# Patient Record
Sex: FEMALE | Race: WHITE | Employment: UNEMPLOYED | URBAN - METROPOLITAN AREA
[De-identification: names, ages, dates, MRNs, and addresses within clinical notes are randomized per-mention and may not be internally consistent; named-entity substitution may affect disease eponyms.]

---

## 2017-02-14 ENCOUNTER — TRANSFERRED RECORDS (OUTPATIENT)
Dept: HEALTH INFORMATION MANAGEMENT | Facility: CLINIC | Age: 52
End: 2017-02-14

## 2017-02-14 LAB — PAP SMEAR - HIM PATIENT REPORTED: NEGATIVE

## 2017-09-23 ENCOUNTER — RADIANT APPOINTMENT (OUTPATIENT)
Dept: GENERAL RADIOLOGY | Facility: CLINIC | Age: 52
End: 2017-09-23
Attending: PHYSICIAN ASSISTANT
Payer: COMMERCIAL

## 2017-09-23 ENCOUNTER — OFFICE VISIT (OUTPATIENT)
Dept: URGENT CARE | Facility: URGENT CARE | Age: 52
End: 2017-09-23
Payer: COMMERCIAL

## 2017-09-23 VITALS
DIASTOLIC BLOOD PRESSURE: 76 MMHG | HEIGHT: 67 IN | BODY MASS INDEX: 25.11 KG/M2 | HEART RATE: 74 BPM | WEIGHT: 160 LBS | TEMPERATURE: 97.2 F | SYSTOLIC BLOOD PRESSURE: 126 MMHG | RESPIRATION RATE: 16 BRPM

## 2017-09-23 DIAGNOSIS — S99.911A ANKLE INJURY, RIGHT, INITIAL ENCOUNTER: Primary | ICD-10-CM

## 2017-09-23 DIAGNOSIS — S99.911A ANKLE INJURY, RIGHT, INITIAL ENCOUNTER: ICD-10-CM

## 2017-09-23 PROCEDURE — 73610 X-RAY EXAM OF ANKLE: CPT | Mod: RT

## 2017-09-23 PROCEDURE — 99203 OFFICE O/P NEW LOW 30 MIN: CPT | Performed by: PHYSICIAN ASSISTANT

## 2017-09-23 RX ORDER — IBUPROFEN 800 MG/1
800 TABLET, FILM COATED ORAL EVERY 8 HOURS PRN
Qty: 30 TABLET | Refills: 1 | Status: SHIPPED | OUTPATIENT
Start: 2017-09-23 | End: 2018-01-01

## 2017-09-23 NOTE — NURSING NOTE
"Chief Complaint   Patient presents with     Urgent Care     Musculoskeletal Problem     was coming down stairs last night and missed a step. injury to right ankle.        Initial /76  Pulse 74  Temp 97.2  F (36.2  C) (Oral)  Resp 16  Ht 5' 7\" (1.702 m)  Wt 160 lb (72.6 kg)  LMP 08/25/2017  Breastfeeding? No  BMI 25.06 kg/m2 Estimated body mass index is 25.06 kg/(m^2) as calculated from the following:    Height as of this encounter: 5' 7\" (1.702 m).    Weight as of this encounter: 160 lb (72.6 kg).  Medication Reconciliation: complete  "

## 2017-09-23 NOTE — MR AVS SNAPSHOT
After Visit Summary   9/23/2017    Megha Campbell    MRN: [id number]           Patient Information     Date Of Birth          1965        Visit Information        Provider Department      9/23/2017 6:35 PM Abilio Purdy PA-C [facility]        Today's Diagnoses     Ankle injury, right, initial encounter    -  1      Care Instructions    600 mg ibuprofen 3 times a day for 3-5 days.  If not improving in a week, follow up with pcp        Self-Care for Strains and Sprains  Most minor strains and sprains can be treated with self-care. Recovering from a strain or sprain may take 6 to 8 weeks. Your self-care goal is to reduce pain and immobilize the injury to speed healing.     A sprain injures ligaments (tissue that connects bones to bones).        A strain injures muscles or tendons (tissue that connects muscles to bones).   Support the injured area  Wrapping the injured area provides support for short, necessary activities. Be careful not to wrap the area too tightly. This could cut off the blood supply.    Support a wrist, elbow, or shoulder with a sling.    Wrap an ankle or knee with an elastic bandage.    Tape a finger or toe to the one next to it.  Use cold and heat  Cold reduces swelling. Both cold and heat reduce pain. Heat should not be used in the initial treatment of the injury. When using cold or heat, always place a towel between the pack and your skin.    Apply ice or a cold pack 10 to 15 minutes every hour you re awake for the first 2 days.    After the swelling goes down, use cold or heat to control pain. Don t use heat late in the day, since it can cause swelling when you re not active.  Rest and elevate  Rest and elevation help your injury heal faster.    Raise the injured area above your heart level.    Keep the injured area from moving.    Limit the use of the joint or limb.  Use medicine    Aspirin reduces pain and swelling. (Note: Don t give  aspirin to a child 18 or younger unless prescribed by the doctor.)    Aspirin substitutes, such as ibuprofen, can reduce pain. Some substitutes reduce swelling, too. Ask your pharmacist which substitutes you can use.  Call your doctor if:    The injured joint won t move, or bones make a grating sound when they move.    You can t put weight on the injured area, even after 24 hours.    The injured body part is cold, blue, or numb.    The joint or limb appears bent or crooked.    Pain increases or doesn t improve in 4 days.    When pressing along the injured area, you notice a spot that is especially painful.   Date Last Reviewed: 9/29/2015 2000-2017 [employer] [address] [place] [address], [place] [address]. All rights reserved. This information is not intended as a substitute for professional medical care. Always follow your healthcare professional's instructions.        R.I.C.E.    R.I.C.E. stands for Rest, Ice, Compression, and Elevation. Doing these things helps limit pain and swelling after an injury. R.I.C.E. also helps injuries heal faster. Use R.I.C.E. for sprains, strains, and severe bruises or bumps. Follow the tips on this handout and begin R.I.C.E. as soon as possible after an injury.  ? Rest  Pain is your body s way of telling you to rest an injured area. Whether you have hurt an elbow, hand, foot, or knee, limiting its use will prevent further injury and help you heal.  ? Ice  Applying ice right after an injury helps prevent swelling and reduce pain. Don t place ice directly on your skin.    Wrap a cold pack or bag of ice in a thin cloth. Place it over the injured area.    Ice for 10 minutes every 3 hours. Don t ice for more than 20 minutes at a time.  ? Compression  Putting pressure (compression) on an injury helps prevent swelling and provides support.    Wrap the injured area firmly with an elastic bandage. If your hand or foot tingles, becomes discolored, or feels cold to the touch, the  "bandage may be too tight. Rewrap it more loosely.    If your bandage becomes too loose, rewrap it.    Do not wear an elastic bandage overnight.  ? Elevation  Keeping an injury elevated helps reduce swelling, pain, and throbbing. Elevation is most effective when the injury is kept elevated higher than the heart.     Call your healthcare provider if you notice any of the following:    Fingers or toes feel numb, are cold to the touch, or change color    Skin looks shiny or tight    Pain, swelling, or bruising worsens and is not improved with elevation   Date Last Reviewed: 9/3/2015    9296-4813 [employer]. [address] [place] [address]. All rights reserved. This information is not intended as a substitute for professional medical care. Always follow your healthcare professional's instructions.                Follow-ups after your visit        Who to contact     If you have questions or need follow up information about today's clinic visit or your schedule please contact [facility] directly at [phone].  Normal or non-critical lab and imaging results will be communicated to you by [employer], letter or phone within 4 business days after the clinic has received the results. If you do not hear from us within 7 days, please contact the clinic through [employer] or phone. If you have a critical or abnormal lab result, we will notify you by phone as soon as possible.  Submit refill requests through [employer] or call your pharmacy and they will forward the refill request to us. Please allow 3 business days for your refill to be completed.          Additional Information About Your Visit        [employer] Information     [employer] lets you send messages to your doctor, view your test results, renew your prescriptions, schedule appointments and more. To sign up, go to [email or web address] . Click on \"Log in\" on the left side of the screen, which will take you to the Welcome page. " "Then click on \"Sign up Now\" on the right side of the page.     You will be asked to enter the access code listed below, as well as some personal information. Please follow the directions to create your username and password.     Your access code is: [email or web address]  Expires: 2017  7:16 PM     Your access code will  in 90 days. If you need help or a new code, please call your [facility] clinic or [phone].        Care EveryWhere ID     This is your Care EveryWhere ID. This could be used by other organizations to access your [facility] medical records  [id number]        Your Vitals Were     Pulse Temperature Respirations Height Last Period Breastfeeding?    74 97.2  F (36.2  C) (Oral) 16 5' 7\" (1.702 m) 2017 No    BMI (Body Mass Index)                   25.06 kg/m2            Blood Pressure from Last 3 Encounters:   17 126/76    Weight from Last 3 Encounters:   17 160 lb (72.6 kg)                 Today's Medication Changes          These changes are accurate as of: 17  7:16 PM.  If you have any questions, ask your nurse or doctor.               Start taking these medicines.        Dose/Directions    * order for DME   Used for:  Ankle injury, right, initial encounter   Started by:  Abilio Purdy PA-C        Air splint   Quantity:  1 Device   Refills:  0       * order for DME   Used for:  Ankle injury, right, initial encounter   Started by:  Abilio Purdy PA-C        2 crutches   Quantity:  2 Device   Refills:  0       * Notice:  This list has 2 medication(s) that are the same as other medications prescribed for you. Read the directions carefully, and ask your doctor or other care provider to review them with you.         Where to get your medicines      Some of these will need a paper prescription and others can be bought over the counter.  Ask your nurse if you have questions.     Bring a paper prescription for each of these medications     order for DME    " order for DME                Primary Care Provider    None Specified       No primary provider on file.        Equal Access to Services     TORIAROLDO ROSEANN : Hadii aad ku hadmariettagómez Matildemichelet, jo annashley grimm, johnnaangela piersonumbertoashley dejesus, jose abelrobbiegini ash. So [facility] [phone].    ATENCIÓN: Si habla español, tiene a anaya disposición servicios gratuitos de asistencia lingüística. Llame al [phone].    We comply with applicable federal civil rights laws and [place] laws. We do not discriminate on the basis of race, color, national origin, age, disability sex, sexual orientation or gender identity.            Thank you!     Thank you for choosing [facility]  for your care. Our goal is always to provide you with excellent care. Hearing back from our patients is one way we can continue to improve our services. Please take a few minutes to complete the written survey that you may receive in the mail after your visit with us. Thank you!             Your Updated Medication List - Protect others around you: Learn how to safely use, store and throw away your medicines at www.disposemymeds.org.          This list is accurate as of: 9/23/17  7:16 PM.  Always use your most recent med list.                   Brand Name Dispense Instructions for use Diagnosis    * order for DME     1 Device    Air splint    Ankle injury, right, initial encounter       * order for DME     2 Device    2 crutches    Ankle injury, right, initial encounter       SERTRALINE HCL PO      Take by mouth daily        WELLBUTRIN PO           * Notice:  This list has 2 medication(s) that are the same as other medications prescribed for you. Read the directions carefully, and ask your doctor or other care provider to review them with you.

## 2017-09-24 NOTE — PROGRESS NOTES
"SUBJECTIVE:  Chief Complaint   Patient presents with     Urgent Care     Musculoskeletal Problem     was coming down stairs last night and missed a step. injury to right ankle.      Megha Campbell is a 52 year old female presents with a chief complaint of right ankle pain.  The injury occurred 1 day(s) ago.   The injury happened while at home. How: twistedimmediate pain.  The patient complained of severe pain  and has not had decreased ROM.  Pain exacerbated by weight-bearing.  Relieved by rest.  She treated it initially with Ibuprofen. This is not the first time this type of injury has occurred to this patient.     No past medical history on file.  Current Outpatient Prescriptions   Medication Sig Dispense Refill     BuPROPion HCl (WELLBUTRIN PO)        SERTRALINE HCL PO Take by mouth daily       order for DME Air splint 1 Device 0     order for DME 2 crutches 2 Device 0     Social History   Substance Use Topics     Smoking status: Never Smoker     Smokeless tobacco: Not on file     Alcohol use Not on file       ROS:  Review of systems negative except as stated above.    EXAM:   /76  Pulse 74  Temp 97.2  F (36.2  C) (Oral)  Resp 16  Ht 5' 7\" (1.702 m)  Wt 160 lb (72.6 kg)  LMP 08/25/2017  Breastfeeding? No  BMI 25.06 kg/m2  Gen: healthy,alert,no distress  Extremity: foot has point tenderness on medial dursum. Pain with eversion and inversion. No swelling, erythema, warmth  There is not compromise to the distal circulation.  Pulses are +2 and CRT is brisk  GENERAL APPEARANCE: healthy, alert and no distress  EXTREMITIES: peripheral pulses normal  SKIN: no suspicious lesions or rashes  NEURO: Normal strength and tone, sensory exam grossly normal, mentation intact and speech normal    X-RAY was done. Appears WNL in initial read      ASSESSMENT:   (V77.894W) Ankle injury, right, initial encounter  (primary encounter diagnosis)  Comment: high level of reported pain, minimal swelling.    Plan: XR Ankle Right " G/E 3 Views, order for DME,         order for DME, ibuprofen (ADVIL/MOTRIN) 800 MG         tablet  800mg ibuprofen in clinic  Follow up with PCP if symptoms worsen or fail to improve  In 7 days  Patient Instructions     600 mg ibuprofen 3 times a day for 3-5 days.  If not improving in a week, follow up with pcp        Self-Care for Strains and Sprains  Most minor strains and sprains can be treated with self-care. Recovering from a strain or sprain may take 6 to 8 weeks. Your self-care goal is to reduce pain and immobilize the injury to speed healing.     A sprain injures ligaments (tissue that connects bones to bones).        A strain injures muscles or tendons (tissue that connects muscles to bones).   Support the injured area  Wrapping the injured area provides support for short, necessary activities. Be careful not to wrap the area too tightly. This could cut off the blood supply.    Support a wrist, elbow, or shoulder with a sling.    Wrap an ankle or knee with an elastic bandage.    Tape a finger or toe to the one next to it.  Use cold and heat  Cold reduces swelling. Both cold and heat reduce pain. Heat should not be used in the initial treatment of the injury. When using cold or heat, always place a towel between the pack and your skin.    Apply ice or a cold pack 10 to 15 minutes every hour you re awake for the first 2 days.    After the swelling goes down, use cold or heat to control pain. Don t use heat late in the day, since it can cause swelling when you re not active.  Rest and elevate  Rest and elevation help your injury heal faster.    Raise the injured area above your heart level.    Keep the injured area from moving.    Limit the use of the joint or limb.  Use medicine    Aspirin reduces pain and swelling. (Note: Don t give aspirin to a child 18 or younger unless prescribed by the doctor.)    Aspirin substitutes, such as ibuprofen, can reduce pain. Some substitutes reduce swelling, too. Ask your  pharmacist which substitutes you can use.  Call your doctor if:    The injured joint won t move, or bones make a grating sound when they move.    You can t put weight on the injured area, even after 24 hours.    The injured body part is cold, blue, or numb.    The joint or limb appears bent or crooked.    Pain increases or doesn t improve in 4 days.    When pressing along the injured area, you notice a spot that is especially painful.   Date Last Reviewed: 9/29/2015 2000-2017 [employer]. [address] [place] [address]. All rights reserved. This information is not intended as a substitute for professional medical care. Always follow your healthcare professional's instructions.        R.I.C.E.    R.I.C.E. stands for Rest, Ice, Compression, and Elevation. Doing these things helps limit pain and swelling after an injury. R.I.C.E. also helps injuries heal faster. Use R.I.C.E. for sprains, strains, and severe bruises or bumps. Follow the tips on this handout and begin R.I.C.E. as soon as possible after an injury.  ? Rest  Pain is your body s way of telling you to rest an injured area. Whether you have hurt an elbow, hand, foot, or knee, limiting its use will prevent further injury and help you heal.  ? Ice  Applying ice right after an injury helps prevent swelling and reduce pain. Don t place ice directly on your skin.    Wrap a cold pack or bag of ice in a thin cloth. Place it over the injured area.    Ice for 10 minutes every 3 hours. Don t ice for more than 20 minutes at a time.  ? Compression  Putting pressure (compression) on an injury helps prevent swelling and provides support.    Wrap the injured area firmly with an elastic bandage. If your hand or foot tingles, becomes discolored, or feels cold to the touch, the bandage may be too tight. Rewrap it more loosely.    If your bandage becomes too loose, rewrap it.    Do not wear an elastic bandage overnight.  ? Elevation  Keeping an injury  elevated helps reduce swelling, pain, and throbbing. Elevation is most effective when the injury is kept elevated higher than the heart.     Call your healthcare provider if you notice any of the following:    Fingers or toes feel numb, are cold to the touch, or change color    Skin looks shiny or tight    Pain, swelling, or bruising worsens and is not improved with elevation   Date Last Reviewed: 9/3/2015    6629-3787 [employer]. [address] [place] [address], [place] [address]. All rights reserved. This information is not intended as a substitute for professional medical care. Always follow your healthcare professional's instructions.

## 2017-09-24 NOTE — PATIENT INSTRUCTIONS
600 mg ibuprofen 3 times a day for 3-5 days.  If not improving in a week, follow up with pcp        Self-Care for Strains and Sprains  Most minor strains and sprains can be treated with self-care. Recovering from a strain or sprain may take 6 to 8 weeks. Your self-care goal is to reduce pain and immobilize the injury to speed healing.     A sprain injures ligaments (tissue that connects bones to bones).        A strain injures muscles or tendons (tissue that connects muscles to bones).   Support the injured area  Wrapping the injured area provides support for short, necessary activities. Be careful not to wrap the area too tightly. This could cut off the blood supply.    Support a wrist, elbow, or shoulder with a sling.    Wrap an ankle or knee with an elastic bandage.    Tape a finger or toe to the one next to it.  Use cold and heat  Cold reduces swelling. Both cold and heat reduce pain. Heat should not be used in the initial treatment of the injury. When using cold or heat, always place a towel between the pack and your skin.    Apply ice or a cold pack 10 to 15 minutes every hour you re awake for the first 2 days.    After the swelling goes down, use cold or heat to control pain. Don t use heat late in the day, since it can cause swelling when you re not active.  Rest and elevate  Rest and elevation help your injury heal faster.    Raise the injured area above your heart level.    Keep the injured area from moving.    Limit the use of the joint or limb.  Use medicine    Aspirin reduces pain and swelling. (Note: Don t give aspirin to a child 18 or younger unless prescribed by the doctor.)    Aspirin substitutes, such as ibuprofen, can reduce pain. Some substitutes reduce swelling, too. Ask your pharmacist which substitutes you can use.  Call your doctor if:    The injured joint won t move, or bones make a grating sound when they move.    You can t put weight on the injured area, even after 24 hours.    The injured  body part is cold, blue, or numb.    The joint or limb appears bent or crooked.    Pain increases or doesn t improve in 4 days.    When pressing along the injured area, you notice a spot that is especially painful.   Date Last Reviewed: 9/29/2015 2000-2017 [employer]. [address] [place] [address]. All rights reserved. This information is not intended as a substitute for professional medical care. Always follow your healthcare professional's instructions.        R.I.C.E.    R.I.C.E. stands for Rest, Ice, Compression, and Elevation. Doing these things helps limit pain and swelling after an injury. R.I.C.E. also helps injuries heal faster. Use R.I.C.E. for sprains, strains, and severe bruises or bumps. Follow the tips on this handout and begin R.I.C.E. as soon as possible after an injury.  ? Rest  Pain is your body s way of telling you to rest an injured area. Whether you have hurt an elbow, hand, foot, or knee, limiting its use will prevent further injury and help you heal.  ? Ice  Applying ice right after an injury helps prevent swelling and reduce pain. Don t place ice directly on your skin.    Wrap a cold pack or bag of ice in a thin cloth. Place it over the injured area.    Ice for 10 minutes every 3 hours. Don t ice for more than 20 minutes at a time.  ? Compression  Putting pressure (compression) on an injury helps prevent swelling and provides support.    Wrap the injured area firmly with an elastic bandage. If your hand or foot tingles, becomes discolored, or feels cold to the touch, the bandage may be too tight. Rewrap it more loosely.    If your bandage becomes too loose, rewrap it.    Do not wear an elastic bandage overnight.  ? Elevation  Keeping an injury elevated helps reduce swelling, pain, and throbbing. Elevation is most effective when the injury is kept elevated higher than the heart.     Call your healthcare provider if you notice any of the following:    Fingers or  toes feel numb, are cold to the touch, or change color    Skin looks shiny or tight    Pain, swelling, or bruising worsens and is not improved with elevation   Date Last Reviewed: 9/3/2015    1562-8380 [employer]. [address] [place] [address], [place] [address]. All rights reserved. This information is not intended as a substitute for professional medical care. Always follow your healthcare professional's instructions.

## 2017-10-26 ENCOUNTER — HOSPITAL ENCOUNTER (EMERGENCY)
Facility: CLINIC | Age: 52
Discharge: HOME | End: 2017-10-26
Attending: EMERGENCY MEDICINE | Admitting: EMERGENCY MEDICINE
Payer: COMMERCIAL

## 2017-10-26 VITALS
HEIGHT: 68 IN | OXYGEN SATURATION: 99 % | TEMPERATURE: 98.5 F | BODY MASS INDEX: 25.76 KG/M2 | DIASTOLIC BLOOD PRESSURE: 71 MMHG | WEIGHT: 170 LBS | SYSTOLIC BLOOD PRESSURE: 124 MMHG

## 2017-10-26 DIAGNOSIS — R11.2 NAUSEA AND VOMITING, INTRACTABILITY OF VOMITING NOT SPECIFIED, UNSPECIFIED VOMITING TYPE: ICD-10-CM

## 2017-10-26 LAB
ANION GAP SERPL CALCULATED.3IONS-SCNC: 11 MMOL/L (ref 3–14)
ANION GAP SERPL CALCULATED.3IONS-SCNC: NORMAL MMOL/L (ref 6–17)
BUN SERPL-MCNC: 10 MG/DL (ref 7–30)
BUN SERPL-MCNC: NORMAL MG/DL (ref 7–30)
CALCIUM SERPL-MCNC: 8.1 MG/DL (ref 8.5–10.1)
CALCIUM SERPL-MCNC: NORMAL MG/DL (ref 8.5–10.1)
CHLORIDE SERPL-SCNC: 103 MMOL/L (ref 94–109)
CHLORIDE SERPL-SCNC: NORMAL MMOL/L (ref 94–109)
CO2 SERPL-SCNC: 23 MMOL/L (ref 20–32)
CO2 SERPL-SCNC: NORMAL MMOL/L (ref 20–32)
CREAT SERPL-MCNC: 0.84 MG/DL (ref 0.52–1.04)
CREAT SERPL-MCNC: NORMAL MG/DL (ref 0.52–1.04)
ERYTHROCYTE [DISTWIDTH] IN BLOOD BY AUTOMATED COUNT: 12.7 % (ref 10–15)
GFR SERPL CREATININE-BSD FRML MDRD: 72 ML/MIN/1.7M2
GFR SERPL CREATININE-BSD FRML MDRD: NORMAL ML/MIN/1.7M2
GLUCOSE SERPL-MCNC: 68 MG/DL (ref 70–99)
GLUCOSE SERPL-MCNC: NORMAL MG/DL (ref 70–99)
HCT VFR BLD AUTO: 41.6 % (ref 35–47)
HGB BLD-MCNC: 14.3 G/DL (ref 11.7–15.7)
INTERPRETATION ECG - MUSE: NORMAL
MCH RBC QN AUTO: 30.6 PG (ref 26.5–33)
MCHC RBC AUTO-ENTMCNC: 34.4 G/DL (ref 31.5–36.5)
MCV RBC AUTO: 89 FL (ref 78–100)
PLATELET # BLD AUTO: 217 10E9/L (ref 150–450)
POTASSIUM SERPL-SCNC: 3.5 MMOL/L (ref 3.4–5.3)
POTASSIUM SERPL-SCNC: NORMAL MMOL/L (ref 3.4–5.3)
RBC # BLD AUTO: 4.68 10E12/L (ref 3.8–5.2)
SODIUM SERPL-SCNC: 137 MMOL/L (ref 133–144)
SODIUM SERPL-SCNC: NORMAL MMOL/L (ref 133–144)
TROPONIN I SERPL-MCNC: <0.015 UG/L (ref 0–0.04)
TROPONIN I SERPL-MCNC: NORMAL UG/L (ref 0–0.04)
WBC # BLD AUTO: 7.6 10E9/L (ref 4–11)

## 2017-10-26 PROCEDURE — 96361 HYDRATE IV INFUSION ADD-ON: CPT

## 2017-10-26 PROCEDURE — 96374 THER/PROPH/DIAG INJ IV PUSH: CPT

## 2017-10-26 PROCEDURE — 99284 EMERGENCY DEPT VISIT MOD MDM: CPT | Mod: 25

## 2017-10-26 PROCEDURE — 80048 BASIC METABOLIC PNL TOTAL CA: CPT | Performed by: EMERGENCY MEDICINE

## 2017-10-26 PROCEDURE — 85027 COMPLETE CBC AUTOMATED: CPT | Performed by: EMERGENCY MEDICINE

## 2017-10-26 PROCEDURE — 84484 ASSAY OF TROPONIN QUANT: CPT | Performed by: EMERGENCY MEDICINE

## 2017-10-26 PROCEDURE — 25000128 H RX IP 250 OP 636: Performed by: EMERGENCY MEDICINE

## 2017-10-26 RX ORDER — ONDANSETRON 2 MG/ML
4 INJECTION INTRAMUSCULAR; INTRAVENOUS ONCE
Status: COMPLETED | OUTPATIENT
Start: 2017-10-26 | End: 2017-10-26

## 2017-10-26 RX ORDER — ONDANSETRON 4 MG/1
4 TABLET, ORALLY DISINTEGRATING ORAL EVERY 8 HOURS PRN
Qty: 10 TABLET | Refills: 0 | Status: SHIPPED | OUTPATIENT
Start: 2017-10-26 | End: 2017-10-29

## 2017-10-26 RX ADMIN — SODIUM CHLORIDE 1000 ML: 9 INJECTION, SOLUTION INTRAVENOUS at 03:09

## 2017-10-26 RX ADMIN — ONDANSETRON 4 MG: 2 SOLUTION INTRAMUSCULAR; INTRAVENOUS at 03:09

## 2017-10-26 ASSESSMENT — ENCOUNTER SYMPTOMS
COUGH: 0
NAUSEA: 1
DIARRHEA: 0
SORE THROAT: 1
CHEST TIGHTNESS: 0
WEAKNESS: 1
ABDOMINAL PAIN: 0
VOMITING: 1

## 2017-10-26 NOTE — ED AVS SNAPSHOT
Emergency Department    [address] [place] [id number]    Phone:  [phone]    Fax:  [phone]                                       Megha Campbell   MRN: [id number]    Department:   Emergency Department   Date of Visit:  10/26/2017           After Visit Summary Signature Page     I have received my discharge instructions, and my questions have been answered. I have discussed any challenges I see with this plan with the nurse or doctor.    ..........................................................................................................................................  Patient/Patient Representative Signature      ..........................................................................................................................................  Patient Representative Print Name and Relationship to Patient    ..................................................               ................................................  Date                                            Time    ..........................................................................................................................................  Reviewed by Signature/Title    ...................................................              ..............................................  Date                                                            Time

## 2017-10-26 NOTE — DISCHARGE INSTRUCTIONS
Please stop your amoxicillin.  Please use zofran as needed for nausea.  [place] diet.  Follow-up with primary care provider in 2-3 days.  Return to ER with new or troubling symptoms.    Discharge Instructions  Vomiting    You have been seen today for vomiting. This is usually caused by a virus, but some bacteria, parasites, medicines or other medical conditions can cause similar symptoms. At this time your doctor does not find that your vomiting is a sign of anything dangerous or life-threatening. However, sometimes the signs of serious illness do not show up right away. If you have new or worse symptoms, you may need to be seen again in the emergency department or by your primary doctor. Remember that serious problems like appendicitis can start as vomiting.     Return to the Emergency Department if:    You keep throwing up and you are not able to keep liquids down.     You feel you are getting dehydrated, such as being very thirsty, not urinating at least every 8-12 hours, or feeling faint or lightheaded.     You develop a new fever, or your fever continues for more than 2 days.     You have belly pain that seems worse than cramps, is in one spot, or is getting worse over time.     You have blood in your vomit or stools.     You feel very weak.    You are not starting to improve within 24 hours of your visit here.     What can I do to help myself?    The most important thing to do is to drink clear liquids. If you have been vomiting a lot, it is best to have only small, frequent sips of liquids. Drinking too much at once may cause more vomiting. If you are vomiting often, you must replace minerals, sodium and potassium lost with your illness. Pedialyte  and sports drinks can help you replace these minerals. You can also drink clear liquids such as water, weak tea, apple juice, and 7-Up . Avoid acid liquids (orange), caffeine (coffee) or alcohol. Do not drink milk until you no longer have diarrhea.     After liquids  are staying down, you may start eating mild foods. Soda crackers, toast, plain noodles, gelatin, applesauce and bananas are good first choices. Avoid foods that have acid, are spicy, fatty or have a lot of fiber (such as meats, coarse grains, vegetables). You may start eating these foods again in about 3 days when you are better.     Sometimes treatment includes prescription medicine to prevent nausea and vomiting. If your doctor prescribes these for you, take them as directed.     Don t take ibuprofen, or other nonsteroidal anti-inflammatory medicines without checking with your healthcare provider.   If you were given a prescription for medicine here today, be sure to read all of the information (including the package insert) that comes with your prescription.  This will include important information about the medicine, its side effects, and any warnings that you need to know about.  The pharmacist who fills the prescription can provide more information and answer questions you may have about the medicine.  If you have questions or concerns that the pharmacist cannot address, please call or return to the Emergency Department.       Opioid Medication Information    Pain medications are among the most commonly prescribed medicines, so we are including this information for all our patients. If you did not receive pain medication or get a prescription for pain medicine, you can ignore it.     You may have been given a prescription for an opioid (narcotic) pain medicine and/or have received a pain medicine while here in the Emergency Department. These medicines can make you drowsy or impaired. You must not drive, operate dangerous equipment, or engage in any other dangerous activities while taking these medications. If you drive while taking these medications, you could be arrested for DUI, or driving under the influence. Do not drink any alcohol while you are taking these medications.     Opioid pain medications can  cause addiction. If you have a history of chemical dependency of any type, you are at a higher risk of becoming addicted to pain medications.  Only take these prescribed medications to treat your pain when all other options have been tried. Take it for as short a time and as few doses as possible. Store your pain pills in a secure place, as they are frequently stolen and provide a dangerous opportunity for children or visitors in your house to start abusing these powerful medications. We will not replace any lost or stolen medicine.  As soon as your pain is better, you should flush all your remaining medication.     Many prescription pain medications contain Tylenol  (acetaminophen), including Vicodin , Tylenol #3 , Norco , Lortab , and Percocet .  You should not take any extra pills of Tylenol  if you are using these prescription medications or you can get very sick.  Do not ever take more than 3000 mg of acetaminophen in any 24 hour period.    All opioids tend to cause constipation. Drink plenty of water and eat foods that have a lot of fiber, such as fruits, vegetables, prune juice, apple juice and high fiber cereal.  Take a laxative if you don t move your bowels at least every other day. Miralax , Milk of Magnesia, Colace , or Senna  can be used to keep you regular.      Remember that you can always come back to the Emergency Department if you are not able to see your regular doctor in the amount of time listed above, if you get any new symptoms, or if there is anything that worries you.

## 2017-10-26 NOTE — ED NOTES
MD at bedside.  Silva Jennings RN,.......................................... 10/26/2017   2:42 AM

## 2017-10-26 NOTE — ED PROVIDER NOTES
History     Chief Complaint:  Nausea & Vomiting     HPI   Megha Campbell is a 52 year old female who presents to the emergency department today for evaluation of nausea and vomiting. The patient reports that last Thursday, 10/19/2017, she presented to her clinic, [facility], for evaluation of a sore throat. The patient had negative strep testing and was then discharged on 800 mg Amoxicillin. Since that time, the patient reports that she has been having 3-4 episodes of vomiting and has been constantly nauseous and weak. The patient last took a dose of her Amoxicillin on Wednesday, 10/25/2017. This morning the patient was feeling particularly weak and nauseous so she decided to come here to the emergency department. Here she denies any chest pain, chest pressure, sore throat, diarrhea, ear pain, coughing, abdominal pain, rash, recent sick contacts, or recent travel. The patient has already received her flu shot this year.     Allergies:  No Known Drug Allergies     Medications:    Wellbutrin   Ibuprofen   Amoxicillin    Past Medical History:    Past medical history reviewed. No pertinent past medical history.      Past Surgical History:    Surgical history reviewed. No pertinent surgical history.     Family History:    History reviewed. No pertinent family history.     Social History:  Smoking Status: Never Smoker  Marital Status:        Review of Systems   HENT: Positive for sore throat (Resolved). Negative for ear pain.    Respiratory: Negative for cough and chest tightness.    Cardiovascular: Negative for chest pain.   Gastrointestinal: Positive for nausea and vomiting. Negative for abdominal pain and diarrhea.   Skin: Negative for rash.   Neurological: Positive for weakness.   All other systems reviewed and are negative.    Physical Exam     Patient Vitals for the past 24 hrs:   BP Temp Temp src Heart Rate SpO2 Height Weight   10/26/17 0415 - - - - 99 % - -   10/26/17 0400 128/76 - - - - - -  "  10/26/17 0225 130/72 98.5  F (36.9  C) Oral 103 98 % 1.727 m (5' 8\") 77.1 kg (170 lb)     Physical Exam  General:                        Well-nourished                        Speaking in full sentences  Eyes:                        Conjunctiva without injection or scleral icterus                        PERRL  ENT:                        Moist mucous membranes                        Posterior oropharynx with mild erythema, no exudate                        TM translucent and gray bilaterally without fluid or overlying erythema                        Nares patent                        Pinnae normal  Neck:                        Full ROM                        No stiffness appreciated  Resp:                        Lungs CTAB                        No crackles, wheezing or audible rubs                        Good air movement  CV:                                        Normal rate, regular rhythm                        S1 and S2 present                        No murmur, gallop or rub  GI:                        BS present                        Abdomen soft without distention                        Non-tender to light and deep palpation                        No guarding or rebound tenderness  Skin:                        Warm, dry, well perfused                        No rashes or open wounds on exposed skin  MSK:                        Moves all extremities                        No focal deformities or swelling  Neuro:                        Alert                        Answers questions appropriately                        Moves all extremities equally                        Gait stable  Psych:                        Normal affect, normal mood    Emergency Department Course     ECG:  ECG taken at 0325, ECG read at 0331  Normal sinus rhythm  Left axis deviation  Low voltage QRS  Cannot rule out Anterior infarct, age undetermined   Abnormal ECG   Rate 92 bpm. NY interval 154 ms. QRS duration 72 ms. QT/QTc 346/427 " ms. P-R-T axes 55 -30 29.    Laboratory:  Laboratory findings were communicated with the patient who voiced understanding of the findings.    CBC: WBC 7.6, HGB 14.3,   BMP: Glucose 68 (L), Calcium 8.1 (L) o/w WNL (Creatinine 0.84)  Troponin I (Collected 0345): <0.015      Interventions:  0309 Zofran 4 mg IV   0309 NS 1000 ml IV     Emergency Department Course:    0235 Nursing notes and vitals reviewed.    0240 I performed an exam of the patient as documented above.     0311 IV was inserted and blood was drawn for laboratory testing, results above.     0325 EKG obtained     I personally reviewed the laboratory and EKG results with the patient and answered all related questions prior to discharge.    Impression & Plan      Medical Decision Making:  Megha Campbell is a 52 year old female who presents to the emergency department today for evaluation of nausea and vomiting.  VS on presentation reveal HR of 103, though are otherwise unremarkable.  Examination reveals a well appearing female resting comfortably on the gurney, with a reassuring abdominal exam. A broad differential was considered, including though is not limited to gastritis, medication side effect, URI, colitis, cholecystitis, referred cardiac ischemia, CNS process, among others.  Prior records reviewed, including recent clinic visit for sore throat, with negative RST and culture.  No current evidence of deep space neck infection, AOM, otitis externa, nor mastoiditis.  Pulmonary exam is clear without crackles nor consolidation.  Patient has been taking amoxicillin, though in the absence of clear infection at this time, I have recommended she stop the Amoxicillin. This is likely contributing to her symptoms.  Abdominal exam is soft without localizing tenderness.  Surgical intra-abdominal process such as colitis, cholecystitis, appendicitis, perforation are unlikely.  Cardiac ischemia unlikely without chest pain, chest pressure, nor SOB.  Troponin  undetectable.  EKG demonstrates sinus rhythm, with non-specific ST-T wave changes, though again do not feel this is representative of cardiac ischemia.  No headache, AMS, nor neurologic deficits suggestive of CNS process. Patient provided the above symptomatic cares noting improvement in symptoms.  Labs reveal BS of 68, though are otherwise unremarkable.  She is able to tolerate PO.  Will plan DC home, Zofran for nausea, bland diet, follow-up with PCP in 2 days, or return to ER with new or worsening symptoms. Patient felt comfortable with this plan of care and questions were answered prior to DC.    Diagnosis:    ICD-10-CM    1. Nausea and vomiting, intractability of vomiting not specified, unspecified vomiting type R11.2      Disposition:   The patient is discharged to home.    Discharge Medications:  New Prescriptions    ONDANSETRON (ZOFRAN ODT) 4 MG ODT TAB    Take 1 tablet (4 mg) by mouth every 8 hours as needed for nausea     Scribe Disclosure:  Nathan CARDOSO, am serving as a scribe at 2:39 AM on 10/26/2017 to document services personally performed by Lei Youssef MD, based on my observations and the provider's statements to me.     EMERGENCY DEPARTMENT       Lei Youssef MD  10/26/17 0453

## 2017-10-26 NOTE — ED AVS SNAPSHOT
Emergency Department    [address] [facility] [address]    Phone:  [phone]    Fax:  [phone]                                       Megha Campbell   MRN: [id number]    Department:   Emergency Department   Date of Visit:  10/26/2017           Patient Information     Date Of Birth          1965        Your diagnoses for this visit were:     Nausea and vomiting, intractability of vomiting not specified, unspecified vomiting type        You were seen by Lei Youssef MD.      Follow-up Information     Follow up with [facility]. Schedule an appointment as soon as possible for a visit in 2 days.    Contact information:    [address] [facility] [address] [phone]          Follow up with  Emergency Department.    Specialty:  EMERGENCY MEDICINE    Why:  If symptoms worsen    Contact information:    [address] [facility] [id number] [phone]        Discharge Instructions       Please stop your amoxicillin.  Please use zofran as needed for nausea.  Mineral diet.  Follow-up with primary care provider in 2-3 days.  Return to ER with new or troubling symptoms.    Discharge Instructions  Vomiting    You have been seen today for vomiting. This is usually caused by a virus, but some bacteria, parasites, medicines or other medical conditions can cause similar symptoms. At this time your doctor does not find that your vomiting is a sign of anything dangerous or life-threatening. However, sometimes the signs of serious illness do not show up right away. If you have new or worse symptoms, you may need to be seen again in the emergency department or by your primary doctor. Remember that serious problems like appendicitis can start as vomiting.     Return to the Emergency Department if:    You keep throwing up and you are not able to keep liquids down.     You feel you are getting dehydrated, such as being very thirsty, not urinating at  least every 8-12 hours, or feeling faint or lightheaded.     You develop a new fever, or your fever continues for more than 2 days.     You have belly pain that seems worse than cramps, is in one spot, or is getting worse over time.     You have blood in your vomit or stools.     You feel very weak.    You are not starting to improve within 24 hours of your visit here.     What can I do to help myself?    The most important thing to do is to drink clear liquids. If you have been vomiting a lot, it is best to have only small, frequent sips of liquids. Drinking too much at once may cause more vomiting. If you are vomiting often, you must replace minerals, sodium and potassium lost with your illness. Pedialyte  and sports drinks can help you replace these minerals. You can also drink clear liquids such as water, weak tea, apple juice, and 7-Up . Avoid acid liquids (orange), caffeine (coffee) or alcohol. Do not drink milk until you no longer have diarrhea.     After liquids are staying down, you may start eating mild foods. Soda crackers, toast, plain noodles, gelatin, applesauce and bananas are good first choices. Avoid foods that have acid, are spicy, fatty or have a lot of fiber (such as meats, coarse grains, vegetables). You may start eating these foods again in about 3 days when you are better.     Sometimes treatment includes prescription medicine to prevent nausea and vomiting. If your doctor prescribes these for you, take them as directed.     Don t take ibuprofen, or other nonsteroidal anti-inflammatory medicines without checking with your healthcare provider.   If you were given a prescription for medicine here today, be sure to read all of the information (including the package insert) that comes with your prescription.  This will include important information about the medicine, its side effects, and any warnings that you need to know about.  The pharmacist who fills the prescription can provide more  information and answer questions you may have about the medicine.  If you have questions or concerns that the pharmacist cannot address, please call or return to the Emergency Department.       Opioid Medication Information    Pain medications are among the most commonly prescribed medicines, so we are including this information for all our patients. If you did not receive pain medication or get a prescription for pain medicine, you can ignore it.     You may have been given a prescription for an opioid (narcotic) pain medicine and/or have received a pain medicine while here in the Emergency Department. These medicines can make you drowsy or impaired. You must not drive, operate dangerous equipment, or engage in any other dangerous activities while taking these medications. If you drive while taking these medications, you could be arrested for DUI, or driving under the influence. Do not drink any alcohol while you are taking these medications.     Opioid pain medications can cause addiction. If you have a history of chemical dependency of any type, you are at a higher risk of becoming addicted to pain medications.  Only take these prescribed medications to treat your pain when all other options have been tried. Take it for as short a time and as few doses as possible. Store your pain pills in a secure place, as they are frequently stolen and provide a dangerous opportunity for children or visitors in your house to start abusing these powerful medications. We will not replace any lost or stolen medicine.  As soon as your pain is better, you should flush all your remaining medication.     Many prescription pain medications contain Tylenol  (acetaminophen), including Vicodin , Tylenol #3 , Norco , Lortab , and Percocet .  You should not take any extra pills of Tylenol  if you are using these prescription medications or you can get very sick.  Do not ever take more than 3000 mg of acetaminophen in any 24 hour  period.    All opioids tend to cause constipation. Drink plenty of water and eat foods that have a lot of fiber, such as fruits, vegetables, prune juice, apple juice and high fiber cereal.  Take a laxative if you don t move your bowels at least every other day. Miralax , Milk of Magnesia, Colace , or Senna  can be used to keep you regular.      Remember that you can always come back to the Emergency Department if you are not able to see your regular doctor in the amount of time listed above, if you get any new symptoms, or if there is anything that worries you.          24 Hour Appointment Hotline       To make an appointment at any [facility], call [phone] ([phone]). If you don't have a family doctor or clinic, we will help you find one. [facility] are conveniently located to serve the needs of you and your family.             Review of your medicines      START taking        Dose / Directions Last dose taken    ondansetron 4 MG ODT tab   Commonly known as:  ZOFRAN ODT   Dose:  4 mg   Quantity:  10 tablet        Take 1 tablet (4 mg) by mouth every 8 hours as needed for nausea   Refills:  0          Our records show that you are taking the medicines listed below. If these are incorrect, please call your family doctor or clinic.        Dose / Directions Last dose taken    ibuprofen 800 MG tablet   Commonly known as:  ADVIL/MOTRIN   Dose:  800 mg   Quantity:  30 tablet        Take 1 tablet (800 mg) by mouth every 8 hours as needed for moderate pain   Refills:  1        * order for DME   Quantity:  1 Device        Air splint   Refills:  0        * order for DME   Quantity:  2 Device        2 crutches   Refills:  0        SERTRALINE HCL PO        Take by mouth daily   Refills:  0        WELLBUTRIN PO        Refills:  0        * Notice:  This list has 2 medication(s) that are the same as other medications prescribed for you. Read the directions carefully, and ask your doctor or other care  provider to review them with you.            Prescriptions were sent or printed at these locations (1 Prescription)                   Other Prescriptions                Printed at Department/Unit printer (1 of 1)         ondansetron (ZOFRAN ODT) 4 MG ODT tab                Procedures and tests performed during your visit     Procedure/Test Number of Times Performed    Basic metabolic panel 2    CBC (+ platelets, no diff) 1    EKG 12 lead 1    Troponin I 1    Troponin I (now) 1      Orders Needing Specimen Collection     None      Pending Results     Date and Time Order Name Status Description    10/26/2017 0246 EKG 12 lead Preliminary             Pending Culture Results     No orders found from 10/24/2017 to 10/27/2017.            Pending Results Instructions     If you had any lab results that were not finalized at the time of your Discharge, you can call the ED Lab Result RN at [phone]. You will be contacted by this team for any positive Lab results or changes in treatment. The nurses are available 7 days a week from 10A to 6:30P.  You can leave a message 24 hours per day and they will return your call.        Test Results From Your Hospital Stay        10/26/2017  3:23 AM      Component Results     Component Value Ref Range & Units Status    WBC 7.6 4.0 - 11.0 10e9/L Final    RBC Count 4.68 3.8 - 5.2 10e12/L Final    Hemoglobin 14.3 11.7 - 15.7 g/dL Final    Hematocrit 41.6 35.0 - 47.0 % Final    MCV 89 78 - 100 fl Final    MCH 30.6 26.5 - 33.0 pg Final    MCHC 34.4 31.5 - 36.5 g/dL Final    RDW 12.7 10.0 - 15.0 % Final    Platelet Count 217 150 - 450 10e9/L Final         10/26/2017  3:34 AM      Component Results     Component Value Ref Range & Units Status    Sodium Canceled, Test credited 133 - 144 mmol/L Final    Unsatisfactory specimen - hemolyzed    Potassium Canceled, Test credited 3.4 - 5.3 mmol/L Final    Unsatisfactory specimen - hemolyzed    Chloride Canceled, Test credited 94 - 109 mmol/L Final     Unsatisfactory specimen - hemolyzed    Carbon Dioxide Canceled, Test credited 20 - 32 mmol/L Final    Unsatisfactory specimen - hemolyzed    Anion Gap Canceled, Test credited 6 - 17 mmol/L Final    Unsatisfactory specimen - hemolyzed    Glucose Canceled, Test credited 70 - 99 mg/dL Final    Unsatisfactory specimen - hemolyzed    Urea Nitrogen Canceled, Test credited 7 - 30 mg/dL Final    Unsatisfactory specimen - hemolyzed    Creatinine Canceled, Test credited 0.52 - 1.04 mg/dL Final    Unsatisfactory specimen - hemolyzed    GFR Estimate Canceled, Test credited >60 mL/min/1.7m2 Final    Unsatisfactory specimen - hemolyzed    GFR Estimate If Black Canceled, Test credited >60 mL/min/1.7m2 Final    Unsatisfactory specimen - hemolyzed    Calcium Canceled, Test credited 8.5 - 10.1 mg/dL Final    Unsatisfactory specimen - hemolyzed         10/26/2017  3:34 AM      Component Results     Component Value Ref Range & Units Status    Troponin I ES Canceled, Test credited 0.000 - 0.045 ug/L Final    Unsatisfactory specimen - hemolyzed         10/26/2017  4:11 AM      Component Results     Component Value Ref Range & Units Status    Sodium 137 133 - 144 mmol/L Final    Potassium 3.5 3.4 - 5.3 mmol/L Final    Chloride 103 94 - 109 mmol/L Final    Carbon Dioxide 23 20 - 32 mmol/L Final    Anion Gap 11 3 - 14 mmol/L Final    Glucose 68 (L) 70 - 99 mg/dL Final    Urea Nitrogen 10 7 - 30 mg/dL Final    Creatinine 0.84 0.52 - 1.04 mg/dL Final    GFR Estimate 72 >60 mL/min/1.7m2 Final    Non  GFR Calc    GFR Estimate If Black 87 >60 mL/min/1.7m2 Final    African American GFR Calc    Calcium 8.1 (L) 8.5 - 10.1 mg/dL Final         10/26/2017  4:11 AM      Component Results     Component Value Ref Range & Units Status    Troponin I ES <0.015 0.000 - 0.045 ug/L Final    The 99th percentile for upper reference range is 0.045 ug/L.  Troponin values   in the range of 0.045 - 0.120 ug/L may be associated with risks of  adverse   clinical events.                  Clinical Quality Measure: Blood Pressure Screening     Your blood pressure was checked while you were in the emergency department today. The last reading we obtained was  BP: 128/76 . Please read the guidelines below about what these numbers mean and what you should do about them.  If your systolic blood pressure (the top number) is less than 120 and your diastolic blood pressure (the bottom number) is less than 80, then your blood pressure is normal. There is nothing more that you need to do about it.  If your systolic blood pressure (the top number) is 120-139 or your diastolic blood pressure (the bottom number) is 80-89, your blood pressure may be higher than it should be. You should have your blood pressure rechecked within a year by a primary care provider.  If your systolic blood pressure (the top number) is 140 or greater or your diastolic blood pressure (the bottom number) is 90 or greater, you may have high blood pressure. High blood pressure is treatable, but if left untreated over time it can put you at risk for heart attack, stroke, or kidney failure. You should have your blood pressure rechecked by a primary care provider within the next 4 weeks.  If your provider in the emergency department today gave you specific instructions to follow-up with your doctor or provider even sooner than that, you should follow that instruction and not wait for up to 4 weeks for your follow-up visit.        Thank you for choosing [facility]       Thank you for choosing [facility] for your care. Our goal is always to provide you with excellent care. Hearing back from our patients is one way we can continue to improve our services. Please take a few minutes to complete the written survey that you may receive in the mail after you visit with us. Thank you!        [facility] Information     [employer] lets you send messages to your doctor, view your test results, renew your prescriptions,  "schedule appointments and more. To sign up, go to [email or web address] . Click on \"Log in\" on the left side of the screen, which will take you to the Welcome page. Then click on \"Sign up Now\" on the right side of the page.     You will be asked to enter the access code listed below, as well as some personal information. Please follow the directions to create your username and password.     Your access code is: [email or web address]  Expires: 2017  7:16 PM     Your access code will  in 90 days. If you need help or a new code, please call your [facility] clinic or [phone].        Care EveryWhere ID     This is your Care EveryWhere ID. This could be used by other organizations to access your [facility] medical records  [id number]        Equal Access to Services     JOHN WHITFIELD : Edel Tay, patsy grimm, clemencia dejesus, jose diaz . So [facility] [phone].    ATENCIÓN: Si habla español, tiene a anaya disposición servicios gratuitos de asistencia lingüística. Llame al [phone].    We comply with applicable federal civil rights laws and [place] laws. We do not discriminate on the basis of race, color, national origin, age, disability, sex, sexual orientation, or gender identity.            After Visit Summary       This is your record. Keep this with you and show to your community pharmacist(s) and doctor(s) at your next visit.                  "

## 2018-01-01 ENCOUNTER — TELEPHONE (OUTPATIENT)
Dept: SURGERY | Facility: CLINIC | Age: 53
End: 2018-01-01

## 2018-01-01 ENCOUNTER — HOSPITAL ENCOUNTER (OUTPATIENT)
Dept: MAMMOGRAPHY | Facility: CLINIC | Age: 53
End: 2018-10-30
Attending: SURGERY
Payer: COMMERCIAL

## 2018-01-01 ENCOUNTER — HOSPITAL ENCOUNTER (OUTPATIENT)
Facility: CLINIC | Age: 53
Discharge: HOME | End: 2018-12-07
Attending: SURGERY | Admitting: SURGERY
Payer: COMMERCIAL

## 2018-01-01 ENCOUNTER — HOSPITAL ENCOUNTER (OUTPATIENT)
Dept: CT IMAGING | Facility: CLINIC | Age: 53
Discharge: HOME | End: 2018-11-18
Attending: SURGERY | Admitting: SURGERY
Payer: COMMERCIAL

## 2018-01-01 ENCOUNTER — HOSPITAL ENCOUNTER (OUTPATIENT)
Facility: CLINIC | Age: 53
Discharge: HOME | End: 2018-11-21
Attending: SURGERY | Admitting: SURGERY
Payer: COMMERCIAL

## 2018-01-01 ENCOUNTER — OFFICE VISIT (OUTPATIENT)
Dept: SURGERY | Facility: CLINIC | Age: 53
End: 2018-01-01
Payer: COMMERCIAL

## 2018-01-01 ENCOUNTER — SURGERY (OUTPATIENT)
Age: 53
End: 2018-01-01

## 2018-01-01 ENCOUNTER — OFFICE VISIT (OUTPATIENT)
Dept: SURGERY | Facility: PHYSICIAN GROUP | Age: 53
End: 2018-01-01
Payer: COMMERCIAL

## 2018-01-01 ENCOUNTER — HOSPITAL ENCOUNTER (OUTPATIENT)
Facility: CLINIC | Age: 53
End: 2018-01-01
Attending: SURGERY | Admitting: SURGERY
Payer: COMMERCIAL

## 2018-01-01 ENCOUNTER — OFFICE VISIT (OUTPATIENT)
Dept: FAMILY MEDICINE | Facility: CLINIC | Age: 53
End: 2018-01-01
Payer: COMMERCIAL

## 2018-01-01 ENCOUNTER — ANESTHESIA (OUTPATIENT)
Dept: SURGERY | Facility: CLINIC | Age: 53
End: 2018-01-01
Payer: COMMERCIAL

## 2018-01-01 ENCOUNTER — HOSPITAL ENCOUNTER (OUTPATIENT)
Dept: MAMMOGRAPHY | Facility: CLINIC | Age: 53
End: 2018-11-21
Attending: SURGERY
Payer: COMMERCIAL

## 2018-01-01 ENCOUNTER — HOSPITAL ENCOUNTER (OUTPATIENT)
Dept: NUCLEAR MEDICINE | Facility: CLINIC | Age: 53
Setting detail: NUCLEAR MEDICINE
End: 2018-11-21
Attending: SURGERY
Payer: COMMERCIAL

## 2018-01-01 ENCOUNTER — MYC MEDICAL ADVICE (OUTPATIENT)
Dept: SURGERY | Facility: CLINIC | Age: 53
End: 2018-01-01

## 2018-01-01 ENCOUNTER — CARE COORDINATION (OUTPATIENT)
Dept: SURGERY | Facility: CLINIC | Age: 53
End: 2018-01-01

## 2018-01-01 ENCOUNTER — PRE VISIT (OUTPATIENT)
Dept: ONCOLOGY | Facility: CLINIC | Age: 53
End: 2018-01-01

## 2018-01-01 ENCOUNTER — ANESTHESIA EVENT (OUTPATIENT)
Dept: SURGERY | Facility: CLINIC | Age: 53
End: 2018-01-01
Payer: COMMERCIAL

## 2018-01-01 ENCOUNTER — TRANSFERRED RECORDS (OUTPATIENT)
Dept: HEALTH INFORMATION MANAGEMENT | Facility: CLINIC | Age: 53
End: 2018-01-01

## 2018-01-01 ENCOUNTER — HOSPITAL ENCOUNTER (OUTPATIENT)
Dept: MAMMOGRAPHY | Facility: CLINIC | Age: 53
Discharge: HOME | End: 2018-10-30
Attending: SURGERY | Admitting: SURGERY
Payer: COMMERCIAL

## 2018-01-01 ENCOUNTER — DOCUMENTATION ONLY (OUTPATIENT)
Dept: MAMMOGRAPHY | Facility: CLINIC | Age: 53
End: 2018-01-01

## 2018-01-01 VITALS
HEART RATE: 95 BPM | WEIGHT: 250 LBS | SYSTOLIC BLOOD PRESSURE: 122 MMHG | BODY MASS INDEX: 37.03 KG/M2 | HEIGHT: 69 IN | OXYGEN SATURATION: 98 % | DIASTOLIC BLOOD PRESSURE: 78 MMHG

## 2018-01-01 VITALS
DIASTOLIC BLOOD PRESSURE: 82 MMHG | SYSTOLIC BLOOD PRESSURE: 139 MMHG | BODY MASS INDEX: 39.62 KG/M2 | RESPIRATION RATE: 14 BRPM | OXYGEN SATURATION: 99 % | HEIGHT: 67 IN | WEIGHT: 252.44 LBS | TEMPERATURE: 97.7 F

## 2018-01-01 VITALS
SYSTOLIC BLOOD PRESSURE: 120 MMHG | HEART RATE: 85 BPM | BODY MASS INDEX: 33.97 KG/M2 | OXYGEN SATURATION: 97 % | WEIGHT: 230 LBS | DIASTOLIC BLOOD PRESSURE: 90 MMHG

## 2018-01-01 VITALS
BODY MASS INDEX: 36.14 KG/M2 | OXYGEN SATURATION: 98 % | SYSTOLIC BLOOD PRESSURE: 128 MMHG | HEART RATE: 83 BPM | TEMPERATURE: 98 F | DIASTOLIC BLOOD PRESSURE: 70 MMHG | HEIGHT: 69 IN | WEIGHT: 244 LBS

## 2018-01-01 VITALS
RESPIRATION RATE: 12 BRPM | OXYGEN SATURATION: 95 % | WEIGHT: 230 LBS | DIASTOLIC BLOOD PRESSURE: 83 MMHG | TEMPERATURE: 97.9 F | SYSTOLIC BLOOD PRESSURE: 144 MMHG | HEIGHT: 69 IN | BODY MASS INDEX: 34.07 KG/M2

## 2018-01-01 DIAGNOSIS — C50.912 MALIGNANT NEOPLASM OF LEFT BREAST (H): ICD-10-CM

## 2018-01-01 DIAGNOSIS — Z17.1 MALIGNANT NEOPLASM OF CENTRAL PORTION OF LEFT BREAST IN FEMALE, ESTROGEN RECEPTOR NEGATIVE (H): Primary | ICD-10-CM

## 2018-01-01 DIAGNOSIS — F40.240 CLAUSTROPHOBIA: Primary | ICD-10-CM

## 2018-01-01 DIAGNOSIS — C50.919 BREAST CANCER (H): Primary | ICD-10-CM

## 2018-01-01 DIAGNOSIS — Z17.1 MALIGNANT NEOPLASM OF LOWER-OUTER QUADRANT OF LEFT BREAST OF FEMALE, ESTROGEN RECEPTOR NEGATIVE (H): ICD-10-CM

## 2018-01-01 DIAGNOSIS — C50.512 MALIGNANT NEOPLASM OF LOWER-OUTER QUADRANT OF LEFT BREAST OF FEMALE, ESTROGEN RECEPTOR NEGATIVE (H): ICD-10-CM

## 2018-01-01 DIAGNOSIS — Z17.1 MALIGNANT NEOPLASM OF LOWER-OUTER QUADRANT OF LEFT BREAST OF FEMALE, ESTROGEN RECEPTOR NEGATIVE (H): Primary | ICD-10-CM

## 2018-01-01 DIAGNOSIS — Z01.818 PREOP GENERAL PHYSICAL EXAM: Primary | ICD-10-CM

## 2018-01-01 DIAGNOSIS — C50.512 MALIGNANT NEOPLASM OF LOWER-OUTER QUADRANT OF LEFT BREAST OF FEMALE, ESTROGEN RECEPTOR NEGATIVE (H): Primary | ICD-10-CM

## 2018-01-01 DIAGNOSIS — C50.112 MALIGNANT NEOPLASM OF CENTRAL PORTION OF LEFT BREAST IN FEMALE, ESTROGEN RECEPTOR NEGATIVE (H): ICD-10-CM

## 2018-01-01 DIAGNOSIS — Z01.818 EXAMINATION PRIOR TO CHEMOTHERAPY: ICD-10-CM

## 2018-01-01 DIAGNOSIS — Z09 SURGERY FOLLOW-UP EXAMINATION: Primary | ICD-10-CM

## 2018-01-01 DIAGNOSIS — Z17.1 MALIGNANT NEOPLASM OF CENTRAL PORTION OF LEFT BREAST IN FEMALE, ESTROGEN RECEPTOR NEGATIVE (H): ICD-10-CM

## 2018-01-01 DIAGNOSIS — Z01.818 PRE-OPERATIVE CLEARANCE: Primary | ICD-10-CM

## 2018-01-01 DIAGNOSIS — Z98.890 S/P LUMPECTOMY OF BREAST: Primary | ICD-10-CM

## 2018-01-01 DIAGNOSIS — C50.112 MALIGNANT NEOPLASM OF CENTRAL PORTION OF LEFT BREAST IN FEMALE, ESTROGEN RECEPTOR NEGATIVE (H): Primary | ICD-10-CM

## 2018-01-01 DIAGNOSIS — C50.912 MALIGNANT NEOPLASM OF LEFT BREAST (H): Primary | ICD-10-CM

## 2018-01-01 DIAGNOSIS — C50.912 INVASIVE DUCTAL CARCINOMA OF BREAST, FEMALE, LEFT (H): Primary | ICD-10-CM

## 2018-01-01 DIAGNOSIS — N61.0 CELLULITIS OF LEFT BREAST: ICD-10-CM

## 2018-01-01 LAB
BACTERIA SPEC CULT: ABNORMAL
BACTERIA SPEC CULT: NO GROWTH
BACTERIA SPEC CULT: NORMAL
COPATH REPORT: NORMAL
GRAM STN SPEC: NORMAL
HCG UR QL: NEGATIVE
HGB BLD-MCNC: 13.2 G/DL (ref 11.7–15.7)
Lab: ABNORMAL
Lab: NORMAL
SPECIMEN SOURCE: ABNORMAL
SPECIMEN SOURCE: NORMAL

## 2018-01-01 PROCEDURE — 88377 M/PHMTRC ALYS ISHQUANT/SEMIQ: CPT | Performed by: PATHOLOGY

## 2018-01-01 PROCEDURE — 88360 TUMOR IMMUNOHISTOCHEM/MANUAL: CPT | Mod: 26 | Performed by: RADIOLOGY

## 2018-01-01 PROCEDURE — 81025 URINE PREGNANCY TEST: CPT | Performed by: ANESTHESIOLOGY

## 2018-01-01 PROCEDURE — 25000128 H RX IP 250 OP 636: Performed by: NURSE ANESTHETIST, CERTIFIED REGISTERED

## 2018-01-01 PROCEDURE — 25000125 ZZHC RX 250: Performed by: NURSE ANESTHETIST, CERTIFIED REGISTERED

## 2018-01-01 PROCEDURE — 88307 TISSUE EXAM BY PATHOLOGIST: CPT | Performed by: SURGERY

## 2018-01-01 PROCEDURE — 88360 TUMOR IMMUNOHISTOCHEM/MANUAL: CPT | Performed by: RADIOLOGY

## 2018-01-01 PROCEDURE — 25000132 ZZH RX MED GY IP 250 OP 250 PS 637: Performed by: SURGERY

## 2018-01-01 PROCEDURE — 85018 HEMOGLOBIN: CPT | Performed by: ANESTHESIOLOGY

## 2018-01-01 PROCEDURE — 87070 CULTURE OTHR SPECIMN AEROBIC: CPT | Performed by: SURGERY

## 2018-01-01 PROCEDURE — 27210794 ZZH OR GENERAL SUPPLY STERILE: Performed by: SURGERY

## 2018-01-01 PROCEDURE — 88360 TUMOR IMMUNOHISTOCHEM/MANUAL: CPT | Performed by: SURGERY

## 2018-01-01 PROCEDURE — 38792 RA TRACER ID OF SENTINL NODE: CPT

## 2018-01-01 PROCEDURE — 87076 CULTURE ANAEROBE IDENT EACH: CPT | Performed by: SURGERY

## 2018-01-01 PROCEDURE — 88342 IMHCHEM/IMCYTCHM 1ST ANTB: CPT | Performed by: RADIOLOGY

## 2018-01-01 PROCEDURE — 88307 TISSUE EXAM BY PATHOLOGIST: CPT | Mod: 26 | Performed by: SURGERY

## 2018-01-01 PROCEDURE — 25000125 ZZHC RX 250: Performed by: SURGERY

## 2018-01-01 PROCEDURE — 71000012 ZZH RECOVERY PHASE 1 LEVEL 1 FIRST HR: Performed by: SURGERY

## 2018-01-01 PROCEDURE — 88342 IMHCHEM/IMCYTCHM 1ST ANTB: CPT | Mod: 26 | Performed by: RADIOLOGY

## 2018-01-01 PROCEDURE — 37000009 ZZH ANESTHESIA TECHNICAL FEE, EACH ADDTL 15 MIN: Performed by: SURGERY

## 2018-01-01 PROCEDURE — 19301 PARTIAL MASTECTOMY: CPT | Mod: 58 | Performed by: SURGERY

## 2018-01-01 PROCEDURE — 00000159 ZZHCL STATISTIC H-SEND OUTS PREP: Performed by: SURGERY

## 2018-01-01 PROCEDURE — 25000128 H RX IP 250 OP 636: Performed by: ANESTHESIOLOGY

## 2018-01-01 PROCEDURE — 88360 TUMOR IMMUNOHISTOCHEM/MANUAL: CPT | Mod: 26 | Performed by: SURGERY

## 2018-01-01 PROCEDURE — 40000268 MA BREAST SPECIMEN LEFT OR

## 2018-01-01 PROCEDURE — 25000125 ZZHC RX 250: Performed by: RADIOLOGY

## 2018-01-01 PROCEDURE — 38525 BIOPSY/REMOVAL LYMPH NODES: CPT | Mod: 51 | Performed by: SURGERY

## 2018-01-01 PROCEDURE — 40000170 ZZH STATISTIC PRE-PROCEDURE ASSESSMENT II: Performed by: SURGERY

## 2018-01-01 PROCEDURE — 36000054 ZZH SURGERY LEVEL 2 W FLUORO 1ST 30 MIN: Performed by: SURGERY

## 2018-01-01 PROCEDURE — 40000986 MA POST PROCEDURE LEFT

## 2018-01-01 PROCEDURE — 00000158 ZZHCL STATISTIC H-FISH PROCESS B/S: Performed by: RADIOLOGY

## 2018-01-01 PROCEDURE — 88305 TISSUE EXAM BY PATHOLOGIST: CPT | Mod: 26 | Performed by: SURGERY

## 2018-01-01 PROCEDURE — 76882 US LMTD JT/FCL EVL NVASC XTR: CPT | Mod: LT

## 2018-01-01 PROCEDURE — 00000159 ZZHCL STATISTIC H-SEND OUTS PREP: Performed by: RADIOLOGY

## 2018-01-01 PROCEDURE — 25000132 ZZH RX MED GY IP 250 OP 250 PS 637: Performed by: ANESTHESIOLOGY

## 2018-01-01 PROCEDURE — 25000132 ZZH RX MED GY IP 250 OP 250 PS 637: Performed by: STUDENT IN AN ORGANIZED HEALTH CARE EDUCATION/TRAINING PROGRAM

## 2018-01-01 PROCEDURE — 99024 POSTOP FOLLOW-UP VISIT: CPT | Performed by: SURGERY

## 2018-01-01 PROCEDURE — 99205 OFFICE O/P NEW HI 60 MIN: CPT | Performed by: SURGERY

## 2018-01-01 PROCEDURE — 25000128 H RX IP 250 OP 636: Performed by: SURGERY

## 2018-01-01 PROCEDURE — 36000050 ZZH SURGERY LEVEL 2 1ST 30 MIN: Performed by: SURGERY

## 2018-01-01 PROCEDURE — 25000566 ZZH SEVOFLURANE, EA 15 MIN: Performed by: SURGERY

## 2018-01-01 PROCEDURE — 87205 SMEAR GRAM STAIN: CPT | Performed by: SURGERY

## 2018-01-01 PROCEDURE — 19301 PARTIAL MASTECTOMY: CPT | Performed by: SURGERY

## 2018-01-01 PROCEDURE — 87075 CULTR BACTERIA EXCEPT BLOOD: CPT | Performed by: SURGERY

## 2018-01-01 PROCEDURE — 40000169 ZZH STATISTIC PRE-PROCEDURE ASSESSMENT I: Performed by: SURGERY

## 2018-01-01 PROCEDURE — 37000008 ZZH ANESTHESIA TECHNICAL FEE, 1ST 30 MIN: Performed by: SURGERY

## 2018-01-01 PROCEDURE — 34300033 ZZH RX 343: Performed by: SURGERY

## 2018-01-01 PROCEDURE — 88305 TISSUE EXAM BY PATHOLOGIST: CPT | Performed by: RADIOLOGY

## 2018-01-01 PROCEDURE — 71000027 ZZH RECOVERY PHASE 2 EACH 15 MINS: Performed by: SURGERY

## 2018-01-01 PROCEDURE — 36000052 ZZH SURGERY LEVEL 2 EA 15 ADDTL MIN: Performed by: SURGERY

## 2018-01-01 PROCEDURE — 00000158 ZZHCL STATISTIC H-FISH PROCESS B/S: Performed by: SURGERY

## 2018-01-01 PROCEDURE — 99214 OFFICE O/P EST MOD 30 MIN: CPT | Performed by: INTERNAL MEDICINE

## 2018-01-01 PROCEDURE — 71000013 ZZH RECOVERY PHASE 1 LEVEL 1 EA ADDTL HR: Performed by: SURGERY

## 2018-01-01 PROCEDURE — 87015 SPECIMEN INFECT AGNT CONCNTJ: CPT | Performed by: SURGERY

## 2018-01-01 PROCEDURE — 88305 TISSUE EXAM BY PATHOLOGIST: CPT | Performed by: SURGERY

## 2018-01-01 PROCEDURE — 38900 IO MAP OF SENT LYMPH NODE: CPT | Performed by: SURGERY

## 2018-01-01 PROCEDURE — 88305 TISSUE EXAM BY PATHOLOGIST: CPT | Mod: 26 | Performed by: RADIOLOGY

## 2018-01-01 PROCEDURE — A9520 TC99 TILMANOCEPT DIAG 0.5MCI: HCPCS | Performed by: SURGERY

## 2018-01-01 PROCEDURE — 36415 COLL VENOUS BLD VENIPUNCTURE: CPT | Performed by: ANESTHESIOLOGY

## 2018-01-01 PROCEDURE — 71260 CT THORAX DX C+: CPT

## 2018-01-01 PROCEDURE — 76942 ECHO GUIDE FOR BIOPSY: CPT

## 2018-01-01 PROCEDURE — A4641 RADIOPHARM DX AGENT NOC: HCPCS

## 2018-01-01 RX ORDER — AMOXICILLIN 250 MG
1-2 CAPSULE ORAL 2 TIMES DAILY
Qty: 30 TABLET | Refills: 0 | Status: SHIPPED | OUTPATIENT
Start: 2018-01-01 | End: 2018-01-01

## 2018-01-01 RX ORDER — SODIUM CHLORIDE, SODIUM LACTATE, POTASSIUM CHLORIDE, CALCIUM CHLORIDE 600; 310; 30; 20 MG/100ML; MG/100ML; MG/100ML; MG/100ML
INJECTION, SOLUTION INTRAVENOUS CONTINUOUS
Status: DISCONTINUED | OUTPATIENT
Start: 2018-01-01 | End: 2018-01-01 | Stop reason: HOSPADM

## 2018-01-01 RX ORDER — CEFAZOLIN SODIUM 1 G/3ML
1 INJECTION, POWDER, FOR SOLUTION INTRAMUSCULAR; INTRAVENOUS SEE ADMIN INSTRUCTIONS
Status: DISCONTINUED | OUTPATIENT
Start: 2018-01-01 | End: 2018-01-01 | Stop reason: HOSPADM

## 2018-01-01 RX ORDER — DIAZEPAM 10 MG
10 TABLET ORAL ONCE
Qty: 1 TABLET | Refills: 0 | Status: SHIPPED | OUTPATIENT
Start: 2018-01-01 | End: 2018-01-01

## 2018-01-01 RX ORDER — MEPERIDINE HYDROCHLORIDE 25 MG/ML
12.5 INJECTION INTRAMUSCULAR; INTRAVENOUS; SUBCUTANEOUS
Status: DISCONTINUED | OUTPATIENT
Start: 2018-01-01 | End: 2018-01-01 | Stop reason: HOSPADM

## 2018-01-01 RX ORDER — FENTANYL CITRATE 50 UG/ML
INJECTION, SOLUTION INTRAMUSCULAR; INTRAVENOUS PRN
Status: DISCONTINUED | OUTPATIENT
Start: 2018-01-01 | End: 2018-01-01

## 2018-01-01 RX ORDER — NALOXONE HYDROCHLORIDE 0.4 MG/ML
.1-.4 INJECTION, SOLUTION INTRAMUSCULAR; INTRAVENOUS; SUBCUTANEOUS
Status: DISCONTINUED | OUTPATIENT
Start: 2018-01-01 | End: 2018-01-01 | Stop reason: HOSPADM

## 2018-01-01 RX ORDER — FENTANYL CITRATE 50 UG/ML
25-50 INJECTION, SOLUTION INTRAMUSCULAR; INTRAVENOUS EVERY 5 MIN PRN
Status: DISCONTINUED | OUTPATIENT
Start: 2018-01-01 | End: 2018-01-01 | Stop reason: HOSPADM

## 2018-01-01 RX ORDER — ACETAMINOPHEN 325 MG/1
975 TABLET ORAL ONCE
Status: COMPLETED | OUTPATIENT
Start: 2018-01-01 | End: 2018-01-01

## 2018-01-01 RX ORDER — ONDANSETRON 2 MG/ML
INJECTION INTRAMUSCULAR; INTRAVENOUS PRN
Status: DISCONTINUED | OUTPATIENT
Start: 2018-01-01 | End: 2018-01-01

## 2018-01-01 RX ORDER — CEPHALEXIN 500 MG/1
500 CAPSULE ORAL 3 TIMES DAILY
Qty: 21 CAPSULE | Refills: 0 | Status: SHIPPED | OUTPATIENT
Start: 2018-01-01 | End: 2019-01-01

## 2018-01-01 RX ORDER — CEFAZOLIN SODIUM 2 G/100ML
2 INJECTION, SOLUTION INTRAVENOUS
Status: COMPLETED | OUTPATIENT
Start: 2018-01-01 | End: 2018-01-01

## 2018-01-01 RX ORDER — FENTANYL CITRATE 50 UG/ML
25-50 INJECTION, SOLUTION INTRAMUSCULAR; INTRAVENOUS
Status: DISCONTINUED | OUTPATIENT
Start: 2018-01-01 | End: 2018-01-01 | Stop reason: HOSPADM

## 2018-01-01 RX ORDER — ONDANSETRON 4 MG/1
4 TABLET, ORALLY DISINTEGRATING ORAL EVERY 30 MIN PRN
Status: DISCONTINUED | OUTPATIENT
Start: 2018-01-01 | End: 2018-01-01 | Stop reason: HOSPADM

## 2018-01-01 RX ORDER — HYDROCODONE BITARTRATE AND ACETAMINOPHEN 5; 325 MG/1; MG/1
1 TABLET ORAL
Status: COMPLETED | OUTPATIENT
Start: 2018-01-01 | End: 2018-01-01

## 2018-01-01 RX ORDER — DIPHENHYDRAMINE HCL 25 MG
25 TABLET ORAL ONCE
Status: COMPLETED | OUTPATIENT
Start: 2018-01-01 | End: 2018-01-01

## 2018-01-01 RX ORDER — IOPAMIDOL 755 MG/ML
80 INJECTION, SOLUTION INTRAVASCULAR ONCE
Status: COMPLETED | OUTPATIENT
Start: 2018-01-01 | End: 2018-01-01

## 2018-01-01 RX ORDER — CEFAZOLIN SODIUM 1 G/3ML
1 INJECTION, POWDER, FOR SOLUTION INTRAMUSCULAR; INTRAVENOUS SEE ADMIN INSTRUCTIONS
Status: CANCELLED | OUTPATIENT
Start: 2018-01-01

## 2018-01-01 RX ORDER — MAGNESIUM HYDROXIDE 1200 MG/15ML
LIQUID ORAL PRN
Status: DISCONTINUED | OUTPATIENT
Start: 2018-01-01 | End: 2018-01-01 | Stop reason: HOSPADM

## 2018-01-01 RX ORDER — DIPHENHYDRAMINE HCL 25 MG
25-50 TABLET ORAL EVERY 6 HOURS PRN
Qty: 60 TABLET | Refills: 1 | Status: ON HOLD | OUTPATIENT
Start: 2018-01-01 | End: 2018-01-01

## 2018-01-01 RX ORDER — SULFAMETHOXAZOLE/TRIMETHOPRIM 800-160 MG
1 TABLET ORAL 2 TIMES DAILY
Qty: 20 TABLET | Refills: 0 | Status: SHIPPED | OUTPATIENT
Start: 2018-01-01 | End: 2018-01-01

## 2018-01-01 RX ORDER — CEFAZOLIN SODIUM 2 G/100ML
2 INJECTION, SOLUTION INTRAVENOUS
Status: DISCONTINUED | OUTPATIENT
Start: 2018-01-01 | End: 2018-01-01 | Stop reason: HOSPADM

## 2018-01-01 RX ORDER — OXYCODONE HYDROCHLORIDE 5 MG/1
5 TABLET ORAL
Status: COMPLETED | OUTPATIENT
Start: 2018-01-01 | End: 2018-01-01

## 2018-01-01 RX ORDER — SODIUM CHLORIDE, SODIUM LACTATE, POTASSIUM CHLORIDE, CALCIUM CHLORIDE 600; 310; 30; 20 MG/100ML; MG/100ML; MG/100ML; MG/100ML
INJECTION, SOLUTION INTRAVENOUS CONTINUOUS PRN
Status: DISCONTINUED | OUTPATIENT
Start: 2018-01-01 | End: 2018-01-01

## 2018-01-01 RX ORDER — CEFAZOLIN SODIUM 2 G/100ML
2 INJECTION, SOLUTION INTRAVENOUS
Status: CANCELLED | OUTPATIENT
Start: 2018-01-01

## 2018-01-01 RX ORDER — PROPOFOL 10 MG/ML
INJECTION, EMULSION INTRAVENOUS CONTINUOUS PRN
Status: DISCONTINUED | OUTPATIENT
Start: 2018-01-01 | End: 2018-01-01

## 2018-01-01 RX ORDER — DIPHENHYDRAMINE HCL 25 MG
25-50 TABLET ORAL EVERY 6 HOURS PRN
Qty: 60 TABLET | Refills: 0 | Status: ON HOLD | OUTPATIENT
Start: 2018-01-01 | End: 2019-01-01

## 2018-01-01 RX ORDER — PROPOFOL 10 MG/ML
INJECTION, EMULSION INTRAVENOUS PRN
Status: DISCONTINUED | OUTPATIENT
Start: 2018-01-01 | End: 2018-01-01

## 2018-01-01 RX ORDER — ONDANSETRON 2 MG/ML
4 INJECTION INTRAMUSCULAR; INTRAVENOUS EVERY 30 MIN PRN
Status: DISCONTINUED | OUTPATIENT
Start: 2018-01-01 | End: 2018-01-01 | Stop reason: HOSPADM

## 2018-01-01 RX ORDER — ONDANSETRON 4 MG/1
4 TABLET, ORALLY DISINTEGRATING ORAL
Status: DISCONTINUED | OUTPATIENT
Start: 2018-01-01 | End: 2018-01-01 | Stop reason: HOSPADM

## 2018-01-01 RX ORDER — LIDOCAINE HYDROCHLORIDE 20 MG/ML
INJECTION, SOLUTION INFILTRATION; PERINEURAL PRN
Status: DISCONTINUED | OUTPATIENT
Start: 2018-01-01 | End: 2018-01-01

## 2018-01-01 RX ORDER — AMOXICILLIN 250 MG
1-2 CAPSULE ORAL 2 TIMES DAILY
Qty: 60 TABLET | Refills: 0 | Status: ON HOLD | OUTPATIENT
Start: 2018-01-01 | End: 2018-01-01

## 2018-01-01 RX ORDER — HYDROMORPHONE HYDROCHLORIDE 1 MG/ML
.3-.5 INJECTION, SOLUTION INTRAMUSCULAR; INTRAVENOUS; SUBCUTANEOUS EVERY 10 MIN PRN
Status: DISCONTINUED | OUTPATIENT
Start: 2018-01-01 | End: 2018-01-01 | Stop reason: HOSPADM

## 2018-01-01 RX ORDER — OXYCODONE AND ACETAMINOPHEN 5; 325 MG/1; MG/1
1 TABLET ORAL EVERY 6 HOURS PRN
Qty: 20 TABLET | Refills: 0 | Status: SHIPPED | OUTPATIENT
Start: 2018-01-01 | End: 2018-01-01

## 2018-01-01 RX ORDER — ACETAMINOPHEN 325 MG/1
650 TABLET ORAL
Status: DISCONTINUED | OUTPATIENT
Start: 2018-01-01 | End: 2018-01-01 | Stop reason: HOSPADM

## 2018-01-01 RX ORDER — HYDROCODONE BITARTRATE AND ACETAMINOPHEN 5; 325 MG/1; MG/1
1-2 TABLET ORAL EVERY 4 HOURS PRN
Qty: 40 TABLET | Refills: 0 | Status: ON HOLD | OUTPATIENT
Start: 2018-01-01 | End: 2018-01-01

## 2018-01-01 RX ORDER — DEXAMETHASONE SODIUM PHOSPHATE 4 MG/ML
INJECTION, SOLUTION INTRA-ARTICULAR; INTRALESIONAL; INTRAMUSCULAR; INTRAVENOUS; SOFT TISSUE PRN
Status: DISCONTINUED | OUTPATIENT
Start: 2018-01-01 | End: 2018-01-01

## 2018-01-01 RX ORDER — IBUPROFEN 800 MG/1
800 TABLET, FILM COATED ORAL EVERY 8 HOURS PRN
Qty: 60 TABLET | Refills: 1 | Status: ON HOLD | OUTPATIENT
Start: 2018-01-01 | End: 2018-01-01

## 2018-01-01 RX ADMIN — DIPHENHYDRAMINE HYDROCHLORIDE 25 MG: 25 CAPSULE ORAL at 09:31

## 2018-01-01 RX ADMIN — Medication 0.5 MG: at 18:03

## 2018-01-01 RX ADMIN — PROPOFOL 200 MG: 10 INJECTION, EMULSION INTRAVENOUS at 15:15

## 2018-01-01 RX ADMIN — PROPOFOL 10 MG: 10 INJECTION, EMULSION INTRAVENOUS at 07:47

## 2018-01-01 RX ADMIN — FENTANYL CITRATE 50 MCG: 50 INJECTION, SOLUTION INTRAMUSCULAR; INTRAVENOUS at 15:15

## 2018-01-01 RX ADMIN — FENTANYL CITRATE 50 MCG: 50 INJECTION, SOLUTION INTRAMUSCULAR; INTRAVENOUS at 16:35

## 2018-01-01 RX ADMIN — FENTANYL CITRATE 50 MCG: 50 INJECTION, SOLUTION INTRAMUSCULAR; INTRAVENOUS at 07:36

## 2018-01-01 RX ADMIN — FENTANYL CITRATE 50 MCG: 50 INJECTION, SOLUTION INTRAMUSCULAR; INTRAVENOUS at 09:13

## 2018-01-01 RX ADMIN — IOPAMIDOL 80 ML: 755 INJECTION, SOLUTION INTRAVENOUS at 11:03

## 2018-01-01 RX ADMIN — LIDOCAINE HYDROCHLORIDE 5 ML: 10 INJECTION, SOLUTION INFILTRATION; PERINEURAL at 13:02

## 2018-01-01 RX ADMIN — SODIUM CHLORIDE 1000 ML: 900 IRRIGANT IRRIGATION at 15:38

## 2018-01-01 RX ADMIN — PROPOFOL 30 MG: 10 INJECTION, EMULSION INTRAVENOUS at 07:38

## 2018-01-01 RX ADMIN — PROPOFOL 10 MG: 10 INJECTION, EMULSION INTRAVENOUS at 07:50

## 2018-01-01 RX ADMIN — FENTANYL CITRATE 50 MCG: 50 INJECTION, SOLUTION INTRAMUSCULAR; INTRAVENOUS at 09:04

## 2018-01-01 RX ADMIN — SODIUM CHLORIDE, POTASSIUM CHLORIDE, SODIUM LACTATE AND CALCIUM CHLORIDE: 600; 310; 30; 20 INJECTION, SOLUTION INTRAVENOUS at 07:36

## 2018-01-01 RX ADMIN — SODIUM CHLORIDE, PRESERVATIVE FREE 70 ML: 5 INJECTION INTRAVENOUS at 11:04

## 2018-01-01 RX ADMIN — ONDANSETRON 4 MG: 2 INJECTION INTRAMUSCULAR; INTRAVENOUS at 07:53

## 2018-01-01 RX ADMIN — FENTANYL CITRATE 25 MCG: 50 INJECTION, SOLUTION INTRAMUSCULAR; INTRAVENOUS at 07:50

## 2018-01-01 RX ADMIN — SODIUM CHLORIDE, POTASSIUM CHLORIDE, SODIUM LACTATE AND CALCIUM CHLORIDE: 600; 310; 30; 20 INJECTION, SOLUTION INTRAVENOUS at 08:44

## 2018-01-01 RX ADMIN — LIDOCAINE HYDROCHLORIDE 40 MG: 20 INJECTION, SOLUTION INFILTRATION; PERINEURAL at 07:38

## 2018-01-01 RX ADMIN — HYDROCODONE BITARTRATE AND ACETAMINOPHEN 1 TABLET: 5; 325 TABLET ORAL at 18:18

## 2018-01-01 RX ADMIN — SODIUM CHLORIDE, POTASSIUM CHLORIDE, SODIUM LACTATE AND CALCIUM CHLORIDE: 600; 310; 30; 20 INJECTION, SOLUTION INTRAVENOUS at 15:14

## 2018-01-01 RX ADMIN — ACETAMINOPHEN 975 MG: 325 TABLET, FILM COATED ORAL at 14:33

## 2018-01-01 RX ADMIN — FENTANYL CITRATE 50 MCG: 50 INJECTION, SOLUTION INTRAMUSCULAR; INTRAVENOUS at 15:36

## 2018-01-01 RX ADMIN — FENTANYL CITRATE 25 MCG: 50 INJECTION, SOLUTION INTRAMUSCULAR; INTRAVENOUS at 07:51

## 2018-01-01 RX ADMIN — LIDOCAINE HYDROCHLORIDE 5 ML: 10 INJECTION, SOLUTION INFILTRATION; PERINEURAL at 08:10

## 2018-01-01 RX ADMIN — PROPOFOL 125 MCG/KG/MIN: 10 INJECTION, EMULSION INTRAVENOUS at 07:38

## 2018-01-01 RX ADMIN — ONDANSETRON 4 MG: 2 INJECTION INTRAMUSCULAR; INTRAVENOUS at 17:00

## 2018-01-01 RX ADMIN — FENTANYL CITRATE 50 MCG: 50 INJECTION, SOLUTION INTRAMUSCULAR; INTRAVENOUS at 17:30

## 2018-01-01 RX ADMIN — MIDAZOLAM 2 MG: 1 INJECTION INTRAMUSCULAR; INTRAVENOUS at 07:36

## 2018-01-01 RX ADMIN — CEFAZOLIN SODIUM 2 G: 2 INJECTION, SOLUTION INTRAVENOUS at 07:39

## 2018-01-01 RX ADMIN — TILMANOCEPT 0.5 MILLICURIE: KIT at 13:40

## 2018-01-01 RX ADMIN — FENTANYL CITRATE 50 MCG: 50 INJECTION, SOLUTION INTRAMUSCULAR; INTRAVENOUS at 17:39

## 2018-01-01 RX ADMIN — PROPOFOL 75 MCG/KG/MIN: 10 INJECTION, EMULSION INTRAVENOUS at 08:23

## 2018-01-01 RX ADMIN — PROPOFOL 50 MCG/KG/MIN: 10 INJECTION, EMULSION INTRAVENOUS at 15:22

## 2018-01-01 RX ADMIN — FENTANYL CITRATE 50 MCG: 50 INJECTION, SOLUTION INTRAMUSCULAR; INTRAVENOUS at 16:18

## 2018-01-01 RX ADMIN — OXYCODONE HYDROCHLORIDE 5 MG: 5 TABLET ORAL at 09:30

## 2018-01-01 RX ADMIN — PHENYLEPHRINE HYDROCHLORIDE 100 MCG: 10 INJECTION, SOLUTION INTRAMUSCULAR; INTRAVENOUS; SUBCUTANEOUS at 16:15

## 2018-01-01 RX ADMIN — MIDAZOLAM 2 MG: 1 INJECTION INTRAMUSCULAR; INTRAVENOUS at 14:52

## 2018-01-01 RX ADMIN — DEXAMETHASONE SODIUM PHOSPHATE 4 MG: 4 INJECTION, SOLUTION INTRA-ARTICULAR; INTRALESIONAL; INTRAMUSCULAR; INTRAVENOUS; SOFT TISSUE at 07:43

## 2018-01-01 RX ADMIN — Medication 0.5 MG: at 17:44

## 2018-01-01 ASSESSMENT — ENCOUNTER SYMPTOMS: SEIZURES: 0

## 2018-01-01 ASSESSMENT — LIFESTYLE VARIABLES: TOBACCO_USE: 0

## 2018-08-17 ENCOUNTER — HOSPITAL ENCOUNTER (OUTPATIENT)
Facility: CLINIC | Age: 53
End: 2018-08-17
Attending: OBSTETRICS & GYNECOLOGY | Admitting: OBSTETRICS & GYNECOLOGY
Payer: COMMERCIAL

## 2018-08-17 RX ORDER — CEFAZOLIN SODIUM 2 G/100ML
2 INJECTION, SOLUTION INTRAVENOUS
Status: CANCELLED | OUTPATIENT
Start: 2018-08-17

## 2018-08-17 RX ORDER — CEFAZOLIN SODIUM 1 G/3ML
1 INJECTION, POWDER, FOR SOLUTION INTRAMUSCULAR; INTRAVENOUS SEE ADMIN INSTRUCTIONS
Status: CANCELLED | OUTPATIENT
Start: 2018-08-17

## 2018-10-25 NOTE — PROGRESS NOTES
[facility] Breast Surgery Consultation    HPI:   Megha Campbell is a 53 year old female who is seen in consultation at the request of Dr. Zapata for evaluation of newly diagnosed left breast cancer. She felt a lump in her left breast approximately two weeks ago. Prior to this she had no breast concerns. No prior breast surgeries or breast biopsies. She denies nipple discharge. No breast pain.     She had diagnostic breast imaging which revealed a ill defined increased parenchymal density spanning 3cm. Targeted US revealed a 2.1cm hypoechoic mass at 5-6:00 and 4cm FN. Her exam by radiology mentions the mass feels 6cm x4cm clinically.  Megha had a left breast biopsy which revealed a 2.1cm invasive ductal carcinoma, grade 2, ER/[place] negative, Her 2 negative.      Hormonal history:  menarche 16yo, 2 children, 1st at age 20yo,  Post-menopausal, <1 year OCP use, no HRT, no fertility treatment.     Family history of breast cancer: Yes - mother with breast cancer, diet at age 59yo.   Family history of ovarian cancer:  No  Family history of colon cancer: No  Family history of prostate cancer: Yes - brother with prostate cancer at 56yo    Imaging:   Obtained at [facility]. Images and reports reviewed.   10/19/2018  Diagnostic mammogram  Near the 6:00 position on the left breast, approscimately 7cm posterior to the nipple, is  Subtle, illdefined increased parenchyma density spanning 3cm. No underlying mass or architectural distortion.     US: irregular, hypoechoic mass measuring 2.1cm in the left breast. The finding is much more conspicuous by US than mammogram.     Percutaneous core needle biopsy, left: Left breast 2.1cm mass at 5:00 by ultrasound, 4cm FN,   invasive ductal carcinoma, grade 2, no ALI. DCIS present. ER/[place]/HER 2 negative.       Past Medical History:  depression      Current Outpatient Prescriptions:      BuPROPion HCl (WELLBUTRIN PO), , Disp: , Rfl:      ibuprofen (ADVIL/MOTRIN) 800 MG tablet, Take 1 tablet (800 mg) by  mouth every 8 hours as needed for moderate pain, Disp: 30 tablet, Rfl: 1     order for DME, Air splint, Disp: 1 Device, Rfl: 0     order for DME, 2 crutches, Disp: 2 Device, Rfl: 0     SERTRALINE HCL PO, Take by mouth daily, Disp: , Rfl:     Past Surgical History:  Gastric bypass - boaz en Y.         Allergies   Allergen Reactions     Oxycodone Itching and Rash        Social History:  Social History     Social History     Marital status:      Spouse name: N/A     Number of children: N/A     Years of education: N/A     Occupational History     Not on file.     Social History Main Topics     Smoking status: Never Smoker     Smokeless tobacco: Not on file     Alcohol use Not on file     Drug use: Not on file     Sexual activity: Not on file     Other Topics Concern     Not on file     Social History Narrative     No narrative on file        ROS:  The 10 point review of systems is negative other than noted in the HPI and above.    PE:  Vitals: There were no vitals taken for this visit.  General appearance: well-nourished, sitting comfortably, no apparent distress  Psych: normal affect, pleasant  HEENT:  Head normocephalic and atraumatic, pupils equal and round, conjunctivae clear, mucous membranes moist, external ears and nose normal  Neck: Supple without thyromegaly or masses  Lungs: Respirations unlabored  Lymphatic: No cervical, or supraclavicular lymphadenopathy  Extremities: Without edema  Musculoskeletal:  Normal station and gait  Neurologic: nonfocal, grossly intact times four extremities, alert and oriented times three  Psychiatric: Mood and affect are appropriate  Skin: Without lesions or rashes    Breast:  A bilateral breast exam was performed in the supine position.. Bilateral breasts were palpated in a circumferential clockwise fashion including the supraclavicular and axillary areas.   Breasts are large and symmetrical with no skin or nipple changes.  Contour is normal. Parenchyma is moderately  dense. There is a 6cm mass in the left breast at 6:00, deep to the nipple/areolar complex.       Lymph:       No supraclavicular/infraclavicular adenopathy.   Axillary adenopathy: none    Assessment:  Left breast invasive ductal carcinoma, grade 2, ER -, MI -, [id number] - measuring 6 cm at 6:00 and 7 cm from the nipple.    Plan:  Megha is a 53yof who unfortunately has newly diagnosed left breast cancer.  I reviewed the imaging and pathology reports with her and explained the findings.  We talked about the fact that this is invasive ductal carcinoma  that is large in size on her clinical exam compared to imaging and she has not had axillary imaging yet. We discussed the triple negative nature of her breast cancer and that it unfortunately tends to be more aggressive disease. We discussed a breast MRI to get a better sense of the size of the lesion given the discrepency between her mammogram/US and clinical exam size. She reports she will not tolerate a breast MRI due to extreme claustrophobia. She is open to axillary ultrasound, this was ordered today. We discussed with triple negative breast cancer, neoadjuvant chemotherapy is often considered. She is not interested in this but is open to meeting with oncology to discuss it further. We also discussed genetic testing given age <60 and triple negative disease. She is open to meeting with genetics. Should a deleterious mutation be identified, she would no longer be a good candidate for breast conservation.  We also reviewed the risk reduction benefits of a prophylactic mastectomy in this situation.     We next discussed the surgical options for treatment.  I described the procedures for lumpectomy with sentinel lymph node biopsy and mastectomy with sentinel lymph node biopsy, possible axillary node dissection including the details of the procedures, the risks, anesthesia and expected recovery.  Given the size of the lesion clinically, a lumpectomy would not be a great  option and would leave her with a much smaller breast on the left. She tells me she does not care about this and would like a lumpectomy. We discussed risk of positive margin is higher with a large lesion and she understands she may need a mastectomy if there are many positive margins.     I reviewed the data regarding lumpectomy and radiation vs mastectomy that shows that the local recurrence risk is slightly higher for lumpectomy and radiation vs mastectomy (3-5% vs. 1-2%), but the survival at 20 years is the same.  I advised that lymph node biopsy is recommended whenever we are dealing with invasive breast cancer and described the procedure for sentinel lymph node biopsy.  We talked about the risk for lymphedema which is small with removal of only a few nodes, but certainly not zero.  We discussed that if her ultrasound of the axilla is concerning, biopsy would be recommended and in that setting I would strongly encourage neoadjuvant therapy to allow for downsizing of the tumor and possibly making her a candidate for a SLNB if she has a good response.     We talked about post-lumpectomy radiation, the course and usual side effects. We discussed that with lumpectomy, radiation is typically recommended to decrease risk of recurrence. It may be necessary following mastectomy depending on final pathology and if kinza involvement is present.     We also talked about post-mastectomy reconstruction and the stages involved. We also discussed the various types of mastectomy, including total, skin-sparing, and nipple-sparing mastectomy.  We reviewed that the nipple-sparing technique is cosmetic; sensation and contractility will likely be lost.       She would like to proceed with scheduling axillary ultrasound, consultation with oncology, and tentative plan for left breast seed localized lumpectomy with SLNB.     Time spent with the patient with greater that 50% of the time in discussion was 60 minutes.    Lesly Aponte,  MD     Addendum: Pt had axillary ultrasound on 10/30 and there were a couple nodes concerning for metastatic disease. Biopsy is recommended and was ordered. Pending these results - I would strongly recommend consideration of neoadjuvant chemotherapy. Megha has an appt with Dr. Winston on 11/2/2018.       Please route or send letter to:  Primary Care Provider (PCP) and Referring Provider

## 2018-10-26 NOTE — LETTER
2018    Re: Megha Campbell - 1965    Megha Campbell is a 53 year old female who is seen in consultation at the request of Dr. Zapata for evaluation of newly diagnosed left breast cancer. She felt a lump in her left breast approximately two weeks ago. Prior to this she had no breast concerns. No prior breast surgeries or breast biopsies. She denies nipple discharge. No breast pain.      She had diagnostic breast imaging which revealed a ill defined increased parenchymal density spanning 3cm. Targeted US revealed a 2.1cm hypoechoic mass at 5-6:00 and 4cm FN. Her exam by radiology mentions the mass feels 6cm x4cm clinically.  Megha had a left breast biopsy which revealed a 2.1cm invasive ductal carcinoma, grade 2, ER/[place] negative, Her 2 negative.       Hormonal history:  menarche 14yo, 2 children, 1st at age 20yo,  Post-menopausal, <1 year OCP use, no HRT, no fertility treatment.      Family history of breast cancer: Yes - mother with breast cancer, diet at age 57yo.   Family history of ovarian cancer:  No  Family history of colon cancer: No  Family history of prostate cancer: Yes - brother with prostate cancer at 56yo     Imaging:   Obtained at [facility]. Images and reports reviewed.   10/19/2018  Diagnostic mammogram  Near the 6:00 position on the left breast, approscimately 7cm posterior to the nipple, is  Subtle, illdefined increased parenchyma density spanning 3cm. No underlying mass or architectural distortion.      US: irregular, hypoechoic mass measuring 2.1cm in the left breast. The finding is much more conspicuous by US than mammogram.      Percutaneous core needle biopsy, left: Left breast 2.1cm mass at 5:00 by ultrasound, 4cm FN,   invasive ductal carcinoma, grade 2, no ALI. DCIS present. ER/NJ/HER 2 negative.      Past Medical History:  depression      Current Outpatient Prescriptions:      BuPROPion HCl (WELLBUTRIN PO), , Disp: , Rfl:      ibuprofen (ADVIL/MOTRIN) 800 MG tablet, Take 1 tablet  (800 mg) by mouth every 8 hours as needed for moderate pain, Disp: 30 tablet, Rfl: 1     order for DME, Air splint, Disp: 1 Device, Rfl: 0     order for DME, 2 crutches, Disp: 2 Device, Rfl: 0     SERTRALINE HCL PO, Take by mouth daily, Disp: , Rfl:      ROS:  The 10 point review of systems is negative other than noted in the HPI and above.     PE:  Vitals: There were no vitals taken for this visit.  General appearance: well-nourished, sitting comfortably, no apparent distress  Psych: normal affect, pleasant  HEENT:  Head normocephalic and atraumatic, pupils equal and round, conjunctivae clear, mucous membranes moist, external ears and nose normal  Neck: Supple without thyromegaly or masses  Lungs: Respirations unlabored  Lymphatic: No cervical, or supraclavicular lymphadenopathy  Extremities: Without edema  Musculoskeletal:  Normal station and gait  Neurologic: nonfocal, grossly intact times four extremities, alert and oriented times three  Psychiatric: Mood and affect are appropriate  Skin: Without lesions or rashes     Breast:  A bilateral breast exam was performed in the supine position.. Bilateral breasts were palpated in a circumferential clockwise fashion including the supraclavicular and axillary areas.   Breasts are large and symmetrical with no skin or nipple changes.  Contour is normal. Parenchyma is moderately dense. There is a 6cm mass in the left breast at 6:00, deep to the nipple/areolar complex.       Lymph:          No supraclavicular/infraclavicular adenopathy.                         Axillary adenopathy: none     Assessment:  Left breast invasive ductal carcinoma, grade 2, ER -, SD -, [id number] - measuring 6 cm at 6:00 and 7 cm from the nipple.     Plan:  Megha is a 53yof who unfortunately has newly diagnosed left breast cancer.  I reviewed the imaging and pathology reports with her and explained the findings.  We talked about the fact that this is invasive ductal carcinoma  that is large in size on  her clinical exam compared to imaging and she has not had axillary imaging yet. We discussed the triple negative nature of her breast cancer and that it unfortunately tends to be more aggressive disease. We discussed a breast MRI to get a better sense of the size of the lesion given the discrepency between her mammogram/US and clinical exam size. She reports she will not tolerate a breast MRI due to extreme claustrophobia. She is open to axillary ultrasound, this was ordered today. We discussed with triple negative breast cancer, neoadjuvant chemotherapy is often considered. She is not interested in this but is open to meeting with oncology to discuss it further. We also discussed genetic testing given age <60 and triple negative disease. She is open to meeting with genetics. Should a deleterious mutation be identified, she would no longer be a good candidate for breast conservation.  We also reviewed the risk reduction benefits of a prophylactic mastectomy in this situation.      We next discussed the surgical options for treatment.  I described the procedures for lumpectomy with sentinel lymph node biopsy and mastectomy with sentinel lymph node biopsy, possible axillary node dissection including the details of the procedures, the risks, anesthesia and expected recovery.  Given the size of the lesion clinically, a lumpectomy would not be a great option and would leave her with a much smaller breast on the left. She tells me she does not care about this and would like a lumpectomy. We discussed risk of positive margin is higher with a large lesion and she understands she may need a mastectomy if there are many positive margins.      I reviewed the data regarding lumpectomy and radiation vs mastectomy that shows that the local recurrence risk is slightly higher for lumpectomy and radiation vs mastectomy (3-5% vs. 1-2%), but the survival at 20 years is the same.  I advised that lymph node biopsy is recommended  whenever we are dealing with invasive breast cancer and described the procedure for sentinel lymph node biopsy.  We talked about the risk for lymphedema which is small with removal of only a few nodes, but certainly not zero.  We discussed that if her ultrasound of the axilla is concerning, biopsy would be recommended and in that setting I would strongly encourage neoadjuvant therapy to allow for downsizing of the tumor and possibly making her a candidate for a SLNB if she has a good response.      We talked about post-lumpectomy radiation, the course and usual side effects. We discussed that with lumpectomy, radiation is typically recommended to decrease risk of recurrence. It may be necessary following mastectomy depending on final pathology and if kinza involvement is present.      We also talked about post-mastectomy reconstruction and the stages involved. We also discussed the various types of mastectomy, including total, skin-sparing, and nipple-sparing mastectomy.  We reviewed that the nipple-sparing technique is cosmetic; sensation and contractility will likely be lost.        She would like to proceed with scheduling axillary ultrasound, consultation with oncology, and tentative plan for left breast seed localized lumpectomy with SLNB.           Lesly Aponte MD      Addendum: Pt had axillary ultrasound on 10/30 and there were a couple nodes concerning for metastatic disease. Biopsy is recommended and was ordered. Pending these results - I would strongly recommend consideration of neoadjuvant chemotherapy. Megha has an appt with Dr. Winston on 11/2/2018.

## 2018-10-26 NOTE — MR AVS SNAPSHOT
After Visit Summary   10/26/2018    Megha Campbell    MRN: [id number]           Patient Information     Date Of Birth          1965        Visit Information        Provider Department      10/26/2018 3:30 PM Lesly Aponte MD [facility] [place] General Surgery      Today's Diagnoses     Malignant neoplasm of lower-outer quadrant of left breast of female, estrogen receptor negative (H)    -  1      Care Instructions    You are scheduled for Ultrasound axilla at [facility] on 10/29/18 at 2:30pm    Your surgery is scheduled on 11/5/18 at [facility]    [place] [facility] will contact you directly for a consultation with Dr. Amber Juarez          Follow-ups after your visit        Additional Services     CANCER RISK MGMT/CANCER GENETIC COUNSELING REFERRAL       Your provider has referred you to the Cancer Risk Management Program - Cancer Genetic Counseling.    Reason for Referral: new left breast cancer    We have a sent a notice to a staff member of the Cancer Risk Management Program to give you a call to assist with scheduling your appointment.  You may also call  [phone] ([phone]) to initiate scheduling.    Please be aware that coverage of these services is subject to the terms and limitations of your health insurance plan.  Call member services at your health plan with any benefit or coverage questions.      Please bring the completed family history sheet to your appointment in addition to any available outside medical records documenting your cancer diagnosis.            ONC/HEME ADULT REFERRAL       Your provider has referred you to: [facility]: [facility] [place] [phone]   [email or web address]    Please be aware that coverage of these services is subject to the terms and limitations of your health insurance plan.  Call member services at  your health plan with any benefit or coverage questions.      Please bring the following with you to your appointment:    (1) Any X-Rays, CTs or MRIs which have been performed.  Contact the facility where they were done to arrange for  prior to your scheduled appointment.   (2) List of current medications  (3) This referral request   (4) Any documents/labs given to you for this referral                  Your next 10 appointments already scheduled     Oct 29, 2018  2:30 PM CDT   US AXILLARY LEFT with [id number]   [facility] ([facility])    [address], [address]  [place] [address] [phone]           How do I prepare for my exam? (Food and drink instructions) No Food and Drink Restrictions.  How do I prepare for my exam? (Other instructions) You do not need to do anything special to prepare for your exam.  What should I wear: Wear comfortable clothes.  How long does the exam take: Most ultrasounds take 30 to 60 minutes.  What should I bring: Bring a list of your medicines, including vitamins, minerals and over-the-counter drugs. It is safest to leave personal items at home.  Do I need a :  No  is needed.  What do I need to tell my doctor: Tell your doctor about any allergies you may have.  What should I do after the exam: No restrictions, You may resume normal activities.  What is this test: An ultrasound uses sound waves to make pictures of the body. Sound waves do not cause pain. The only discomfort may be the pressure of the wand against your skin or full bladder.  Who should I call with questions: If you have any questions, please call the Imaging Department where you will have your exam. Directions, parking instructions, and other information is available on our website, [email or web address].            Nov 05, 2018   Procedure with Lesly Aponte MD   [facility] (--)    [address]  [place] [facility] [place]  "[phone]              Who to contact     If you have questions or need follow up information about today's clinic visit or your schedule please contact [facility] directly at [phone].  Normal or non-critical lab and imaging results will be communicated to you by RADLIVEhart, letter or phone within 4 business days after the clinic has received the results. If you do not hear from us within 7 days, please contact the clinic through RADLIVEhart or phone. If you have a critical or abnormal lab result, we will notify you by phone as soon as possible.  Submit refill requests through [facility] or call your pharmacy and they will forward the refill request to us. Please allow 3 business days for your refill to be completed.          Additional Information About Your Visit        [employer] [employer] Information     [employer] [employer] lets you send messages to your doctor, view your test results, renew your prescriptions, schedule appointments and more. To sign up, go to [email or web address] . Click on \"Log in\" on the left side of the screen, which will take you to the Welcome page. Then click on \"Sign up Now\" on the right side of the page.     You will be asked to enter the access code listed below, as well as some personal information. Please follow the directions to create your username and password.     Your access code is: [email or web address]  Expires: 2019  8:48 AM     Your access code will  in 90 days. If you need help or a new code, please call your [facility] clinic or [phone].        Care EveryWhere ID     This is your Care EveryWhere ID. This could be used by other organizations to access your [facility] medical records  [id number]        Your Vitals Were     Pulse Height Pulse Oximetry BMI (Body Mass Index)          95 5' 9\" (1.753 m) 98% 36.92 kg/m2         Blood Pressure from Last 3 Encounters:   10/26/18 122/78   10/26/17 124/71   17 126/76    Weight from Last 3 Encounters:   10/26/18 250 " lb (113.4 kg)   10/26/17 170 lb (77.1 kg)   09/23/17 160 lb (72.6 kg)              We Performed the Following     CANCER RISK MGMT/CANCER GENETIC COUNSELING REFERRAL     ONC/HEME ADULT REFERRAL        Primary Care Provider Office Phone # Fax #    Laure Zapata [phone]       [facility] [address]  [place] [address]        Equal Access to Services     [facility]: Hadii aad ku hadasho Soomaali, waaxda luqadaha, qaybta kaalmada adeegyada, waxay idiin hayaan adeeg kharagini la'damaris . So [facility] [phone].    ATENCIÓN: Si leonardla andrea, tiene a anaya disposición servicios gratuitos de asistencia lingüística. [facility] [phone].    We comply with applicable federal civil rights laws and [place] laws. We do not discriminate on the basis of race, color, national origin, age, disability, sex, sexual orientation, or gender identity.            Thank you!     Thank you for choosing [facility]  for your care. Our goal is always to provide you with excellent care. Hearing back from our patients is one way we can continue to improve our services. Please take a few minutes to complete the written survey that you may receive in the mail after your visit with us. Thank you!             Your Updated Medication List - Protect others around you: Learn how to safely use, store and throw away your medicines at www.disposemymeds.org.          This list is accurate as of 10/26/18 11:59 PM.  Always use your most recent med list.                   Brand Name Dispense Instructions for use Diagnosis    CLONAZEPAM PO      Take by mouth At Bedtime        ibuprofen 800 MG tablet    ADVIL/MOTRIN    30 tablet    Take 1 tablet (800 mg) by mouth every 8 hours as needed for moderate pain    Ankle injury, right, initial encounter       * order for DME     1 Device    Air splint    Ankle injury, right, initial encounter       * order for DME     2 Device    2 crutches    Ankle injury,  right, initial encounter       SERTRALINE HCL PO      Take by mouth daily        WELLBUTRIN PO           * Notice:  This list has 2 medication(s) that are the same as other medications prescribed for you. Read the directions carefully, and ask your doctor or other care provider to review them with you.

## 2018-10-26 NOTE — NURSING NOTE
Breast Patients    BREAST PATIENTS (ALL)    1-Do you have any of the following symptoms? Lump(s) or Mass(es)  2-In which breast are you having the symptoms? left  3-Do you use hormones?  No  4-Have you had a Mammogram? Yes  Where: Breast screening  Date: 10/19/2018  5-Have you ever had a breast cyst drained? No  6-Have you ever had a breast biopsy? Yes  Side: left  Date: 10/19/18  7-Have you ever had a Breast Cancer? No   8-Is there a history of Breast Cancer in your family? Yes   Relationship to you:    Mother  9-Have you ever had Ovarian Cancer? No  10-Is there a history of Ovarian Cancer in your family? No  11-Summarize your caffeine intake (i.e. coffee, tea, chocolate, soda etc.): Coffee or soda once daily    BREAST PATIENTS (FEMALE)    12-What age did your periods begin? 15  13-Date your last menstrual period began? NA  14-Number of full-term pregnancies: 2  15-Your age when your first child was born? 19   16-Did you nurse your children? No  17-Are you pregnant now? No  18-Have you begun menopause? No  19-Have you had either ovary removed?No  20-Do you have breast implants? No     Carlos Meraz MA on 10/26/2018 at 3:40 PM

## 2018-10-29 NOTE — PROGRESS NOTES
Introduced self and explained my role of oncology care coordinator to patient. Accompanied Megha to her surgical consultation with Dr. Aponte on 10/26/18.    Megha was given the new patient packet which includes educational material and support resources such as [employer], American Cancer Society: For Women Facing Breast Cancer, What You Need to Know about Breast Conservation Surgery, Exercises After Breast Surgery, [employer], Fighting Cancer through Diet and Lifestyle, [facility] Cancer Support Group, and [facility] Breast Cancer Support Group.     At the end of the consultation, we reviewed plan of care and education. Megha has decided to proceed with seed localized, left breast lumpectomy, left sentinel lymph node biopsy. She will have an ultrasound of left axilla performed prior to surgery.    Informed Megha to wear comfortable clothing morning of procedure and supportive bra with clasps in the front and no under wire.     Megha has my contact information and knows to contact me in the future with any questions/concerns.     Celena REBOLLEDON, RN, OCN  Oncology Care Coordinator  [facility]  [phone]

## 2018-10-29 NOTE — TELEPHONE ENCOUNTER
Type of surgery: Seed localized left breast lumpectomy with left sentinel lymph node biopsy  Location of surgery: [facility]  Date and time of surgery: 11/21/18 at 2:30pm  Surgeon: Dr. Lesly Aponte  Pre-Op Appt Date: Patient to schedule  Post-Op Appt Date: Patient to schedule   Packet sent out: Yes  Pre-cert/Authorization completed:  Not Applicable  Date: 10/29/18

## 2018-10-29 NOTE — PATIENT INSTRUCTIONS
You are scheduled for the following appointments:   1)  Ultrasound axilla at [facility] on 10/30/18 at 7:30am   2) Dr. Jamaal Winston at [facility] on 11/2/18 at 4pm    Your surgery is scheduled on 11/5/18 at [facility]

## 2018-10-30 NOTE — DISCHARGE INSTRUCTIONS
After Your Biopsy  Bleeding or Bruising: Slight bruising is normal. If you bleed through the bandage, put direct pressure on the site for 20 minutes. If you are still bleeding after 20 minutes, call the provider who ordered the exam. If it is after hours please go to the emergency room for further evaluation.    Bandages: Keep your bandage in place until tomorrow morning. Do not get it wet. On the second day, you may remove the bandage. Keep steri strips in place, they will fall off on their own.     Activity: You may shower the morning after the exam. No heavy activity (lifting, vacuuming) for 24 hours.     Discomfort: You may take tylenol for pain. You may also apply an ice pack over biopsy site for 15-20 minutes several times per day. Wear supportive bra for 24 hours following biopsy.    Infection: Infection is rare. Symptoms may include fever, redness, increasing pain, and/or fluid draining from biopsy site. If you have any of these symptoms, please call the provider who ordered your exam.    Results: Results may take up to 3 business days.     Call the provider who ordered your exam if: you have bleeding more than 20 minutes, you have uncontrolled pain, or you have signs of an infection.    Please call one of our nurses if you have questions or concerns after your biopsy: Salud [phone] Mhiaela [phone] or Celena DOTSON [phone]    Thank you for choosing [facility].     Celena Ingram BSN, RN, OCN  Oncology Care Coordinator  [facility]  [phone]

## 2018-10-31 NOTE — TELEPHONE ENCOUNTER
Staff Message sent to Dr. Finch to inform patient's left axillary LN biopsy final pathology report is available for her to review and inform patient.  Patient has know left breast cancer and is scheduled for a radioactive seed localized left breast lumpectomy with left sentinel lymph node biopsy on 11/5/18.       Patient Name: JONAHTAN RIGGINS   MR#: [id number]   Specimen #: [id number]   Collected: 10/30/2018   Received: 10/30/2018   Reported: 10/31/2018 16:19   Ordering Phy(s): SCOOTER HOWELL   Additional Phy(s): ANNABELLE FINCH     For improved result formatting, select 'View Enhanced Report Format' under    Linked Documents section.     SPECIMEN(S):   Left ultrasound guided axillary lymph node needle biopsy, 16 cm from   nipple, 3.2 cm size     FINAL DIAGNOSIS:   Lymph node, left axilla, ultrasound-guided biopsy-   - Positive for malignancy, metastatic carcinoma consistent with metastasis    from breast primary (Please see   microscopic description)   -Estrogen receptor is negative (0%), progesterone receptor is negative   (0%)   -Her 2 by FISH is ordered and pending       Salud Frausto, RN, BSN

## 2018-11-01 NOTE — TELEPHONE ENCOUNTER
----- Message from Edna Frausto RN sent at 10/31/2018  4:24 PM CDT -----  Regarding: Breast Biopsy results are available  Contact: [phone]  Hi Steffany Campbell's breast biopsy results just finalized in [facility].    Patient Name: JONATHAN CAMPBELL   MR#: [id number]   Specimen #: [id number]   Collected: 10/30/2018   Received: 10/30/2018   Reported: 10/31/2018 16:19   Ordering Phy(s): SCOOTER HOWELL   Additional Phy(s): ANNABELLE FINCH     For improved result formatting, select 'View Enhanced Report Format' under    Linked Documents section.     SPECIMEN(S):   Left ultrasound guided axillary lymph node needle biopsy, 16 cm from   nipple, 3.2 cm size     FINAL DIAGNOSIS:   Lymph node, left axilla, ultrasound-guided biopsy-   - Positive for malignancy, metastatic carcinoma consistent with metastasis    from breast primary (Please see   microscopic description)   -Estrogen receptor is negative (0%), progesterone receptor is negative   (0%)   -Her 2 by FISH is ordered and pending

## 2018-11-01 NOTE — TELEPHONE ENCOUNTER
I called Megha and discussed her pathology results from her axillary node biopsy on the left. It revealed metastatic breast cancer. As such, I strongly encourage her to consider neoadjuvant chemotherapy with a large triple negative cancer with kinza metastasis. She is open to this. She will meet with Dr. Winston on Friday at 4pm. She does have concerns about losing her hair and does not think she will want a port.     Lesly Aponte MD  [facility]  [phone]

## 2018-11-02 NOTE — OR NURSING
PATIENT RELATES GETTING A CALL FROM DR. FINCH YESTERDAY, SAYING SURGERY IS CANCELLED AND KEEP APT. TODAY WITH ONCOLOGIST.

## 2018-11-05 NOTE — PROGRESS NOTES
I received a message that Megha decided to cancel her port placement today and get a 2nd opinion regarding her breast cancer treatment. I called her and left her a voicemail encouraging her to get a 2nd opinion and to call me if she has questions going forward.     Lesly Aponte MD  [facility]  [phone]

## 2018-11-10 NOTE — TELEPHONE ENCOUNTER
Date of appointment: 11/14/18   Diagnosis/reason for appointment:Malignant neoplasm of lower-outer quadrant of left breast of female, estrogen receptor negative   Referring provider/facility:Dr Aponte-Internal  Who called:    Recent Studies  Imaging:  Pathology:  Labs:  Previous chemo/radiation (if known):    Records requested from:  Records received from:    Additional information:Records in Epic and

## 2018-11-12 NOTE — TELEPHONE ENCOUNTER
I called Megha and again discussed with her options for treatment of her triple negative breast cancer with positive lymph node. She has thought about chemotherapy and does not want it, before or after surgery. She would like to proceed with seed localized lumpectomy with left axillary node dissection as soon as possible. She understands this would be a large lumpectomy and also the risks associated with axillary node dissection. She also understands radiation will be recommended post operatively. I discussed that I could place a port at the time of her surgery but again she is adamant she does not want chemotherapy.     Lesly Aponte MD  [facility]  [phone]

## 2018-11-12 NOTE — TELEPHONE ENCOUNTER
----- Message from Leslie Layton sent at 11/12/2018 10:14 AM CST -----  Regarding: RE: Surgery  The only time available in the OR before Thanksgiving would be next Wednesday at 3pm.  Otherwise, we are looking at December 5th.    Would you be willing to add her at 3pm the day before Thanksgiving? Or should we plan on the 5th?    Let me know  Leslie    ----- Message -----     From: Celena Ingram RN     Sent: 11/12/2018  10:01 AM       To: Leslie Layton, Lesly Aponte MD  Subject: Surgery                                          Hello ladies-    Received a call from Megha this am. She'd like to cancel 2nd opinion with Dr. Barney and proceed with surgery first followed by radiation. She is not interested in chemo, at all.     She'd like this done ASAP, preferably before the Thanksgiving Holiday...     Leslie-she had a pre op scheduled with her ob/gyn; however, cancelled it. Tish did one for the port placement. Not sure if that will work.    Thanks!  Vanda

## 2018-11-15 NOTE — TELEPHONE ENCOUNTER
I called Megha to discuss our recommendations from Breast Cancer Conference yesterday. The consensus of the group was to proceed with neoadjuvant chemotherapy; however, they understood that Megha refuses this and would like surgery first. They would then recommend CT chest prior to surgery to ensure no evidence of distant disease. Megha was initially resistant to this but did agree. I will have Leslie call her to coordinate.     Lesly Aponte MD  [facility]  [phone]

## 2018-11-15 NOTE — TELEPHONE ENCOUNTER
Call returned by patient. All patient's questions pertaining to CT chest answered appropriately and thoroughly. At the end of the discussion, Ms. Campbell agreed to proceed with CT chest to r/o metastatic disease. Surgery as scheduled for November 21st.     Await call from Leslie to confirm CT chest. Both parties in agreement of plan.    Celena REBOLLEDON, RN, OCN  Oncology Care Coordinator  [facility]  [phone]

## 2018-11-15 NOTE — TELEPHONE ENCOUNTER
Voicemail received from Ms. Campbell. Megha would like to cancel her CT chest scheduled for November 17th  as well has her surgery scheduled for November 21st for her newly diagnosed breast cancer. Per the voicemail left by Ms. Campbell, she and her  talked, and they would like to get a second opinion regarding surgical options for her newly diagnosed, triple negative breast cancer.    I called patient back and left a message requesting a call back to confirm cancelling of CT chest as well as her surgery. Await returned call from patient.    Both Leslie and Dr. Aponte have been made aware of above.    Celena REBOLLEDON, RN, OCN  Oncology Care Coordinator  [facility]  [phone]

## 2018-11-16 NOTE — TELEPHONE ENCOUNTER
Patient requesting Valium prior to CT chest. Reviewed with Dr. Aponte. Ok to call in Valium 10 mg one tablet 30 minutes prior to CT chest. Do not take until you get to the imaging center. Instructed patient she would need a  to and from appointment. Patient verbalized understanding.    Rx called to [facility] per patient request.    Celena REBOLLEDON, RN, OCN  Oncology Care Coordinator  [facility]  [phone]

## 2018-11-16 NOTE — MR AVS SNAPSHOT
After Visit Summary   11/16/2018    Megha Campbell    MRN: [id number]           Patient Information     Date Of Birth          1965        Visit Information        Provider Department      11/16/2018 4:40 PM Briseyda Lock MD [facility]        Today's Diagnoses     Preop general physical exam    -  1    Malignant neoplasm of lower-outer quadrant of left breast of female, estrogen receptor negative (H)          Care Instructions      Before Your Surgery      Call your surgeon if there is any change in your health. This includes signs of a cold or flu (such as a sore throat, runny nose, cough, rash or fever).    Do not smoke, drink alcohol or take over the counter medicine (unless your surgeon or primary care doctor tells you to) for the 24 hours before and after surgery.    If you take prescribed drugs: Follow your doctor s orders about which medicines to take and which to stop until after surgery.    Eating and drinking prior to surgery: follow the instructions from your surgeon    Take a shower or bath the night before surgery. Use the soap your surgeon gave you to gently clean your skin. If you do not have soap from your surgeon, use your regular soap. Do not shave or scrub the surgery site.  Wear clean pajamas and have clean sheets on your bed.           Follow-ups after your visit        Follow-up notes from your care team     Return in about 1 month (around 12/16/2018).      Your next 10 appointments already scheduled     Nov 18, 2018 10:30 AM CST   CT CHEST W CONTRAST with SHCT2   [facility] ([facility])    [address] [place] [id number] [phone]           How do I prepare for my exam? (Food and drink instructions) **You will have contrast for this exam.** Do not eat or drink for 2 hours before your exam. If you need to take medicine, you may take it with small sips of water. (We may ask you to take liquid medicine as well.)   The day before your exam, drink extra fluids at least six 8-ounce glasses (unless your doctor tells you to restrict your fluids).  How do I prepare for my exam? (Other instructions) Patients over 70 or patients with diabetes or kidney problems: If you haven t had a blood test (creatinine test) within the last 30 days, the Cardiologist/Radiologist may require you to get this test prior to your exam.  What should I wear: Please wear loose clothing, such as a sweat suit or jogging clothes.  Avoid snaps, zippers and other metal. We may ask you to undress and put on a hospital gown.  How long does the exam take: Most scans take less than 20 minutes.  What should I bring: Please bring any scans or X-rays taken at other hospitals, if similar tests were done. Also bring a list of your medicines, including vitamins, minerals and over-the-counter drugs. It is safest to leave personal items at home.  Do I need a :  No  is needed.  What do I need to tell my doctor? Be sure to tell your doctor: * If you have any allergies. * If there s any chance you are pregnant. * If you are breastfeeding. * If you have diabetes as your medication may need to be adjusted for this exam.  What should I do after the exam: No restrictions, You may resume normal activities.  What is this test: A CT (computed tomography) scan is a series of pictures that allows us to look inside your body. The scanner creates images of the body in cross sections, much like slices of bread. This helps us see any problems more clearly. You may receive contrast (X-ray dye) before or during your scan. Contrast is given through an IV (small needle in your arm).  Who should I call with questions: If you have any questions, please call the Imaging Department where you will have your exam. Directions, parking instructions, and other information is available on our website, [email or web address].            Nov 21, 2018 12:30 PM [facility] BREAST RADIOACTIVE SEED  LOCALIZATION INITIAL LEFT with SHBCUS2, SH BREAST NURSE, SH BREAST RAD   [facility] ([facility])    [address], [address]  [facility] [id number] [phone]           How do I prepare for my exam? (Food and drink instructions) No Food and Drink Restrictions.  How do I prepare for my exam? (Other instructions) You do not need to do anything special to prepare for your exam.  What should I wear: Wear comfortable clothes.  How long does the exam take: Most ultrasounds take 30 to 60 minutes.  What should I bring: Bring a list of your medicines, including vitamins, minerals and over-the-counter drugs. It is safest to leave personal items at home.  Do I need a :  No  is needed.  What do I need to tell my doctor: Tell your doctor about any allergies you may have.  What should I do after the exam: No restrictions, You may resume normal activities.  What is this test: An ultrasound uses sound waves to make pictures of the body. Sound waves do not cause pain. The only discomfort may be the pressure of the wand against your skin or full bladder.  Who should I call with questions: If you have any questions, please call the Imaging Department where you will have your exam. Directions, parking instructions, and other information is available on our website, [email or web address].            Nov 21, 2018  1:30 PM CST   NM SENTINEL NODE INJECTION BILATERAL with SHNM2   [facility] ([facility])    [address] [id number] [phone]           How do I prepare for my exam? (Food and drink instructions) For Adults:  No Food and Drink Restrictions.  For children: Young children may need medicine to help them relax (called sedation). We will tell you in advance if your child needs this medicine. If so, he or she cannot eat or drink before this test. You will need to arrive about 45 minutes early.  *If your  child will not be sedated, he or she can eat and drink as normal.  How do I prepare for my exam? (Other instructions) You may take your normal medicines, unless your doctor tells you not to.  What should I wear: Please wear comfortable clothes.  How long does the exam take: Please allow 90 minutes for this exam.  What should I bring: Please bring a list of your medicines (including vitamins, minerals and over-the-counter drugs). Leave your valuables at home.  Do I need a :  No  is needed.  What do I need to tell my doctor: If you are feeding or may be pregnant, tell us before the exam.  What should I do after the exam: No restrictions, You may resume normal activities. The radioactive fluid will leave your body when you urinate (use the toilet).  *If your child was sedated, we will bring him or her to the recovery room. It may take up to 90 minutes before your child is ready to go home. We will give you clear guidelines about how to care for your child at home. Be sure to ask any questions you may have.  What is this test:  For these tests, we will inject a small amount of radioactive fluid into your arm or hand (about the amount of radiation you would get in an X-ray). If you are having a GFR, we will test your blood to measure the radioactivity. This tells us how well your kidneys are filtering (cleaning) your blood. If you are having a renogram (kidney scan), we take pictures of the kidneys to see how quickly they can remove the radioactive fluid from your body. This tells us how well your kidneys are working. The fluid helps the kidneys show up on our video screen.  Other information about my exam (For children): We may place a catheter (tube) in the bladder. This tube will drain urine from the body. A parent or other adult may stay with the child in the exam room.  Who should I call with questions: Please call your Imaging Department at your exam site with any questions. Directions, parking  "instructions, and other information is available on our website, [email or web address].            Nov 21, 2018  2:00 PM CST   MA POST PROCEDURE LEFT with SHBCMA4   [facility] [place] ([facility])    [address] [place] [address], [address]  [facility] [id number] [phone]           How do I prepare for my exam? (Food and drink instructions) No Food and Drink Restrictions.  How do I prepare for my exam? (Other instructions) Do not use any powder, lotion or deodorant under your arms or on your breast. If you do, we will ask you to remove it before your exam.  What should I wear: Wear comfortable, two-piece clothing.  How long does the exam take: Most scans will take 15 minutes.  What should I bring: Bring any previous mammograms from other facilities or have them mailed to the breast center.  Do I need a :  No  is needed.  What do I need to tell my doctor: If you have any allergies, tell your care team.  What should I do after the exam: No restrictions, You may resume normal activities.  What is this test: This test is an x-ray of the breast to look for breast disease. The breast is pressed between two plates to flatten and spread the tissue. An X-ray is taken of the breast from different angles.  Who should I call with questions: If you have any questions, please call the Imaging Department where you will have your exam. Directions, parking instructions, and other information is available on our website, [email or web address].  Other information about my exam Three-dimensional (3D) mammograms are available at [place] locations in [facility], [place], and [place].  Health locations include [place] and the [facility] in [place].  Benefits of 3D mammograms include: * Improved rate of cancer detection * Decreases your chance of having to go back for more tests, which means fewer: * \"False-positive\" results (This " means that there is an abnormal area but it isn't cancer.) * Invasive testing procedures, such as a biopsy or surgery * Can provide clearer images of the breast if you have dense breast tissue.  *3D mammography is an optional exam that anyone can have with a 2D mammogram. It doesn't replace or take the place of a 2D mammogram. 2D mammograms remain an effective screening test for all women.  Not all insurance companies cover the cost of a 3D mammogram. Check with your insurance.            Nov 21, 2018   Procedure with Lesly Aponte MD   [facility] (--)    [address]  [facility] [address]   [phone]            Nov 21, 2018  2:15 PM CST   [facility] Same Day Surgery with Lesly Aponte MD   [facility] ([facility])    [facility] ([facility])   [phone]            Nov 21, 2018  2:20 PM CST   MA BREAST SPECIMEN LEFT OR with SHMASP2   [facility] ([facility])    [address], [address]  [facility] [address]   [phone]           How do I prepare for my exam? (Food and drink instructions) No Food and Drink Restrictions.  How do I prepare for my exam? (Other instructions) Do not use any powder, lotion or deodorant under your arms or on your breast. If you do, we will ask you to remove it before your exam.  What should I wear: Wear comfortable, two-piece clothing.  How long does the exam take: Most scans will take 15 minutes.  What should I bring: Bring any previous mammograms from other facilities or have them mailed to the breast center.  Do I need a :  No  is needed.  What do I need to tell my doctor: If you have any allergies, tell your care team.  What should I do after the exam: No restrictions, You may resume normal activities.  What is this test: This test is an x-ray of the breast to look for breast disease.  "The breast is pressed between two plates to flatten and spread the tissue. An X-ray is taken of the breast from different angles.  Who should I call with questions: If you have any questions, please call the Imaging Department where you will have your exam. Directions, parking instructions, and other information is available on our website, [email or web address].  Other information about my exam Three-dimensional (3D) mammograms are available at [place] locations in [facility], [place], [place], and [place]. [facility] locations include [place] and the [facility] in [place].  Benefits of 3D mammograms include: * Improved rate of cancer detection * Decreases your chance of having to go back for more tests, which means fewer: * \"False-positive\" results (This means that there is an abnormal area but it isn't cancer.) * Invasive testing procedures, such as a biopsy or surgery * Can provide clearer images of the breast if you have dense breast tissue.  *3D mammography is an optional exam that anyone can have with a 2D mammogram. It doesn't replace or take the place of a 2D mammogram. 2D mammograms remain an effective screening test for all women.  Not all insurance companies cover the cost of a 3D mammogram. Check with your insurance.              Future tests that were ordered for you today     Open Future Orders        Priority Expected Expires Ordered    CT Chest w Contrast Routine 11/15/2018 11/15/2019 11/15/2018            Who to contact     If you have questions or need follow up information about today's clinic visit or your schedule please contact [facility] directly at [phone].  Normal or non-critical lab and imaging results will be communicated to you by MyChart, letter or phone within 4 business days after the clinic has received the results. If you do not hear from us within 7 days, please contact the clinic through MyChart or phone. " "If you have a critical or abnormal lab result, we will notify you by phone as soon as possible.  Submit refill requests through [employer] or call your pharmacy and they will forward the refill request to us. Please allow 3 business days for your refill to be completed.          Additional Information About Your Visit        [employer] Information     [employer] lets you send messages to your doctor, view your test results, renew your prescriptions, schedule appointments and more. To sign up, go to [email or web address] . Click on \"Log in\" on the left side of the screen, which will take you to the Welcome page. Then click on \"Sign up Now\" on the right side of the page.     You will be asked to enter the access code listed below, as well as some personal information. Please follow the directions to create your username and password.     Your access code is: [email or web address]  Expires: 2019  7:48 AM     Your access code will  in 90 days. If you need help or a new code, please call your [facility] clinic or [phone].        Care EveryWhere ID     This is your Care EveryWhere ID. This could be used by other organizations to access your [facility] medical records  [id number]        Your Vitals Were     Pulse Temperature Height Pulse Oximetry BMI (Body Mass Index)       83 98  F (36.7  C) (Oral) 5' 9\" (1.753 m) 98% 36.03 kg/m2        Blood Pressure from Last 3 Encounters:   18 128/70   10/26/18 122/78   10/26/17 124/71    Weight from Last 3 Encounters:   18 244 lb (110.7 kg)   10/26/18 250 lb (113.4 kg)   10/26/17 170 lb (77.1 kg)              Today, you had the following     No orders found for display         Today's Medication Changes          These changes are accurate as of 18  5:03 PM.  If you have any questions, ask your nurse or doctor.               Start taking these medicines.        Dose/Directions    diazepam 10 MG tablet   Commonly known as:  VALIUM   Used for:  Claustrophobia   Started " by:  Celena Ingram, RN        Dose:  10 mg   Take 1 tablet (10 mg) by mouth once for 1 dose Take 30 minutes before procedure.  Do not operate a vehicle after taking this medication.   Quantity:  1 tablet   Refills:  0            Where to get your medicines      Some of these will need a paper prescription and others can be bought over the counter.  Ask your nurse if you have questions.     Bring a paper prescription for each of these medications     diazepam 10 MG tablet                Primary Care Provider Office Phone # Fax #    Laure Zapata [phone]       [facility] [address]  [facility] [address]        Equal Access to Services     [facility]: Hadii mikal zaragoza hadmariettao [place], waaxda luqadaha, qaybta kaalmada bennettyaashley, jose diaz . So [facility] [phone].    ATENCIÓN: Si habla español, tiene a anaya disposición servicios gratuitos de asistencia lingüística. [facility] [phone].    We comply with applicable federal civil rights laws and [place] laws. We do not discriminate on the basis of race, color, national origin, age, disability, sex, sexual orientation, or gender identity.            Thank you!     Thank you for choosing [facility]  for your care. Our goal is always to provide you with excellent care. Hearing back from our patients is one way we can continue to improve our services. Please take a few minutes to complete the written survey that you may receive in the mail after your visit with us. Thank you!             Your Updated Medication List - Protect others around you: Learn how to safely use, store and throw away your medicines at www.disposemymeds.org.          This list is accurate as of 11/16/18  5:03 PM.  Always use your most recent med list.                   Brand Name Dispense Instructions for use Diagnosis    CLONAZEPAM PO      Take by mouth At Bedtime        diazepam 10 MG tablet    VALIUM    1 tablet     Take 1 tablet (10 mg) by mouth once for 1 dose Take 30 minutes before procedure.  Do not operate a vehicle after taking this medication.    Claustrophobia       * order for DME     1 Device    Air splint    Ankle injury, right, initial encounter       * order for DME     2 Device    2 crutches    Ankle injury, right, initial encounter       order for DME     2 Units    Equipment being ordered: front closure bra for post-op and recovery.  *Scheduled for seed localized, left breast lumpectomy, sentinel lymph node biopsy on 11/5/18*    Invasive ductal carcinoma of breast, female, left (H)       SERTRALINE HCL PO      Take by mouth daily        WELLBUTRIN PO           * Notice:  This list has 2 medication(s) that are the same as other medications prescribed for you. Read the directions carefully, and ask your doctor or other care provider to review them with you.

## 2018-11-16 NOTE — PROGRESS NOTES
[address] [address]  [phone]  Dept: [phone]    PRE-OP EVALUATION:  Today's date: 2018    Megha Campbell (: 1965) presents for pre-operative evaluation assessment as requested by Dr. Aponte.  She requires evaluation and anesthesia risk assessment prior to undergoing surgery/procedure for treatment of breast cancer, left.    Proposed Surgery/ Procedure: Seed localized left breast lumpectomy, left sentinel lymph node biopsy  Date of Surgery/ Procedure: 18  Time of Surgery/ Procedure: 230 pm  Hospital/Surgical Facility:  OR  Fax number for surgical facility:   Primary Physician: Laure Zapata  Type of Anesthesia Anticipated: General    Patient has a Health Care Directive or Living Will:  YES     1. NO - Do you have a history of heart attack, stroke, stent, bypass or surgery on an artery in the head, neck, heart or legs?  2. NO - Do you ever have any pain or discomfort in your chest?  3. NO - Do you have a history of  Heart Failure?  4. NO - Are you troubled by shortness of breath when: walking on the level, up a slight hill or at night?  5. NO - Do you currently have a cold, bronchitis or other respiratory infection?  6. NO - Do you have a cough, shortness of breath or wheezing?  7. NO - Do you sometimes get pains in the calves of your legs when you walk?  8. NO - Do you or anyone in your family have previous history of blood clots?  9. NO - Do you or does anyone in your family have a serious bleeding problem such as prolonged bleeding following surgeries or cuts?  10. Yes  - HAVE YOU EVER HAD PROBLEMS WITH ANEMIA OR BEEN TOLD TO TAKE IRON PILLS? Temporarily after previous gastric bypass surgery in 2004. Most recent Hgb within normal limits.  11. NO - Have you had any abnormal blood loss such as black, tarry or bloody stools, or abnormal vaginal bleeding?  12. NO - Have you ever had a blood transfusion?  13. NO - Have you or any of  your relatives ever had problems with anesthesia?  14. NO - Do you have sleep apnea, excessive snoring or daytime drowsiness?  15. NO - Do you have any prosthetic heart valves?  16. NO - Do you have prosthetic joints?  17. NO - Is there any chance that you may be pregnant?      HPI:     HPI related to upcoming procedure: patient Megha Campbell is a very pleasant 53 year old female with left breast cancer who presents to internal medicine clinic for a pre op cardiac evaluation for upcoming general surgery procedure for Seed localized left breast lumpectomy, left sentinel lymph node biopsy for treatment of for treatment of breast cancer, left. Patient denies any chest pain, headaches, fever or chills. Patient denies any known CAD, CVA or Type 2 Diabetes. Patient denies allergies to anesthesia agents. Patient does not take any daily aspirin or any other blood thinner medication.        MEDICAL HISTORY:      Past Medical History:   Diagnosis Date     Breast cancer in female (H)      Past Surgical History:   Procedure Laterality Date     GASTRIC BYPASS  2004     Current Outpatient Prescriptions   Medication Sig Dispense Refill     BuPROPion HCl (WELLBUTRIN PO)        CLONAZEPAM PO Take by mouth At Bedtime       diazepam (VALIUM) 10 MG tablet Take 1 tablet (10 mg) by mouth once for 1 dose Take 30 minutes before procedure.  Do not operate a vehicle after taking this medication. 1 tablet 0     order for DME Equipment being ordered: front closure bra for post-op and recovery.    *Scheduled for seed localized, left breast lumpectomy, sentinel lymph node biopsy on 11/5/18* 2 Units 0     SERTRALINE HCL PO Take by mouth daily       order for DME Air splint (Patient not taking: Reported on 10/26/2018) 1 Device 0     order for DME 2 crutches (Patient not taking: Reported on 10/26/2018) 2 Device 0     OTC products: None, except as noted above    Allergies   Allergen Reactions     Oxycodone Itching and Rash      Latex Allergy:  "NO    Social History   Substance Use Topics     Smoking status: Never Smoker     Smokeless tobacco: Never Used     Alcohol use No     History   Drug Use No       REVIEW OF SYSTEMS:   Constitutional, neuro, ENT, endocrine, pulmonary, cardiac, gastrointestinal, genitourinary, musculoskeletal, integument and psychiatric systems are negative, except as otherwise noted.    EXAM:   /70 (BP Location: Right arm, Patient Position: Sitting, Cuff Size: Adult Large)  Pulse 83  Temp 98  F (36.7  C) (Oral)  Ht 5' 9\" (1.753 m)  Wt 244 lb (110.7 kg)  SpO2 98%  BMI 36.03 kg/m2    GENERAL APPEARANCE: alert and no distress     EYES: EOMI, PERRL     HENT: ear canals and TM's normal and nose and mouth without ulcers or lesions     NECK: no adenopathy, no asymmetry, masses, or scars and thyroid normal to palpation     RESP: lungs clear to auscultation - no rales, rhonchi or wheezes     CV: regular rates and rhythm, normal S1 S2, no S3 or S4 and no murmur, click or rub     ABDOMEN:  soft, nontender, no HSM or masses and bowel sounds normal     MS: extremities normal- no gross deformities noted, no evidence of inflammation in joints, FROM in all extremities.     SKIN: no suspicious lesions or rashes     NEURO: Normal strength and tone, sensory exam grossly normal, mentation intact and speech normal     PSYCH: mentation appears normal. and affect normal/bright     LYMPHATICS: No cervical adenopathy    DIAGNOSTICS:   EKG: Not indicated due to non-vascular surgery and low risk of event (age <65 and without cardiac risk factors)    Recent Labs   Lab Test  10/26/17   0345  10/26/17   0314   HGB   --   14.3   PLT   --   217   NA  137  Canceled, Test credited   POTASSIUM  3.5  Canceled, Test credited   CR  0.84  Canceled, Test credited        IMPRESSION:   Reason for surgery/procedure: breast cancer, left.  Diagnosis/reason for consult: pre op cardiac evaluation for upcoming general surgery procedure for Seed localized left breast " lumpectomy, left sentinel lymph node biopsy for treatment of for treatment of breast cancer, left.    The proposed surgical procedure is considered INTERMEDIATE risk.    REVISED CARDIAC RISK INDEX  The patient has the following serious cardiovascular risks for perioperative complications such as (MI, PE, VFib and 3  AV Block):  No serious cardiac risks  INTERPRETATION: 0 risks: Class I (very low risk - 0.4% complication rate)    The patient has the following additional risks for perioperative complications:  No identified additional risks      ICD-10-CM    1. Preop general physical exam Z01.818    2. Malignant neoplasm of lower-outer quadrant of left breast of female, estrogen receptor negative (H) C50.512     Z17.1        RECOMMENDATIONS:     --Patient is to take all scheduled medications on the day of surgery.    APPROVAL GIVEN to proceed with proposed procedure, without further diagnostic evaluation       Signed Electronically by: Briseyda Lock MD    Copy of this evaluation report is provided to requesting physician.    [place] Preop Guidelines    Revised Cardiac Risk Index

## 2018-11-19 NOTE — H&P (VIEW-ONLY)
[address] [address]  [phone]  Dept: [phone]    PRE-OP EVALUATION:  Today's date: 2018    Megha Campbell (: 1965) presents for pre-operative evaluation assessment as requested by Dr. Aponte.  She requires evaluation and anesthesia risk assessment prior to undergoing surgery/procedure for treatment of breast cancer, left.    Proposed Surgery/ Procedure: Seed localized left breast lumpectomy, left sentinel lymph node biopsy  Date of Surgery/ Procedure: 18  Time of Surgery/ Procedure: 230 pm  Hospital/Surgical Facility:  OR  Fax number for surgical facility:   Primary Physician: Laure Zapata  Type of Anesthesia Anticipated: General    Patient has a Health Care Directive or Living Will:  YES     1. NO - Do you have a history of heart attack, stroke, stent, bypass or surgery on an artery in the head, neck, heart or legs?  2. NO - Do you ever have any pain or discomfort in your chest?  3. NO - Do you have a history of  Heart Failure?  4. NO - Are you troubled by shortness of breath when: walking on the level, up a slight hill or at night?  5. NO - Do you currently have a cold, bronchitis or other respiratory infection?  6. NO - Do you have a cough, shortness of breath or wheezing?  7. NO - Do you sometimes get pains in the calves of your legs when you walk?  8. NO - Do you or anyone in your family have previous history of blood clots?  9. NO - Do you or does anyone in your family have a serious bleeding problem such as prolonged bleeding following surgeries or cuts?  10. Yes  - HAVE YOU EVER HAD PROBLEMS WITH ANEMIA OR BEEN TOLD TO TAKE IRON PILLS? Temporarily after previous gastric bypass surgery in 2004. Most recent Hgb within normal limits.  11. NO - Have you had any abnormal blood loss such as black, tarry or bloody stools, or abnormal vaginal bleeding?  12. NO - Have you ever had a blood transfusion?  13. NO - Have you or any of  your relatives ever had problems with anesthesia?  14. NO - Do you have sleep apnea, excessive snoring or daytime drowsiness?  15. NO - Do you have any prosthetic heart valves?  16. NO - Do you have prosthetic joints?  17. NO - Is there any chance that you may be pregnant?      HPI:     HPI related to upcoming procedure: patient Megha Campbell is a very pleasant 53 year old female with left breast cancer who presents to internal medicine clinic for a pre op cardiac evaluation for upcoming general surgery procedure for Seed localized left breast lumpectomy, left sentinel lymph node biopsy for treatment of for treatment of breast cancer, left. Patient denies any chest pain, headaches, fever or chills. Patient denies any known CAD, CVA or Type 2 Diabetes. Patient denies allergies to anesthesia agents. Patient does not take any daily aspirin or any other blood thinner medication.        MEDICAL HISTORY:      Past Medical History:   Diagnosis Date     Breast cancer in female (H)      Past Surgical History:   Procedure Laterality Date     GASTRIC BYPASS  2004     Current Outpatient Prescriptions   Medication Sig Dispense Refill     BuPROPion HCl (WELLBUTRIN PO)        CLONAZEPAM PO Take by mouth At Bedtime       diazepam (VALIUM) 10 MG tablet Take 1 tablet (10 mg) by mouth once for 1 dose Take 30 minutes before procedure.  Do not operate a vehicle after taking this medication. 1 tablet 0     order for DME Equipment being ordered: front closure bra for post-op and recovery.    *Scheduled for seed localized, left breast lumpectomy, sentinel lymph node biopsy on 11/5/18* 2 Units 0     SERTRALINE HCL PO Take by mouth daily       order for DME Air splint (Patient not taking: Reported on 10/26/2018) 1 Device 0     order for DME 2 crutches (Patient not taking: Reported on 10/26/2018) 2 Device 0     OTC products: None, except as noted above    Allergies   Allergen Reactions     Oxycodone Itching and Rash      Latex Allergy:  "NO    Social History   Substance Use Topics     Smoking status: Never Smoker     Smokeless tobacco: Never Used     Alcohol use No     History   Drug Use No       REVIEW OF SYSTEMS:   Constitutional, neuro, ENT, endocrine, pulmonary, cardiac, gastrointestinal, genitourinary, musculoskeletal, integument and psychiatric systems are negative, except as otherwise noted.    EXAM:   /70 (BP Location: Right arm, Patient Position: Sitting, Cuff Size: Adult Large)  Pulse 83  Temp 98  F (36.7  C) (Oral)  Ht 5' 9\" (1.753 m)  Wt 244 lb (110.7 kg)  SpO2 98%  BMI 36.03 kg/m2    GENERAL APPEARANCE: alert and no distress     EYES: EOMI, PERRL     HENT: ear canals and TM's normal and nose and mouth without ulcers or lesions     NECK: no adenopathy, no asymmetry, masses, or scars and thyroid normal to palpation     RESP: lungs clear to auscultation - no rales, rhonchi or wheezes     CV: regular rates and rhythm, normal S1 S2, no S3 or S4 and no murmur, click or rub     ABDOMEN:  soft, nontender, no HSM or masses and bowel sounds normal     MS: extremities normal- no gross deformities noted, no evidence of inflammation in joints, FROM in all extremities.     SKIN: no suspicious lesions or rashes     NEURO: Normal strength and tone, sensory exam grossly normal, mentation intact and speech normal     PSYCH: mentation appears normal. and affect normal/bright     LYMPHATICS: No cervical adenopathy    DIAGNOSTICS:   EKG: Not indicated due to non-vascular surgery and low risk of event (age <65 and without cardiac risk factors)    Recent Labs   Lab Test  10/26/17   0345  10/26/17   0314   HGB   --   14.3   PLT   --   217   NA  137  Canceled, Test credited   POTASSIUM  3.5  Canceled, Test credited   CR  0.84  Canceled, Test credited        IMPRESSION:   Reason for surgery/procedure: breast cancer, left.  Diagnosis/reason for consult: pre op cardiac evaluation for upcoming general surgery procedure for Seed localized left breast " lumpectomy, left sentinel lymph node biopsy for treatment of for treatment of breast cancer, left.    The proposed surgical procedure is considered INTERMEDIATE risk.    REVISED CARDIAC RISK INDEX  The patient has the following serious cardiovascular risks for perioperative complications such as (MI, PE, VFib and 3  AV Block):  No serious cardiac risks  INTERPRETATION: 0 risks: Class I (very low risk - 0.4% complication rate)    The patient has the following additional risks for perioperative complications:  No identified additional risks      ICD-10-CM    1. Preop general physical exam Z01.818    2. Malignant neoplasm of lower-outer quadrant of left breast of female, estrogen receptor negative (H) C50.512     Z17.1        RECOMMENDATIONS:     --Patient is to take all scheduled medications on the day of surgery.    APPROVAL GIVEN to proceed with proposed procedure, without further diagnostic evaluation       Signed Electronically by: Briseyda Lock MD    Copy of this evaluation report is provided to requesting physician.    [facility] Preop Guidelines    Revised Cardiac Risk Index

## 2018-11-19 NOTE — TELEPHONE ENCOUNTER
I called Megha with the results of her chest CT - it reveals the known left breast cancer and involved axillary nodes. No evidence of metastatic disease. We will proceed with seed localized left breast lumpectomy with left axillary dissection as planned. She understands if margins are positive, mastectomy would be recommended.     Lesly Aponte MD  [facility]  [phone]

## 2018-11-21 NOTE — ANESTHESIA PREPROCEDURE EVALUATION
Anesthesia Evaluation     .             ROS/MED HX    ENT/Pulmonary:     (+)HESHAM risk factors obese, Intermittent asthma Treatment: Inhaler prn,  , . .   (-) tobacco use   Neurologic:      (-) seizures, CVA and migraines   Cardiovascular:  - neg cardiovascular ROS       METS/Exercise Tolerance:     Hematologic: Comments: No PRBC, FFP, Platelets        Musculoskeletal:         GI/Hepatic:  - neg GI/hepatic ROS       Renal/Genitourinary:  - ROS Renal section negative       Endo: Comment: S/p gastric bypass    (+) Obesity, .   (-) Type II DM   Psychiatric:         Infectious Disease:         Malignancy:         Other:                     Physical Exam  Normal systems: cardiovascular, pulmonary and dental    Airway   Mallampati: II  TM distance: >3 FB  Neck ROM: full    Dental     Cardiovascular       Pulmonary                     Anesthesia Plan      History & Physical Review  History and physical reviewed and following examination; no interval change.    ASA Status:  2 .    NPO Status:  > 8 hours    Plan for General and LMA with Intravenous and Propofol induction. Maintenance will be Balanced.    PONV prophylaxis:  Ondansetron (or other 5HT-3)       Postoperative Care  Postoperative pain management:  IV analgesics and Multi-modal analgesia.      Consents  Anesthetic plan, risks, benefits and alternatives discussed with:  Patient..                          .

## 2018-11-21 NOTE — PROGRESS NOTES
SBAR Seed Localization    SITUATION:  Patient to breast imaging center for imaging guided seed localizations before breast lumpectomy or excision biopsy with sentinel node injection.    BACKGROUND:  Breast imaging cancer, breast abnormality  Ordered procedure completed: Yes  Special needs identified: Yes     ASSESSMENT:  SBAR report called to patient care unit because of unexpected event in radiology: No  Allergies and medication list reviewed prior to procedure. Yes  Skin cleansed with ChloraPrep One-Step.  Anesthesia: approximately 5ml of 1% Lidocaine injection subcutaneous before seed insertion administered by the radiologist.   Gauze dressing over insertion site(s).  Post procedure mammogram completed: Yes    Patient tolerance: Patient tolerated exam well.    RECOMMENDATIONS:  Patient transferred to Same Day Surgery in stable condition via wheelchair with Breast Imaging Staff.  Copy of note given to patient and instructions to hand this note to surgery staff.    Please call [facility] [phone] if there are any questions.

## 2018-11-21 NOTE — ANESTHESIA CARE TRANSFER NOTE
Patient: Megha Campbell    Procedure(s):  SEED LOCALIZED LEFT BREAST LUMPECTOMY WITH LEFT AXILLARY LYMPH NODE DISSECTION    Diagnosis: BREAST CANCER  Diagnosis Additional Information: No value filed.    Anesthesia Type:   General, LMA     Note:  Airway :Face Mask  Patient transferred to:PACU  Handoff Report: Identifed the Patient, Identified the Reponsible Provider, Reviewed the pertinent medical history, Discussed the surgical course, Reviewed Intra-OP anesthesia mangement and issues during anesthesia, Set expectations for post-procedure period and Allowed opportunity for questions and acknowledgement of understanding      Vitals: (Last set prior to Anesthesia Care Transfer)    CRNA VITALS  11/21/2018 1645 - 11/21/2018 1718      11/21/2018             EKG: Sinus rhythm                Electronically Signed By: ELLEN Hays CRNA  November 21, 2018  5:18 PM

## 2018-11-21 NOTE — OP NOTE
[facility] Breast Surgery Operative Note      Pre-operative diagnosis: Left breast invasive ductal carcinoma   Post-operative diagnosis: Left breast invasive ductal carcinoma     Procedure: 1.  LEFT SEED LOCALIZED PARTIAL MASTECTOMY  2.  LEFT SENTINEL LYMPH NODE BIOPSY  3.  INTRAOPERATIVE INJECTION OF METHYLENE BLUE TRACER     Surgeon: Lesly Aponte MD   Assistant(s):  Zafar Butler PA-C  The PA s assistance was medically necessary to provide adequate exposure in the operating field, maintain hemostasis, cutting suture, clamping and ligating bleeding vessels, and visualization of anatomic structures throughout the surgical procedure.      Anesthesia: General    Estimated blood loss:   25 cc     Specimens:   ID Type Source Tests Collected by Time Destination   A : LEFT BREAST LUMPECTOMY Tissue Breast, Left SURGICAL PATHOLOGY EXAM Lesly Aponte MD 11/21/2018  4:05 PM    B : LEFT BREAST  Tissue Breast, Left SURGICAL PATHOLOGY EXAM Lesly Aponte MD 11/21/2018  4:05 PM    C : LEFT AXILLARY LYMPH NODE DISSECTION Tissue Axilla, Left SURGICAL PATHOLOGY EXAM Lesly Aponte MD 11/21/2018  4:52 PM         INDICATION:  Megha is a 53yof who presented with a large left breast mass. She had a biopsy and it was found to be triple negative, grade 3 invasive ductal carcinoma. She had an axillary ultrasound which revealed adenopathy. Node biopsy was completed and positive for metastatic disease. Neoadjuvant chemotherapy was recommended by Dr. Winston and she was initially scheduled for a port placement. She cancelled her port placement and elected to proceed with surgery up front and does not at this time want to consider adjuvant chemotherapy. She chose to proceed with lumpectomy and axillary node dissection. We had discussed the risk of positive margins given the size of the lesion.   DESCRIPTION OF PROCEDURE: The patient was placed on the table in supine position. General anesthetic was induced. Perioperative  antibiotics were given.  The left breast and axilla were prepped and draped in standard sterile fashion.  We used the seed placed in the [facility] as well as the post-seed mammograms to localize the area of interest. We made a periareolar incision centered at the 6 o'clock position. We carried the incision down using electrocautery into the breast tissue and excised the palpable mass, including the seed.  The neoprobe was used to guide the dissection. The mass was removed in its entirety with some surrounding benign appearing breast tissue. The mass clinically felt to be 8-9cm. The margins were grossly normal. The posterior margin was separately excised and included the pectoral fascia. After the specimen was removed it had a high signal with the neoprobe. Once the mass was removed, it was oriented with Wright dyes. A specimen mammogram was obtained and revealed the seed and clip and the margins appeared to not be involved. The specimen was then sent to pathology for review.  The wound was then examined for bleeding and hemostasis was achieved using electrocautery.    Hemostasis was maintained throughout with electrocautery. The field was irrigated with sterile saline.     Clips were placed at the 12, 3, 6, and 9 o'clock positions of the lumpectomy cavity as well as posterior and anterior.  The lumpectomy cavity was reapproximated with several interrupted 3-0 vicryl sutures. The skin was closed with a deep dermal 3-0 vicryl and running 4-0 Monocryl subcuticular suture and steri strips.    We than began the axillary node dissection. A separate incision was made just below the hair bearing skin in the left axilla. Cautery was used to divide the subcutaneous fat and the clavipectoral fascia to enter the axillary space. There were several palpable nodes in the axilla which felt clinically positive. The axillary vein was identified. The thoracodorsal bundle was identified and protected. The long thoracic nerve was  identified medially along the chest wall. All of the soft tissue inferior to the axillary vein and anterior to the thoracodorsal bundle was removed and sent to pathology for review.     There were no remaining clinically positive nodes upon thorough evaluation by palpation of the axilla.   Hemostasis was assured.  A 15french round drain was placed through the skin and into the axillary space. This was secured with a 3-0 nylon suture. We then closed the incision with interrupted subcutaneous 3-0 Vicryl sutures, a running 4-0 Monocryl subcuticular suture and Steri strips. The nodes were sent to pathology for routine evaluation. The patient tolerated the procedure well.  Sponge and instrument counts were correct.    Lesly Aponte MD  [facility]  [phone]

## 2018-11-21 NOTE — IP AVS SNAPSHOT
[place] [address]    [place] [address]    Phone:  [phone]                                       After Visit Summary   11/21/2018    Megha Campbell    MRN: [id number]           After Visit Summary Signature Page     I have received my discharge instructions, and my questions have been answered. I have discussed any challenges I see with this plan with the nurse or doctor.    ..........................................................................................................................................  Patient/Patient Representative Signature      ..........................................................................................................................................  Patient Representative Print Name and Relationship to Patient    ..................................................               ................................................  Date                                   Time    ..........................................................................................................................................  Reviewed by Signature/Title    ...................................................              ..............................................  Date                                               Time          22EPIC Rev 08/18

## 2018-11-21 NOTE — IP AVS SNAPSHOT
MRN:[id number]                      After Visit Summary   11/21/2018    Megha Campbell    MRN: [id number]           Thank you!     Thank you for choosing [facility] for your care. Our goal is always to provide you with excellent care. Hearing back from our patients is one way we can continue to improve our services. Please take a few minutes to complete the written survey that you may receive in the mail after you visit with us. Thank you!        Patient Information     Date Of Birth          1965        Designated Caregiver       Most Recent Value    Caregiver    Will someone help with your care after discharge? yes    Name of designated caregiver Esau ( )    Phone number of caregiver see EC      About your hospital stay     You were admitted on:  November 21, 2018 You last received care in the:  [facility] PACU    You were discharged on:  November 21, 2018       Who to Call     For medical emergencies, please call 911.  For non-urgent questions about your medical care, please call your primary care provider or clinic, [phone]  For questions related to your surgery, please call your surgery clinic        Attending Provider     Provider Specialty    Lesly Aponte MD Surgery       Primary Care Provider Office Phone # Fax #    Laure Zapata [phone]      After Care Instructions     Discharge Instructions       [facility]  Discharge Instructions: Post-Operative Breast Surgery    ACTIVITY  Increase your activity gradually.  Avoid strenuous physical activity or heavy lifting greater than 15-20 lbs. for 1-2 weeks with arm on the surgery side.  You may climb stairs.  Gentle rotation and stretching of your arms and shoulders will prevent joint stiffness.  You may drive without restrictions when you are not using any prescription pain medication and comfortable in a car.  You may return to work/school when you are  comfortable without any prescription pain medication.    WOUND CARE  You may remove your bandage and shower 48 hours after the surgery.  Pat your incisions dry and leave open to air.  Re-apply dressing (Band-aid or gauze/tape) as needed for drainage.  You may have steri-strips (looks like tape) or Dermabond (looks like glue) on your incision.  Leave it alone, it will peel up and fall off on its own.   Do not soak your incisions in a tub or pool for 2 weeks.   Wear a supportive bra for 1-2 weeks, day and night.  It is common to have fluid accumulate at the surgery site. Your body will reabsorb this fluid over time. Occasionally if it is painful this can be aspirated in clinic.     DIET  Return to the diet you were on before surgery.  Drink plenty of liquids to stay hydrated.    PAIN  Expect some tenderness and discomfort at the incision site(s).  Use the prescribed pain medication at your discretion.  Expect gradual resolution of your pain over several days.  You may take ibuprofen with food (unless you have been told not to) instead of or in addition to your prescribed pain medication.  If you are taking Norco or Percocet, do not take any additional acetaminophen/APAP/Tylenol.  Do not drink alcohol or drive while you are taking pain medications.  You may apply ice to your incisions in 20 minute intervals as needed for the next week.  After that time, consider switching to heat if you prefer.    RETURN APPOINTMENT  Follow up with your surgeon in 2 weeks.  Please call the office at [phone] to schedule your appointment.    CALL OUR OFFICE IF YOU HAVE:   Chills or fever above 101.5 F.  Increased redness or drainage at your incisions.  Significant bleeding.  Pain not relieved by your pain medication or rest.  Increasing pain after the first 48 hours.  Any other concerns or questions.    HELPFUL HINTS  Pain medications can cause constipation.  Limit use when possible.  Take over the counter stool softener/stimulant,  such as Colace or Senna, with plenty of water.  You may take a mild over the counter laxative, such as Miralax or a suppository, as needed.    DRAIN CARE: please record outputs of drain daily. Empty drain into measuring cup and record milliliters. Drain is removed when output is <30ml/day.     Please call [phone] to schedule your follow up appt for next week. I would like to see you on Tuesday. They will overbook the schedule.            Ice to affected area       Ice to operative site PRN            Shower       No shower until drain removed. Ok to sit in tub and wash lower half but do not get incisions wet.                  Further instructions from your care team             Same Day Surgery Discharge Instructions for  Sedation and General Anesthesia       It's not unusual to feel dizzy, light-headed or faint for up to 24 hours after surgery or while taking pain medication.  If you have these symptoms: sit for a few minutes before standing and have someone assist you when you get up to walk or use the bathroom.      You should rest and relax for the next 24 hours. We recommend you make arrangements to have an adult stay with you for at least 24 hours after your discharge.  Avoid hazardous and strenuous activity.      DO NOT DRIVE any vehicle or operate mechanical equipment for 24 hours following the end of your surgery.  Even though you may feel normal, your reactions may be affected by the medication you have received.      Do not drink alcoholic beverages for 24 hours following surgery.       Slowly progress to your regular diet as you feel able. It's not unusual to feel nauseated and/or vomit after receiving anesthesia.  If you develop these symptoms, drink clear liquids (apple juice, ginger ale, broth, 7-up, etc. ) until you feel better.  If your nausea and vomiting persists for 24 hours, please notify your surgeon.        All narcotic pain medications, along with inactivity and anesthesia, can cause  constipation. Drinking plenty of liquids and increasing fiber intake will help.      For any questions of a medical nature, call your surgeon.      Do not make important decisions for 24 hours.      If you had general anesthesia, you may have a sore throat for a couple of days related to the breathing tube used during surgery.  You may use Cepacol lozenges to help with this discomfort.  If it worsens or if you develop a fever, contact your surgeon.       If you feel your pain is not well managed with the pain medications prescribed by your surgeon, please contact your surgeon's office to let them know so they can address your concerns.       Jude Woods Drain  Home Care Instructions    What is a Jude Woods (MARVEL) Drain?  This is a small tube that connects to a bulb.  Its gentle suction removes extra fluid from a surgical wound.  Your doctor will remove the tube when the amount of fluid decreases.  The color and amount of fluid varies.  Right after surgery the fluid is bright red.  Over time, it changes to light pink and may become clear or the color of straw.    How should I care for my tube site?    Keep the skin around the tube dry.  Check with your doctor about how to shower.  You may need to cover the site with plastic when you shower.  Or, it may be okay to let the site get wet and put on a clean bandage after you shower.      If the bandage gets wet, you will need to change it.  How should I care for the bulb?    Keep the bulb compressed at all times except while you empty it.     Attach the bulb to your clothing with tape and a safety pin.    Try to empty the bulb at the same time every day.  Empty the bulb at least once a day, or when the bulb becomes half full.  If it becomes too full, there will not be enough suction.    To empty the bulb:    Wash your hands.    Open the bulb cap.    Drain the fluid from the bulb into the measuring cup.  If you have two drains, use two cups.      Clean the mouth of the  "bulb with an alcohol wipe if your nurse told you to.    Squeeze the bulb (fold it in half before you close the bulb cap) If it does not stay compressed, call your nurse or clinic.    Write the amount of drainage on the drainage record (see back page).  If you have two drains, write the amount for each bulb.    Flush the drainage down the toilet.  Rinse the measuring cup.    Wash your hands.    When should I call my doctor?   Call your doctor if:    You have a fever over 101 F (38.3 C), taken under the tongue.     The drainage increases or smells bad.    The skin around your tube has increased redness, swelling, warmth or pain.    You have pus or fluid leaking at the tube site.    Your stitches break.    You think the tube is not draining.    The tube falls out.    You have any problems or concerns.    Your drainage record:    Empty your bulb at least once per day or when 1/2 to 1/3 full.  Write down the date, time and amount of drainage in each bulb.   Bring this record to each clinic visit.    Date Time Bulb 1: amount of  Drainage in (ml or cc) Bulb 2: amount of drainage (in ml or cc) Notes                                                          **If you have concerns or questions about your procedure,    please contact Dr Aponte at  [phone]**          Pending Results     No orders found from 11/19/2018 to 11/22/2018.            Admission Information     Date & Time Provider Department Dept. Phone    11/21/2018 Lesly Aponte MD [facility] PACU [phone]      Your Vitals Were     Blood Pressure Temperature Respirations Height Weight Pulse Oximetry    127/65 97.9  F (36.6  C) (Temporal) 13 1.753 m (5' 9\") 104.3 kg (230 lb) 99%    BMI (Body Mass Index)                   33.97 kg/m2           MyChart Information     [facility] lets you send messages to your doctor, view your test results, renew your prescriptions, schedule appointments and more. To sign up, go to [email or web address] . Click " "on \"Log in\" on the left side of the screen, which will take you to the Welcome page. Then click on \"Sign up Now\" on the right side of the page.     You will be asked to enter the access code listed below, as well as some personal information. Please follow the directions to create your username and password.     Your access code is: [email or web address]  Expires: 2019  7:48 AM     Your access code will  in 90 days. If you need help or a new code, please call your [facility] clinic or [phone].        Care EveryWhere ID     This is your Care EveryWhere ID. This could be used by other organizations to access your [facility] medical records  [id number]        Equal Access to Services     JOHN WHITFIELD : Edel Tay, waliz grimm, clemencia piersonalashley dejesus, jose diaz . So [facility] [phone].    ATENCIÓN: Si habla español, tiene a anaya disposición servicios gratuitos de asistencia lingüística. Llame al [phone].    We comply with applicable federal civil rights laws and [place] laws. We do not discriminate on the basis of race, color, national origin, age, disability, sex, sexual orientation, or gender identity.               Review of your medicines      START taking        Dose / Directions    HYDROcodone-acetaminophen 5-325 MG tablet   Commonly known as:  NORCO   Used for:  Malignant neoplasm of lower-outer quadrant of left breast of female, estrogen receptor negative (H)        Dose:  1-2 tablet   Take 1-2 tablets by mouth every 4 hours as needed for moderate to severe pain   Quantity:  40 tablet   Refills:  0       senna-docusate 8.6-50 MG per tablet   Commonly known as:  SENOKOT-S;PERICOLACE   Used for:  Malignant neoplasm of lower-outer quadrant of left breast of female, estrogen receptor negative (H)        Dose:  1-2 tablet   Take 1-2 tablets by mouth 2 times daily   Quantity:  60 tablet   Refills:  0         CONTINUE these medicines which have NOT " CHANGED        Dose / Directions    CLONAZEPAM PO        Take by mouth At Bedtime   Refills:  0       * order for DME   Used for:  Ankle injury, right, initial encounter        Air splint   Quantity:  1 Device   Refills:  0       * order for DME   Used for:  Ankle injury, right, initial encounter        2 crutches   Quantity:  2 Device   Refills:  0       order for DME   Used for:  Invasive ductal carcinoma of breast, female, left (H)        Equipment being ordered: front closure bra for post-op and recovery.  *Scheduled for seed localized, left breast lumpectomy, sentinel lymph node biopsy on 11/5/18*   Quantity:  2 Units   Refills:  0       SERTRALINE HCL PO        Take by mouth daily   Refills:  0       WELLBUTRIN PO        Refills:  0       * Notice:  This list has 2 medication(s) that are the same as other medications prescribed for you. Read the directions carefully, and ask your doctor or other care provider to review them with you.         Where to get your medicines      These medications were sent to [facility] [place] - [address]  [address], [place] [address]     Phone:  [phone]     senna-docusate 8.6-50 MG per tablet         Some of these will need a paper prescription and others can be bought over the counter. Ask your nurse if you have questions.     Bring a paper prescription for each of these medications     HYDROcodone-acetaminophen 5-325 MG tablet                Protect others around you: Learn how to safely use, store and throw away your medicines at www.disposemymeds.org.        Information about OPIOIDS     PRESCRIPTION OPIOIDS: WHAT YOU NEED TO KNOW   We gave you an opioid (narcotic) pain medicine. It is important to manage your pain, but opioids are not always the best choice. You should first try all the other options your care team gave you. Take this medicine for as short a time (and as few doses) as possible.    Some activities can increase  your pain, such as bandage changes or therapy sessions. It may help to take your pain medicine 30 to 60 minutes before these activities. Reduce your stress by getting enough sleep, working on hobbies you enjoy and practicing relaxation or meditation. Talk to your care team about ways to manage your pain beyond prescription opioids.    These medicines have risks:    DO NOT drive when on new or higher doses of pain medicine. These medicines can affect your alertness and reaction times, and you could be arrested for driving under the influence (DUI). If you need to use opioids long-term, talk to your care team about driving.    DO NOT operate heavy machinery    DO NOT do any other dangerous activities while taking these medicines.    DO NOT drink any alcohol while taking these medicines.     If the opioid prescribed includes acetaminophen, DO NOT take with any other medicines that contain acetaminophen. Read all labels carefully. Look for the word  acetaminophen  or  Tylenol.  Ask your pharmacist if you have questions or are unsure.    You can get addicted to pain medicines, especially if you have a history of addiction (chemical, alcohol or substance dependence). Talk to your care team about ways to reduce this risk.    All opioids tend to cause constipation. Drink plenty of water and eat foods that have a lot of fiber, such as fruits, vegetables, prune juice, apple juice and high-fiber cereal. Take a laxative (Miralax, milk of magnesia, Colace, Senna) if you don t move your bowels at least every other day. Other side effects include upset stomach, sleepiness, dizziness, throwing up, tolerance (needing more of the medicine to have the same effect), physical dependence and slowed breathing.    Store your pills in a secure place, locked if possible. We will not replace any lost or stolen medicine. If you don t finish your medicine, please throw away (dispose) as directed by your pharmacist. The [place] Pollution  Control [employer] has more information about safe disposal: https://www.pca.[facility].[place]/living-green/managing-unwanted-medications             Medication List: This is a list of all your medications and when to take them. Check marks below indicate your daily home schedule. Keep this list as a reference.      Medications           Morning Afternoon Evening Bedtime As Needed    CLONAZEPAM PO   Take by mouth At Bedtime                                HYDROcodone-acetaminophen 5-325 MG tablet   Commonly known as:  NORCO   Take 1-2 tablets by mouth every 4 hours as needed for moderate to severe pain                                * order for DME   Air splint                                * order for DME   2 crutches                                order for DME   Equipment being ordered: front closure bra for post-op and recovery.  *Scheduled for seed localized, left breast lumpectomy, sentinel lymph node biopsy on 11/5/18*                                senna-docusate 8.6-50 MG per tablet   Commonly known as:  SENOKOT-S;PERICOLACE   Take 1-2 tablets by mouth 2 times daily                                SERTRALINE HCL PO   Take by mouth daily                                WELLBUTRIN PO                                * Notice:  This list has 2 medication(s) that are the same as other medications prescribed for you. Read the directions carefully, and ask your doctor or other care provider to review them with you.

## 2018-11-22 NOTE — DISCHARGE INSTRUCTIONS
Same Day Surgery Discharge Instructions for  Sedation and General Anesthesia       It's not unusual to feel dizzy, light-headed or faint for up to 24 hours after surgery or while taking pain medication.  If you have these symptoms: sit for a few minutes before standing and have someone assist you when you get up to walk or use the bathroom.      You should rest and relax for the next 24 hours. We recommend you make arrangements to have an adult stay with you for at least 24 hours after your discharge.  Avoid hazardous and strenuous activity.      DO NOT DRIVE any vehicle or operate mechanical equipment for 24 hours following the end of your surgery.  Even though you may feel normal, your reactions may be affected by the medication you have received.      Do not drink alcoholic beverages for 24 hours following surgery.       Slowly progress to your regular diet as you feel able. It's not unusual to feel nauseated and/or vomit after receiving anesthesia.  If you develop these symptoms, drink clear liquids (apple juice, ginger ale, broth, 7-up, etc. ) until you feel better.  If your nausea and vomiting persists for 24 hours, please notify your surgeon.        All narcotic pain medications, along with inactivity and anesthesia, can cause constipation. Drinking plenty of liquids and increasing fiber intake will help.      For any questions of a medical nature, call your surgeon.      Do not make important decisions for 24 hours.      If you had general anesthesia, you may have a sore throat for a couple of days related to the breathing tube used during surgery.  You may use Cepacol lozenges to help with this discomfort.  If it worsens or if you develop a fever, contact your surgeon.       If you feel your pain is not well managed with the pain medications prescribed by your surgeon, please contact your surgeon's office to let them know so they can address your concerns.       Jude Woods [facility]  Instructions    What is a Jude Woods (MARVEL) Drain?  This is a small tube that connects to a bulb.  Its gentle suction removes extra fluid from a surgical wound.  Your doctor will remove the tube when the amount of fluid decreases.  The color and amount of fluid varies.  Right after surgery the fluid is bright red.  Over time, it changes to light pink and may become clear or the color of straw.    How should I care for my tube site?    Keep the skin around the tube dry.  Check with your doctor about how to shower.  You may need to cover the site with plastic when you shower.  Or, it may be okay to let the site get wet and put on a clean bandage after you shower.      If the bandage gets wet, you will need to change it.  How should I care for the bulb?    Keep the bulb compressed at all times except while you empty it.     Attach the bulb to your clothing with tape and a safety pin.    Try to empty the bulb at the same time every day.  Empty the bulb at least once a day, or when the bulb becomes half full.  If it becomes too full, there will not be enough suction.    To empty the bulb:    Wash your hands.    Open the bulb cap.    Drain the fluid from the bulb into the measuring cup.  If you have two drains, use two cups.      Clean the mouth of the bulb with an alcohol wipe if your nurse told you to.    Squeeze the bulb (fold it in half before you close the bulb cap) If it does not stay compressed, call your nurse or clinic.    Write the amount of drainage on the drainage record (see back page).  If you have two drains, write the amount for each bulb.    Flush the drainage down the toilet.  Rinse the measuring cup.    Wash your hands.    When should I call my doctor?   Call your doctor if:    You have a fever over 101 F (38.3 C), taken under the tongue.     The drainage increases or smells bad.    The skin around your tube has increased redness, swelling, warmth or pain.    You have pus or fluid leaking at the tube  site.    Your stitches break.    You think the tube is not draining.    The tube falls out.    You have any problems or concerns.    Your drainage record:    Empty your bulb at least once per day or when 1/2 to 1/3 full.  Write down the date, time and amount of drainage in each bulb.   Bring this record to each clinic visit.    Date Time Bulb 1: amount of  Drainage in (ml or cc) Bulb 2: amount of drainage (in ml or cc) Notes                                                          **If you have concerns or questions about your procedure,    please contact Dr Aponte at  [phone]**

## 2018-11-22 NOTE — OR NURSING
PNDS met, po per I&O sheet. Pt Meghaabhinav Campbell dressed, up in recliner and transported to Phase 2.  MARVEL High teaching done with patient's .   Designated care taker verbalized understanding instruction. Return demonstration achieved and care taker has no further question. Supplies for the care were given to the designated caretaker.

## 2018-11-22 NOTE — OR NURSING
Printed and verbal instructions were given to the patient and assigned care taker.  Patient and caretaker verbalized understanding discharge instructions and has no further question. No knowledge deficit noted. Prescribed medications ( including narcotic medication Norco) was/were given to the care taker. Patient's belongings were returned to the patient and care taker. Patient was transferred to a wheel chair and was accompanied by RN and NA to the hospital entrance door to be discharged to patient's home.

## 2018-11-22 NOTE — ANESTHESIA POSTPROCEDURE EVALUATION
Patient: Megha Campbell    Procedure(s):  SEED LOCALIZED LEFT BREAST LUMPECTOMY WITH LEFT AXILLARY LYMPH NODE DISSECTION    Diagnosis:BREAST CANCER  Diagnosis Additional Information: No value filed.    Anesthesia Type:  General, LMA    Note:  Anesthesia Post Evaluation    Patient location during evaluation: PACU  Patient participation: Able to fully participate in evaluation  Level of consciousness: awake  Pain management: adequate  Airway patency: patent  Cardiovascular status: acceptable  Respiratory status: acceptable  Hydration status: acceptable  PONV: none     Anesthetic complications: None          Last vitals:  Vitals:    11/21/18 1745 11/21/18 1800 11/21/18 1845   BP: 121/54 127/65 144/83   Resp: 10 13 12   Temp:      SpO2: 95% 99% 95%         Electronically Signed By: Christine John MD, MD  November 21, 2018  9:28 PM

## 2018-11-27 NOTE — MR AVS SNAPSHOT
After Visit Summary   11/27/2018    Megha Campbell    MRN: [id number]           Patient Information     Date Of Birth          1965        Visit Information        Provider Department      11/27/2018 4:15 PM Lesly Aponte MD [facility] General Surgery      Today's Diagnoses     S/P lumpectomy of breast    -  1       Follow-ups after your visit        Additional Services     RAD ONCOLOGY REFERRAL       Your provider has referred you to: radiation oncology    Please be aware that coverage of these services is subject to the terms and limitations of your health insurance plan.  Call member services at your health plan with any benefit or coverage questions.      Please bring the following with you to your appointment:    (1) Any X-Rays, CTs or MRIs which have been performed.  Contact the facility where they were done to arrange for  prior to your scheduled appointment.    (2) List of current medications   (3) This referral request   (4) Any documents/labs given to you for this referral                  Who to contact     If you have questions or need follow up information about today's clinic visit or your schedule please contact [facility] directly at [phone].  Normal or non-critical lab and imaging results will be communicated to you by MyChart, letter or phone within 4 business days after the clinic has received the results. If you do not hear from us within 7 days, please contact the clinic through [facility]oxed[facility] or phone. If you have a critical or abnormal lab result, we will notify you by phone as soon as possible.  Submit refill requests through Arch[employer] or call your pharmacy and they will forward the refill request to us. Please allow 3 business days for your refill to be completed.          Additional Information About Your Visit        Shoeb[employer] Information     Archy lets you send messages to  "your doctor, view your test results, renew your prescriptions, schedule appointments and more. To sign up, go to [email or web address] . Click on \"Log in\" on the left side of the screen, which will take you to the Welcome page. Then click on \"Sign up Now\" on the right side of the page.     You will be asked to enter the access code listed below, as well as some personal information. Please follow the directions to create your username and password.     Your access code is: [email or web address]  Expires: 2019  7:48 AM     Your access code will  in 90 days. If you need help or a new code, please call your [facility] clinic or [phone].        Care EveryWhere ID     This is your Care EveryWhere ID. This could be used by other organizations to access your [facility] medical records  [id number]        Your Vitals Were     Pulse Pulse Oximetry BMI (Body Mass Index)             85 97% 33.97 kg/m2          Blood Pressure from Last 3 Encounters:   18 120/90   18 144/83   18 128/70    Weight from Last 3 Encounters:   18 230 lb (104.3 kg)   18 230 lb (104.3 kg)   18 244 lb (110.7 kg)              We Performed the Following     RAD ONCOLOGY REFERRAL          Today's Medication Changes          These changes are accurate as of 18 11:59 PM.  If you have any questions, ask your nurse or doctor.               Start taking these medicines.        Dose/Directions    acetaminophen-codeine 300-30 MG tablet   Commonly known as:  TYLENOL #3   Used for:  S/P lumpectomy of breast   Started by:  Lesly Aponte MD        Dose:  1 tablet   Take 1 tablet by mouth every 6 hours as needed for severe pain   Quantity:  20 tablet   Refills:  0       diphenhydrAMINE 25 MG tablet   Commonly known as:  BENADRYL   Used for:  S/P lumpectomy of breast   Started by:  Lesly Aponte MD        Dose:  25-50 mg   Take 1-2 tablets (25-50 mg) by mouth every 6 hours as needed for itching or allergies "   Quantity:  60 tablet   Refills:  1       ibuprofen 800 MG tablet   Commonly known as:  ADVIL/MOTRIN   Used for:  S/P lumpectomy of breast   Started by:  Lesly Aponte MD        Dose:  800 mg   Take 1 tablet (800 mg) by mouth every 8 hours as needed for moderate pain   Quantity:  60 tablet   Refills:  1            Where to get your medicines      These medications were sent to [facility] - [place], [place] - [address] AT [facility] OF Critical access hospital [address]  [address], [place] [address]     Phone:  [phone]     diphenhydrAMINE 25 MG tablet    ibuprofen 800 MG tablet         Some of these will need a paper prescription and others can be bought over the counter.  Ask your nurse if you have questions.     Bring a paper prescription for each of these medications     acetaminophen-codeine 300-30 MG tablet               Information about OPIOIDS     PRESCRIPTION OPIOIDS: WHAT YOU NEED TO KNOW   We gave you an opioid (narcotic) pain medicine. It is important to manage your pain, but opioids are not always the best choice. You should first try all the other options your care team gave you. Take this medicine for as short a time (and as few doses) as possible.    Some activities can increase your pain, such as bandage changes or therapy sessions. It may help to take your pain medicine 30 to 60 minutes before these activities. Reduce your stress by getting enough sleep, working on hobbies you enjoy and practicing relaxation or meditation. Talk to your care team about ways to manage your pain beyond prescription opioids.    These medicines have risks:    DO NOT drive when on new or higher doses of pain medicine. These medicines can affect your alertness and reaction times, and you could be arrested for driving under the influence (DUI). If you need to use opioids long-term, talk to your care team about driving.    DO NOT operate heavy machinery    DO NOT do any other  dangerous activities while taking these medicines.    DO NOT drink any alcohol while taking these medicines.     If the opioid prescribed includes acetaminophen, DO NOT take with any other medicines that contain acetaminophen. Read all labels carefully. Look for the word  acetaminophen  or  Tylenol.  Ask your pharmacist if you have questions or are unsure.    You can get addicted to pain medicines, especially if you have a history of addiction (chemical, alcohol or substance dependence). Talk to your care team about ways to reduce this risk.    All opioids tend to cause constipation. Drink plenty of water and eat foods that have a lot of fiber, such as fruits, vegetables, prune juice, apple juice and high-fiber cereal. Take a laxative (Miralax, milk of magnesia, Colace, Senna) if you don t move your bowels at least every other day. Other side effects include upset stomach, sleepiness, dizziness, throwing up, tolerance (needing more of the medicine to have the same effect), physical dependence and slowed breathing.    Store your pills in a secure place, locked if possible. We will not replace any lost or stolen medicine. If you don t finish your medicine, please throw away (dispose) as directed by your pharmacist. The [place] Pollution Control Agency has more information about safe disposal: [email or web address]-medications         Primary Care Provider Office Phone # Fax #    Laure Zapata [phone]       [facility] [address] [address]        Equal Access to Services     JOHN WHITFIELD : Hadii aad ku hadasho [place], waaxda luqadaha, qaybta kaalmada adelaurynyaashley, jose ash. So [facility] [phone].    ATENCIÓN: Si habla español, tiene a anaya disposición servicios gratuitos de asistencia lingüística. Llame al [phone].    We comply with applicable federal civil rights laws and [place] laws. We do  not discriminate on the basis of race, color, national origin, age, disability, sex, sexual orientation, or gender identity.            Thank you!     Thank you for choosing [facility]  for your care. Our goal is always to provide you with excellent care. Hearing back from our patients is one way we can continue to improve our services. Please take a few minutes to complete the written survey that you may receive in the mail after your visit with us. Thank you!             Your Updated Medication List - Protect others around you: Learn how to safely use, store and throw away your medicines at www.disposemymeds.org.          This list is accurate as of 11/27/18 11:59 PM.  Always use your most recent med list.                   Brand Name Dispense Instructions for use Diagnosis    acetaminophen-codeine 300-30 MG tablet    TYLENOL #3    20 tablet    Take 1 tablet by mouth every 6 hours as needed for severe pain    S/P lumpectomy of breast       CLONAZEPAM PO      Take by mouth At Bedtime        diphenhydrAMINE 25 MG tablet    BENADRYL    60 tablet    Take 1-2 tablets (25-50 mg) by mouth every 6 hours as needed for itching or allergies    S/P lumpectomy of breast       HYDROcodone-acetaminophen 5-325 MG tablet    NORCO    40 tablet    Take 1-2 tablets by mouth every 4 hours as needed for moderate to severe pain    Malignant neoplasm of lower-outer quadrant of left breast of female, estrogen receptor negative (H)       ibuprofen 800 MG tablet    ADVIL/MOTRIN    60 tablet    Take 1 tablet (800 mg) by mouth every 8 hours as needed for moderate pain    S/P lumpectomy of breast       * order for DME     1 Device    Air splint    Ankle injury, right, initial encounter       * order for DME     2 Device    2 crutches    Ankle injury, right, initial encounter       order for DME     2 Units    Equipment being ordered: front closure bra for post-op and recovery.  *Scheduled for seed localized, left  breast lumpectomy, sentinel lymph node biopsy on 11/5/18*    Invasive ductal carcinoma of breast, female, left (H)       senna-docusate 8.6-50 MG tablet    SENOKOT-S/PERICOLACE    60 tablet    Take 1-2 tablets by mouth 2 times daily    Malignant neoplasm of lower-outer quadrant of left breast of female, estrogen receptor negative (H)       SERTRALINE HCL PO      Take by mouth daily        WELLBUTRIN PO           * Notice:  This list has 2 medication(s) that are the same as other medications prescribed for you. Read the directions carefully, and ask your doctor or other care provider to review them with you.

## 2018-11-27 NOTE — LETTER
2018    Re: Megha Campbell, : 1965    [facility] Breast Surgery Postoperative Note     S: Megha returns for one week follow up after left breast mastectomy and left axillary node dissection for a triple negative 8.5cm invasive ductal carcinoma with 5/11 positive lymph nodes. The inferior margin was focally positive for invasive ductal carcinoma. She has an axillary drain in place. She reports she is having pain in her axilla. Her drain accidentally fell out on  when she was itching her chest. She reports it was <10ml when it came out. She stopped using norco because of itching. Her breast feels fine.      Breasts: left breast inferior periareolar incision healing well. No evidence of infection. Axillary incision healing well. She has a tennis ball sized seroma in the axilla which is fairly soft and not limiting.      Pathology:   FINAL DIAGNOSIS:   <<<<<  A: Breast, left, lumpectomy   - Invasive ductal carcinoma, [place] grade 3, invasive carcinoma   extending to inferior margin of excision     B: Breast, left, new posterior margin of excision   - Microscopic foci of invasive carcinoma closely approximating anterior   portion of excision (see description)     C: Lymph nodes, left axillary, excision   - Metastatic carcinoma to 5 of 11 axillary lymph nodes, extracapsular   extension identified      A/P  Megha Campbell is recovering from a partial mastectomy with left axillary node dissection for a T3N2a invasive ductal carcinoma. We discussed her pathology report at length. She would benefit from re-excision of the inferior margin. She would also benefit from radiation and chemotherapy. As she has previously stated, she does not want chemotherapy. She is open to radiation and will meet with Radiation Oncology. We will schedule her for re-excision of the inferior margin in the next week or two. If her seroma increases in size, she may need aspiration. I gave her Rx for tylenol 3 for pain,  benadryl and ibuprofen today.      Thank you for the opportunity to help in her care.     Lesly Aponte  [facility]  [phone]

## 2018-11-27 NOTE — PROGRESS NOTES
[facility] Breast Surgery Postoperative Note    S: Megha returns for one week follow up after left breast mastectomy and left axillary node dissection for a triple negative 8.5cm invasive ductal carcinoma with 5/11 positive lymph nodes. The inferior margin was focally positive for invasive ductal carcinoma. She has an axillary drain in place. She reports she is having pain in her axilla. Her drain accidentally fell out on Sunday when she was itching her chest. She reports it was <10ml when it came out. She stopped using norco because of itching. Her breast feels fine. .     Breasts: left breast inferior periareolar incision healing well. No evidence of infection. Axillary incision healing well. She has a tennis ball sized seroma in the axilla which is fairly soft and not limiting.     Pathology:   FINAL DIAGNOSIS:   <<<<<  A: Breast, left, lumpectomy   - Invasive ductal carcinoma, [place] grade 3, invasive carcinoma   extending to inferior margin of excision     B: Breast, left, new posterior margin of excision   - Microscopic foci of invasive carcinoma closely approximating anterior   portion of excision (see description)     C: Lymph nodes, left axillary, excision   - Metastatic carcinoma to 5 of 11 axillary lymph nodes, extracapsular   extension identified       A/P  Megha Campbell is recovering from a partial mastectomy with left axillary node dissection for a T3N2a invasive ductal carcinoma. We discussed her pathology report at length. She would benefit from re-excision of the inferior margin. She would also benefit from radiation and chemotherapy. As she has previously stated, she does not want chemotherapy. She is open to radiation and will meet with Radiation Oncology. We will schedule her for re-excision of the inferior margin in the next week or two. If her seroma increases in size, she may need aspiration. I gave her Rx for tylenol 3 for pain, benadryl and ibuprofen today.     Thank you for the  opportunity to help in her care.    Lesly Aponte  [facility]  [phone]    Please route or send letter to:  Primary Care Provider (PCP) and Referring Provider

## 2018-11-28 NOTE — TELEPHONE ENCOUNTER
Type of surgery: Re-excision left breast inferior margin  Location of surgery: [facility]  Date and time of surgery: 12/7/18 at 7:30am  Surgeon: Dr. Lesly Aponte  Pre-Op Appt Date: Patient to schedule  Post-Op Appt Date: Patient to schedule   Packet sent out: Yes  Pre-cert/Authorization completed:  Not Applicable  Date: 11/26/18

## 2018-11-28 NOTE — TELEPHONE ENCOUNTER
Your surgery is scheduled on 12/7/18 at [facility].    You are scheduled with Dr. Babatunde Saab at [facility] on 12/19/18 at 1pm.    Dr. Winston's office will contact you directly to schedule your follow-up appointment in his office.

## 2018-12-05 NOTE — H&P (VIEW-ONLY)
[address] [address]  [phone]  Dept: [phone]    PRE-OP EVALUATION:  Today's date: 2018    Megha Campbell (: 1965) presents for pre-operative evaluation assessment as requested by Dr. Aponte.  She requires evaluation and anesthesia risk assessment prior to undergoing surgery/procedure for treatment of breast cancer, left.    Proposed Surgery/ Procedure: Seed localized left breast lumpectomy, left sentinel lymph node biopsy  Date of Surgery/ Procedure: 18  Time of Surgery/ Procedure: 230 pm  Hospital/Surgical Facility:  OR  Fax number for surgical facility:   Primary Physician: Laure Zapata  Type of Anesthesia Anticipated: General    Patient has a Health Care Directive or Living Will:  YES     1. NO - Do you have a history of heart attack, stroke, stent, bypass or surgery on an artery in the head, neck, heart or legs?  2. NO - Do you ever have any pain or discomfort in your chest?  3. NO - Do you have a history of  Heart Failure?  4. NO - Are you troubled by shortness of breath when: walking on the level, up a slight hill or at night?  5. NO - Do you currently have a cold, bronchitis or other respiratory infection?  6. NO - Do you have a cough, shortness of breath or wheezing?  7. NO - Do you sometimes get pains in the calves of your legs when you walk?  8. NO - Do you or anyone in your family have previous history of blood clots?  9. NO - Do you or does anyone in your family have a serious bleeding problem such as prolonged bleeding following surgeries or cuts?  10. Yes  - HAVE YOU EVER HAD PROBLEMS WITH ANEMIA OR BEEN TOLD TO TAKE IRON PILLS? Temporarily after previous gastric bypass surgery in 2004. Most recent Hgb within normal limits.  11. NO - Have you had any abnormal blood loss such as black, tarry or bloody stools, or abnormal vaginal bleeding?  12. NO - Have you ever had a blood transfusion?  13. NO - Have you or any of  your relatives ever had problems with anesthesia?  14. NO - Do you have sleep apnea, excessive snoring or daytime drowsiness?  15. NO - Do you have any prosthetic heart valves?  16. NO - Do you have prosthetic joints?  17. NO - Is there any chance that you may be pregnant?      HPI:     HPI related to upcoming procedure: patient Megha Campbell is a very pleasant 53 year old female with left breast cancer who presents to internal medicine clinic for a pre op cardiac evaluation for upcoming general surgery procedure for Seed localized left breast lumpectomy, left sentinel lymph node biopsy for treatment of for treatment of breast cancer, left. Patient denies any chest pain, headaches, fever or chills. Patient denies any known CAD, CVA or Type 2 Diabetes. Patient denies allergies to anesthesia agents. Patient does not take any daily aspirin or any other blood thinner medication.        MEDICAL HISTORY:      Past Medical History:   Diagnosis Date     Breast cancer in female (H)      Past Surgical History:   Procedure Laterality Date     GASTRIC BYPASS  2004     Current Outpatient Prescriptions   Medication Sig Dispense Refill     BuPROPion HCl (WELLBUTRIN PO)        CLONAZEPAM PO Take by mouth At Bedtime       diazepam (VALIUM) 10 MG tablet Take 1 tablet (10 mg) by mouth once for 1 dose Take 30 minutes before procedure.  Do not operate a vehicle after taking this medication. 1 tablet 0     order for DME Equipment being ordered: front closure bra for post-op and recovery.    *Scheduled for seed localized, left breast lumpectomy, sentinel lymph node biopsy on 11/5/18* 2 Units 0     SERTRALINE HCL PO Take by mouth daily       order for DME Air splint (Patient not taking: Reported on 10/26/2018) 1 Device 0     order for DME 2 crutches (Patient not taking: Reported on 10/26/2018) 2 Device 0     OTC products: None, except as noted above    Allergies   Allergen Reactions     Oxycodone Itching and Rash      Latex Allergy:  "NO    Social History   Substance Use Topics     Smoking status: Never Smoker     Smokeless tobacco: Never Used     Alcohol use No     History   Drug Use No       REVIEW OF SYSTEMS:   Constitutional, neuro, ENT, endocrine, pulmonary, cardiac, gastrointestinal, genitourinary, musculoskeletal, integument and psychiatric systems are negative, except as otherwise noted.    EXAM:   /70 (BP Location: Right arm, Patient Position: Sitting, Cuff Size: Adult Large)  Pulse 83  Temp 98  F (36.7  C) (Oral)  Ht 5' 9\" (1.753 m)  Wt 244 lb (110.7 kg)  SpO2 98%  BMI 36.03 kg/m2    GENERAL APPEARANCE: alert and no distress     EYES: EOMI, PERRL     HENT: ear canals and TM's normal and nose and mouth without ulcers or lesions     NECK: no adenopathy, no asymmetry, masses, or scars and thyroid normal to palpation     RESP: lungs clear to auscultation - no rales, rhonchi or wheezes     CV: regular rates and rhythm, normal S1 S2, no S3 or S4 and no murmur, click or rub     ABDOMEN:  soft, nontender, no HSM or masses and bowel sounds normal     MS: extremities normal- no gross deformities noted, no evidence of inflammation in joints, FROM in all extremities.     SKIN: no suspicious lesions or rashes     NEURO: Normal strength and tone, sensory exam grossly normal, mentation intact and speech normal     PSYCH: mentation appears normal. and affect normal/bright     LYMPHATICS: No cervical adenopathy    DIAGNOSTICS:   EKG: Not indicated due to non-vascular surgery and low risk of event (age <65 and without cardiac risk factors)    Recent Labs   Lab Test  10/26/17   0345  10/26/17   0314   HGB   --   14.3   PLT   --   217   NA  137  Canceled, Test credited   POTASSIUM  3.5  Canceled, Test credited   CR  0.84  Canceled, Test credited        IMPRESSION:   Reason for surgery/procedure: breast cancer, left.  Diagnosis/reason for consult: pre op cardiac evaluation for upcoming general surgery procedure for Seed localized left breast " lumpectomy, left sentinel lymph node biopsy for treatment of for treatment of breast cancer, left.    The proposed surgical procedure is considered INTERMEDIATE risk.    REVISED CARDIAC RISK INDEX  The patient has the following serious cardiovascular risks for perioperative complications such as (MI, PE, VFib and 3  AV Block):  No serious cardiac risks  INTERPRETATION: 0 risks: Class I (very low risk - 0.4% complication rate)    The patient has the following additional risks for perioperative complications:  No identified additional risks      ICD-10-CM    1. Preop general physical exam Z01.818    2. Malignant neoplasm of lower-outer quadrant of left breast of female, estrogen receptor negative (H) C50.512     Z17.1        RECOMMENDATIONS:     --Patient is to take all scheduled medications on the day of surgery.    APPROVAL GIVEN to proceed with proposed procedure, without further diagnostic evaluation       Signed Electronically by: Briseyda Lock MD    Copy of this evaluation report is provided to requesting physician.    [place] Preop Guidelines    Revised Cardiac Risk Index

## 2018-12-07 NOTE — ANESTHESIA PREPROCEDURE EVALUATION
Anesthesia Evaluation     .             ROS/MED HX    ENT/Pulmonary:     (+)asthma , . .   (-) sleep apnea   Neurologic:       Cardiovascular:         METS/Exercise Tolerance:     Hematologic:         Musculoskeletal:         GI/Hepatic:     (+) Other GI/Hepatic s/p GIB;     (-) GERD   Renal/Genitourinary:         Endo:     (+) Obesity, .      Psychiatric:     (+) psychiatric history depression      Infectious Disease:         Malignancy:   (+) Malignancy History of Breast  Breast CA status post Surgery.         Other:                     Physical Exam  Normal systems: cardiovascular and pulmonary    Airway   Mallampati: II  TM distance: >3 FB  Neck ROM: full    Dental   (+) missing    Cardiovascular       Pulmonary                     Anesthesia Plan      History & Physical Review  History and physical reviewed and following examination; no interval change.    ASA Status:  2 .    NPO Status:  > 8 hours    Plan for MAC   PONV prophylaxis:  Ondansetron (or other 5HT-3)       Postoperative Care  Postoperative pain management:  IV analgesics.      Consents  Anesthetic plan, risks, benefits and alternatives discussed with:  Patient..                          .

## 2018-12-07 NOTE — DISCHARGE INSTRUCTIONS
Same Day Surgery Discharge Instructions for  Sedation and General Anesthesia       It's not unusual to feel dizzy, light-headed or faint for up to 24 hours after surgery or while taking pain medication.  If you have these symptoms: sit for a few minutes before standing and have someone assist you when you get up to walk or use the bathroom.      You should rest and relax for the next 24 hours. We recommend you make arrangements to have an adult stay with you for at least 24 hours after your discharge.  Avoid hazardous and strenuous activity.      DO NOT DRIVE any vehicle or operate mechanical equipment for 24 hours following the end of your surgery.  Even though you may feel normal, your reactions may be affected by the medication you have received.      Do not drink alcoholic beverages for 24 hours following surgery.       Slowly progress to your regular diet as you feel able. It's not unusual to feel nauseated and/or vomit after receiving anesthesia.  If you develop these symptoms, drink clear liquids (apple juice, ginger ale, broth, 7-up, etc. ) until you feel better.  If your nausea and vomiting persists for 24 hours, please notify your surgeon.        All narcotic pain medications, along with inactivity and anesthesia, can cause constipation. Drinking plenty of liquids and increasing fiber intake will help.      For any questions of a medical nature, call your surgeon.      Do not make important decisions for 24 hours.      If you had general anesthesia, you may have a sore throat for a couple of days related to the breathing tube used during surgery.  You may use Cepacol lozenges to help with this discomfort.  If it worsens or if you develop a fever, contact your surgeon.       If you feel your pain is not well managed with the pain medications prescribed by your surgeon, please contact your surgeon's office to let them know so they can address your concerns.       [facility]  Discharge Instructions: Post-Operative Breast Surgery    ACTIVITY    Take frequent short walks and increase your activity gradually.      Avoid strenuous physical activity or heavy lifting greater than 15-20 lbs. for 1-2 weeks with arm on the surgery side.  You may climb stairs.    Gentle rotation and stretching of your arms and shoulders will prevent joint stiffness.    You may drive without restrictions when you are not using any prescription pain medication and feel comfortable in a car.    You may return to work/school when you are comfortable without any prescription pain medication.    WOUND CARE    You may remove your outer dressing and shower 48 hours after the surgery.  Pat your incisions dry and leave them open to air.  Re-apply dressing (Band-Aids or gauze/tape) as needed for drainage.    You may have steri-strips (looks like white tape) or Dermabond (looks like glue) on your incisions.  You may peel off the steri-strips 2 weeks after your surgery if they have not peeled off on their own.  If you have Dermabond, it will peel up and fall off on its own.    Do not soak your incisions in a tub or pool for 2 weeks.     Do not apply any lotions, creams, or ointments to your incisions.    A ridge under your incisions is normal and will gradually resolve.    Wear a supportive bra for 1-2 weeks, day and night.    DIET    Start with liquids, then gradually resume your regular diet as tolerated.     Drink plenty of liquids to stay hydrated.    PAIN    Expect some tenderness and discomfort at the incision site(s).  Use the prescribed pain medication at your discretion.  Expect gradual resolution of your pain over several days.    You may take ibuprofen with food (unless you have been told not to) instead of or in addition to your prescribed pain medication.  If you are taking Norco or Percocet, do not take any additional acetaminophen/APAP/Tylenol.    Do not drink alcohol or drive while you are taking  pain medications.    You may apply ice to your incisions in 20 minute intervals as needed for the next 48 hours.      EXPECTATIONS    Pain medications can cause constipation.  Limit use when possible.  Take over the counter stool softener/stimulant, such as Colace or Senna, 1-2 times a day with plenty of water.  You may take a mild over the counter laxative, such as Miralax or a suppository, as needed.      You may discontinue these medications once you are having regular bowel movements and/or are no longer taking your narcotic pain medication.    Blue dye may have been used during your surgery to locate lymph nodes and can cause your urine to be blue/green for several days after surgery.  This is not a cause for concern and will resolve on its own.     RETURN APPOINTMENT    Follow up with your surgeon in 2 weeks.  Please call the office at [phone] to schedule your appointment.      CALL OUR OFFICE -[phone] IF YOU HAVE:     Chills or fever above 101 F.    Increased redness, warmth, or drainage at your incisions.    Significant bleeding.    Pain not relieved by your pain medication or rest.    Increasing pain after the first 48 hours.    Any other concerns or questions.    Revised October 2018  **If you have concerns or questions about your procedure,    please contact Dr Aponte at  [phone]**

## 2018-12-07 NOTE — ANESTHESIA POSTPROCEDURE EVALUATION
Patient: Megha Campbell    Procedure(s):  RE-EXCISION LEFT BREAST INFERIOR MARGIN, ASPIRATION OF AXILLARY SEROMA    Diagnosis:BREAST CANCER  Diagnosis Additional Information: No value filed.    Anesthesia Type:  MAC    Note:  Anesthesia Post Evaluation    Patient location during evaluation: PACU  Patient participation: Able to fully participate in evaluation  Level of consciousness: awake  Pain management: adequate  Airway patency: patent  Cardiovascular status: acceptable  Respiratory status: acceptable  Hydration status: acceptable  PONV: none     Anesthetic complications: None          Last vitals:  Vitals:    12/07/18 0900 12/07/18 0915 12/07/18 0930   BP: 136/82 (!) 135/91 117/80   Resp: 12 12 12   Temp:      SpO2: 99% 92% 95%         Electronically Signed By: PAULETTE FUENTES MD  December 7, 2018  9:35 AM

## 2018-12-07 NOTE — OP NOTE
[facility] Breast Surgery Operative Note      Pre-operative diagnosis: left breast invasive ductal carcinoma, positive inferior margin, axillary seroma   Post-operative diagnosis: left breast invasive ductal carcinoma, positive inferior margin, axillary seroma     Procedure: 1. Aspiration of axillary seroma  2. Re-excision of left breast inferior margin     Surgeon: Lesly Aponte MD   Assistant(s):  Umesh Alonzo MD - [facility] Resident  The PA s assistance was medically necessary to provide adequate exposure in the operating field, maintain hemostasis, cutting suture, clamping and ligating bleeding vessels, and visualization of anatomic structures throughout the surgical procedure.      Anesthesia: Local with MAC    Estimated blood loss:   15 cc     Specimens:   ID Type Source Tests Collected by Time Destination   1 : AXILLARY SEROMA Other (specify in comments) Axilla, Left ANAEROBIC BACTERIAL CULTURE, GRAM STAIN, MISCELLANEOUS CULTURE AEROBIC BACTERIAL Lesly Aponte MD 12/7/2018  7:57 AM    2 : LEFT BREAST SEROMA Fluid Breast, Left ANAEROBIC BACTERIAL CULTURE, FLUID CULTURE AEROBIC BACTERIAL, GRAM STAIN Lesly Aponte MD 12/7/2018  8:00 AM    A : LEFT BREAST INFERIOR MARGIN Tissue Breast, Left SURGICAL PATHOLOGY EXAM Lesly Aponte MD 12/7/2018  8:11 AM         INDICATION:  Megha is a 53yof who underwent a left lumpectomy and SLNB on 11/21 for a triple negative 8.5cm invasive ductal carcinoma with 5/11 positive lymph nodes. The inferior margin was focally positive for invasive ductal carcinoma. She also has a moderate sized seroma in the left axilla. She presents today for re-excision of the inferior margin and aspiration of the axillary seroma.   DESCRIPTION OF PROCEDURE: The patient was placed on the table in supine position. Conscious sedation was induced. Perioperative antibiotics were given. The left breast and axilla were prepped and draped in standard fashion.   We began by aspirating the axillary  seroma with a 18 gauge needle and syringe. 180ml of serous fluid was aspirated. This was sent to the lab for culture. The lumpectomy incision was then reopened with a 15 blade. There was immediate return of dark bloody fluid. This was also cultured as her overlying skin was mildly indurated, warm and erythematous. The prior lumpectomy cavity was completely opened by cutting several deep sutures. There was a large hematoma within the lumpectomy cavity which was completely evacuated. There was no identifiable bleeding vessel. The inferior margin was grasped with bobby clamps and excised. This was oriented with sutures and sent to pathology for review. There was some bleeding which was controlled with cautery. The entire lumpectomy cavity had mild oozing from the raw surfaces. I placed 2cc of surgicell powder within the lumpectomy cavity.  The lumpectomy cavity was reapproximated with several interrupted 3-0 vicryl sutures. The skin was closed with a deep dermal 3-0 vicryl and running 4-0 Monocryl subcuticular suture and steri strips.    The patient tolerated the procedure well.  Sponge and instrument counts were correct.    Lesly Aponte MD  [facility]  [phone]

## 2018-12-07 NOTE — IP AVS SNAPSHOT
MRN:[id number]                      After Visit Summary   12/7/2018    Megha Campbell    MRN: [id number]           Thank you!     Thank you for choosing [facility] for your care. Our goal is always to provide you with excellent care. Hearing back from our patients is one way we can continue to improve our services. Please take a few minutes to complete the written survey that you may receive in the mail after you visit with us. Thank you!        Patient Information     Date Of Birth          1965        About your hospital stay     You were admitted on:  December 7, 2018 You last received care in the:  [facility] PACU    You were discharged on:  December 7, 2018       Who to Call     For medical emergencies, please call 911.  For non-urgent questions about your medical care, please call your primary care provider or clinic, [phone]  For questions related to your surgery, please call your surgery clinic        Attending Provider     Provider Specialty    Lesly Aponte MD Surgery       Primary Care Provider Office Phone # Fax #    Laure Zapata [phone]      After Care Instructions     Diet Instructions       Resume pre-procedure diet            Discharge Instructions       Patient to follow up with appointment in 2 weeks            Discharge Instructions       [facility]  Discharge Instructions: Post-Operative Breast Surgery    ACTIVITY  Take frequent short walks and increase your activity gradually.    Avoid strenuous physical activity or heavy lifting greater than 15-20 lbs. for 1-2 weeks with arm on the surgery side.  You may climb stairs.  Gentle rotation and stretching of your arms and shoulders will prevent joint stiffness.  You may drive without restrictions when you are not using any prescription pain medication and feel comfortable in a car.  You may return to work/school when you are comfortable without any  prescription pain medication.    WOUND CARE  You may remove your outer dressing and shower 48 hours after the surgery.  Pat your incisions dry and leave them open to air.  Re-apply dressing (Band-Aids or gauze/tape) as needed for drainage.  You may have steri-strips (looks like white tape) or Dermabond (looks like glue) on your incisions.  You may peel off the steri-strips 2 weeks after your surgery if they have not peeled off on their own.  If you have Dermabond, it will peel up and fall off on its own.  Do not soak your incisions in a tub or pool for 2 weeks.   Do not apply any lotions, creams, or ointments to your incisions.  A ridge under your incisions is normal and will gradually resolve.  Wear a supportive bra for 1-2 weeks, day and night.    DIET  Start with liquids, then gradually resume your regular diet as tolerated.   Drink plenty of liquids to stay hydrated.    PAIN  Expect some tenderness and discomfort at the incision site(s).  Use the prescribed pain medication at your discretion.  Expect gradual resolution of your pain over several days.  You may take ibuprofen with food (unless you have been told not to) instead of or in addition to your prescribed pain medication.  If you are taking Norco or Percocet, do not take any additional acetaminophen/APAP/Tylenol.  Do not drink alcohol or drive while you are taking pain medications.  You may apply ice to your incisions in 20 minute intervals as needed for the next 48 hours.      EXPECTATIONS  Pain medications can cause constipation.  Limit use when possible.  Take over the counter stool softener/stimulant, such as Colace or Senna, 1-2 times a day with plenty of water.  You may take a mild over the counter laxative, such as Miralax or a suppository, as needed.    You may discontinue these medications once you are having regular bowel movements and/or are no longer taking your narcotic pain medication.  Blue dye may have been used during your surgery to  locate lymph nodes and can cause your urine to be blue/green for several days after surgery.  This is not a cause for concern and will resolve on its own.     RETURN APPOINTMENT  Follow up with your surgeon in 2 weeks.  Please call the office at [phone] to schedule your appointment.      CALL OUR OFFICE -[phone] IF YOU HAVE:   Chills or fever above 101 F.  Increased redness, warmth, or drainage at your incisions.  Significant bleeding.  Pain not relieved by your pain medication or rest.  Increasing pain after the first 48 hours.  Any other concerns or questions.            Do not - immerse incision in water until sutures removed       Do not immerse incision in water until you have followed up with your surgeon            No Alcohol       For 24 hours post procedure            No driving or operating machinery        until the day after procedure            No lifting        No lifting over 10 lbs and no strenuous physical activity for 2 weeks on your left arm            Shower       No shower for 48 hours post procedure. May shower Postoperative Day (POD)  2                  Further instructions from your care team       Same Day Surgery Discharge Instructions for  Sedation and General Anesthesia       It's not unusual to feel dizzy, light-headed or faint for up to 24 hours after surgery or while taking pain medication.  If you have these symptoms: sit for a few minutes before standing and have someone assist you when you get up to walk or use the bathroom.      You should rest and relax for the next 24 hours. We recommend you make arrangements to have an adult stay with you for at least 24 hours after your discharge.  Avoid hazardous and strenuous activity.      DO NOT DRIVE any vehicle or operate mechanical equipment for 24 hours following the end of your surgery.  Even though you may feel normal, your reactions may be affected by the medication you have received.      Do not drink alcoholic beverages  for 24 hours following surgery.       Slowly progress to your regular diet as you feel able. It's not unusual to feel nauseated and/or vomit after receiving anesthesia.  If you develop these symptoms, drink clear liquids (apple juice, ginger ale, broth, 7-up, etc. ) until you feel better.  If your nausea and vomiting persists for 24 hours, please notify your surgeon.        All narcotic pain medications, along with inactivity and anesthesia, can cause constipation. Drinking plenty of liquids and increasing fiber intake will help.      For any questions of a medical nature, call your surgeon.      Do not make important decisions for 24 hours.      If you had general anesthesia, you may have a sore throat for a couple of days related to the breathing tube used during surgery.  You may use Cepacol lozenges to help with this discomfort.  If it worsens or if you develop a fever, contact your surgeon.       If you feel your pain is not well managed with the pain medications prescribed by your surgeon, please contact your surgeon's office to let them know so they can address your concerns.       [facility]  Discharge Instructions: Post-Operative Breast Surgery    ACTIVITY    Take frequent short walks and increase your activity gradually.      Avoid strenuous physical activity or heavy lifting greater than 15-20 lbs. for 1-2 weeks with arm on the surgery side.  You may climb stairs.    Gentle rotation and stretching of your arms and shoulders will prevent joint stiffness.    You may drive without restrictions when you are not using any prescription pain medication and feel comfortable in a car.    You may return to work/school when you are comfortable without any prescription pain medication.    WOUND CARE    You may remove your outer dressing and shower 48 hours after the surgery.  Pat your incisions dry and leave them open to air.  Re-apply dressing (Band-Aids or gauze/tape) as  needed for drainage.    You may have steri-strips (looks like white tape) or Dermabond (looks like glue) on your incisions.  You may peel off the steri-strips 2 weeks after your surgery if they have not peeled off on their own.  If you have Dermabond, it will peel up and fall off on its own.    Do not soak your incisions in a tub or pool for 2 weeks.     Do not apply any lotions, creams, or ointments to your incisions.    A ridge under your incisions is normal and will gradually resolve.    Wear a supportive bra for 1-2 weeks, day and night.    DIET    Start with liquids, then gradually resume your regular diet as tolerated.     Drink plenty of liquids to stay hydrated.    PAIN    Expect some tenderness and discomfort at the incision site(s).  Use the prescribed pain medication at your discretion.  Expect gradual resolution of your pain over several days.    You may take ibuprofen with food (unless you have been told not to) instead of or in addition to your prescribed pain medication.  If you are taking Norco or Percocet, do not take any additional acetaminophen/APAP/Tylenol.    Do not drink alcohol or drive while you are taking pain medications.    You may apply ice to your incisions in 20 minute intervals as needed for the next 48 hours.      EXPECTATIONS    Pain medications can cause constipation.  Limit use when possible.  Take over the counter stool softener/stimulant, such as Colace or Senna, 1-2 times a day with plenty of water.  You may take a mild over the counter laxative, such as Miralax or a suppository, as needed.      You may discontinue these medications once you are having regular bowel movements and/or are no longer taking your narcotic pain medication.    Blue dye may have been used during your surgery to locate lymph nodes and can cause your urine to be blue/green for several days after surgery.  This is not a cause for concern and will resolve on its own.     RETURN APPOINTMENT    Follow up with  "your surgeon in 2 weeks.  Please call the office at [phone] to schedule your appointment.      CALL OUR OFFICE -[phone] IF YOU HAVE:     Chills or fever above 101 F.    Increased redness, warmth, or drainage at your incisions.    Significant bleeding.    Pain not relieved by your pain medication or rest.    Increasing pain after the first 48 hours.    Any other concerns or questions.    Revised October 2018  **If you have concerns or questions about your procedure,    please contact Dr Aponte at  [phone]**      Pending Results     Date and Time Order Name Status Description    12/7/2018 0800 Gram stain In process     12/7/2018 0800 Fluid Culture Aerobic Bacterial In process     12/7/2018 0800 Anaerobic bacterial culture In process     12/7/2018 0758 Gram stain In process     12/7/2018 0758 Anaerobic bacterial culture In process     12/7/2018 0757 Fluid Culture Aerobic Bacterial In process             Admission Information     Date & Time Provider Department Dept. Phone    12/7/2018 Lesly Aponte MD [facility] PACU [phone]      Your Vitals Were     Blood Pressure Temperature Respirations Height Weight Last Period    131/72 97.7  F (36.5  C) (Temporal) 15 1.702 m (5' 7\") 114.5 kg (252 lb 7 oz) 12/07/2017    Pulse Oximetry BMI (Body Mass Index)                98% 39.54 kg/m2          MyChart Information     [employer] lets you send messages to your doctor, view your test results, renew your prescriptions, schedule appointments and more. To sign up, go to [email or web address] . Click on \"Log in\" on the left side of the screen, which will take you to the Welcome page. Then click on \"Sign up Now\" on the right side of the page.     You will be asked to enter the access code listed below, as well as some personal information. Please follow the directions to create your username and password.     Your access code is: [email or web address]  Expires: 1/27/2019  7:48 AM     Your access code will "  in 90 days. If you need help or a new code, please call your [facility] clinic or [phone].        Care EveryWhere ID     This is your Care EveryWhere ID. This could be used by other organizations to access your [facility] medical records  [id number]        Equal Access to Services     JOHN WHITFIELD : Hadii aad ku hadmairettao Soomaali, waaxda luqadaha, qaybta kaalmada adeegyada, jose aeblrobbiegini ash. So [facility] [phone].    ATENCIÓN: Si habla español, tiene a anaya disposición servicios gratuitos de asistencia lingüística. Llame al [phone].    We comply with applicable federal civil rights laws and [place] laws. We do not discriminate on the basis of race, color, national origin, age, disability, sex, sexual orientation, or gender identity.               Review of your medicines      START taking        Dose / Directions    oxyCODONE-acetaminophen 5-325 MG tablet   Commonly known as:  PERCOCET   Used for:  Malignant neoplasm of lower-outer quadrant of left breast of female, estrogen receptor negative (H)   Notes to Patient:  You received 5 mg Oxycodone at 9:30 am.        Dose:  1 tablet   Take 1 tablet by mouth every 6 hours as needed for pain   Quantity:  20 tablet   Refills:  0       senna-docusate 8.6-50 MG tablet   Commonly known as:  SENOKOT-S/PERICOLACE   Used for:  Malignant neoplasm of lower-outer quadrant of left breast of female, estrogen receptor negative (H)        Dose:  1-2 tablet   Take 1-2 tablets by mouth 2 times daily   Quantity:  30 tablet   Refills:  0       sulfamethoxazole-trimethoprim 800-160 MG tablet   Commonly known as:  BACTRIM DS/SEPTRA DS   Used for:  Malignant neoplasm of lower-outer quadrant of left breast of female, estrogen receptor negative (H)        Dose:  1 tablet   Take 1 tablet by mouth 2 times daily   Quantity:  20 tablet   Refills:  0         CONTINUE these medicines which have NOT CHANGED        Dose / Directions    CLONAZEPAM PO        Dose:  1  mg   Take 1 mg by mouth At Bedtime   Refills:  0       diphenhydrAMINE 25 MG tablet   Commonly known as:  BENADRYL   Used for:  Malignant neoplasm of lower-outer quadrant of left breast of female, estrogen receptor negative (H)   Notes to Patient:  You received 25 mg at 9:30 am.        Dose:  25-50 mg   Take 1-2 tablets (25-50 mg) by mouth every 6 hours as needed for itching or allergies   Quantity:  60 tablet   Refills:  0       order for DME   Used for:  Invasive ductal carcinoma of breast, female, left (H)        Equipment being ordered: front closure bra for post-op and recovery.  *Scheduled for seed localized, left breast lumpectomy, sentinel lymph node biopsy on 11/5/18*   Quantity:  2 Units   Refills:  0       SERTRALINE HCL PO        Take by mouth daily   Refills:  0       WELLBUTRIN PO        Dose:  100 mg   Take 100 mg by mouth daily   Refills:  0         STOP taking     acetaminophen-codeine 300-30 MG tablet   Commonly known as:  TYLENOL #3           ibuprofen 800 MG tablet   Commonly known as:  ADVIL/MOTRIN                Where to get your medicines      These medications were sent to [facility] - [address]  [address], [place] [address]     Phone:  [phone]     diphenhydrAMINE 25 MG tablet    senna-docusate 8.6-50 MG tablet    sulfamethoxazole-trimethoprim 800-160 MG tablet         Some of these will need a paper prescription and others can be bought over the counter. Ask your nurse if you have questions.     Bring a paper prescription for each of these medications     oxyCODONE-acetaminophen 5-325 MG tablet                Protect others around you: Learn how to safely use, store and throw away your medicines at www.disposemymeds.org.        ANTIBIOTIC INSTRUCTION     You've Been Prescribed an Antibiotic - Now What?  Your healthcare team thinks that you or your loved one might have an infection. Some infections can be treated with antibiotics, which  are powerful, life-saving drugs. Like all medications, antibiotics have side effects and should only be used when necessary. There are some important things you should know about your antibiotic treatment.      Your healthcare team may run tests before you start taking an antibiotic.    Your team may take samples (e.g., from your blood, urine or other areas) to run tests to look for bacteria. These test can be important to determine if you need an antibiotic at all and, if you do, which antibiotic will work best.      Within a few days, your healthcare team might change or even stop your antibiotic.    Your team may start you on an antibiotic while they are working to find out what is making you sick.    Your team might change your antibiotic because test results show that a different antibiotic would be better to treat your infection.    In some cases, once your team has more information, they learn that you do not need an antibiotic at all. They may find out that you don't have an infection, or that the antibiotic you're taking won't work against your infection. For example, an infection caused by a virus can't be treated with antibiotics. Staying on an antibiotic when you don't need it is more likely to be harmful than helpful.      You may experience side effects from your antibiotic.    Like all medications, antibiotics have side effects. Some of these can be serious.    Let you healthcare team know if you have any known allergies when you are admitted to the hospital.    One significant side effect of nearly all antibiotics is the risk of severe and sometimes deadly diarrhea caused by Clostridium difficile (C. Difficile). This occurs when a person takes antibiotics because some good germs are destroyed. Antibiotic use allows C. diificile to take over, putting patients at high risk for this serious infection.    As a patient or caregiver, it is important to understand your or your loved one's antibiotic  treatment. It is especially important for caregivers to speak up when patients can't speak for themselves. Here are some important questions to ask your healthcare team.    What infection is this antibiotic treating and how do you know I have that infection?    What side effects might occur from this antibiotic?    How long will I need to take this antibiotic?    Is it safe to take this antibiotic with other medications or supplements (e.g., vitamins) that I am taking?     Are there any special directions I need to know about taking this antibiotic? For example, should I take it with food?    How will I be monitored to know whether my infection is responding to the antibiotic?    What tests may help to make sure the right antibiotic is prescribed for me?      Information provided by:  www.cdc.gov/getsmart  U.S. Department of Health and Human Services  Centers for disease Control and Prevention  National Center for Emerging and Zoonotic Infectious Diseases  Division of Healthcare Quality Promotion        Information about OPIOIDS     PRESCRIPTION OPIOIDS: WHAT YOU NEED TO KNOW   We gave you an opioid (narcotic) pain medicine. It is important to manage your pain, but opioids are not always the best choice. You should first try all the other options your care team gave you. Take this medicine for as short a time (and as few doses) as possible.    Some activities can increase your pain, such as bandage changes or therapy sessions. It may help to take your pain medicine 30 to 60 minutes before these activities. Reduce your stress by getting enough sleep, working on hobbies you enjoy and practicing relaxation or meditation. Talk to your care team about ways to manage your pain beyond prescription opioids.    These medicines have risks:    DO NOT drive when on new or higher doses of pain medicine. These medicines can affect your alertness and reaction times, and you could be arrested for driving under the influence (DUI).  If you need to use opioids long-term, talk to your care team about driving.    DO NOT operate heavy machinery    DO NOT do any other dangerous activities while taking these medicines.    DO NOT drink any alcohol while taking these medicines.     If the opioid prescribed includes acetaminophen, DO NOT take with any other medicines that contain acetaminophen. Read all labels carefully. Look for the word  acetaminophen  or  Tylenol.  Ask your pharmacist if you have questions or are unsure.    You can get addicted to pain medicines, especially if you have a history of addiction (chemical, alcohol or substance dependence). Talk to your care team about ways to reduce this risk.    All opioids tend to cause constipation. Drink plenty of water and eat foods that have a lot of fiber, such as fruits, vegetables, prune juice, apple juice and high-fiber cereal. Take a laxative (Miralax, milk of magnesia, Colace, Senna) if you don t move your bowels at least every other day. Other side effects include upset stomach, sleepiness, dizziness, throwing up, tolerance (needing more of the medicine to have the same effect), physical dependence and slowed breathing.    Store your pills in a secure place, locked if possible. We will not replace any lost or stolen medicine. If you don t finish your medicine, please throw away (dispose) as directed by your pharmacist. The [place] Pollution Control Agency has more information about safe disposal: https://www.pca.[facility].mn.us/living-green/managing-unwanted-medications             Medication List: This is a list of all your medications and when to take them. Check marks below indicate your daily home schedule. Keep this list as a reference.      Medications           Morning Afternoon Evening Bedtime As Needed    CLONAZEPAM PO   Take 1 mg by mouth At Bedtime                                diphenhydrAMINE 25 MG tablet   Commonly known as:  BENADRYL   Take 1-2 tablets (25-50 mg) by mouth every  6 hours as needed for itching or allergies   Last time this was given:  25 mg on 12/7/2018  9:31 AM   Notes to Patient:  You received 25 mg at 9:30 am.                                order for DME   Equipment being ordered: front closure bra for post-op and recovery.  *Scheduled for seed localized, left breast lumpectomy, sentinel lymph node biopsy on 11/5/18*                                oxyCODONE-acetaminophen 5-325 MG tablet   Commonly known as:  PERCOCET   Take 1 tablet by mouth every 6 hours as needed for pain   Notes to Patient:  You received 5 mg Oxycodone at 9:30 am.                                senna-docusate 8.6-50 MG tablet   Commonly known as:  SENOKOT-S/PERICOLACE   Take 1-2 tablets by mouth 2 times daily                                SERTRALINE HCL PO   Take by mouth daily                                sulfamethoxazole-trimethoprim 800-160 MG tablet   Commonly known as:  BACTRIM DS/SEPTRA DS   Take 1 tablet by mouth 2 times daily                                WELLBUTRIN PO   Take 100 mg by mouth daily

## 2018-12-07 NOTE — ANESTHESIA CARE TRANSFER NOTE
Patient: Megha Campbell    Procedure(s):  RE-EXCISION LEFT BREAST INFERIOR MARGIN, ASPIRATION OF AXILLARY SEROMA    Diagnosis: BREAST CANCER  Diagnosis Additional Information: No value filed.    Anesthesia Type:   MAC     Note:  Airway :Face Mask  Patient transferred to:PACU  Comments: Pt to PACU with O2 via mask, airway patent, VSS.  Report to RN.Handoff Report: Identifed the Patient, Identified the Reponsible Provider, Reviewed the pertinent medical history, Discussed the surgical course, Reviewed Intra-OP anesthesia mangement and issues during anesthesia, Set expectations for post-procedure period and Allowed opportunity for questions and acknowledgement of understanding      Vitals: (Last set prior to Anesthesia Care Transfer)    CRNA VITALS  12/7/2018 0820 - 12/7/2018 0856      12/7/2018             Resp Rate (set): 10                Electronically Signed By: ELLEN Flanagan CRNA  December 7, 2018  8:56 AM

## 2018-12-07 NOTE — IP AVS SNAPSHOT
[place] [address]    [place] [address]    Phone:  [phone]                                       After Visit Summary   12/7/2018    Megha Campbell    MRN: [id number]           After Visit Summary Signature Page     I have received my discharge instructions, and my questions have been answered. I have discussed any challenges I see with this plan with the nurse or doctor.    ..........................................................................................................................................  Patient/Patient Representative Signature      ..........................................................................................................................................  Patient Representative Print Name and Relationship to Patient    ..................................................               ................................................  Date                                   Time    ..........................................................................................................................................  Reviewed by Signature/Title    ...................................................              ..............................................  Date                                               Time          22EPIC Rev 08/18

## 2018-12-10 NOTE — TELEPHONE ENCOUNTER
From: Megha Campbell  To: Lesly Aponet MD  Sent: 12/8/2018 10:46 PM CST  Subject: Updates about my health    Good evening Lesly,   I hope you re well , I m concerned about swelling underneath   My arm was is quite swollen where my incision is under my arm it was down   Now it seems to be swollen up again. Would this be due to the infection

## 2018-12-10 NOTE — TELEPHONE ENCOUNTER
I called patient and left her a message to call me back.    Megha is scheduled to see Dr. Aponte this Friday, December 14th at 2:00p.m. At [facility] for her first post op check.  I am reaching out to patient to inform of the appointment date and to check in at 1:45p.m.  Awaiting a return call.  Salud Frausto RN, BSN

## 2018-12-10 NOTE — TELEPHONE ENCOUNTER
"I spoke with patient this morning who informed me that she had an aspiration of axillary seroma, and a re-excision of her left breast inferior margin on 12/7/2018 with Dr. Lesly Aponte.     Patient reports having left axilla swelling on and off since her surgery.  Patient states at this time the swelling is improved and it \"doesn't look too bad\".  Patient denies having fevers, increased redness, or any incisional drainage.  Patient states the incision is pink, dry and intact with steri strips across.       She reports taking 1 hydrocodone tablet in the morning and 1 in the evening before bedtime. Patient states she is getting good pain relief with the hydrocodone tablets.    I informed patient that we will need for her to come in for a 2 week post op visit, and patient is asking if she could come in sooner.  I will send a message to [facility] schedulers to ask if they could call patient to get her scheduled for a post op visit later this week, or early next week.  I also informed patient to continue to monitor her surgical sites for worsening symptoms, such as increased swelling, increased pain, increased redness, or fevers.  Informed patient to call us back if she were to develop any of these symptoms.  Patient verbalized understanding of the plan of care.  Salud Frausto RN, BSN      This is a FYI for Dr. Aponte.   "

## 2018-12-10 NOTE — TELEPHONE ENCOUNTER
Patient called back and I informed her of the post op appointment with Dr. Aponte 12/14/18 @ 2:00p.m.  Patient verbalized understanding.  Salud Frausto RN, BSN

## 2018-12-11 NOTE — TELEPHONE ENCOUNTER
I called Megha with her pathology report from re-excision of the inferior margin - there was residual cancer within the margin but the final margin is negative. She will see me on Friday for follow up. She has an appt with Radiation Oncology soon. She will also follow up with Dr. Winston.     Lesly Aponte MD  [facility]  [phone]

## 2018-12-14 NOTE — PROGRESS NOTES
[facility] Breast Surgery Postoperative Note    S: Megha returns for follow up after re-excision of the inferior left breast margin and drainage of her axillary seroma. She reports ongoing pain in the axilla and breast with breast swelling. It is not as severe as after her initial surgery.     Breasts: left breast is swollen, no erythema. Incision healing well. Left axillary seroma smaller in size and soft.     Pathology: reviewed and copy given, new margin negative.     A/P  Megha Campbell is recovering from a large left breast partial mastectomy with axillary node dissection and re-excision of the inferior margin for a triple negative 8.5cm invasive ductal carcinoma with 5/11 positive lymph nodes. She is doing well. She is not interested in chemotherapy despite her triple negative large breast cancer. She is open to radiation.   Referrals were placed to radiation oncology. I spent a great deal of time discussing with her that I thought she would benefit from chemotherapy. She was very nigel with me that she is not going to do chemotherapy. I will see her back in 1-2 weeks to reassess her breast and axilla.     Thank you for the opportunity to help in her care.    Lesly Aponte  [facility]  [phone]    Please route or send letter to:  Primary Care Provider (PCP) and Referring Provider

## 2018-12-30 NOTE — PROGRESS NOTES
[facility] Breast Surgery Postoperative Note    S: Megha returns for two week follow up after re-excision of inferior breast margin and evacuation of breast hematoma. She is doing fine. She has occasional sharp pains in the left breast, 1-2 times per day which last a few seconds. She reports the swelling under her left arm is much improved.     Breasts:   Left breast is swollen, larger than right. There is mild induration across the inferior breast with mild erythema. nontender to palpation throughout. Small axillary seroma.     Pathology: reviewed, final margin is negative for malignancy.     A/P  Megha Campbell is recovering from a large left breast partial mastectomy with axillary node dissection and re-excision of the inferior margin. She is not interested in chemotherapy despite ongoing discussion regarding the triple negative nature of her breast cancer. She has an appt with Dr. Saab with Radiation Oncology next week. Given the mild erythema and induration - I would like her to take keflex for 1 week.  If she has ongoing erythema on her breast I would like to see her back in 2-3 weeks. She should continue to wear a very supportive bra to promote drainage of the breast seroma.  She will follow up with me in 6 months as well, after completion of radiation.       Thank you for the opportunity to help in her care.    Lesly Aponte  [facility]  [phone]    Please route or send letter to:  Primary Care Provider (PCP) and Referring Provider

## 2019-01-01 ENCOUNTER — TELEPHONE (OUTPATIENT)
Dept: FAMILY MEDICINE | Facility: CLINIC | Age: 54
End: 2019-01-01

## 2019-01-01 ENCOUNTER — HOSPITAL ENCOUNTER (EMERGENCY)
Facility: CLINIC | Age: 54
Discharge: HOME | End: 2019-09-06
Attending: EMERGENCY MEDICINE | Admitting: EMERGENCY MEDICINE
Payer: COMMERCIAL

## 2019-01-01 ENCOUNTER — APPOINTMENT (OUTPATIENT)
Dept: OCCUPATIONAL THERAPY | Facility: CLINIC | Age: 54
End: 2019-01-01
Attending: INTERNAL MEDICINE
Payer: COMMERCIAL

## 2019-01-01 ENCOUNTER — APPOINTMENT (OUTPATIENT)
Dept: CARDIOLOGY | Facility: CLINIC | Age: 54
End: 2019-01-01
Attending: INTERNAL MEDICINE
Payer: COMMERCIAL

## 2019-01-01 ENCOUNTER — APPOINTMENT (OUTPATIENT)
Dept: OCCUPATIONAL THERAPY | Facility: CLINIC | Age: 54
End: 2019-01-01
Payer: COMMERCIAL

## 2019-01-01 ENCOUNTER — APPOINTMENT (OUTPATIENT)
Dept: CT IMAGING | Facility: CLINIC | Age: 54
End: 2019-01-01
Attending: EMERGENCY MEDICINE
Payer: COMMERCIAL

## 2019-01-01 ENCOUNTER — HOSPITAL ENCOUNTER (EMERGENCY)
Dept: LAB | Facility: CLINIC | Age: 54
End: 2019-08-16
Attending: INTERNAL MEDICINE
Payer: COMMERCIAL

## 2019-01-01 ENCOUNTER — APPOINTMENT (OUTPATIENT)
Dept: CT IMAGING | Facility: CLINIC | Age: 54
End: 2019-01-01
Attending: INTERNAL MEDICINE
Payer: COMMERCIAL

## 2019-01-01 ENCOUNTER — HOSPITAL ENCOUNTER (EMERGENCY)
Facility: CLINIC | Age: 54
Discharge: HOME | End: 2019-08-08
Attending: EMERGENCY MEDICINE | Admitting: EMERGENCY MEDICINE
Payer: COMMERCIAL

## 2019-01-01 ENCOUNTER — APPOINTMENT (OUTPATIENT)
Dept: INTERVENTIONAL RADIOLOGY/VASCULAR | Facility: CLINIC | Age: 54
End: 2019-01-01
Attending: INTERNAL MEDICINE
Payer: COMMERCIAL

## 2019-01-01 ENCOUNTER — APPOINTMENT (OUTPATIENT)
Dept: ULTRASOUND IMAGING | Facility: CLINIC | Age: 54
End: 2019-01-01
Attending: EMERGENCY MEDICINE
Payer: COMMERCIAL

## 2019-01-01 ENCOUNTER — APPOINTMENT (OUTPATIENT)
Dept: CT IMAGING | Facility: CLINIC | Age: 54
End: 2019-01-01
Attending: NURSE PRACTITIONER
Payer: COMMERCIAL

## 2019-01-01 ENCOUNTER — HOSPITAL ENCOUNTER (OUTPATIENT)
Facility: CLINIC | Age: 54
Discharge: HOME | End: 2019-08-14
Attending: RADIOLOGY | Admitting: RADIOLOGY
Payer: COMMERCIAL

## 2019-01-01 ENCOUNTER — TRANSFERRED RECORDS (OUTPATIENT)
Dept: HEALTH INFORMATION MANAGEMENT | Facility: CLINIC | Age: 54
End: 2019-01-01

## 2019-01-01 ENCOUNTER — HOSPITAL ENCOUNTER (OUTPATIENT)
Dept: MAMMOGRAPHY | Facility: CLINIC | Age: 54
Discharge: HOME | End: 2019-08-05
Attending: SURGERY | Admitting: SURGERY
Payer: COMMERCIAL

## 2019-01-01 ENCOUNTER — HOSPITAL ENCOUNTER (OUTPATIENT)
Dept: MAMMOGRAPHY | Facility: CLINIC | Age: 54
End: 2019-08-05
Attending: SURGERY
Payer: COMMERCIAL

## 2019-01-01 ENCOUNTER — APPOINTMENT (OUTPATIENT)
Dept: ULTRASOUND IMAGING | Facility: CLINIC | Age: 54
End: 2019-01-01
Attending: NURSE PRACTITIONER
Payer: COMMERCIAL

## 2019-01-01 ENCOUNTER — APPOINTMENT (OUTPATIENT)
Dept: CARDIOLOGY | Facility: CLINIC | Age: 54
End: 2019-01-01
Attending: PHYSICIAN ASSISTANT
Payer: COMMERCIAL

## 2019-01-01 ENCOUNTER — TELEPHONE (OUTPATIENT)
Dept: SURGERY | Facility: CLINIC | Age: 54
End: 2019-01-01

## 2019-01-01 ENCOUNTER — HOSPITAL ENCOUNTER (INPATIENT)
Facility: CLINIC | Age: 54
LOS: 3 days | End: 2019-09-17
Attending: EMERGENCY MEDICINE | Admitting: INTERNAL MEDICINE
Payer: COMMERCIAL

## 2019-01-01 ENCOUNTER — APPOINTMENT (OUTPATIENT)
Dept: GENERAL RADIOLOGY | Facility: CLINIC | Age: 54
End: 2019-01-01
Attending: NURSE PRACTITIONER
Payer: COMMERCIAL

## 2019-01-01 ENCOUNTER — ANESTHESIA (OUTPATIENT)
Dept: INTENSIVE CARE | Facility: CLINIC | Age: 54
End: 2019-01-01
Payer: COMMERCIAL

## 2019-01-01 ENCOUNTER — APPOINTMENT (OUTPATIENT)
Dept: PHYSICAL THERAPY | Facility: CLINIC | Age: 54
End: 2019-01-01
Attending: HOSPITALIST
Payer: COMMERCIAL

## 2019-01-01 ENCOUNTER — APPOINTMENT (OUTPATIENT)
Dept: GENERAL RADIOLOGY | Facility: CLINIC | Age: 54
End: 2019-01-01
Attending: PHYSICIAN ASSISTANT
Payer: COMMERCIAL

## 2019-01-01 ENCOUNTER — APPOINTMENT (OUTPATIENT)
Dept: GENERAL RADIOLOGY | Facility: CLINIC | Age: 54
End: 2019-01-01
Attending: EMERGENCY MEDICINE
Payer: COMMERCIAL

## 2019-01-01 ENCOUNTER — HOSPITAL ENCOUNTER (INPATIENT)
Facility: CLINIC | Age: 54
LOS: 3 days | Discharge: HOME | End: 2019-09-03
Attending: EMERGENCY MEDICINE | Admitting: INTERNAL MEDICINE
Payer: COMMERCIAL

## 2019-01-01 ENCOUNTER — HOSPITAL ENCOUNTER (INPATIENT)
Facility: CLINIC | Age: 54
LOS: 1 days | Discharge: HOME | End: 2019-09-04
Attending: EMERGENCY MEDICINE | Admitting: INTERNAL MEDICINE
Payer: COMMERCIAL

## 2019-01-01 ENCOUNTER — ANESTHESIA (OUTPATIENT)
Dept: SURGERY | Facility: CLINIC | Age: 54
End: 2019-01-01
Payer: COMMERCIAL

## 2019-01-01 ENCOUNTER — DOCUMENTATION ONLY (OUTPATIENT)
Dept: ONCOLOGY | Facility: CLINIC | Age: 54
End: 2019-01-01

## 2019-01-01 ENCOUNTER — TELEPHONE (OUTPATIENT)
Dept: SURGERY | Facility: PHYSICIAN GROUP | Age: 54
End: 2019-01-01

## 2019-01-01 ENCOUNTER — HOSPITAL ENCOUNTER (EMERGENCY)
Facility: CLINIC | Age: 54
Discharge: HOME | End: 2019-08-29
Attending: EMERGENCY MEDICINE | Admitting: EMERGENCY MEDICINE
Payer: COMMERCIAL

## 2019-01-01 ENCOUNTER — APPOINTMENT (OUTPATIENT)
Dept: CARDIOLOGY | Facility: CLINIC | Age: 54
End: 2019-01-01
Attending: NURSE PRACTITIONER
Payer: COMMERCIAL

## 2019-01-01 ENCOUNTER — HOSPITAL ENCOUNTER (EMERGENCY)
Facility: CLINIC | Age: 54
Discharge: HOME | End: 2019-08-14
Attending: EMERGENCY MEDICINE | Admitting: INTERNAL MEDICINE
Payer: COMMERCIAL

## 2019-01-01 ENCOUNTER — HOSPITAL ENCOUNTER (OUTPATIENT)
Dept: MAMMOGRAPHY | Facility: CLINIC | Age: 54
Discharge: HOME | End: 2019-05-21
Attending: SURGERY | Admitting: SURGERY
Payer: COMMERCIAL

## 2019-01-01 ENCOUNTER — HOSPITAL ENCOUNTER (OUTPATIENT)
Facility: CLINIC | Age: 54
Discharge: HOME | End: 2019-08-09
Attending: OBSTETRICS & GYNECOLOGY | Admitting: OBSTETRICS & GYNECOLOGY
Payer: COMMERCIAL

## 2019-01-01 ENCOUNTER — DOCUMENTATION ONLY (OUTPATIENT)
Dept: OTHER | Facility: CLINIC | Age: 54
End: 2019-01-01

## 2019-01-01 ENCOUNTER — APPOINTMENT (OUTPATIENT)
Dept: ULTRASOUND IMAGING | Facility: CLINIC | Age: 54
End: 2019-01-01
Attending: INTERNAL MEDICINE
Payer: COMMERCIAL

## 2019-01-01 ENCOUNTER — APPOINTMENT (OUTPATIENT)
Dept: GENERAL RADIOLOGY | Facility: CLINIC | Age: 54
End: 2019-01-01
Attending: ANESTHESIOLOGY
Payer: COMMERCIAL

## 2019-01-01 ENCOUNTER — APPOINTMENT (OUTPATIENT)
Dept: GENERAL RADIOLOGY | Facility: CLINIC | Age: 54
End: 2019-01-01
Attending: INTERNAL MEDICINE
Payer: COMMERCIAL

## 2019-01-01 ENCOUNTER — HOSPITAL ENCOUNTER (OUTPATIENT)
Dept: ULTRASOUND IMAGING | Facility: CLINIC | Age: 54
End: 2019-08-09
Attending: OBSTETRICS & GYNECOLOGY | Admitting: OBSTETRICS & GYNECOLOGY
Payer: COMMERCIAL

## 2019-01-01 ENCOUNTER — ANESTHESIA EVENT (OUTPATIENT)
Dept: SURGERY | Facility: CLINIC | Age: 54
End: 2019-01-01
Payer: COMMERCIAL

## 2019-01-01 ENCOUNTER — APPOINTMENT (OUTPATIENT)
Dept: PHYSICAL THERAPY | Facility: CLINIC | Age: 54
End: 2019-01-01
Attending: INTERNAL MEDICINE
Payer: COMMERCIAL

## 2019-01-01 ENCOUNTER — HOSPITAL ENCOUNTER (OUTPATIENT)
Dept: MAMMOGRAPHY | Facility: CLINIC | Age: 54
Discharge: HOME | End: 2019-06-21
Attending: SURGERY | Admitting: SURGERY
Payer: COMMERCIAL

## 2019-01-01 ENCOUNTER — HOSPITAL ENCOUNTER (INPATIENT)
Facility: CLINIC | Age: 54
LOS: 7 days | Discharge: HOME | End: 2019-08-28
Attending: EMERGENCY MEDICINE | Admitting: INTERNAL MEDICINE
Payer: COMMERCIAL

## 2019-01-01 ENCOUNTER — HOSPITAL ENCOUNTER (INPATIENT)
Facility: CLINIC | Age: 54
LOS: 5 days | Discharge: HOME HEALTH | End: 2019-09-11
Attending: EMERGENCY MEDICINE | Admitting: INTERNAL MEDICINE
Payer: COMMERCIAL

## 2019-01-01 ENCOUNTER — HOSPITAL ENCOUNTER (INPATIENT)
Facility: CLINIC | Age: 54
LOS: 3 days | Discharge: HOME | End: 2019-08-19
Attending: EMERGENCY MEDICINE | Admitting: INTERNAL MEDICINE
Payer: COMMERCIAL

## 2019-01-01 ENCOUNTER — HOSPITAL ENCOUNTER (EMERGENCY)
Facility: CLINIC | Age: 54
Discharge: HOME | End: 2019-09-12
Attending: EMERGENCY MEDICINE | Admitting: EMERGENCY MEDICINE
Payer: COMMERCIAL

## 2019-01-01 ENCOUNTER — HOSPITAL ENCOUNTER (OUTPATIENT)
Dept: MAMMOGRAPHY | Facility: CLINIC | Age: 54
End: 2019-05-21
Attending: SURGERY
Payer: COMMERCIAL

## 2019-01-01 ENCOUNTER — ANESTHESIA EVENT (OUTPATIENT)
Dept: INTENSIVE CARE | Facility: CLINIC | Age: 54
End: 2019-01-01
Payer: COMMERCIAL

## 2019-01-01 ENCOUNTER — HOSPITAL ENCOUNTER (EMERGENCY)
Facility: CLINIC | Age: 54
Discharge: HOME | End: 2019-09-03
Admitting: EMERGENCY MEDICINE
Payer: COMMERCIAL

## 2019-01-01 ENCOUNTER — CARE COORDINATION (OUTPATIENT)
Dept: FAMILY MEDICINE | Facility: CLINIC | Age: 54
End: 2019-01-01

## 2019-01-01 ENCOUNTER — OFFICE VISIT (OUTPATIENT)
Dept: SURGERY | Facility: CLINIC | Age: 54
End: 2019-01-01
Payer: COMMERCIAL

## 2019-01-01 VITALS
WEIGHT: 266.2 LBS | BODY MASS INDEX: 40.35 KG/M2 | SYSTOLIC BLOOD PRESSURE: 143 MMHG | DIASTOLIC BLOOD PRESSURE: 67 MMHG | OXYGEN SATURATION: 95 % | RESPIRATION RATE: 20 BRPM | TEMPERATURE: 99.5 F | HEART RATE: 108 BPM | HEIGHT: 68 IN

## 2019-01-01 VITALS
OXYGEN SATURATION: 100 % | RESPIRATION RATE: 22 BRPM | HEART RATE: 99 BPM | TEMPERATURE: 97.6 F | DIASTOLIC BLOOD PRESSURE: 75 MMHG | SYSTOLIC BLOOD PRESSURE: 127 MMHG

## 2019-01-01 VITALS
RESPIRATION RATE: 18 BRPM | TEMPERATURE: 96.5 F | HEART RATE: 74 BPM | OXYGEN SATURATION: 98 % | DIASTOLIC BLOOD PRESSURE: 69 MMHG | SYSTOLIC BLOOD PRESSURE: 130 MMHG | WEIGHT: 245.1 LBS | BODY MASS INDEX: 37.15 KG/M2 | HEIGHT: 68 IN

## 2019-01-01 VITALS
BODY MASS INDEX: 31.39 KG/M2 | DIASTOLIC BLOOD PRESSURE: 4 MMHG | WEIGHT: 200 LBS | OXYGEN SATURATION: 97 % | HEIGHT: 67 IN | TEMPERATURE: 98.3 F | SYSTOLIC BLOOD PRESSURE: 124 MMHG | RESPIRATION RATE: 20 BRPM

## 2019-01-01 VITALS
HEART RATE: 121 BPM | TEMPERATURE: 97.2 F | SYSTOLIC BLOOD PRESSURE: 140 MMHG | WEIGHT: 243.8 LBS | RESPIRATION RATE: 18 BRPM | HEIGHT: 68 IN | DIASTOLIC BLOOD PRESSURE: 81 MMHG | OXYGEN SATURATION: 98 % | BODY MASS INDEX: 36.95 KG/M2

## 2019-01-01 VITALS
WEIGHT: 264.77 LBS | RESPIRATION RATE: 26 BRPM | BODY MASS INDEX: 40.26 KG/M2 | TEMPERATURE: 101.8 F | OXYGEN SATURATION: 98 % | SYSTOLIC BLOOD PRESSURE: 114 MMHG | DIASTOLIC BLOOD PRESSURE: 66 MMHG | HEART RATE: 129 BPM

## 2019-01-01 VITALS
TEMPERATURE: 98.7 F | HEART RATE: 96 BPM | HEIGHT: 63 IN | WEIGHT: 245.7 LBS | RESPIRATION RATE: 18 BRPM | DIASTOLIC BLOOD PRESSURE: 70 MMHG | BODY MASS INDEX: 43.54 KG/M2 | OXYGEN SATURATION: 98 % | SYSTOLIC BLOOD PRESSURE: 130 MMHG

## 2019-01-01 VITALS
OXYGEN SATURATION: 95 % | BODY MASS INDEX: 36.49 KG/M2 | RESPIRATION RATE: 26 BRPM | SYSTOLIC BLOOD PRESSURE: 127 MMHG | HEART RATE: 98 BPM | WEIGHT: 240 LBS | TEMPERATURE: 97.7 F | DIASTOLIC BLOOD PRESSURE: 78 MMHG

## 2019-01-01 VITALS
RESPIRATION RATE: 20 BRPM | DIASTOLIC BLOOD PRESSURE: 98 MMHG | TEMPERATURE: 98.9 F | BODY MASS INDEX: 34.72 KG/M2 | OXYGEN SATURATION: 99 % | WEIGHT: 221.2 LBS | HEIGHT: 67 IN | HEART RATE: 97 BPM | SYSTOLIC BLOOD PRESSURE: 149 MMHG

## 2019-01-01 VITALS
TEMPERATURE: 96.6 F | DIASTOLIC BLOOD PRESSURE: 82 MMHG | RESPIRATION RATE: 21 BRPM | HEART RATE: 121 BPM | SYSTOLIC BLOOD PRESSURE: 139 MMHG | OXYGEN SATURATION: 98 %

## 2019-01-01 VITALS
RESPIRATION RATE: 20 BRPM | BODY MASS INDEX: 36.83 KG/M2 | SYSTOLIC BLOOD PRESSURE: 135 MMHG | HEIGHT: 68 IN | HEART RATE: 102 BPM | DIASTOLIC BLOOD PRESSURE: 93 MMHG | WEIGHT: 243 LBS | OXYGEN SATURATION: 96 % | TEMPERATURE: 97.5 F

## 2019-01-01 VITALS
OXYGEN SATURATION: 99 % | HEART RATE: 107 BPM | DIASTOLIC BLOOD PRESSURE: 84 MMHG | RESPIRATION RATE: 22 BRPM | WEIGHT: 200 LBS | TEMPERATURE: 98.5 F | BODY MASS INDEX: 31.32 KG/M2 | SYSTOLIC BLOOD PRESSURE: 118 MMHG

## 2019-01-01 VITALS
BODY MASS INDEX: 34.97 KG/M2 | HEART RATE: 121 BPM | OXYGEN SATURATION: 92 % | SYSTOLIC BLOOD PRESSURE: 155 MMHG | TEMPERATURE: 98.3 F | DIASTOLIC BLOOD PRESSURE: 93 MMHG | RESPIRATION RATE: 21 BRPM | WEIGHT: 230 LBS

## 2019-01-01 VITALS — OXYGEN SATURATION: 97 % | WEIGHT: 210 LBS | BODY MASS INDEX: 32.89 KG/M2 | HEART RATE: 90 BPM

## 2019-01-01 VITALS
TEMPERATURE: 97.7 F | OXYGEN SATURATION: 100 % | DIASTOLIC BLOOD PRESSURE: 103 MMHG | RESPIRATION RATE: 33 BRPM | HEART RATE: 123 BPM | SYSTOLIC BLOOD PRESSURE: 141 MMHG

## 2019-01-01 DIAGNOSIS — R06.02 SHORTNESS OF BREATH: ICD-10-CM

## 2019-01-01 DIAGNOSIS — Z17.1 MALIGNANT NEOPLASM OF LOWER-OUTER QUADRANT OF LEFT BREAST OF FEMALE, ESTROGEN RECEPTOR NEGATIVE (H): ICD-10-CM

## 2019-01-01 DIAGNOSIS — C50.512 MALIGNANT NEOPLASM OF LOWER-OUTER QUADRANT OF LEFT BREAST OF FEMALE, ESTROGEN RECEPTOR NEGATIVE (H): ICD-10-CM

## 2019-01-01 DIAGNOSIS — N64.4 BREAST PAIN: ICD-10-CM

## 2019-01-01 DIAGNOSIS — J91.0 MALIGNANT PLEURAL EFFUSION (H): ICD-10-CM

## 2019-01-01 DIAGNOSIS — F41.1 GENERALIZED ANXIETY DISORDER: ICD-10-CM

## 2019-01-01 DIAGNOSIS — J18.9 PNEUMONIA OF RIGHT UPPER LOBE DUE TO INFECTIOUS ORGANISM: Primary | ICD-10-CM

## 2019-01-01 DIAGNOSIS — C50.919 BREAST CANCER (H): ICD-10-CM

## 2019-01-01 DIAGNOSIS — F41.9 ANXIETY: Primary | ICD-10-CM

## 2019-01-01 DIAGNOSIS — K21.9 GASTROESOPHAGEAL REFLUX DISEASE WITHOUT ESOPHAGITIS: ICD-10-CM

## 2019-01-01 DIAGNOSIS — J45.901 ASTHMA EXACERBATION: ICD-10-CM

## 2019-01-01 DIAGNOSIS — F41.9 ANXIETY: ICD-10-CM

## 2019-01-01 DIAGNOSIS — I47.10 SVT (SUPRAVENTRICULAR TACHYCARDIA) (H): ICD-10-CM

## 2019-01-01 DIAGNOSIS — M62.81 GENERALIZED MUSCLE WEAKNESS: ICD-10-CM

## 2019-01-01 DIAGNOSIS — J18.9 ACUTE PNEUMONIA: ICD-10-CM

## 2019-01-01 DIAGNOSIS — J45.21 EXACERBATION OF INTERMITTENT ASTHMA, UNSPECIFIED ASTHMA SEVERITY: ICD-10-CM

## 2019-01-01 DIAGNOSIS — R09.02 HYPOXIA: ICD-10-CM

## 2019-01-01 DIAGNOSIS — N64.4 BREAST PAIN: Primary | ICD-10-CM

## 2019-01-01 DIAGNOSIS — N61.0 CELLULITIS OF BREAST: Primary | ICD-10-CM

## 2019-01-01 DIAGNOSIS — J81.0 ACUTE PULMONARY EDEMA (H): ICD-10-CM

## 2019-01-01 DIAGNOSIS — E87.6 HYPOKALEMIA: ICD-10-CM

## 2019-01-01 DIAGNOSIS — F32.A DEPRESSIVE DISORDER: ICD-10-CM

## 2019-01-01 DIAGNOSIS — F32.A DEPRESSIVE DISORDER: Primary | ICD-10-CM

## 2019-01-01 DIAGNOSIS — N64.4 BREAST PAIN, LEFT: ICD-10-CM

## 2019-01-01 DIAGNOSIS — I89.0 LYMPHEDEMA: Primary | ICD-10-CM

## 2019-01-01 DIAGNOSIS — J96.01 ACUTE RESPIRATORY FAILURE WITH HYPOXIA (H): ICD-10-CM

## 2019-01-01 DIAGNOSIS — J18.9 PNEUMONIA OF RIGHT LUNG DUE TO INFECTIOUS ORGANISM, UNSPECIFIED PART OF LUNG: ICD-10-CM

## 2019-01-01 DIAGNOSIS — R60.0 LOWER EXTREMITY EDEMA: ICD-10-CM

## 2019-01-01 DIAGNOSIS — J90 PLEURAL EFFUSION ON LEFT: ICD-10-CM

## 2019-01-01 DIAGNOSIS — Z17.1 MALIGNANT NEOPLASM OF LOWER-OUTER QUADRANT OF LEFT BREAST OF FEMALE, ESTROGEN RECEPTOR NEGATIVE (H): Primary | ICD-10-CM

## 2019-01-01 DIAGNOSIS — C50.512 MALIGNANT NEOPLASM OF LOWER-OUTER QUADRANT OF LEFT BREAST OF FEMALE, ESTROGEN RECEPTOR NEGATIVE (H): Primary | ICD-10-CM

## 2019-01-01 DIAGNOSIS — C79.9 METASTATIC CANCER (H): ICD-10-CM

## 2019-01-01 DIAGNOSIS — I82.4Y9 DEEP VEIN THROMBOSIS (DVT) OF PROXIMAL LOWER EXTREMITY, UNSPECIFIED CHRONICITY, UNSPECIFIED LATERALITY (H): ICD-10-CM

## 2019-01-01 DIAGNOSIS — C50.012 BILATERAL MALIGNANT NEOPLASM INVOLVING BOTH NIPPLE AND AREOLA IN FEMALE, UNSPECIFIED ESTROGEN RECEPTOR STATUS (H): ICD-10-CM

## 2019-01-01 DIAGNOSIS — K59.09 OTHER CONSTIPATION: ICD-10-CM

## 2019-01-01 DIAGNOSIS — J90 PLEURAL EFFUSION: ICD-10-CM

## 2019-01-01 DIAGNOSIS — G89.18 ACUTE POST-OPERATIVE PAIN: Primary | ICD-10-CM

## 2019-01-01 DIAGNOSIS — C50.919 METASTATIC BREAST CANCER: ICD-10-CM

## 2019-01-01 DIAGNOSIS — J45.20 MILD INTERMITTENT ASTHMA WITHOUT COMPLICATION: ICD-10-CM

## 2019-01-01 DIAGNOSIS — J18.9 PNEUMONIA OF BOTH LOWER LOBES DUE TO INFECTIOUS ORGANISM: ICD-10-CM

## 2019-01-01 DIAGNOSIS — N64.4 BREAST PAIN, LEFT: Primary | ICD-10-CM

## 2019-01-01 DIAGNOSIS — I82.401 ACUTE DEEP VEIN THROMBOSIS (DVT) OF RIGHT LOWER EXTREMITY, UNSPECIFIED VEIN (H): ICD-10-CM

## 2019-01-01 DIAGNOSIS — J45.901 MODERATE ASTHMA WITH EXACERBATION, UNSPECIFIED WHETHER PERSISTENT: ICD-10-CM

## 2019-01-01 DIAGNOSIS — Z01.818 EXAMINATION PRIOR TO CHEMOTHERAPY: ICD-10-CM

## 2019-01-01 DIAGNOSIS — Z09 FOLLOW-UP EXAMINATION FOLLOWING SURGERY: Primary | ICD-10-CM

## 2019-01-01 DIAGNOSIS — R06.02 SOB (SHORTNESS OF BREATH): ICD-10-CM

## 2019-01-01 DIAGNOSIS — R07.89 CHEST WALL PAIN: ICD-10-CM

## 2019-01-01 DIAGNOSIS — J18.9 PNEUMONIA OF BOTH UPPER LOBES DUE TO INFECTIOUS ORGANISM: ICD-10-CM

## 2019-01-01 DIAGNOSIS — R00.0 TACHYCARDIA: ICD-10-CM

## 2019-01-01 DIAGNOSIS — C50.011 BILATERAL MALIGNANT NEOPLASM INVOLVING BOTH NIPPLE AND AREOLA IN FEMALE, UNSPECIFIED ESTROGEN RECEPTOR STATUS (H): ICD-10-CM

## 2019-01-01 DIAGNOSIS — E87.70 HYPERVOLEMIA, UNSPECIFIED HYPERVOLEMIA TYPE: ICD-10-CM

## 2019-01-01 LAB
ABO + RH BLD: NORMAL
ABO + RH BLD: NORMAL
ALBUMIN SERPL-MCNC: 2 G/DL (ref 3.4–5)
ALBUMIN SERPL-MCNC: 2 G/DL (ref 3.4–5)
ALBUMIN SERPL-MCNC: 2.1 G/DL (ref 3.4–5)
ALBUMIN SERPL-MCNC: 2.1 G/DL (ref 3.4–5)
ALBUMIN SERPL-MCNC: 2.2 G/DL (ref 3.4–5)
ALBUMIN SERPL-MCNC: 2.2 G/DL (ref 3.4–5)
ALBUMIN SERPL-MCNC: 2.6 G/DL (ref 3.4–5)
ALBUMIN SERPL-MCNC: 2.7 G/DL (ref 3.4–5)
ALBUMIN SERPL-MCNC: 3 G/DL (ref 3.4–5)
ALBUMIN UR-MCNC: 30 MG/DL
ALBUMIN UR-MCNC: NEGATIVE MG/DL
ALP SERPL-CCNC: 100 U/L (ref 40–150)
ALP SERPL-CCNC: 108 U/L (ref 40–150)
ALP SERPL-CCNC: 111 U/L (ref 40–150)
ALP SERPL-CCNC: 114 U/L (ref 40–150)
ALP SERPL-CCNC: 171 U/L (ref 40–150)
ALP SERPL-CCNC: 90 U/L (ref 40–150)
ALP SERPL-CCNC: 91 U/L (ref 40–150)
ALP SERPL-CCNC: 92 U/L (ref 40–150)
ALP SERPL-CCNC: 94 U/L (ref 40–150)
ALT SERPL W P-5'-P-CCNC: 132 U/L (ref 0–50)
ALT SERPL W P-5'-P-CCNC: 15 U/L (ref 0–50)
ALT SERPL W P-5'-P-CCNC: 17 U/L (ref 0–50)
ALT SERPL W P-5'-P-CCNC: 23 U/L (ref 0–50)
ALT SERPL W P-5'-P-CCNC: 26 U/L (ref 0–50)
ALT SERPL W P-5'-P-CCNC: 28 U/L (ref 0–50)
ALT SERPL W P-5'-P-CCNC: 32 U/L (ref 0–50)
ALT SERPL W P-5'-P-CCNC: 339 U/L (ref 0–50)
ALT SERPL W P-5'-P-CCNC: 53 U/L (ref 0–50)
ANION GAP SERPL CALCULATED.3IONS-SCNC: 10 MMOL/L (ref 3–14)
ANION GAP SERPL CALCULATED.3IONS-SCNC: 10 MMOL/L (ref 3–14)
ANION GAP SERPL CALCULATED.3IONS-SCNC: 11 MMOL/L (ref 3–14)
ANION GAP SERPL CALCULATED.3IONS-SCNC: 3 MMOL/L (ref 3–14)
ANION GAP SERPL CALCULATED.3IONS-SCNC: 4 MMOL/L (ref 3–14)
ANION GAP SERPL CALCULATED.3IONS-SCNC: 5 MMOL/L (ref 3–14)
ANION GAP SERPL CALCULATED.3IONS-SCNC: 6 MMOL/L (ref 3–14)
ANION GAP SERPL CALCULATED.3IONS-SCNC: 7 MMOL/L (ref 3–14)
ANION GAP SERPL CALCULATED.3IONS-SCNC: 8 MMOL/L (ref 3–14)
ANION GAP SERPL CALCULATED.3IONS-SCNC: 9 MMOL/L (ref 3–14)
APPEARANCE FLD: NORMAL
APPEARANCE UR: ABNORMAL
APPEARANCE UR: CLEAR
APTT PPP: 32 SEC (ref 22–37)
AST SERPL W P-5'-P-CCNC: 15 U/L (ref 0–45)
AST SERPL W P-5'-P-CCNC: 19 U/L (ref 0–45)
AST SERPL W P-5'-P-CCNC: 305 U/L (ref 0–45)
AST SERPL W P-5'-P-CCNC: 33 U/L (ref 0–45)
AST SERPL W P-5'-P-CCNC: 34 U/L (ref 0–45)
AST SERPL W P-5'-P-CCNC: 38 U/L (ref 0–45)
AST SERPL W P-5'-P-CCNC: 46 U/L (ref 0–45)
AST SERPL W P-5'-P-CCNC: 633 U/L (ref 0–45)
AST SERPL W P-5'-P-CCNC: 87 U/L (ref 0–45)
BACTERIA #/AREA URNS HPF: ABNORMAL /HPF
BACTERIA SPEC CULT: NO GROWTH
BACTERIA SPEC CULT: NORMAL
BASE DEFICIT BLDA-SCNC: 0.1 MMOL/L
BASE DEFICIT BLDA-SCNC: 0.5 MMOL/L
BASE DEFICIT BLDA-SCNC: 0.7 MMOL/L
BASE DEFICIT BLDA-SCNC: 1.4 MMOL/L
BASE DEFICIT BLDA-SCNC: 1.7 MMOL/L
BASE DEFICIT BLDA-SCNC: 7.2 MMOL/L
BASE DEFICIT BLDV-SCNC: 0.7 MMOL/L
BASE DEFICIT BLDV-SCNC: 8 MMOL/L
BASE EXCESS BLDA CALC-SCNC: 0 MMOL/L
BASE EXCESS BLDA CALC-SCNC: 0.5 MMOL/L
BASE EXCESS BLDA CALC-SCNC: 0.7 MMOL/L
BASE EXCESS BLDA CALC-SCNC: 0.9 MMOL/L
BASE EXCESS BLDA CALC-SCNC: 11.9 MMOL/L
BASE EXCESS BLDA CALC-SCNC: 2 MMOL/L
BASE EXCESS BLDA CALC-SCNC: 4.4 MMOL/L
BASE EXCESS BLDA CALC-SCNC: 5.4 MMOL/L
BASE EXCESS BLDA CALC-SCNC: 5.5 MMOL/L
BASE EXCESS BLDA CALC-SCNC: 6.5 MMOL/L
BASOPHILS # BLD AUTO: 0 10E9/L (ref 0–0.2)
BASOPHILS NFR BLD AUTO: 0 %
BASOPHILS NFR BLD AUTO: 0 %
BASOPHILS NFR BLD AUTO: 0.1 %
BASOPHILS NFR BLD AUTO: 0.2 %
BILIRUB SERPL-MCNC: 0.3 MG/DL (ref 0.2–1.3)
BILIRUB SERPL-MCNC: 0.3 MG/DL (ref 0.2–1.3)
BILIRUB SERPL-MCNC: 0.4 MG/DL (ref 0.2–1.3)
BILIRUB SERPL-MCNC: 0.5 MG/DL (ref 0.2–1.3)
BILIRUB SERPL-MCNC: 0.6 MG/DL (ref 0.2–1.3)
BILIRUB SERPL-MCNC: 0.7 MG/DL (ref 0.2–1.3)
BILIRUB UR QL STRIP: ABNORMAL
BILIRUB UR QL STRIP: NEGATIVE
BLD GP AB SCN SERPL QL: NORMAL
BLOOD BANK CMNT PATIENT-IMP: NORMAL
BUN SERPL-MCNC: 10 MG/DL (ref 7–30)
BUN SERPL-MCNC: 11 MG/DL (ref 7–30)
BUN SERPL-MCNC: 13 MG/DL (ref 7–30)
BUN SERPL-MCNC: 13 MG/DL (ref 7–30)
BUN SERPL-MCNC: 14 MG/DL (ref 7–30)
BUN SERPL-MCNC: 15 MG/DL (ref 7–30)
BUN SERPL-MCNC: 15 MG/DL (ref 7–30)
BUN SERPL-MCNC: 16 MG/DL (ref 7–30)
BUN SERPL-MCNC: 17 MG/DL (ref 7–30)
BUN SERPL-MCNC: 17 MG/DL (ref 7–30)
BUN SERPL-MCNC: 23 MG/DL (ref 7–30)
BUN SERPL-MCNC: 28 MG/DL (ref 7–30)
BUN SERPL-MCNC: 29 MG/DL (ref 7–30)
BUN SERPL-MCNC: 3 MG/DL (ref 7–30)
BUN SERPL-MCNC: 3 MG/DL (ref 7–30)
BUN SERPL-MCNC: 4 MG/DL (ref 7–30)
BUN SERPL-MCNC: 6 MG/DL (ref 7–30)
BUN SERPL-MCNC: 7 MG/DL (ref 7–30)
BUN SERPL-MCNC: 8 MG/DL (ref 7–30)
BUN SERPL-MCNC: 9 MG/DL (ref 7–30)
C DIFF TOX B STL QL: NEGATIVE
CA-I BLD-MCNC: 4.4 MG/DL (ref 4.4–5.2)
CA-I BLD-MCNC: 4.7 MG/DL (ref 4.4–5.2)
CA-I SERPL ISE-MCNC: 4.5 MG/DL (ref 4.4–5.2)
CALCIUM SERPL-MCNC: 8 MG/DL (ref 8.5–10.1)
CALCIUM SERPL-MCNC: 8.1 MG/DL (ref 8.5–10.1)
CALCIUM SERPL-MCNC: 8.1 MG/DL (ref 8.5–10.1)
CALCIUM SERPL-MCNC: 8.2 MG/DL (ref 8.5–10.1)
CALCIUM SERPL-MCNC: 8.3 MG/DL (ref 8.5–10.1)
CALCIUM SERPL-MCNC: 8.4 MG/DL (ref 8.5–10.1)
CALCIUM SERPL-MCNC: 8.5 MG/DL (ref 8.5–10.1)
CALCIUM SERPL-MCNC: 8.5 MG/DL (ref 8.5–10.1)
CALCIUM SERPL-MCNC: 8.6 MG/DL (ref 8.5–10.1)
CALCIUM SERPL-MCNC: 8.7 MG/DL (ref 8.5–10.1)
CALCIUM SERPL-MCNC: 8.7 MG/DL (ref 8.5–10.1)
CALCIUM SERPL-MCNC: 8.8 MG/DL (ref 8.5–10.1)
CALCIUM SERPL-MCNC: 8.9 MG/DL (ref 8.5–10.1)
CALCIUM SERPL-MCNC: 9 MG/DL (ref 8.5–10.1)
CALCIUM SERPL-MCNC: 9.2 MG/DL (ref 8.5–10.1)
CALCIUM SERPL-MCNC: 9.2 MG/DL (ref 8.5–10.1)
CALCIUM SERPL-MCNC: 9.3 MG/DL (ref 8.5–10.1)
CALCIUM SERPL-MCNC: 9.4 MG/DL (ref 8.5–10.1)
CHLORIDE SERPL-SCNC: 100 MMOL/L (ref 94–109)
CHLORIDE SERPL-SCNC: 101 MMOL/L (ref 94–109)
CHLORIDE SERPL-SCNC: 101 MMOL/L (ref 94–109)
CHLORIDE SERPL-SCNC: 102 MMOL/L (ref 94–109)
CHLORIDE SERPL-SCNC: 103 MMOL/L (ref 94–109)
CHLORIDE SERPL-SCNC: 104 MMOL/L (ref 94–109)
CHLORIDE SERPL-SCNC: 105 MMOL/L (ref 94–109)
CHLORIDE SERPL-SCNC: 105 MMOL/L (ref 94–109)
CHLORIDE SERPL-SCNC: 106 MMOL/L (ref 94–109)
CHLORIDE SERPL-SCNC: 106 MMOL/L (ref 94–109)
CHLORIDE SERPL-SCNC: 108 MMOL/L (ref 94–109)
CHLORIDE SERPL-SCNC: 108 MMOL/L (ref 94–109)
CHLORIDE SERPL-SCNC: 93 MMOL/L (ref 94–109)
CHLORIDE SERPL-SCNC: 94 MMOL/L (ref 94–109)
CHLORIDE SERPL-SCNC: 96 MMOL/L (ref 94–109)
CHLORIDE SERPL-SCNC: 96 MMOL/L (ref 94–109)
CHLORIDE SERPL-SCNC: 97 MMOL/L (ref 94–109)
CHLORIDE SERPL-SCNC: 98 MMOL/L (ref 94–109)
CHLORIDE SERPL-SCNC: 99 MMOL/L (ref 94–109)
CHLORIDE SERPL-SCNC: 99 MMOL/L (ref 94–109)
CO2 BLD-SCNC: 28 MMOL/L (ref 21–28)
CO2 BLDCOV-SCNC: 24 MMOL/L (ref 21–28)
CO2 BLDCOV-SCNC: 25 MMOL/L (ref 21–28)
CO2 BLDCOV-SCNC: 27 MMOL/L (ref 21–28)
CO2 BLDCOV-SCNC: 28 MMOL/L (ref 21–28)
CO2 BLDCOV-SCNC: 29 MMOL/L (ref 21–28)
CO2 BLDCOV-SCNC: 30 MMOL/L (ref 21–28)
CO2 BLDCOV-SCNC: 39 MMOL/L (ref 21–28)
CO2 SERPL-SCNC: 25 MMOL/L (ref 20–32)
CO2 SERPL-SCNC: 26 MMOL/L (ref 20–32)
CO2 SERPL-SCNC: 27 MMOL/L (ref 20–32)
CO2 SERPL-SCNC: 27 MMOL/L (ref 20–32)
CO2 SERPL-SCNC: 28 MMOL/L (ref 20–32)
CO2 SERPL-SCNC: 29 MMOL/L (ref 20–32)
CO2 SERPL-SCNC: 29 MMOL/L (ref 20–32)
CO2 SERPL-SCNC: 30 MMOL/L (ref 20–32)
CO2 SERPL-SCNC: 30 MMOL/L (ref 20–32)
CO2 SERPL-SCNC: 31 MMOL/L (ref 20–32)
CO2 SERPL-SCNC: 31 MMOL/L (ref 20–32)
CO2 SERPL-SCNC: 32 MMOL/L (ref 20–32)
CO2 SERPL-SCNC: 32 MMOL/L (ref 20–32)
CO2 SERPL-SCNC: 33 MMOL/L (ref 20–32)
CO2 SERPL-SCNC: 33 MMOL/L (ref 20–32)
CO2 SERPL-SCNC: 34 MMOL/L (ref 20–32)
CO2 SERPL-SCNC: 36 MMOL/L (ref 20–32)
CO2 SERPL-SCNC: 36 MMOL/L (ref 20–32)
CO2 SERPL-SCNC: 38 MMOL/L (ref 20–32)
COLOR FLD: NORMAL
COLOR UR AUTO: ABNORMAL
COLOR UR AUTO: ABNORMAL
COPATH REPORT: NORMAL
CREAT BLD-MCNC: 0.5 MG/DL (ref 0.52–1.04)
CREAT SERPL-MCNC: 0.37 MG/DL (ref 0.52–1.04)
CREAT SERPL-MCNC: 0.43 MG/DL (ref 0.52–1.04)
CREAT SERPL-MCNC: 0.46 MG/DL (ref 0.52–1.04)
CREAT SERPL-MCNC: 0.47 MG/DL (ref 0.52–1.04)
CREAT SERPL-MCNC: 0.5 MG/DL (ref 0.52–1.04)
CREAT SERPL-MCNC: 0.52 MG/DL (ref 0.52–1.04)
CREAT SERPL-MCNC: 0.53 MG/DL (ref 0.52–1.04)
CREAT SERPL-MCNC: 0.54 MG/DL (ref 0.52–1.04)
CREAT SERPL-MCNC: 0.54 MG/DL (ref 0.52–1.04)
CREAT SERPL-MCNC: 0.55 MG/DL (ref 0.52–1.04)
CREAT SERPL-MCNC: 0.56 MG/DL (ref 0.52–1.04)
CREAT SERPL-MCNC: 0.57 MG/DL (ref 0.52–1.04)
CREAT SERPL-MCNC: 0.57 MG/DL (ref 0.52–1.04)
CREAT SERPL-MCNC: 0.58 MG/DL (ref 0.52–1.04)
CREAT SERPL-MCNC: 0.59 MG/DL (ref 0.52–1.04)
CREAT SERPL-MCNC: 0.59 MG/DL (ref 0.52–1.04)
CREAT SERPL-MCNC: 0.6 MG/DL (ref 0.52–1.04)
CREAT SERPL-MCNC: 0.61 MG/DL (ref 0.52–1.04)
CREAT SERPL-MCNC: 0.62 MG/DL (ref 0.52–1.04)
CREAT SERPL-MCNC: 0.63 MG/DL (ref 0.52–1.04)
CREAT SERPL-MCNC: 0.64 MG/DL (ref 0.52–1.04)
CREAT SERPL-MCNC: 0.66 MG/DL (ref 0.52–1.04)
CREAT SERPL-MCNC: 0.67 MG/DL (ref 0.52–1.04)
CREAT SERPL-MCNC: 0.69 MG/DL (ref 0.52–1.04)
CREAT SERPL-MCNC: 0.7 MG/DL (ref 0.52–1.04)
CREAT SERPL-MCNC: 0.7 MG/DL (ref 0.52–1.04)
CREAT SERPL-MCNC: 0.74 MG/DL (ref 0.52–1.04)
CREAT SERPL-MCNC: 0.78 MG/DL (ref 0.52–1.04)
CREAT SERPL-MCNC: 0.8 MG/DL (ref 0.52–1.04)
D DIMER PPP FEU-MCNC: 2.3 UG/ML FEU (ref 0–0.5)
D DIMER PPP FEU-MCNC: 3.2 UG/ML FEU (ref 0–0.5)
DIFFERENTIAL METHOD BLD: ABNORMAL
EOSINOPHIL # BLD AUTO: 0 10E9/L (ref 0–0.7)
EOSINOPHIL # BLD AUTO: 0.1 10E9/L (ref 0–0.7)
EOSINOPHIL # BLD AUTO: 0.2 10E9/L (ref 0–0.7)
EOSINOPHIL # BLD AUTO: 0.2 10E9/L (ref 0–0.7)
EOSINOPHIL NFR BLD AUTO: 0.1 %
EOSINOPHIL NFR BLD AUTO: 0.1 %
EOSINOPHIL NFR BLD AUTO: 0.4 %
EOSINOPHIL NFR BLD AUTO: 0.4 %
EOSINOPHIL NFR BLD AUTO: 0.6 %
EOSINOPHIL NFR BLD AUTO: 0.7 %
EOSINOPHIL NFR BLD AUTO: 0.8 %
EOSINOPHIL NFR BLD AUTO: 1.8 %
EOSINOPHIL NFR BLD AUTO: 2.7 %
EOSINOPHIL NFR BLD AUTO: 3.1 %
ERYTHROCYTE [DISTWIDTH] IN BLOOD BY AUTOMATED COUNT: 14.6 % (ref 10–15)
ERYTHROCYTE [DISTWIDTH] IN BLOOD BY AUTOMATED COUNT: 14.8 % (ref 10–15)
ERYTHROCYTE [DISTWIDTH] IN BLOOD BY AUTOMATED COUNT: 14.8 % (ref 10–15)
ERYTHROCYTE [DISTWIDTH] IN BLOOD BY AUTOMATED COUNT: 14.9 % (ref 10–15)
ERYTHROCYTE [DISTWIDTH] IN BLOOD BY AUTOMATED COUNT: 14.9 % (ref 10–15)
ERYTHROCYTE [DISTWIDTH] IN BLOOD BY AUTOMATED COUNT: 15 % (ref 10–15)
ERYTHROCYTE [DISTWIDTH] IN BLOOD BY AUTOMATED COUNT: 15.4 % (ref 10–15)
ERYTHROCYTE [DISTWIDTH] IN BLOOD BY AUTOMATED COUNT: 15.5 % (ref 10–15)
ERYTHROCYTE [DISTWIDTH] IN BLOOD BY AUTOMATED COUNT: 15.6 % (ref 10–15)
ERYTHROCYTE [DISTWIDTH] IN BLOOD BY AUTOMATED COUNT: 15.7 % (ref 10–15)
ERYTHROCYTE [DISTWIDTH] IN BLOOD BY AUTOMATED COUNT: 15.8 % (ref 10–15)
ERYTHROCYTE [DISTWIDTH] IN BLOOD BY AUTOMATED COUNT: 15.9 % (ref 10–15)
ERYTHROCYTE [DISTWIDTH] IN BLOOD BY AUTOMATED COUNT: 16 % (ref 10–15)
ERYTHROCYTE [DISTWIDTH] IN BLOOD BY AUTOMATED COUNT: 16.5 % (ref 10–15)
ERYTHROCYTE [DISTWIDTH] IN BLOOD BY AUTOMATED COUNT: 16.8 % (ref 10–15)
GFR SERPL CREATININE-BSD FRML MDRD: 84 ML/MIN/{1.73_M2}
GFR SERPL CREATININE-BSD FRML MDRD: 87 ML/MIN/{1.73_M2}
GFR SERPL CREATININE-BSD FRML MDRD: >90 ML/MIN/{1.73_M2}
GLUCOSE BLDC GLUCOMTR-MCNC: 121 MG/DL (ref 70–99)
GLUCOSE BLDC GLUCOMTR-MCNC: 124 MG/DL (ref 70–99)
GLUCOSE BLDC GLUCOMTR-MCNC: 132 MG/DL (ref 70–99)
GLUCOSE BLDC GLUCOMTR-MCNC: 133 MG/DL (ref 70–99)
GLUCOSE BLDC GLUCOMTR-MCNC: 134 MG/DL (ref 70–99)
GLUCOSE BLDC GLUCOMTR-MCNC: 135 MG/DL (ref 70–99)
GLUCOSE BLDC GLUCOMTR-MCNC: 136 MG/DL (ref 70–99)
GLUCOSE BLDC GLUCOMTR-MCNC: 137 MG/DL (ref 70–99)
GLUCOSE BLDC GLUCOMTR-MCNC: 137 MG/DL (ref 70–99)
GLUCOSE BLDC GLUCOMTR-MCNC: 139 MG/DL (ref 70–99)
GLUCOSE BLDC GLUCOMTR-MCNC: 141 MG/DL (ref 70–99)
GLUCOSE BLDC GLUCOMTR-MCNC: 143 MG/DL (ref 70–99)
GLUCOSE BLDC GLUCOMTR-MCNC: 144 MG/DL (ref 70–99)
GLUCOSE BLDC GLUCOMTR-MCNC: 144 MG/DL (ref 70–99)
GLUCOSE BLDC GLUCOMTR-MCNC: 145 MG/DL (ref 70–99)
GLUCOSE BLDC GLUCOMTR-MCNC: 146 MG/DL (ref 70–99)
GLUCOSE BLDC GLUCOMTR-MCNC: 146 MG/DL (ref 70–99)
GLUCOSE BLDC GLUCOMTR-MCNC: 148 MG/DL (ref 70–99)
GLUCOSE BLDC GLUCOMTR-MCNC: 150 MG/DL (ref 70–99)
GLUCOSE BLDC GLUCOMTR-MCNC: 150 MG/DL (ref 70–99)
GLUCOSE BLDC GLUCOMTR-MCNC: 151 MG/DL (ref 70–99)
GLUCOSE BLDC GLUCOMTR-MCNC: 151 MG/DL (ref 70–99)
GLUCOSE BLDC GLUCOMTR-MCNC: 153 MG/DL (ref 70–99)
GLUCOSE BLDC GLUCOMTR-MCNC: 153 MG/DL (ref 70–99)
GLUCOSE BLDC GLUCOMTR-MCNC: 154 MG/DL (ref 70–99)
GLUCOSE BLDC GLUCOMTR-MCNC: 154 MG/DL (ref 70–99)
GLUCOSE BLDC GLUCOMTR-MCNC: 156 MG/DL (ref 70–99)
GLUCOSE BLDC GLUCOMTR-MCNC: 158 MG/DL (ref 70–99)
GLUCOSE BLDC GLUCOMTR-MCNC: 161 MG/DL (ref 70–99)
GLUCOSE BLDC GLUCOMTR-MCNC: 163 MG/DL (ref 70–99)
GLUCOSE BLDC GLUCOMTR-MCNC: 165 MG/DL (ref 70–99)
GLUCOSE BLDC GLUCOMTR-MCNC: 167 MG/DL (ref 70–99)
GLUCOSE BLDC GLUCOMTR-MCNC: 168 MG/DL (ref 70–99)
GLUCOSE BLDC GLUCOMTR-MCNC: 173 MG/DL (ref 70–99)
GLUCOSE BLDC GLUCOMTR-MCNC: 176 MG/DL (ref 70–99)
GLUCOSE BLDC GLUCOMTR-MCNC: 177 MG/DL (ref 70–99)
GLUCOSE BLDC GLUCOMTR-MCNC: 178 MG/DL (ref 70–99)
GLUCOSE BLDC GLUCOMTR-MCNC: 181 MG/DL (ref 70–99)
GLUCOSE BLDC GLUCOMTR-MCNC: 185 MG/DL (ref 70–99)
GLUCOSE BLDC GLUCOMTR-MCNC: 191 MG/DL (ref 70–99)
GLUCOSE BLDC GLUCOMTR-MCNC: 192 MG/DL (ref 70–99)
GLUCOSE BLDC GLUCOMTR-MCNC: 196 MG/DL (ref 70–99)
GLUCOSE BLDC GLUCOMTR-MCNC: 199 MG/DL (ref 70–99)
GLUCOSE BLDC GLUCOMTR-MCNC: 205 MG/DL (ref 70–99)
GLUCOSE BLDC GLUCOMTR-MCNC: 222 MG/DL (ref 70–99)
GLUCOSE FLD-MCNC: 101 MG/DL
GLUCOSE SERPL-MCNC: 100 MG/DL (ref 70–99)
GLUCOSE SERPL-MCNC: 103 MG/DL (ref 70–99)
GLUCOSE SERPL-MCNC: 105 MG/DL (ref 70–99)
GLUCOSE SERPL-MCNC: 108 MG/DL (ref 70–99)
GLUCOSE SERPL-MCNC: 109 MG/DL (ref 70–99)
GLUCOSE SERPL-MCNC: 110 MG/DL (ref 70–99)
GLUCOSE SERPL-MCNC: 110 MG/DL (ref 70–99)
GLUCOSE SERPL-MCNC: 113 MG/DL (ref 70–99)
GLUCOSE SERPL-MCNC: 117 MG/DL (ref 70–99)
GLUCOSE SERPL-MCNC: 120 MG/DL (ref 70–99)
GLUCOSE SERPL-MCNC: 124 MG/DL (ref 70–99)
GLUCOSE SERPL-MCNC: 124 MG/DL (ref 70–99)
GLUCOSE SERPL-MCNC: 125 MG/DL (ref 70–99)
GLUCOSE SERPL-MCNC: 127 MG/DL (ref 70–99)
GLUCOSE SERPL-MCNC: 130 MG/DL (ref 70–99)
GLUCOSE SERPL-MCNC: 138 MG/DL (ref 70–99)
GLUCOSE SERPL-MCNC: 149 MG/DL (ref 70–99)
GLUCOSE SERPL-MCNC: 152 MG/DL (ref 70–99)
GLUCOSE SERPL-MCNC: 183 MG/DL (ref 70–99)
GLUCOSE SERPL-MCNC: 194 MG/DL (ref 70–99)
GLUCOSE SERPL-MCNC: 205 MG/DL (ref 70–99)
GLUCOSE SERPL-MCNC: 293 MG/DL (ref 70–99)
GLUCOSE SERPL-MCNC: 93 MG/DL (ref 70–99)
GLUCOSE SERPL-MCNC: 93 MG/DL (ref 70–99)
GLUCOSE SERPL-MCNC: 94 MG/DL (ref 70–99)
GLUCOSE SERPL-MCNC: 95 MG/DL (ref 70–99)
GLUCOSE SERPL-MCNC: 97 MG/DL (ref 70–99)
GLUCOSE SERPL-MCNC: 97 MG/DL (ref 70–99)
GLUCOSE SERPL-MCNC: 99 MG/DL (ref 70–99)
GLUCOSE UR STRIP-MCNC: NEGATIVE MG/DL
GLUCOSE UR STRIP-MCNC: NEGATIVE MG/DL
GRAM STN SPEC: ABNORMAL
GRAM STN SPEC: ABNORMAL
GRAM STN SPEC: NORMAL
HBA1C MFR BLD: 5.7 % (ref 0–5.6)
HCO3 BLD-SCNC: 23 MMOL/L (ref 21–28)
HCO3 BLD-SCNC: 24 MMOL/L (ref 21–28)
HCO3 BLD-SCNC: 25 MMOL/L (ref 21–28)
HCO3 BLD-SCNC: 26 MMOL/L (ref 21–28)
HCO3 BLD-SCNC: 27 MMOL/L (ref 21–28)
HCO3 BLD-SCNC: 27 MMOL/L (ref 21–28)
HCO3 BLD-SCNC: 30 MMOL/L (ref 21–28)
HCO3 BLD-SCNC: 30 MMOL/L (ref 21–28)
HCO3 BLD-SCNC: 31 MMOL/L (ref 21–28)
HCO3 BLD-SCNC: 34 MMOL/L (ref 21–28)
HCO3 BLD-SCNC: 37 MMOL/L (ref 21–28)
HCO3 BLDV-SCNC: 25 MMOL/L (ref 21–28)
HCO3 BLDV-SCNC: 25 MMOL/L (ref 21–28)
HCT VFR BLD AUTO: 31.9 % (ref 35–47)
HCT VFR BLD AUTO: 33.1 % (ref 35–47)
HCT VFR BLD AUTO: 33.2 % (ref 35–47)
HCT VFR BLD AUTO: 33.5 % (ref 35–47)
HCT VFR BLD AUTO: 33.9 % (ref 35–47)
HCT VFR BLD AUTO: 34.6 % (ref 35–47)
HCT VFR BLD AUTO: 34.6 % (ref 35–47)
HCT VFR BLD AUTO: 34.7 % (ref 35–47)
HCT VFR BLD AUTO: 34.8 % (ref 35–47)
HCT VFR BLD AUTO: 34.8 % (ref 35–47)
HCT VFR BLD AUTO: 35.1 % (ref 35–47)
HCT VFR BLD AUTO: 35.4 % (ref 35–47)
HCT VFR BLD AUTO: 35.8 % (ref 35–47)
HCT VFR BLD AUTO: 35.8 % (ref 35–47)
HCT VFR BLD AUTO: 36.6 % (ref 35–47)
HCT VFR BLD AUTO: 37 % (ref 35–47)
HCT VFR BLD AUTO: 37.1 % (ref 35–47)
HCT VFR BLD AUTO: 37.5 % (ref 35–47)
HCT VFR BLD AUTO: 37.5 % (ref 35–47)
HCT VFR BLD CALC: 33 %PCV (ref 35–47)
HGB BLD CALC-MCNC: 11.2 G/DL (ref 11.7–15.7)
HGB BLD-MCNC: 10.4 G/DL (ref 11.7–15.7)
HGB BLD-MCNC: 10.4 G/DL (ref 11.7–15.7)
HGB BLD-MCNC: 10.5 G/DL (ref 11.7–15.7)
HGB BLD-MCNC: 10.5 G/DL (ref 11.7–15.7)
HGB BLD-MCNC: 10.7 G/DL (ref 11.7–15.7)
HGB BLD-MCNC: 10.8 G/DL (ref 11.7–15.7)
HGB BLD-MCNC: 10.8 G/DL (ref 11.7–15.7)
HGB BLD-MCNC: 10.9 G/DL (ref 11.7–15.7)
HGB BLD-MCNC: 11 G/DL (ref 11.7–15.7)
HGB BLD-MCNC: 11.1 G/DL (ref 11.7–15.7)
HGB BLD-MCNC: 11.2 G/DL (ref 11.7–15.7)
HGB BLD-MCNC: 11.3 G/DL (ref 11.7–15.7)
HGB BLD-MCNC: 11.5 G/DL (ref 11.7–15.7)
HGB BLD-MCNC: 11.7 G/DL (ref 11.7–15.7)
HGB BLD-MCNC: 11.8 G/DL (ref 11.7–15.7)
HGB BLD-MCNC: 12.2 G/DL (ref 11.7–15.7)
HGB BLD-MCNC: 9.7 G/DL (ref 11.7–15.7)
HGB UR QL STRIP: ABNORMAL
HGB UR QL STRIP: NEGATIVE
IMM GRANULOCYTES # BLD: 0 10E9/L (ref 0–0.4)
IMM GRANULOCYTES NFR BLD: 0.2 %
IMM GRANULOCYTES NFR BLD: 0.3 %
IMM GRANULOCYTES NFR BLD: 0.3 %
IMM GRANULOCYTES NFR BLD: 0.4 %
IMM GRANULOCYTES NFR BLD: 0.6 %
INR BLD: 1 (ref 0.86–1.14)
INR PPP: 0.97 (ref 0.86–1.14)
INR PPP: 0.97 (ref 0.86–1.14)
INR PPP: 1.22 (ref 0.86–1.14)
INR PPP: 1.27 (ref 0.86–1.14)
INR PPP: 1.37 (ref 0.86–1.14)
INTERPRETATION ECG - MUSE: NORMAL
KETONES UR STRIP-MCNC: 10 MG/DL
KETONES UR STRIP-MCNC: NEGATIVE MG/DL
L PNEUMO1 AG UR QL IA: NORMAL
LACTATE BLD-SCNC: 0.5 MMOL/L (ref 0.7–2.1)
LACTATE BLD-SCNC: 0.6 MMOL/L (ref 0.7–2.1)
LACTATE BLD-SCNC: 0.8 MMOL/L (ref 0.7–2)
LACTATE BLD-SCNC: 0.9 MMOL/L (ref 0.7–2.1)
LACTATE BLD-SCNC: 1 MMOL/L (ref 0.7–2)
LACTATE BLD-SCNC: 1 MMOL/L (ref 0.7–2.1)
LACTATE BLD-SCNC: 1.1 MMOL/L (ref 0.7–2)
LACTATE BLD-SCNC: 1.2 MMOL/L (ref 0.7–2)
LACTATE BLD-SCNC: 1.2 MMOL/L (ref 0.7–2.1)
LACTATE BLD-SCNC: 1.3 MMOL/L (ref 0.7–2)
LACTATE BLD-SCNC: 1.4 MMOL/L (ref 0.7–2)
LACTATE BLD-SCNC: 1.4 MMOL/L (ref 0.7–2)
LACTATE BLD-SCNC: 1.4 MMOL/L (ref 0.7–2.1)
LACTATE BLD-SCNC: 1.5 MMOL/L (ref 0.7–2)
LACTATE BLD-SCNC: 1.7 MMOL/L (ref 0.7–2.1)
LACTATE BLD-SCNC: 1.7 MMOL/L (ref 0.7–2.1)
LACTATE BLD-SCNC: 12.1 MMOL/L (ref 0.7–2)
LACTATE BLD-SCNC: 2.3 MMOL/L (ref 0.7–2.1)
LACTATE BLD-SCNC: 2.4 MMOL/L (ref 0.7–2)
LACTATE BLD-SCNC: 2.5 MMOL/L (ref 0.7–2)
LACTATE BLD-SCNC: 2.8 MMOL/L (ref 0.7–2)
LACTATE BLD-SCNC: 3 MMOL/L (ref 0.7–2)
LACTATE BLD-SCNC: 3.1 MMOL/L (ref 0.7–2)
LACTATE BLD-SCNC: 3.4 MMOL/L (ref 0.7–2)
LACTATE BLD-SCNC: 3.7 MMOL/L (ref 0.7–2)
LACTATE BLD-SCNC: 3.8 MMOL/L (ref 0.7–2)
LACTATE BLD-SCNC: 4.2 MMOL/L (ref 0.7–2)
LACTATE BLD-SCNC: 4.3 MMOL/L (ref 0.7–2)
LACTATE BLD-SCNC: 4.6 MMOL/L (ref 0.7–2)
LACTATE BLD-SCNC: 4.8 MMOL/L (ref 0.7–2)
LACTATE BLD-SCNC: 5 MMOL/L (ref 0.7–2)
LACTATE BLD-SCNC: 5.4 MMOL/L (ref 0.7–2)
LACTATE BLD-SCNC: 5.6 MMOL/L (ref 0.7–2)
LDH FLD L TO P-CCNC: 518 U/L
LDH SERPL L TO P-CCNC: 377 U/L (ref 81–234)
LEUKOCYTE ESTERASE UR QL STRIP: ABNORMAL
LEUKOCYTE ESTERASE UR QL STRIP: NEGATIVE
LMWH PPP CHRO-ACNC: 0.44 IU/ML
LMWH PPP CHRO-ACNC: 0.5 IU/ML
LMWH PPP CHRO-ACNC: 0.6 IU/ML
LMWH PPP CHRO-ACNC: 0.74 IU/ML
LMWH PPP CHRO-ACNC: 0.93 IU/ML
LMWH PPP CHRO-ACNC: 1 IU/ML
LMWH PPP CHRO-ACNC: 1.11 IU/ML
LYMPHOCYTES # BLD AUTO: 0.3 10E9/L (ref 0.8–5.3)
LYMPHOCYTES # BLD AUTO: 0.4 10E9/L (ref 0.8–5.3)
LYMPHOCYTES # BLD AUTO: 0.4 10E9/L (ref 0.8–5.3)
LYMPHOCYTES # BLD AUTO: 0.5 10E9/L (ref 0.8–5.3)
LYMPHOCYTES # BLD AUTO: 0.6 10E9/L (ref 0.8–5.3)
LYMPHOCYTES # BLD AUTO: 0.7 10E9/L (ref 0.8–5.3)
LYMPHOCYTES # BLD AUTO: 0.8 10E9/L (ref 0.8–5.3)
LYMPHOCYTES NFR BLD AUTO: 11 %
LYMPHOCYTES NFR BLD AUTO: 13 %
LYMPHOCYTES NFR BLD AUTO: 3.1 %
LYMPHOCYTES NFR BLD AUTO: 4.4 %
LYMPHOCYTES NFR BLD AUTO: 5.4 %
LYMPHOCYTES NFR BLD AUTO: 6.3 %
LYMPHOCYTES NFR BLD AUTO: 6.7 %
LYMPHOCYTES NFR BLD AUTO: 6.7 %
LYMPHOCYTES NFR BLD AUTO: 7.8 %
LYMPHOCYTES NFR BLD AUTO: 9.6 %
Lab: NORMAL
MAGNESIUM SERPL-MCNC: 1.8 MG/DL (ref 1.6–2.3)
MAGNESIUM SERPL-MCNC: 1.9 MG/DL (ref 1.6–2.3)
MAGNESIUM SERPL-MCNC: 2 MG/DL (ref 1.6–2.3)
MAGNESIUM SERPL-MCNC: 2.1 MG/DL (ref 1.6–2.3)
MAGNESIUM SERPL-MCNC: 2.3 MG/DL (ref 1.6–2.3)
MAGNESIUM SERPL-MCNC: 2.4 MG/DL (ref 1.6–2.3)
MAGNESIUM SERPL-MCNC: 2.4 MG/DL (ref 1.6–2.3)
MAGNESIUM SERPL-MCNC: 2.7 MG/DL (ref 1.6–2.3)
MAGNESIUM SERPL-MCNC: 3.6 MG/DL (ref 1.6–2.3)
MCH RBC QN AUTO: 24.3 PG (ref 26.5–33)
MCH RBC QN AUTO: 24.4 PG (ref 26.5–33)
MCH RBC QN AUTO: 24.4 PG (ref 26.5–33)
MCH RBC QN AUTO: 24.5 PG (ref 26.5–33)
MCH RBC QN AUTO: 24.6 PG (ref 26.5–33)
MCH RBC QN AUTO: 24.6 PG (ref 26.5–33)
MCH RBC QN AUTO: 24.7 PG (ref 26.5–33)
MCH RBC QN AUTO: 24.8 PG (ref 26.5–33)
MCH RBC QN AUTO: 24.9 PG (ref 26.5–33)
MCH RBC QN AUTO: 25.1 PG (ref 26.5–33)
MCH RBC QN AUTO: 25.5 PG (ref 26.5–33)
MCH RBC QN AUTO: 25.6 PG (ref 26.5–33)
MCH RBC QN AUTO: 25.8 PG (ref 26.5–33)
MCH RBC QN AUTO: 25.9 PG (ref 26.5–33)
MCHC RBC AUTO-ENTMCNC: 28.8 G/DL (ref 31.5–36.5)
MCHC RBC AUTO-ENTMCNC: 30.3 G/DL (ref 31.5–36.5)
MCHC RBC AUTO-ENTMCNC: 30.4 G/DL (ref 31.5–36.5)
MCHC RBC AUTO-ENTMCNC: 30.4 G/DL (ref 31.5–36.5)
MCHC RBC AUTO-ENTMCNC: 30.7 G/DL (ref 31.5–36.5)
MCHC RBC AUTO-ENTMCNC: 30.8 G/DL (ref 31.5–36.5)
MCHC RBC AUTO-ENTMCNC: 31 G/DL (ref 31.5–36.5)
MCHC RBC AUTO-ENTMCNC: 31 G/DL (ref 31.5–36.5)
MCHC RBC AUTO-ENTMCNC: 31.1 G/DL (ref 31.5–36.5)
MCHC RBC AUTO-ENTMCNC: 31.2 G/DL (ref 31.5–36.5)
MCHC RBC AUTO-ENTMCNC: 31.3 G/DL (ref 31.5–36.5)
MCHC RBC AUTO-ENTMCNC: 31.5 G/DL (ref 31.5–36.5)
MCHC RBC AUTO-ENTMCNC: 31.6 G/DL (ref 31.5–36.5)
MCHC RBC AUTO-ENTMCNC: 31.7 G/DL (ref 31.5–36.5)
MCHC RBC AUTO-ENTMCNC: 31.7 G/DL (ref 31.5–36.5)
MCHC RBC AUTO-ENTMCNC: 32 G/DL (ref 31.5–36.5)
MCHC RBC AUTO-ENTMCNC: 32.2 G/DL (ref 31.5–36.5)
MCHC RBC AUTO-ENTMCNC: 32.2 G/DL (ref 31.5–36.5)
MCHC RBC AUTO-ENTMCNC: 32.3 G/DL (ref 31.5–36.5)
MCHC RBC AUTO-ENTMCNC: 32.5 G/DL (ref 31.5–36.5)
MCHC RBC AUTO-ENTMCNC: 32.5 G/DL (ref 31.5–36.5)
MCHC RBC AUTO-ENTMCNC: 32.6 G/DL (ref 31.5–36.5)
MCHC RBC AUTO-ENTMCNC: 32.6 G/DL (ref 31.5–36.5)
MCV RBC AUTO: 79 FL (ref 78–100)
MCV RBC AUTO: 80 FL (ref 78–100)
MCV RBC AUTO: 81 FL (ref 78–100)
MCV RBC AUTO: 85 FL (ref 78–100)
MONOCYTES # BLD AUTO: 0.5 10E9/L (ref 0–1.3)
MONOCYTES # BLD AUTO: 0.6 10E9/L (ref 0–1.3)
MONOCYTES # BLD AUTO: 0.7 10E9/L (ref 0–1.3)
MONOCYTES # BLD AUTO: 0.8 10E9/L (ref 0–1.3)
MONOCYTES # BLD AUTO: 0.9 10E9/L (ref 0–1.3)
MONOCYTES # BLD AUTO: 0.9 10E9/L (ref 0–1.3)
MONOCYTES NFR BLD AUTO: 10.4 %
MONOCYTES NFR BLD AUTO: 11.1 %
MONOCYTES NFR BLD AUTO: 11.4 %
MONOCYTES NFR BLD AUTO: 11.6 %
MONOCYTES NFR BLD AUTO: 8 %
MONOCYTES NFR BLD AUTO: 8.7 %
MONOCYTES NFR BLD AUTO: 8.8 %
MONOCYTES NFR BLD AUTO: 9.6 %
MONOCYTES NFR BLD AUTO: 9.6 %
MONOCYTES NFR BLD AUTO: 9.7 %
MRSA DNA SPEC QL NAA+PROBE: NEGATIVE
MUCOUS THREADS #/AREA URNS LPF: PRESENT /LPF
MUCOUS THREADS #/AREA URNS LPF: PRESENT /LPF
NEUTROPHILS # BLD AUTO: 4.3 10E9/L (ref 1.6–8.3)
NEUTROPHILS # BLD AUTO: 4.6 10E9/L (ref 1.6–8.3)
NEUTROPHILS # BLD AUTO: 4.7 10E9/L (ref 1.6–8.3)
NEUTROPHILS # BLD AUTO: 5.4 10E9/L (ref 1.6–8.3)
NEUTROPHILS # BLD AUTO: 5.6 10E9/L (ref 1.6–8.3)
NEUTROPHILS # BLD AUTO: 6 10E9/L (ref 1.6–8.3)
NEUTROPHILS # BLD AUTO: 6.7 10E9/L (ref 1.6–8.3)
NEUTROPHILS # BLD AUTO: 7.6 10E9/L (ref 1.6–8.3)
NEUTROPHILS # BLD AUTO: 7.9 10E9/L (ref 1.6–8.3)
NEUTROPHILS # BLD AUTO: 9.3 10E9/L (ref 1.6–8.3)
NEUTROPHILS NFR BLD AUTO: 74.8 %
NEUTROPHILS NFR BLD AUTO: 75.1 %
NEUTROPHILS NFR BLD AUTO: 77.3 %
NEUTROPHILS NFR BLD AUTO: 79.7 %
NEUTROPHILS NFR BLD AUTO: 80.6 %
NEUTROPHILS NFR BLD AUTO: 81.8 %
NEUTROPHILS NFR BLD AUTO: 83.8 %
NEUTROPHILS NFR BLD AUTO: 84.6 %
NEUTROPHILS NFR BLD AUTO: 86.5 %
NEUTROPHILS NFR BLD AUTO: 87.7 %
NITRATE UR QL: NEGATIVE
NITRATE UR QL: NEGATIVE
NRBC # BLD AUTO: 0 10*3/UL
NRBC BLD AUTO-RTO: 0 /100
NT-PROBNP SERPL-MCNC: 1061 PG/ML (ref 0–900)
NT-PROBNP SERPL-MCNC: 1803 PG/ML (ref 0–900)
NT-PROBNP SERPL-MCNC: 2504 PG/ML (ref 0–900)
NT-PROBNP SERPL-MCNC: 2925 PG/ML (ref 0–900)
NT-PROBNP SERPL-MCNC: 3584 PG/ML (ref 0–900)
NT-PROBNP SERPL-MCNC: 5150 PG/ML (ref 0–900)
NT-PROBNP SERPL-MCNC: 5808 PG/ML (ref 0–900)
NT-PROBNP SERPL-MCNC: 86 PG/ML (ref 0–900)
O2/TOTAL GAS SETTING VFR VENT: 40 %
O2/TOTAL GAS SETTING VFR VENT: 70 %
O2/TOTAL GAS SETTING VFR VENT: ABNORMAL %
O2/TOTAL GAS SETTING VFR VENT: NORMAL %
OXYHGB MFR BLD: 90 % (ref 92–100)
OXYHGB MFR BLD: 90 % (ref 92–100)
OXYHGB MFR BLD: 91 % (ref 92–100)
OXYHGB MFR BLD: 91 % (ref 92–100)
OXYHGB MFR BLD: 92 % (ref 92–100)
OXYHGB MFR BLD: 92 % (ref 92–100)
OXYHGB MFR BLD: 93 % (ref 92–100)
OXYHGB MFR BLD: 95 % (ref 92–100)
OXYHGB MFR BLD: 96 % (ref 92–100)
OXYHGB MFR BLD: 96 % (ref 92–100)
OXYHGB MFR BLD: 97 % (ref 92–100)
OXYHGB MFR BLD: 97 % (ref 92–100)
OXYHGB MFR BLD: 98 % (ref 92–100)
OXYHGB MFR BLD: 98 % (ref 92–100)
OXYHGB MFR BLD: 99 % (ref 92–100)
OXYHGB MFR BLD: 99 % (ref 92–100)
OXYHGB MFR BLDV: 45 %
OXYHGB MFR BLDV: 67 %
PCO2 BLD: 120 MM HG (ref 35–45)
PCO2 BLD: 38 MM HG (ref 35–45)
PCO2 BLD: 40 MM HG (ref 35–45)
PCO2 BLD: 40 MM HG (ref 35–45)
PCO2 BLD: 41 MM HG (ref 35–45)
PCO2 BLD: 41 MM HG (ref 35–45)
PCO2 BLD: 43 MM HG (ref 35–45)
PCO2 BLD: 44 MM HG (ref 35–45)
PCO2 BLD: 45 MM HG (ref 35–45)
PCO2 BLD: 47 MM HG (ref 35–45)
PCO2 BLD: 49 MM HG (ref 35–45)
PCO2 BLD: 50 MM HG (ref 35–45)
PCO2 BLD: 51 MM HG (ref 35–45)
PCO2 BLD: 65 MM HG (ref 35–45)
PCO2 BLD: 71 MM HG (ref 35–45)
PCO2 BLDV: 109 MM HG (ref 40–50)
PCO2 BLDV: 37 MM HG (ref 40–50)
PCO2 BLDV: 40 MM HG (ref 40–50)
PCO2 BLDV: 42 MM HG (ref 40–50)
PCO2 BLDV: 42 MM HG (ref 40–50)
PCO2 BLDV: 43 MM HG (ref 40–50)
PCO2 BLDV: 45 MM HG (ref 40–50)
PCO2 BLDV: 45 MM HG (ref 40–50)
PCO2 BLDV: 46 MM HG (ref 40–50)
PCO2 BLDV: 46 MM HG (ref 40–50)
PCO2 BLDV: 58 MM HG (ref 40–50)
PH BLD: 6.97 PH (ref 7.35–7.45)
PH BLD: 7.12 PH (ref 7.35–7.45)
PH BLD: 7.33 PH (ref 7.35–7.45)
PH BLD: 7.34 PH (ref 7.35–7.45)
PH BLD: 7.35 PH (ref 7.35–7.45)
PH BLD: 7.37 PH (ref 7.35–7.45)
PH BLD: 7.37 PH (ref 7.35–7.45)
PH BLD: 7.39 PH (ref 7.35–7.45)
PH BLD: 7.39 PH (ref 7.35–7.45)
PH BLD: 7.4 PH (ref 7.35–7.45)
PH BLD: 7.4 PH (ref 7.35–7.45)
PH BLD: 7.41 PH (ref 7.35–7.45)
PH BLD: 7.44 PH (ref 7.35–7.45)
PH BLD: 7.49 PH (ref 7.35–7.45)
PH BLDV: 6.98 PH (ref 7.32–7.43)
PH BLDV: 7.35 PH (ref 7.32–7.43)
PH BLDV: 7.4 PH (ref 7.32–7.43)
PH BLDV: 7.4 PH (ref 7.32–7.43)
PH BLDV: 7.41 PH (ref 7.32–7.43)
PH BLDV: 7.41 PH (ref 7.32–7.43)
PH BLDV: 7.42 PH (ref 7.32–7.43)
PH BLDV: 7.42 PH (ref 7.32–7.43)
PH BLDV: 7.43 PH (ref 7.32–7.43)
PH BLDV: 7.44 PH (ref 7.32–7.43)
PH BLDV: 7.44 PH (ref 7.32–7.43)
PH UR STRIP: 6 PH (ref 5–7)
PH UR STRIP: 6.5 PH (ref 5–7)
PHOSPHATE SERPL-MCNC: 2 MG/DL (ref 2.5–4.5)
PHOSPHATE SERPL-MCNC: 2 MG/DL (ref 2.5–4.5)
PHOSPHATE SERPL-MCNC: 2.5 MG/DL (ref 2.5–4.5)
PHOSPHATE SERPL-MCNC: 2.6 MG/DL (ref 2.5–4.5)
PHOSPHATE SERPL-MCNC: 2.8 MG/DL (ref 2.5–4.5)
PHOSPHATE SERPL-MCNC: 2.9 MG/DL (ref 2.5–4.5)
PHOSPHATE SERPL-MCNC: 3 MG/DL (ref 2.5–4.5)
PLATELET # BLD AUTO: 236 10E9/L (ref 150–450)
PLATELET # BLD AUTO: 238 10E9/L (ref 150–450)
PLATELET # BLD AUTO: 239 10E9/L (ref 150–450)
PLATELET # BLD AUTO: 249 10E9/L (ref 150–450)
PLATELET # BLD AUTO: 257 10E9/L (ref 150–450)
PLATELET # BLD AUTO: 264 10E9/L (ref 150–450)
PLATELET # BLD AUTO: 268 10E9/L (ref 150–450)
PLATELET # BLD AUTO: 269 10E9/L (ref 150–450)
PLATELET # BLD AUTO: 271 10E9/L (ref 150–450)
PLATELET # BLD AUTO: 273 10E9/L (ref 150–450)
PLATELET # BLD AUTO: 281 10E9/L (ref 150–450)
PLATELET # BLD AUTO: 284 10E9/L (ref 150–450)
PLATELET # BLD AUTO: 303 10E9/L (ref 150–450)
PLATELET # BLD AUTO: 320 10E9/L (ref 150–450)
PLATELET # BLD AUTO: 323 10E9/L (ref 150–450)
PLATELET # BLD AUTO: 331 10E9/L (ref 150–450)
PLATELET # BLD AUTO: 336 10E9/L (ref 150–450)
PLATELET # BLD AUTO: 338 10E9/L (ref 150–450)
PLATELET # BLD AUTO: 341 10E9/L (ref 150–450)
PLATELET # BLD AUTO: 343 10E9/L (ref 150–450)
PLATELET # BLD AUTO: 344 10E9/L (ref 150–450)
PLATELET # BLD AUTO: 349 10E9/L (ref 150–450)
PLATELET # BLD AUTO: 351 10E9/L (ref 150–450)
PLATELET # BLD AUTO: 352 10E9/L (ref 150–450)
PLATELET # BLD AUTO: 352 10E9/L (ref 150–450)
PLATELET # BLD AUTO: 365 10E9/L (ref 150–450)
PLATELET # BLD AUTO: 368 10E9/L (ref 150–450)
PO2 BLD: 101 MM HG (ref 80–105)
PO2 BLD: 108 MM HG (ref 80–105)
PO2 BLD: 140 MM HG (ref 80–105)
PO2 BLD: 152 MM HG (ref 80–105)
PO2 BLD: 190 MM HG (ref 80–105)
PO2 BLD: 40 MM HG (ref 80–105)
PO2 BLD: 64 MM HG (ref 80–105)
PO2 BLD: 65 MM HG (ref 80–105)
PO2 BLD: 66 MM HG (ref 80–105)
PO2 BLD: 67 MM HG (ref 80–105)
PO2 BLD: 69 MM HG (ref 80–105)
PO2 BLD: 69 MM HG (ref 80–105)
PO2 BLD: 87 MM HG (ref 80–105)
PO2 BLD: 94 MM HG (ref 80–105)
PO2 BLD: 95 MM HG (ref 80–105)
PO2 BLD: 97 MM HG (ref 80–105)
PO2 BLD: 99 MM HG (ref 80–105)
PO2 BLDV: 30 MM HG (ref 25–47)
PO2 BLDV: 34 MM HG (ref 25–47)
PO2 BLDV: 34 MM HG (ref 25–47)
PO2 BLDV: 36 MM HG (ref 25–47)
PO2 BLDV: 36 MM HG (ref 25–47)
PO2 BLDV: 37 MM HG (ref 25–47)
PO2 BLDV: 39 MM HG (ref 25–47)
PO2 BLDV: 40 MM HG (ref 25–47)
PO2 BLDV: 41 MM HG (ref 25–47)
PO2 BLDV: 43 MM HG (ref 25–47)
PO2 BLDV: 46 MM HG (ref 25–47)
POTASSIUM BLD-SCNC: 4.4 MMOL/L (ref 3.4–5.3)
POTASSIUM SERPL-SCNC: 2.8 MMOL/L (ref 3.4–5.3)
POTASSIUM SERPL-SCNC: 3 MMOL/L (ref 3.4–5.3)
POTASSIUM SERPL-SCNC: 3 MMOL/L (ref 3.4–5.3)
POTASSIUM SERPL-SCNC: 3.1 MMOL/L (ref 3.4–5.3)
POTASSIUM SERPL-SCNC: 3.2 MMOL/L (ref 3.4–5.3)
POTASSIUM SERPL-SCNC: 3.3 MMOL/L (ref 3.4–5.3)
POTASSIUM SERPL-SCNC: 3.4 MMOL/L (ref 3.4–5.3)
POTASSIUM SERPL-SCNC: 3.5 MMOL/L (ref 3.4–5.3)
POTASSIUM SERPL-SCNC: 3.6 MMOL/L (ref 3.4–5.3)
POTASSIUM SERPL-SCNC: 3.6 MMOL/L (ref 3.4–5.3)
POTASSIUM SERPL-SCNC: 3.7 MMOL/L (ref 3.4–5.3)
POTASSIUM SERPL-SCNC: 3.7 MMOL/L (ref 3.4–5.3)
POTASSIUM SERPL-SCNC: 3.8 MMOL/L (ref 3.4–5.3)
POTASSIUM SERPL-SCNC: 3.9 MMOL/L (ref 3.4–5.3)
POTASSIUM SERPL-SCNC: 4 MMOL/L (ref 3.4–5.3)
POTASSIUM SERPL-SCNC: 4.1 MMOL/L (ref 3.4–5.3)
POTASSIUM SERPL-SCNC: 4.2 MMOL/L (ref 3.4–5.3)
POTASSIUM SERPL-SCNC: 4.8 MMOL/L (ref 3.4–5.3)
PROCALCITONIN SERPL-MCNC: 0.09 NG/ML
PROCALCITONIN SERPL-MCNC: 0.1 NG/ML
PROCALCITONIN SERPL-MCNC: 0.11 NG/ML
PROCALCITONIN SERPL-MCNC: <0.05 NG/ML
PROT FLD-MCNC: 4.9 G/DL
PROT SERPL-MCNC: 6.2 G/DL (ref 6.8–8.8)
PROT SERPL-MCNC: 6.3 G/DL (ref 6.8–8.8)
PROT SERPL-MCNC: 6.5 G/DL (ref 6.8–8.8)
PROT SERPL-MCNC: 7.1 G/DL (ref 6.8–8.8)
PROT SERPL-MCNC: 7.1 G/DL (ref 6.8–8.8)
PROT SERPL-MCNC: 7.2 G/DL (ref 6.8–8.8)
PROT SERPL-MCNC: 7.3 G/DL (ref 6.8–8.8)
PROT SERPL-MCNC: 8 G/DL (ref 6.8–8.8)
RBC # BLD AUTO: 4 10E12/L (ref 3.8–5.2)
RBC # BLD AUTO: 4.1 10E12/L (ref 3.8–5.2)
RBC # BLD AUTO: 4.15 10E12/L (ref 3.8–5.2)
RBC # BLD AUTO: 4.2 10E12/L (ref 3.8–5.2)
RBC # BLD AUTO: 4.26 10E12/L (ref 3.8–5.2)
RBC # BLD AUTO: 4.31 10E12/L (ref 3.8–5.2)
RBC # BLD AUTO: 4.31 10E12/L (ref 3.8–5.2)
RBC # BLD AUTO: 4.35 10E12/L (ref 3.8–5.2)
RBC # BLD AUTO: 4.37 10E12/L (ref 3.8–5.2)
RBC # BLD AUTO: 4.39 10E12/L (ref 3.8–5.2)
RBC # BLD AUTO: 4.4 10E12/L (ref 3.8–5.2)
RBC # BLD AUTO: 4.41 10E12/L (ref 3.8–5.2)
RBC # BLD AUTO: 4.41 10E12/L (ref 3.8–5.2)
RBC # BLD AUTO: 4.42 10E12/L (ref 3.8–5.2)
RBC # BLD AUTO: 4.43 10E12/L (ref 3.8–5.2)
RBC # BLD AUTO: 4.46 10E12/L (ref 3.8–5.2)
RBC # BLD AUTO: 4.51 10E12/L (ref 3.8–5.2)
RBC # BLD AUTO: 4.63 10E12/L (ref 3.8–5.2)
RBC # BLD AUTO: 4.65 10E12/L (ref 3.8–5.2)
RBC # BLD AUTO: 4.67 10E12/L (ref 3.8–5.2)
RBC # BLD AUTO: 4.72 10E12/L (ref 3.8–5.2)
RBC #/AREA URNS AUTO: 1 /HPF (ref 0–2)
RBC #/AREA URNS AUTO: <1 /HPF (ref 0–2)
S PNEUM AG SPEC QL: NORMAL
SAO2 % BLDA FROM PO2: 44 % (ref 92–100)
SAO2 % BLDV FROM PO2: 57 %
SAO2 % BLDV FROM PO2: 67 %
SAO2 % BLDV FROM PO2: 67 %
SAO2 % BLDV FROM PO2: 69 %
SAO2 % BLDV FROM PO2: 69 %
SAO2 % BLDV FROM PO2: 72 %
SAO2 % BLDV FROM PO2: 74 %
SAO2 % BLDV FROM PO2: 76 %
SAO2 % BLDV FROM PO2: 82 %
SODIUM BLD-SCNC: 136 MMOL/L (ref 133–144)
SODIUM SERPL-SCNC: 133 MMOL/L (ref 133–144)
SODIUM SERPL-SCNC: 134 MMOL/L (ref 133–144)
SODIUM SERPL-SCNC: 135 MMOL/L (ref 133–144)
SODIUM SERPL-SCNC: 136 MMOL/L (ref 133–144)
SODIUM SERPL-SCNC: 137 MMOL/L (ref 133–144)
SODIUM SERPL-SCNC: 138 MMOL/L (ref 133–144)
SODIUM SERPL-SCNC: 139 MMOL/L (ref 133–144)
SODIUM SERPL-SCNC: 140 MMOL/L (ref 133–144)
SODIUM SERPL-SCNC: 142 MMOL/L (ref 133–144)
SODIUM SERPL-SCNC: 143 MMOL/L (ref 133–144)
SOURCE: ABNORMAL
SOURCE: ABNORMAL
SP GR UR STRIP: 1 (ref 1–1.03)
SP GR UR STRIP: 1.03 (ref 1–1.03)
SPECIMEN EXP DATE BLD: NORMAL
SPECIMEN SOURCE FLD: NORMAL
SPECIMEN SOURCE: ABNORMAL
SPECIMEN SOURCE: NORMAL
SQUAMOUS #/AREA URNS AUTO: 16 /HPF (ref 0–1)
SQUAMOUS #/AREA URNS AUTO: 2 /HPF (ref 0–1)
TRANS CELLS #/AREA URNS HPF: 2 /HPF (ref 0–1)
TROPONIN I SERPL-MCNC: <0.015 UG/L (ref 0–0.04)
TSH SERPL DL<=0.005 MIU/L-ACNC: 0.7 MU/L (ref 0.4–4)
UROBILINOGEN UR STRIP-MCNC: 4 MG/DL (ref 0–2)
UROBILINOGEN UR STRIP-MCNC: NORMAL MG/DL (ref 0–2)
VANCOMYCIN SERPL-MCNC: 18 MG/L
VANCOMYCIN SERPL-MCNC: 24.2 MG/L
WBC # BLD AUTO: 10.3 10E9/L (ref 4–11)
WBC # BLD AUTO: 10.6 10E9/L (ref 4–11)
WBC # BLD AUTO: 11.9 10E9/L (ref 4–11)
WBC # BLD AUTO: 15 10E9/L (ref 4–11)
WBC # BLD AUTO: 15.4 10E9/L (ref 4–11)
WBC # BLD AUTO: 17.5 10E9/L (ref 4–11)
WBC # BLD AUTO: 5.5 10E9/L (ref 4–11)
WBC # BLD AUTO: 6.1 10E9/L (ref 4–11)
WBC # BLD AUTO: 6.2 10E9/L (ref 4–11)
WBC # BLD AUTO: 6.8 10E9/L (ref 4–11)
WBC # BLD AUTO: 6.9 10E9/L (ref 4–11)
WBC # BLD AUTO: 7.2 10E9/L (ref 4–11)
WBC # BLD AUTO: 7.3 10E9/L (ref 4–11)
WBC # BLD AUTO: 7.5 10E9/L (ref 4–11)
WBC # BLD AUTO: 7.6 10E9/L (ref 4–11)
WBC # BLD AUTO: 7.6 10E9/L (ref 4–11)
WBC # BLD AUTO: 8 10E9/L (ref 4–11)
WBC # BLD AUTO: 8.1 10E9/L (ref 4–11)
WBC # BLD AUTO: 8.4 10E9/L (ref 4–11)
WBC # BLD AUTO: 9 10E9/L (ref 4–11)
WBC # BLD AUTO: 9 10E9/L (ref 4–11)
WBC # BLD AUTO: 9.1 10E9/L (ref 4–11)
WBC # BLD AUTO: 9.7 10E9/L (ref 4–11)
WBC # FLD AUTO: NORMAL /UL
WBC #/AREA URNS AUTO: 7 /HPF (ref 0–5)
WBC #/AREA URNS AUTO: <1 /HPF (ref 0–5)

## 2019-01-01 PROCEDURE — 25000128 H RX IP 250 OP 636: Performed by: HOSPITALIST

## 2019-01-01 PROCEDURE — 83735 ASSAY OF MAGNESIUM: CPT | Performed by: INTERNAL MEDICINE

## 2019-01-01 PROCEDURE — 99285 EMERGENCY DEPT VISIT HI MDM: CPT | Mod: 25

## 2019-01-01 PROCEDURE — 25000131 ZZH RX MED GY IP 250 OP 636 PS 637: Performed by: INTERNAL MEDICINE

## 2019-01-01 PROCEDURE — 25000132 ZZH RX MED GY IP 250 OP 250 PS 637: Performed by: ANESTHESIOLOGY

## 2019-01-01 PROCEDURE — 25000132 ZZH RX MED GY IP 250 OP 250 PS 637: Performed by: INTERNAL MEDICINE

## 2019-01-01 PROCEDURE — 27210190 US THORACENTESIS

## 2019-01-01 PROCEDURE — 88112 CYTOPATH CELL ENHANCE TECH: CPT | Mod: 26 | Performed by: OBSTETRICS & GYNECOLOGY

## 2019-01-01 PROCEDURE — 25000125 ZZHC RX 250: Performed by: HOSPITALIST

## 2019-01-01 PROCEDURE — 25000128 H RX IP 250 OP 636: Performed by: ANESTHESIOLOGY

## 2019-01-01 PROCEDURE — 25800030 ZZH RX IP 258 OP 636: Performed by: ANESTHESIOLOGY

## 2019-01-01 PROCEDURE — 25000125 ZZHC RX 250: Performed by: INTERNAL MEDICINE

## 2019-01-01 PROCEDURE — 99236 HOSP IP/OBS SAME DATE HI 85: CPT | Performed by: INTERNAL MEDICINE

## 2019-01-01 PROCEDURE — 83615 LACTATE (LD) (LDH) ENZYME: CPT | Performed by: INTERNAL MEDICINE

## 2019-01-01 PROCEDURE — 83605 ASSAY OF LACTIC ACID: CPT | Performed by: PHYSICIAN ASSISTANT

## 2019-01-01 PROCEDURE — 96374 THER/PROPH/DIAG INJ IV PUSH: CPT

## 2019-01-01 PROCEDURE — 99233 SBSQ HOSP IP/OBS HIGH 50: CPT | Performed by: INTERNAL MEDICINE

## 2019-01-01 PROCEDURE — 99292 CRITICAL CARE ADDL 30 MIN: CPT | Mod: 25 | Performed by: INTERNAL MEDICINE

## 2019-01-01 PROCEDURE — 94640 AIRWAY INHALATION TREATMENT: CPT | Mod: 76

## 2019-01-01 PROCEDURE — 25000125 ZZHC RX 250

## 2019-01-01 PROCEDURE — 80048 BASIC METABOLIC PNL TOTAL CA: CPT | Performed by: HOSPITALIST

## 2019-01-01 PROCEDURE — 25000128 H RX IP 250 OP 636: Performed by: INTERNAL MEDICINE

## 2019-01-01 PROCEDURE — 25000132 ZZH RX MED GY IP 250 OP 250 PS 637: Performed by: PHYSICIAN ASSISTANT

## 2019-01-01 PROCEDURE — 88342 IMHCHEM/IMCYTCHM 1ST ANTB: CPT | Mod: 26,XU | Performed by: THORACIC SURGERY (CARDIOTHORACIC VASCULAR SURGERY)

## 2019-01-01 PROCEDURE — 93005 ELECTROCARDIOGRAM TRACING: CPT

## 2019-01-01 PROCEDURE — 40000264 ECHOCARDIOGRAM LIMITED

## 2019-01-01 PROCEDURE — 40000275 ZZH STATISTIC RCP TIME EA 10 MIN

## 2019-01-01 PROCEDURE — 85610 PROTHROMBIN TIME: CPT | Performed by: ANESTHESIOLOGY

## 2019-01-01 PROCEDURE — 84132 ASSAY OF SERUM POTASSIUM: CPT

## 2019-01-01 PROCEDURE — 83605 ASSAY OF LACTIC ACID: CPT | Performed by: ANESTHESIOLOGY

## 2019-01-01 PROCEDURE — 27211193 ZZ H WOUND GLUE CR1

## 2019-01-01 PROCEDURE — 83605 ASSAY OF LACTIC ACID: CPT

## 2019-01-01 PROCEDURE — 88341 IMHCHEM/IMCYTCHM EA ADD ANTB: CPT | Performed by: OBSTETRICS & GYNECOLOGY

## 2019-01-01 PROCEDURE — 36415 COLL VENOUS BLD VENIPUNCTURE: CPT | Performed by: INTERNAL MEDICINE

## 2019-01-01 PROCEDURE — 82565 ASSAY OF CREATININE: CPT | Performed by: PHYSICIAN ASSISTANT

## 2019-01-01 PROCEDURE — 82803 BLOOD GASES ANY COMBINATION: CPT

## 2019-01-01 PROCEDURE — 40000863 ZZH STATISTIC RADIOLOGY XRAY, US, CT, MAR, NM

## 2019-01-01 PROCEDURE — 12000000 ZZH R&B MED SURG/OB

## 2019-01-01 PROCEDURE — 85610 PROTHROMBIN TIME: CPT | Performed by: EMERGENCY MEDICINE

## 2019-01-01 PROCEDURE — 93306 TTE W/DOPPLER COMPLETE: CPT | Mod: 26 | Performed by: INTERNAL MEDICINE

## 2019-01-01 PROCEDURE — 80048 BASIC METABOLIC PNL TOTAL CA: CPT | Performed by: INTERNAL MEDICINE

## 2019-01-01 PROCEDURE — 85049 AUTOMATED PLATELET COUNT: CPT | Performed by: PHYSICIAN ASSISTANT

## 2019-01-01 PROCEDURE — 25000125 ZZHC RX 250: Performed by: PHYSICIAN ASSISTANT

## 2019-01-01 PROCEDURE — 84484 ASSAY OF TROPONIN QUANT: CPT | Performed by: EMERGENCY MEDICINE

## 2019-01-01 PROCEDURE — 80048 BASIC METABOLIC PNL TOTAL CA: CPT | Performed by: EMERGENCY MEDICINE

## 2019-01-01 PROCEDURE — 88342 IMHCHEM/IMCYTCHM 1ST ANTB: CPT | Performed by: THORACIC SURGERY (CARDIOTHORACIC VASCULAR SURGERY)

## 2019-01-01 PROCEDURE — 0W9B40Z DRAINAGE OF LEFT PLEURAL CAVITY WITH DRAINAGE DEVICE, PERCUTANEOUS ENDOSCOPIC APPROACH: ICD-10-PCS | Performed by: THORACIC SURGERY (CARDIOTHORACIC VASCULAR SURGERY)

## 2019-01-01 PROCEDURE — 99220 ZZC INITIAL OBSERVATION CARE,LEVL III: CPT | Performed by: PHYSICIAN ASSISTANT

## 2019-01-01 PROCEDURE — 85027 COMPLETE CBC AUTOMATED: CPT | Performed by: INTERNAL MEDICINE

## 2019-01-01 PROCEDURE — 25000125 ZZHC RX 250: Performed by: NURSE PRACTITIONER

## 2019-01-01 PROCEDURE — 97165 OT EVAL LOW COMPLEX 30 MIN: CPT | Mod: GO

## 2019-01-01 PROCEDURE — 88331 PATH CONSLTJ SURG 1 BLK 1SPC: CPT | Performed by: THORACIC SURGERY (CARDIOTHORACIC VASCULAR SURGERY)

## 2019-01-01 PROCEDURE — 71260 CT THORAX DX C+: CPT

## 2019-01-01 PROCEDURE — 40000257 ZZH STATISTIC CONSULT NO CHARGE VASC ACCESS

## 2019-01-01 PROCEDURE — 83880 ASSAY OF NATRIURETIC PEPTIDE: CPT | Performed by: PHYSICIAN ASSISTANT

## 2019-01-01 PROCEDURE — 88341 IMHCHEM/IMCYTCHM EA ADD ANTB: CPT | Performed by: THORACIC SURGERY (CARDIOTHORACIC VASCULAR SURGERY)

## 2019-01-01 PROCEDURE — 94640 AIRWAY INHALATION TREATMENT: CPT

## 2019-01-01 PROCEDURE — 83605 ASSAY OF LACTIC ACID: CPT | Performed by: INTERNAL MEDICINE

## 2019-01-01 PROCEDURE — 80053 COMPREHEN METABOLIC PANEL: CPT | Performed by: EMERGENCY MEDICINE

## 2019-01-01 PROCEDURE — 40000671 ZZH STATISTIC ANESTHESIA CASE

## 2019-01-01 PROCEDURE — 80048 BASIC METABOLIC PNL TOTAL CA: CPT | Performed by: PHYSICIAN ASSISTANT

## 2019-01-01 PROCEDURE — 25000132 ZZH RX MED GY IP 250 OP 250 PS 637: Performed by: HOSPITALIST

## 2019-01-01 PROCEDURE — 99207 ZZC APP CREDIT; MD BILLING SHARED VISIT: CPT | Performed by: NURSE PRACTITIONER

## 2019-01-01 PROCEDURE — 76642 ULTRASOUND BREAST LIMITED: CPT | Mod: LT

## 2019-01-01 PROCEDURE — 99232 SBSQ HOSP IP/OBS MODERATE 35: CPT | Performed by: INTERNAL MEDICINE

## 2019-01-01 PROCEDURE — 84157 ASSAY OF PROTEIN OTHER: CPT | Performed by: INTERNAL MEDICINE

## 2019-01-01 PROCEDURE — 96375 TX/PRO/DX INJ NEW DRUG ADDON: CPT

## 2019-01-01 PROCEDURE — 25000128 H RX IP 250 OP 636: Performed by: THORACIC SURGERY (CARDIOTHORACIC VASCULAR SURGERY)

## 2019-01-01 PROCEDURE — 97161 PT EVAL LOW COMPLEX 20 MIN: CPT | Mod: GP | Performed by: PHYSICAL THERAPIST

## 2019-01-01 PROCEDURE — 25500064 ZZH RX 255 OP 636: Performed by: INTERNAL MEDICINE

## 2019-01-01 PROCEDURE — 27211040 ZZH CONTINUOUS NEBULIZER MICRO PUMP

## 2019-01-01 PROCEDURE — 85014 HEMATOCRIT: CPT

## 2019-01-01 PROCEDURE — 93010 ELECTROCARDIOGRAM REPORT: CPT | Performed by: INTERNAL MEDICINE

## 2019-01-01 PROCEDURE — 94644 CONT INHLJ TX 1ST HOUR: CPT

## 2019-01-01 PROCEDURE — 82330 ASSAY OF CALCIUM: CPT | Performed by: INTERNAL MEDICINE

## 2019-01-01 PROCEDURE — 85049 AUTOMATED PLATELET COUNT: CPT | Performed by: NURSE PRACTITIONER

## 2019-01-01 PROCEDURE — 31500 INSERT EMERGENCY AIRWAY: CPT

## 2019-01-01 PROCEDURE — 25000125 ZZHC RX 250: Performed by: EMERGENCY MEDICINE

## 2019-01-01 PROCEDURE — 86901 BLOOD TYPING SEROLOGIC RH(D): CPT | Performed by: PHYSICIAN ASSISTANT

## 2019-01-01 PROCEDURE — 83880 ASSAY OF NATRIURETIC PEPTIDE: CPT | Performed by: EMERGENCY MEDICINE

## 2019-01-01 PROCEDURE — 40000274 ZZH STATISTIC RCP CONSULT EA 30 MIN

## 2019-01-01 PROCEDURE — P9041 ALBUMIN (HUMAN),5%, 50ML: HCPCS | Performed by: INTERNAL MEDICINE

## 2019-01-01 PROCEDURE — 25000128 H RX IP 250 OP 636

## 2019-01-01 PROCEDURE — 02HV33Z INSERTION OF INFUSION DEVICE INTO SUPERIOR VENA CAVA, PERCUTANEOUS APPROACH: ICD-10-PCS | Performed by: RADIOLOGY

## 2019-01-01 PROCEDURE — 25000128 H RX IP 250 OP 636: Performed by: EMERGENCY MEDICINE

## 2019-01-01 PROCEDURE — 25800030 ZZH RX IP 258 OP 636: Performed by: NURSE ANESTHETIST, CERTIFIED REGISTERED

## 2019-01-01 PROCEDURE — 76942 ECHO GUIDE FOR BIOPSY: CPT

## 2019-01-01 PROCEDURE — 25000125 ZZHC RX 250: Performed by: THORACIC SURGERY (CARDIOTHORACIC VASCULAR SURGERY)

## 2019-01-01 PROCEDURE — 25800030 ZZH RX IP 258 OP 636: Performed by: INTERNAL MEDICINE

## 2019-01-01 PROCEDURE — 99232 SBSQ HOSP IP/OBS MODERATE 35: CPT | Performed by: HOSPITALIST

## 2019-01-01 PROCEDURE — 86698 HISTOPLASMA ANTIBODY: CPT | Performed by: ANESTHESIOLOGY

## 2019-01-01 PROCEDURE — 96361 HYDRATE IV INFUSION ADD-ON: CPT

## 2019-01-01 PROCEDURE — 82805 BLOOD GASES W/O2 SATURATION: CPT | Performed by: ANESTHESIOLOGY

## 2019-01-01 PROCEDURE — 25000125 ZZHC RX 250: Performed by: ANESTHESIOLOGY

## 2019-01-01 PROCEDURE — 74019 RADEX ABDOMEN 2 VIEWS: CPT

## 2019-01-01 PROCEDURE — 00000159 ZZHCL STATISTIC H-SEND OUTS PREP: Performed by: THORACIC SURGERY (CARDIOTHORACIC VASCULAR SURGERY)

## 2019-01-01 PROCEDURE — 83735 ASSAY OF MAGNESIUM: CPT | Performed by: ANESTHESIOLOGY

## 2019-01-01 PROCEDURE — 89051 BODY FLUID CELL COUNT: CPT | Performed by: INTERNAL MEDICINE

## 2019-01-01 PROCEDURE — 25000132 ZZH RX MED GY IP 250 OP 250 PS 637: Performed by: PSYCHIATRY & NEUROLOGY

## 2019-01-01 PROCEDURE — 25000132 ZZH RX MED GY IP 250 OP 250 PS 637: Performed by: EMERGENCY MEDICINE

## 2019-01-01 PROCEDURE — 40000239 ZZH STATISTIC VAT ROUNDS

## 2019-01-01 PROCEDURE — 25000125 ZZHC RX 250: Performed by: NURSE ANESTHETIST, CERTIFIED REGISTERED

## 2019-01-01 PROCEDURE — 25000128 H RX IP 250 OP 636: Performed by: PHYSICIAN ASSISTANT

## 2019-01-01 PROCEDURE — 94645 CONT INHLJ TX EACH ADDL HOUR: CPT

## 2019-01-01 PROCEDURE — 84145 PROCALCITONIN (PCT): CPT | Performed by: EMERGENCY MEDICINE

## 2019-01-01 PROCEDURE — 84100 ASSAY OF PHOSPHORUS: CPT | Performed by: ANESTHESIOLOGY

## 2019-01-01 PROCEDURE — 96376 TX/PRO/DX INJ SAME DRUG ADON: CPT

## 2019-01-01 PROCEDURE — 82805 BLOOD GASES W/O2 SATURATION: CPT | Performed by: INTERNAL MEDICINE

## 2019-01-01 PROCEDURE — 00000102 ZZHCL STATISTIC CYTO WRIGHT STAIN TC: Performed by: OBSTETRICS & GYNECOLOGY

## 2019-01-01 PROCEDURE — 88342 IMHCHEM/IMCYTCHM 1ST ANTB: CPT | Mod: 26 | Performed by: INTERNAL MEDICINE

## 2019-01-01 PROCEDURE — 40000141 ZZH STATISTIC PERIPHERAL IV START W/O US GUIDANCE

## 2019-01-01 PROCEDURE — 82945 GLUCOSE OTHER FLUID: CPT | Performed by: INTERNAL MEDICINE

## 2019-01-01 PROCEDURE — 86612 BLASTOMYCES ANTIBODY: CPT | Performed by: ANESTHESIOLOGY

## 2019-01-01 PROCEDURE — 36415 COLL VENOUS BLD VENIPUNCTURE: CPT | Performed by: EMERGENCY MEDICINE

## 2019-01-01 PROCEDURE — 80202 ASSAY OF VANCOMYCIN: CPT | Performed by: INTERNAL MEDICINE

## 2019-01-01 PROCEDURE — 99284 EMERGENCY DEPT VISIT MOD MDM: CPT | Mod: 25

## 2019-01-01 PROCEDURE — 3E043XZ INTRODUCTION OF VASOPRESSOR INTO CENTRAL VEIN, PERCUTANEOUS APPROACH: ICD-10-PCS | Performed by: INTERNAL MEDICINE

## 2019-01-01 PROCEDURE — 25000128 H RX IP 250 OP 636: Performed by: PSYCHIATRY & NEUROLOGY

## 2019-01-01 PROCEDURE — 88305 TISSUE EXAM BY PATHOLOGIST: CPT | Performed by: THORACIC SURGERY (CARDIOTHORACIC VASCULAR SURGERY)

## 2019-01-01 PROCEDURE — 00000155 ZZHCL STATISTIC H-CELL BLOCK W/STAIN: Performed by: OBSTETRICS & GYNECOLOGY

## 2019-01-01 PROCEDURE — 87493 C DIFF AMPLIFIED PROBE: CPT | Performed by: INTERNAL MEDICINE

## 2019-01-01 PROCEDURE — G0378 HOSPITAL OBSERVATION PER HR: HCPCS

## 2019-01-01 PROCEDURE — 84100 ASSAY OF PHOSPHORUS: CPT | Performed by: INTERNAL MEDICINE

## 2019-01-01 PROCEDURE — 99222 1ST HOSP IP/OBS MODERATE 55: CPT | Performed by: PSYCHIATRY & NEUROLOGY

## 2019-01-01 PROCEDURE — 97162 PT EVAL MOD COMPLEX 30 MIN: CPT | Mod: GP

## 2019-01-01 PROCEDURE — 25000131 ZZH RX MED GY IP 250 OP 636 PS 637: Performed by: ANESTHESIOLOGY

## 2019-01-01 PROCEDURE — 87070 CULTURE OTHR SPECIMN AEROBIC: CPT | Performed by: INTERNAL MEDICINE

## 2019-01-01 PROCEDURE — 80053 COMPREHEN METABOLIC PANEL: CPT | Performed by: ANESTHESIOLOGY

## 2019-01-01 PROCEDURE — 85520 HEPARIN ASSAY: CPT | Performed by: INTERNAL MEDICINE

## 2019-01-01 PROCEDURE — 84145 PROCALCITONIN (PCT): CPT | Performed by: INTERNAL MEDICINE

## 2019-01-01 PROCEDURE — 76604 US EXAM CHEST: CPT

## 2019-01-01 PROCEDURE — 85610 PROTHROMBIN TIME: CPT | Performed by: INTERNAL MEDICINE

## 2019-01-01 PROCEDURE — 97530 THERAPEUTIC ACTIVITIES: CPT | Mod: GP

## 2019-01-01 PROCEDURE — 40000008 ZZH STATISTIC AIRWAY CARE

## 2019-01-01 PROCEDURE — 71275 CT ANGIOGRAPHY CHEST: CPT | Mod: 77

## 2019-01-01 PROCEDURE — 94799 UNLISTED PULMONARY SVC/PX: CPT

## 2019-01-01 PROCEDURE — 85025 COMPLETE CBC W/AUTO DIFF WBC: CPT | Performed by: EMERGENCY MEDICINE

## 2019-01-01 PROCEDURE — 86850 RBC ANTIBODY SCREEN: CPT | Performed by: PHYSICIAN ASSISTANT

## 2019-01-01 PROCEDURE — 31500 INSERT EMERGENCY AIRWAY: CPT | Performed by: NURSE ANESTHETIST, CERTIFIED REGISTERED

## 2019-01-01 PROCEDURE — 3E0L3GC INTRODUCTION OF OTHER THERAPEUTIC SUBSTANCE INTO PLEURAL CAVITY, PERCUTANEOUS APPROACH: ICD-10-PCS | Performed by: THORACIC SURGERY (CARDIOTHORACIC VASCULAR SURGERY)

## 2019-01-01 PROCEDURE — 94660 CPAP INITIATION&MGMT: CPT

## 2019-01-01 PROCEDURE — 97535 SELF CARE MNGMENT TRAINING: CPT | Mod: GO

## 2019-01-01 PROCEDURE — 84443 ASSAY THYROID STIM HORMONE: CPT | Performed by: INTERNAL MEDICINE

## 2019-01-01 PROCEDURE — 25800030 ZZH RX IP 258 OP 636

## 2019-01-01 PROCEDURE — P9047 ALBUMIN (HUMAN), 25%, 50ML: HCPCS | Performed by: ANESTHESIOLOGY

## 2019-01-01 PROCEDURE — 97110 THERAPEUTIC EXERCISES: CPT | Mod: GP

## 2019-01-01 PROCEDURE — 0BBP4ZX EXCISION OF LEFT PLEURA, PERCUTANEOUS ENDOSCOPIC APPROACH, DIAGNOSTIC: ICD-10-PCS | Performed by: THORACIC SURGERY (CARDIOTHORACIC VASCULAR SURGERY)

## 2019-01-01 PROCEDURE — 87641 MR-STAPH DNA AMP PROBE: CPT | Performed by: NURSE PRACTITIONER

## 2019-01-01 PROCEDURE — 99239 HOSP IP/OBS DSCHRG MGMT >30: CPT | Performed by: INTERNAL MEDICINE

## 2019-01-01 PROCEDURE — 81001 URINALYSIS AUTO W/SCOPE: CPT | Performed by: EMERGENCY MEDICINE

## 2019-01-01 PROCEDURE — 36415 COLL VENOUS BLD VENIPUNCTURE: CPT

## 2019-01-01 PROCEDURE — 99223 1ST HOSP IP/OBS HIGH 75: CPT | Mod: AI | Performed by: INTERNAL MEDICINE

## 2019-01-01 PROCEDURE — 25000131 ZZH RX MED GY IP 250 OP 636 PS 637: Performed by: HOSPITALIST

## 2019-01-01 PROCEDURE — 71046 X-RAY EXAM CHEST 2 VIEWS: CPT

## 2019-01-01 PROCEDURE — 27210222 ZZH KIT SHRLOCK 6FR POWER PICC

## 2019-01-01 PROCEDURE — 87070 CULTURE OTHR SPECIMN AEROBIC: CPT | Performed by: SURGERY

## 2019-01-01 PROCEDURE — 71000012 ZZH RECOVERY PHASE 1 LEVEL 1 FIRST HR: Performed by: THORACIC SURGERY (CARDIOTHORACIC VASCULAR SURGERY)

## 2019-01-01 PROCEDURE — 88377 M/PHMTRC ALYS ISHQUANT/SEMIQ: CPT | Performed by: PATHOLOGY

## 2019-01-01 PROCEDURE — 87640 STAPH A DNA AMP PROBE: CPT | Performed by: NURSE PRACTITIONER

## 2019-01-01 PROCEDURE — 84132 ASSAY OF SERUM POTASSIUM: CPT | Performed by: INTERNAL MEDICINE

## 2019-01-01 PROCEDURE — 96365 THER/PROPH/DIAG IV INF INIT: CPT

## 2019-01-01 PROCEDURE — 40000985 XR CHEST PORT 1 VW

## 2019-01-01 PROCEDURE — 37000009 ZZH ANESTHESIA TECHNICAL FEE, EACH ADDTL 15 MIN: Performed by: THORACIC SURGERY (CARDIOTHORACIC VASCULAR SURGERY)

## 2019-01-01 PROCEDURE — 94003 VENT MGMT INPAT SUBQ DAY: CPT

## 2019-01-01 PROCEDURE — 20000003 ZZH R&B ICU

## 2019-01-01 PROCEDURE — 25000132 ZZH RX MED GY IP 250 OP 250 PS 637: Performed by: NURSE PRACTITIONER

## 2019-01-01 PROCEDURE — 99213 OFFICE O/P EST LOW 20 MIN: CPT | Performed by: INTERNAL MEDICINE

## 2019-01-01 PROCEDURE — 25000128 H RX IP 250 OP 636: Performed by: RADIOLOGY

## 2019-01-01 PROCEDURE — 96374 THER/PROPH/DIAG INJ IV PUSH: CPT | Mod: 59

## 2019-01-01 PROCEDURE — 84100 ASSAY OF PHOSPHORUS: CPT | Performed by: NURSE PRACTITIONER

## 2019-01-01 PROCEDURE — 36415 COLL VENOUS BLD VENIPUNCTURE: CPT | Performed by: OBSTETRICS & GYNECOLOGY

## 2019-01-01 PROCEDURE — 25000131 ZZH RX MED GY IP 250 OP 636 PS 637: Performed by: PHYSICIAN ASSISTANT

## 2019-01-01 PROCEDURE — 80053 COMPREHEN METABOLIC PANEL: CPT | Performed by: INTERNAL MEDICINE

## 2019-01-01 PROCEDURE — 71045 X-RAY EXAM CHEST 1 VIEW: CPT

## 2019-01-01 PROCEDURE — 25000128 H RX IP 250 OP 636: Performed by: STUDENT IN AN ORGANIZED HEALTH CARE EDUCATION/TRAINING PROGRAM

## 2019-01-01 PROCEDURE — 99222 1ST HOSP IP/OBS MODERATE 55: CPT | Mod: AI | Performed by: INTERNAL MEDICINE

## 2019-01-01 PROCEDURE — 40000256 ZZH STATISTIC CARDIOPULM RESUSCITATION

## 2019-01-01 PROCEDURE — 00000158 ZZHCL STATISTIC H-FISH PROCESS B/S: Performed by: THORACIC SURGERY (CARDIOTHORACIC VASCULAR SURGERY)

## 2019-01-01 PROCEDURE — 88360 TUMOR IMMUNOHISTOCHEM/MANUAL: CPT | Mod: 26,59 | Performed by: THORACIC SURGERY (CARDIOTHORACIC VASCULAR SURGERY)

## 2019-01-01 PROCEDURE — 97530 THERAPEUTIC ACTIVITIES: CPT | Mod: GO

## 2019-01-01 PROCEDURE — 87075 CULTR BACTERIA EXCEPT BLOOD: CPT | Performed by: SURGERY

## 2019-01-01 PROCEDURE — 88341 IMHCHEM/IMCYTCHM EA ADD ANTB: CPT | Mod: 26 | Performed by: THORACIC SURGERY (CARDIOTHORACIC VASCULAR SURGERY)

## 2019-01-01 PROCEDURE — 82805 BLOOD GASES W/O2 SATURATION: CPT | Performed by: EMERGENCY MEDICINE

## 2019-01-01 PROCEDURE — 00000146 ZZHCL STATISTIC GLUCOSE BY METER IP

## 2019-01-01 PROCEDURE — 25000128 H RX IP 250 OP 636: Performed by: NURSE ANESTHETIST, CERTIFIED REGISTERED

## 2019-01-01 PROCEDURE — 87040 BLOOD CULTURE FOR BACTERIA: CPT | Performed by: INTERNAL MEDICINE

## 2019-01-01 PROCEDURE — 84295 ASSAY OF SERUM SODIUM: CPT

## 2019-01-01 PROCEDURE — 96367 TX/PROPH/DG ADDL SEQ IV INF: CPT

## 2019-01-01 PROCEDURE — 84484 ASSAY OF TROPONIN QUANT: CPT | Performed by: ANESTHESIOLOGY

## 2019-01-01 PROCEDURE — 72128 CT CHEST SPINE W/O DYE: CPT

## 2019-01-01 PROCEDURE — 85025 COMPLETE CBC W/AUTO DIFF WBC: CPT | Performed by: PHYSICIAN ASSISTANT

## 2019-01-01 PROCEDURE — 83036 HEMOGLOBIN GLYCOSYLATED A1C: CPT | Performed by: NURSE PRACTITIONER

## 2019-01-01 PROCEDURE — 85730 THROMBOPLASTIN TIME PARTIAL: CPT | Performed by: EMERGENCY MEDICINE

## 2019-01-01 PROCEDURE — 86635 COCCIDIOIDES ANTIBODY: CPT | Performed by: ANESTHESIOLOGY

## 2019-01-01 PROCEDURE — 87040 BLOOD CULTURE FOR BACTERIA: CPT | Performed by: EMERGENCY MEDICINE

## 2019-01-01 PROCEDURE — 85027 COMPLETE CBC AUTOMATED: CPT | Performed by: ANESTHESIOLOGY

## 2019-01-01 PROCEDURE — 70450 CT HEAD/BRAIN W/O DYE: CPT

## 2019-01-01 PROCEDURE — 85520 HEPARIN ASSAY: CPT | Performed by: ANESTHESIOLOGY

## 2019-01-01 PROCEDURE — 36000063 ZZH SURGERY LEVEL 4 EA 15 ADDTL MIN: Performed by: THORACIC SURGERY (CARDIOTHORACIC VASCULAR SURGERY)

## 2019-01-01 PROCEDURE — 0W9B3ZZ DRAINAGE OF LEFT PLEURAL CAVITY, PERCUTANEOUS APPROACH: ICD-10-PCS | Performed by: RADIOLOGY

## 2019-01-01 PROCEDURE — 84145 PROCALCITONIN (PCT): CPT | Performed by: NURSE PRACTITIONER

## 2019-01-01 PROCEDURE — 40000986 XR CHEST PORT 1 VW

## 2019-01-01 PROCEDURE — 93971 EXTREMITY STUDY: CPT

## 2019-01-01 PROCEDURE — 72131 CT LUMBAR SPINE W/O DYE: CPT

## 2019-01-01 PROCEDURE — 82565 ASSAY OF CREATININE: CPT

## 2019-01-01 PROCEDURE — 83735 ASSAY OF MAGNESIUM: CPT | Performed by: EMERGENCY MEDICINE

## 2019-01-01 PROCEDURE — 86606 ASPERGILLUS ANTIBODY: CPT | Performed by: ANESTHESIOLOGY

## 2019-01-01 PROCEDURE — 83605 ASSAY OF LACTIC ACID: CPT | Performed by: NURSE PRACTITIONER

## 2019-01-01 PROCEDURE — 40000264 ECHOCARDIOGRAM COMPLETE

## 2019-01-01 PROCEDURE — 27210886 ZZH ACCESSORY CR5

## 2019-01-01 PROCEDURE — 36415 COLL VENOUS BLD VENIPUNCTURE: CPT | Performed by: PHYSICIAN ASSISTANT

## 2019-01-01 PROCEDURE — 97161 PT EVAL LOW COMPLEX 20 MIN: CPT | Mod: GP

## 2019-01-01 PROCEDURE — 99291 CRITICAL CARE FIRST HOUR: CPT | Performed by: INTERNAL MEDICINE

## 2019-01-01 PROCEDURE — 88112 CYTOPATH CELL ENHANCE TECH: CPT | Performed by: OBSTETRICS & GYNECOLOGY

## 2019-01-01 PROCEDURE — 93308 TTE F-UP OR LMTD: CPT | Mod: 26 | Performed by: INTERNAL MEDICINE

## 2019-01-01 PROCEDURE — 40000894 ZZH STATISTIC OT IP EVAL DEFER

## 2019-01-01 PROCEDURE — 94002 VENT MGMT INPAT INIT DAY: CPT

## 2019-01-01 PROCEDURE — 87205 SMEAR GRAM STAIN: CPT | Performed by: INTERNAL MEDICINE

## 2019-01-01 PROCEDURE — 84155 ASSAY OF PROTEIN SERUM: CPT | Performed by: PHYSICIAN ASSISTANT

## 2019-01-01 PROCEDURE — 25000131 ZZH RX MED GY IP 250 OP 636 PS 637: Performed by: EMERGENCY MEDICINE

## 2019-01-01 PROCEDURE — 82565 ASSAY OF CREATININE: CPT | Performed by: INTERNAL MEDICINE

## 2019-01-01 PROCEDURE — 93325 DOPPLER ECHO COLOR FLOW MAPG: CPT | Mod: 26 | Performed by: INTERNAL MEDICINE

## 2019-01-01 PROCEDURE — 83735 ASSAY OF MAGNESIUM: CPT | Performed by: NURSE PRACTITIONER

## 2019-01-01 PROCEDURE — 88360 TUMOR IMMUNOHISTOCHEM/MANUAL: CPT | Performed by: INTERNAL MEDICINE

## 2019-01-01 PROCEDURE — 36600 WITHDRAWAL OF ARTERIAL BLOOD: CPT

## 2019-01-01 PROCEDURE — 84132 ASSAY OF SERUM POTASSIUM: CPT | Performed by: NURSE PRACTITIONER

## 2019-01-01 PROCEDURE — 82330 ASSAY OF CALCIUM: CPT | Performed by: NURSE PRACTITIONER

## 2019-01-01 PROCEDURE — 88360 TUMOR IMMUNOHISTOCHEM/MANUAL: CPT | Performed by: OBSTETRICS & GYNECOLOGY

## 2019-01-01 PROCEDURE — 87205 SMEAR GRAM STAIN: CPT | Performed by: SURGERY

## 2019-01-01 PROCEDURE — 99291 CRITICAL CARE FIRST HOUR: CPT | Mod: 25 | Performed by: ANESTHESIOLOGY

## 2019-01-01 PROCEDURE — 88341 IMHCHEM/IMCYTCHM EA ADD ANTB: CPT | Mod: 26,XU | Performed by: OBSTETRICS & GYNECOLOGY

## 2019-01-01 PROCEDURE — 85610 PROTHROMBIN TIME: CPT | Performed by: PHYSICIAN ASSISTANT

## 2019-01-01 PROCEDURE — 85027 COMPLETE CBC AUTOMATED: CPT | Performed by: NURSE PRACTITIONER

## 2019-01-01 PROCEDURE — 93321 DOPPLER ECHO F-UP/LMTD STD: CPT | Mod: 26 | Performed by: INTERNAL MEDICINE

## 2019-01-01 PROCEDURE — 84484 ASSAY OF TROPONIN QUANT: CPT | Performed by: PHYSICIAN ASSISTANT

## 2019-01-01 PROCEDURE — 84100 ASSAY OF PHOSPHORUS: CPT | Performed by: EMERGENCY MEDICINE

## 2019-01-01 PROCEDURE — 86900 BLOOD TYPING SEROLOGIC ABO: CPT | Performed by: PHYSICIAN ASSISTANT

## 2019-01-01 PROCEDURE — 25800030 ZZH RX IP 258 OP 636: Performed by: PHYSICIAN ASSISTANT

## 2019-01-01 PROCEDURE — 40000901 ZZH STATISTIC WOC PT EDUCATION, 0-15 MIN

## 2019-01-01 PROCEDURE — 88342 IMHCHEM/IMCYTCHM 1ST ANTB: CPT | Mod: 26 | Performed by: OBSTETRICS & GYNECOLOGY

## 2019-01-01 PROCEDURE — 80048 BASIC METABOLIC PNL TOTAL CA: CPT | Performed by: ANESTHESIOLOGY

## 2019-01-01 PROCEDURE — 37000008 ZZH ANESTHESIA TECHNICAL FEE, 1ST 30 MIN: Performed by: THORACIC SURGERY (CARDIOTHORACIC VASCULAR SURGERY)

## 2019-01-01 PROCEDURE — 88342 IMHCHEM/IMCYTCHM 1ST ANTB: CPT | Performed by: OBSTETRICS & GYNECOLOGY

## 2019-01-01 PROCEDURE — 88305 TISSUE EXAM BY PATHOLOGIST: CPT | Mod: 26 | Performed by: THORACIC SURGERY (CARDIOTHORACIC VASCULAR SURGERY)

## 2019-01-01 PROCEDURE — 40000893 ZZH STATISTIC PT IP EVAL DEFER: Performed by: PHYSICAL THERAPIST

## 2019-01-01 PROCEDURE — 83880 ASSAY OF NATRIURETIC PEPTIDE: CPT | Performed by: INTERNAL MEDICINE

## 2019-01-01 PROCEDURE — 25000125 ZZHC RX 250: Performed by: SURGERY

## 2019-01-01 PROCEDURE — 40000169 ZZH STATISTIC PRE-PROCEDURE ASSESSMENT I: Performed by: THORACIC SURGERY (CARDIOTHORACIC VASCULAR SURGERY)

## 2019-01-01 PROCEDURE — 99233 SBSQ HOSP IP/OBS HIGH 50: CPT | Performed by: HOSPITALIST

## 2019-01-01 PROCEDURE — 25000566 ZZH SEVOFLURANE, EA 15 MIN: Performed by: THORACIC SURGERY (CARDIOTHORACIC VASCULAR SURGERY)

## 2019-01-01 PROCEDURE — 82805 BLOOD GASES W/O2 SATURATION: CPT | Performed by: NURSE PRACTITIONER

## 2019-01-01 PROCEDURE — 25000128 H RX IP 250 OP 636: Performed by: NURSE PRACTITIONER

## 2019-01-01 PROCEDURE — 71275 CT ANGIOGRAPHY CHEST: CPT

## 2019-01-01 PROCEDURE — 85027 COMPLETE CBC AUTOMATED: CPT | Performed by: PHYSICIAN ASSISTANT

## 2019-01-01 PROCEDURE — 88360 TUMOR IMMUNOHISTOCHEM/MANUAL: CPT | Mod: 26 | Performed by: INTERNAL MEDICINE

## 2019-01-01 PROCEDURE — 99024 POSTOP FOLLOW-UP VISIT: CPT | Performed by: SURGERY

## 2019-01-01 PROCEDURE — 99239 HOSP IP/OBS DSCHRG MGMT >30: CPT | Performed by: HOSPITALIST

## 2019-01-01 PROCEDURE — 5A1945Z RESPIRATORY VENTILATION, 24-96 CONSECUTIVE HOURS: ICD-10-PCS | Performed by: INTERNAL MEDICINE

## 2019-01-01 PROCEDURE — P9047 ALBUMIN (HUMAN), 25%, 50ML: HCPCS | Performed by: INTERNAL MEDICINE

## 2019-01-01 PROCEDURE — 36561 INSERT TUNNELED CV CATH: CPT

## 2019-01-01 PROCEDURE — 82565 ASSAY OF CREATININE: CPT | Performed by: NURSE PRACTITIONER

## 2019-01-01 PROCEDURE — 88360 TUMOR IMMUNOHISTOCHEM/MANUAL: CPT | Mod: 26 | Performed by: OBSTETRICS & GYNECOLOGY

## 2019-01-01 PROCEDURE — 97530 THERAPEUTIC ACTIVITIES: CPT | Mod: GP | Performed by: PHYSICAL THERAPIST

## 2019-01-01 PROCEDURE — 85379 FIBRIN DEGRADATION QUANT: CPT | Performed by: EMERGENCY MEDICINE

## 2019-01-01 PROCEDURE — 87899 AGENT NOS ASSAY W/OPTIC: CPT | Performed by: INTERNAL MEDICINE

## 2019-01-01 PROCEDURE — G0463 HOSPITAL OUTPT CLINIC VISIT: HCPCS

## 2019-01-01 PROCEDURE — 87899 AGENT NOS ASSAY W/OPTIC: CPT | Performed by: ANESTHESIOLOGY

## 2019-01-01 PROCEDURE — 84484 ASSAY OF TROPONIN QUANT: CPT | Performed by: NURSE PRACTITIONER

## 2019-01-01 PROCEDURE — 80053 COMPREHEN METABOLIC PANEL: CPT | Performed by: NURSE PRACTITIONER

## 2019-01-01 PROCEDURE — 0JH60WZ INSERTION OF TOTALLY IMPLANTABLE VASCULAR ACCESS DEVICE INTO CHEST SUBCUTANEOUS TISSUE AND FASCIA, OPEN APPROACH: ICD-10-PCS | Performed by: RADIOLOGY

## 2019-01-01 PROCEDURE — 93971 EXTREMITY STUDY: CPT | Mod: LT

## 2019-01-01 PROCEDURE — 00000159 ZZHCL STATISTIC H-SEND OUTS PREP: Performed by: INTERNAL MEDICINE

## 2019-01-01 PROCEDURE — 99291 CRITICAL CARE FIRST HOUR: CPT | Performed by: ANESTHESIOLOGY

## 2019-01-01 PROCEDURE — 99233 SBSQ HOSP IP/OBS HIGH 50: CPT | Performed by: PSYCHIATRY & NEUROLOGY

## 2019-01-01 PROCEDURE — 40000281 ZZH STATISTIC TRANSPORT TIME EA 15 MIN

## 2019-01-01 PROCEDURE — 40000986 XR ABDOMEN PORT 1 VW

## 2019-01-01 PROCEDURE — 83615 LACTATE (LD) (LDH) ENZYME: CPT | Performed by: PHYSICIAN ASSISTANT

## 2019-01-01 PROCEDURE — 36000093 ZZH SURGERY LEVEL 4 1ST 30 MIN: Performed by: THORACIC SURGERY (CARDIOTHORACIC VASCULAR SURGERY)

## 2019-01-01 PROCEDURE — 88305 TISSUE EXAM BY PATHOLOGIST: CPT | Performed by: OBSTETRICS & GYNECOLOGY

## 2019-01-01 PROCEDURE — 27210437 ZZH NUTRITION PRODUCT SEMIELEM INTERMED LITER

## 2019-01-01 PROCEDURE — 88342 IMHCHEM/IMCYTCHM 1ST ANTB: CPT | Performed by: INTERNAL MEDICINE

## 2019-01-01 PROCEDURE — 88331 PATH CONSLTJ SURG 1 BLK 1SPC: CPT | Mod: 26 | Performed by: THORACIC SURGERY (CARDIOTHORACIC VASCULAR SURGERY)

## 2019-01-01 PROCEDURE — 25000125 ZZHC RX 250: Performed by: RADIOLOGY

## 2019-01-01 PROCEDURE — 27210794 ZZH OR GENERAL SUPPLY STERILE: Performed by: THORACIC SURGERY (CARDIOTHORACIC VASCULAR SURGERY)

## 2019-01-01 PROCEDURE — 85610 PROTHROMBIN TIME: CPT | Mod: QW | Performed by: OBSTETRICS & GYNECOLOGY

## 2019-01-01 PROCEDURE — 99232 SBSQ HOSP IP/OBS MODERATE 35: CPT | Performed by: PSYCHIATRY & NEUROLOGY

## 2019-01-01 PROCEDURE — 88360 TUMOR IMMUNOHISTOCHEM/MANUAL: CPT | Performed by: THORACIC SURGERY (CARDIOTHORACIC VASCULAR SURGERY)

## 2019-01-01 PROCEDURE — 88305 TISSUE EXAM BY PATHOLOGIST: CPT | Mod: 26 | Performed by: OBSTETRICS & GYNECOLOGY

## 2019-01-01 PROCEDURE — 36569 INSJ PICC 5 YR+ W/O IMAGING: CPT

## 2019-01-01 PROCEDURE — C1788 PORT, INDWELLING, IMP: HCPCS

## 2019-01-01 RX ORDER — LORAZEPAM 0.5 MG/1
0.5 TABLET ORAL EVERY 8 HOURS PRN
Status: DISCONTINUED | OUTPATIENT
Start: 2019-01-01 | End: 2019-01-01

## 2019-01-01 RX ORDER — DEXTROSE MONOHYDRATE 25 G/50ML
25-50 INJECTION, SOLUTION INTRAVENOUS
Status: DISCONTINUED | OUTPATIENT
Start: 2019-01-01 | End: 2019-01-01 | Stop reason: HOSPADM

## 2019-01-01 RX ORDER — CLONAZEPAM 0.5 MG/1
2 TABLET ORAL AT BEDTIME
Status: DISCONTINUED | OUTPATIENT
Start: 2019-01-01 | End: 2019-01-01 | Stop reason: HOSPADM

## 2019-01-01 RX ORDER — LEVALBUTEROL 1.25 MG/.5ML
0.63 SOLUTION, CONCENTRATE RESPIRATORY (INHALATION) EVERY 6 HOURS PRN
Qty: 1 EACH | Refills: 0 | Status: SHIPPED | OUTPATIENT
Start: 2019-01-01

## 2019-01-01 RX ORDER — POTASSIUM CHLORIDE 1500 MG/1
20 TABLET, EXTENDED RELEASE ORAL ONCE
Status: COMPLETED | OUTPATIENT
Start: 2019-01-01 | End: 2019-01-01

## 2019-01-01 RX ORDER — BISACODYL 10 MG
10 SUPPOSITORY, RECTAL RECTAL DAILY PRN
Status: DISCONTINUED | OUTPATIENT
Start: 2019-01-01 | End: 2019-01-01 | Stop reason: HOSPADM

## 2019-01-01 RX ORDER — PROCHLORPERAZINE MALEATE 5 MG
10 TABLET ORAL 4 TIMES DAILY
Status: DISCONTINUED | OUTPATIENT
Start: 2019-01-01 | End: 2019-01-01

## 2019-01-01 RX ORDER — DEXTROSE MONOHYDRATE 25 G/50ML
25-50 INJECTION, SOLUTION INTRAVENOUS
Status: DISCONTINUED | OUTPATIENT
Start: 2019-01-01 | End: 2019-01-01 | Stop reason: DRUGHIGH

## 2019-01-01 RX ORDER — FUROSEMIDE 10 MG/ML
60 INJECTION INTRAMUSCULAR; INTRAVENOUS 3 TIMES DAILY
Status: DISCONTINUED | OUTPATIENT
Start: 2019-01-01 | End: 2019-01-01

## 2019-01-01 RX ORDER — ONDANSETRON 4 MG/1
4 TABLET, ORALLY DISINTEGRATING ORAL EVERY 6 HOURS PRN
Status: DISCONTINUED | OUTPATIENT
Start: 2019-01-01 | End: 2019-01-01 | Stop reason: HOSPADM

## 2019-01-01 RX ORDER — ONDANSETRON 2 MG/ML
INJECTION INTRAMUSCULAR; INTRAVENOUS PRN
Status: DISCONTINUED | OUTPATIENT
Start: 2019-01-01 | End: 2019-01-01

## 2019-01-01 RX ORDER — PIPERACILLIN SODIUM, TAZOBACTAM SODIUM 4; .5 G/20ML; G/20ML
4.5 INJECTION, POWDER, LYOPHILIZED, FOR SOLUTION INTRAVENOUS ONCE
Status: COMPLETED | OUTPATIENT
Start: 2019-01-01 | End: 2019-01-01

## 2019-01-01 RX ORDER — POTASSIUM CHLORIDE 1500 MG/1
20-40 TABLET, EXTENDED RELEASE ORAL
Status: DISCONTINUED | OUTPATIENT
Start: 2019-01-01 | End: 2019-01-01 | Stop reason: HOSPADM

## 2019-01-01 RX ORDER — CLONAZEPAM 1 MG/1
2 TABLET ORAL AT BEDTIME
Status: DISCONTINUED | OUTPATIENT
Start: 2019-01-01 | End: 2019-01-01 | Stop reason: HOSPADM

## 2019-01-01 RX ORDER — PROCHLORPERAZINE 25 MG
25 SUPPOSITORY, RECTAL RECTAL EVERY 12 HOURS PRN
Status: DISCONTINUED | OUTPATIENT
Start: 2019-01-01 | End: 2019-01-01

## 2019-01-01 RX ORDER — LORAZEPAM 0.5 MG/1
0.5 TABLET ORAL ONCE
Status: DISCONTINUED | OUTPATIENT
Start: 2019-01-01 | End: 2019-01-01

## 2019-01-01 RX ORDER — AZITHROMYCIN 250 MG/1
250 TABLET, FILM COATED ORAL DAILY
Status: DISCONTINUED | OUTPATIENT
Start: 2019-01-01 | End: 2019-01-01 | Stop reason: HOSPADM

## 2019-01-01 RX ORDER — CLONAZEPAM 1 MG/1
TABLET ORAL
Start: 2019-01-01

## 2019-01-01 RX ORDER — LORAZEPAM 2 MG/ML
0.5 INJECTION INTRAMUSCULAR ONCE
Status: COMPLETED | OUTPATIENT
Start: 2019-01-01 | End: 2019-01-01

## 2019-01-01 RX ORDER — POTASSIUM CL/LIDO/0.9 % NACL 10MEQ/0.1L
10 INTRAVENOUS SOLUTION, PIGGYBACK (ML) INTRAVENOUS ONCE
Status: COMPLETED | OUTPATIENT
Start: 2019-01-01 | End: 2019-01-01

## 2019-01-01 RX ORDER — POTASSIUM CHLORIDE 1500 MG/1
20 TABLET, EXTENDED RELEASE ORAL 2 TIMES DAILY
Qty: 60 TABLET | Refills: 0 | Status: SHIPPED | OUTPATIENT
Start: 2019-01-01

## 2019-01-01 RX ORDER — POTASSIUM CHLORIDE 750 MG/1
10 TABLET, EXTENDED RELEASE ORAL 2 TIMES DAILY
Status: DISCONTINUED | OUTPATIENT
Start: 2019-01-01 | End: 2019-01-01

## 2019-01-01 RX ORDER — QUETIAPINE FUMARATE 25 MG/1
25 TABLET, FILM COATED ORAL 3 TIMES DAILY
Status: DISCONTINUED | OUTPATIENT
Start: 2019-01-01 | End: 2019-01-01 | Stop reason: HOSPADM

## 2019-01-01 RX ORDER — IPRATROPIUM BROMIDE AND ALBUTEROL SULFATE 2.5; .5 MG/3ML; MG/3ML
3 SOLUTION RESPIRATORY (INHALATION) EVERY 4 HOURS PRN
Status: DISCONTINUED | OUTPATIENT
Start: 2019-01-01 | End: 2019-01-01

## 2019-01-01 RX ORDER — LIDOCAINE 40 MG/G
CREAM TOPICAL
Status: DISCONTINUED | OUTPATIENT
Start: 2019-01-01 | End: 2019-01-01

## 2019-01-01 RX ORDER — HYDROMORPHONE HYDROCHLORIDE 1 MG/ML
0.3 INJECTION, SOLUTION INTRAMUSCULAR; INTRAVENOUS; SUBCUTANEOUS 2 TIMES DAILY PRN
Status: DISCONTINUED | OUTPATIENT
Start: 2019-01-01 | End: 2019-01-01

## 2019-01-01 RX ORDER — IPRATROPIUM BROMIDE AND ALBUTEROL SULFATE 2.5; .5 MG/3ML; MG/3ML
3 SOLUTION RESPIRATORY (INHALATION) EVERY 4 HOURS
Status: DISCONTINUED | OUTPATIENT
Start: 2019-01-01 | End: 2019-01-01

## 2019-01-01 RX ORDER — HYDROXYZINE HYDROCHLORIDE 25 MG/1
25 TABLET, FILM COATED ORAL EVERY 4 HOURS PRN
Status: DISCONTINUED | OUTPATIENT
Start: 2019-01-01 | End: 2019-01-01

## 2019-01-01 RX ORDER — CARVEDILOL 3.12 MG/1
3.12 TABLET ORAL 2 TIMES DAILY WITH MEALS
Status: DISCONTINUED | OUTPATIENT
Start: 2019-01-01 | End: 2019-01-01

## 2019-01-01 RX ORDER — NICOTINE POLACRILEX 4 MG
15-30 LOZENGE BUCCAL
Status: DISCONTINUED | OUTPATIENT
Start: 2019-01-01 | End: 2019-01-01 | Stop reason: HOSPADM

## 2019-01-01 RX ORDER — ACETAMINOPHEN 325 MG/1
650 TABLET ORAL EVERY 4 HOURS PRN
Status: DISCONTINUED | OUTPATIENT
Start: 2019-01-01 | End: 2019-01-01 | Stop reason: HOSPADM

## 2019-01-01 RX ORDER — LIDOCAINE 40 MG/G
CREAM TOPICAL
Status: DISCONTINUED | OUTPATIENT
Start: 2019-01-01 | End: 2019-01-01 | Stop reason: HOSPADM

## 2019-01-01 RX ORDER — CEFAZOLIN SODIUM 1 G/3ML
1 INJECTION, POWDER, FOR SOLUTION INTRAMUSCULAR; INTRAVENOUS SEE ADMIN INSTRUCTIONS
Status: DISCONTINUED | OUTPATIENT
Start: 2019-01-01 | End: 2019-01-01 | Stop reason: HOSPADM

## 2019-01-01 RX ORDER — POTASSIUM CHLORIDE 1.5 G/1.58G
20-40 POWDER, FOR SOLUTION ORAL
Status: DISCONTINUED | OUTPATIENT
Start: 2019-01-01 | End: 2019-01-01

## 2019-01-01 RX ORDER — HEPARIN SODIUM,PORCINE 10 UNIT/ML
5-10 VIAL (ML) INTRAVENOUS EVERY 24 HOURS
Status: DISCONTINUED | OUTPATIENT
Start: 2019-01-01 | End: 2019-01-01 | Stop reason: HOSPADM

## 2019-01-01 RX ORDER — DIPHENHYDRAMINE HCL 25 MG
25 CAPSULE ORAL EVERY 6 HOURS PRN
Status: DISCONTINUED | OUTPATIENT
Start: 2019-01-01 | End: 2019-01-01 | Stop reason: HOSPADM

## 2019-01-01 RX ORDER — DIPHENHYDRAMINE HCL 25 MG
25-50 TABLET ORAL EVERY 6 HOURS PRN
Status: CANCELLED | OUTPATIENT
Start: 2019-01-01

## 2019-01-01 RX ORDER — PIPERACILLIN SODIUM, TAZOBACTAM SODIUM 3; .375 G/15ML; G/15ML
3.38 INJECTION, POWDER, LYOPHILIZED, FOR SOLUTION INTRAVENOUS EVERY 6 HOURS
Status: DISCONTINUED | OUTPATIENT
Start: 2019-01-01 | End: 2019-01-01

## 2019-01-01 RX ORDER — POTASSIUM CHLORIDE 7.45 MG/ML
10 INJECTION INTRAVENOUS
Status: DISCONTINUED | OUTPATIENT
Start: 2019-01-01 | End: 2019-01-01

## 2019-01-01 RX ORDER — ATROPINE SULFATE 0.1 MG/ML
INJECTION INTRAVENOUS
Status: COMPLETED
Start: 2019-01-01 | End: 2019-01-01

## 2019-01-01 RX ORDER — NICOTINE POLACRILEX 4 MG
15-30 LOZENGE BUCCAL
Status: DISCONTINUED | OUTPATIENT
Start: 2019-01-01 | End: 2019-01-01

## 2019-01-01 RX ORDER — TRAMADOL HYDROCHLORIDE 50 MG/1
50 TABLET ORAL EVERY 6 HOURS PRN
Qty: 10 TABLET | Refills: 0 | Status: SHIPPED | OUTPATIENT
Start: 2019-01-01 | End: 2019-01-01

## 2019-01-01 RX ORDER — IBUPROFEN 100 MG/5ML
400 SUSPENSION, ORAL (FINAL DOSE FORM) ORAL EVERY 6 HOURS
Status: DISCONTINUED | OUTPATIENT
Start: 2019-01-01 | End: 2019-01-01

## 2019-01-01 RX ORDER — METOPROLOL TARTRATE 1 MG/ML
2.5-5 INJECTION, SOLUTION INTRAVENOUS ONCE
Status: DISCONTINUED | OUTPATIENT
Start: 2019-01-01 | End: 2019-01-01

## 2019-01-01 RX ORDER — AZITHROMYCIN 500 MG/1
500 INJECTION, POWDER, LYOPHILIZED, FOR SOLUTION INTRAVENOUS ONCE
Status: COMPLETED | OUTPATIENT
Start: 2019-01-01 | End: 2019-01-01

## 2019-01-01 RX ORDER — AMOXICILLIN 250 MG
1 CAPSULE ORAL 2 TIMES DAILY PRN
Status: DISCONTINUED | OUTPATIENT
Start: 2019-01-01 | End: 2019-01-01 | Stop reason: HOSPADM

## 2019-01-01 RX ORDER — PIPERACILLIN SODIUM, TAZOBACTAM SODIUM 3; .375 G/15ML; G/15ML
3.38 INJECTION, POWDER, LYOPHILIZED, FOR SOLUTION INTRAVENOUS EVERY 6 HOURS
Status: DISCONTINUED | OUTPATIENT
Start: 2019-01-01 | End: 2019-01-01 | Stop reason: HOSPADM

## 2019-01-01 RX ORDER — AMOXICILLIN 250 MG
2 CAPSULE ORAL 2 TIMES DAILY PRN
Status: DISCONTINUED | OUTPATIENT
Start: 2019-01-01 | End: 2019-01-01 | Stop reason: HOSPADM

## 2019-01-01 RX ORDER — ADENOSINE 3 MG/ML
INJECTION, SOLUTION INTRAVENOUS
Status: COMPLETED
Start: 2019-01-01 | End: 2019-01-01

## 2019-01-01 RX ORDER — KETAMINE HYDROCHLORIDE 100 MG/ML
1 INJECTION, SOLUTION INTRAMUSCULAR; INTRAVENOUS ONCE
Status: DISCONTINUED | OUTPATIENT
Start: 2019-01-01 | End: 2019-01-01

## 2019-01-01 RX ORDER — ALBUMIN (HUMAN) 12.5 G/50ML
12.5 SOLUTION INTRAVENOUS ONCE
Status: COMPLETED | OUTPATIENT
Start: 2019-01-01 | End: 2019-01-01

## 2019-01-01 RX ORDER — IPRATROPIUM BROMIDE AND ALBUTEROL SULFATE 2.5; .5 MG/3ML; MG/3ML
SOLUTION RESPIRATORY (INHALATION)
Status: COMPLETED
Start: 2019-01-01 | End: 2019-01-01

## 2019-01-01 RX ORDER — ONDANSETRON 4 MG/1
4 TABLET, ORALLY DISINTEGRATING ORAL EVERY 6 HOURS PRN
Qty: 20 TABLET | Refills: 0 | Status: SHIPPED | OUTPATIENT
Start: 2019-01-01

## 2019-01-01 RX ORDER — NALOXONE HYDROCHLORIDE 0.4 MG/ML
.1-.4 INJECTION, SOLUTION INTRAMUSCULAR; INTRAVENOUS; SUBCUTANEOUS
Status: DISCONTINUED | OUTPATIENT
Start: 2019-01-01 | End: 2019-01-01 | Stop reason: HOSPADM

## 2019-01-01 RX ORDER — NALOXONE HYDROCHLORIDE 0.4 MG/ML
.1-.4 INJECTION, SOLUTION INTRAMUSCULAR; INTRAVENOUS; SUBCUTANEOUS
Status: DISCONTINUED | OUTPATIENT
Start: 2019-01-01 | End: 2019-01-01

## 2019-01-01 RX ORDER — MIRTAZAPINE 15 MG/1
15 TABLET, FILM COATED ORAL AT BEDTIME
Qty: 30 TABLET | Refills: 3 | Status: ON HOLD | OUTPATIENT
Start: 2019-01-01 | End: 2019-01-01

## 2019-01-01 RX ORDER — LEVALBUTEROL INHALATION SOLUTION 0.31 MG/3ML
0.63 SOLUTION RESPIRATORY (INHALATION) 3 TIMES DAILY
Status: DISCONTINUED | OUTPATIENT
Start: 2019-01-01 | End: 2019-01-01

## 2019-01-01 RX ORDER — BISACODYL 5 MG
15 TABLET, DELAYED RELEASE (ENTERIC COATED) ORAL DAILY PRN
Status: DISCONTINUED | OUTPATIENT
Start: 2019-01-01 | End: 2019-01-01 | Stop reason: HOSPADM

## 2019-01-01 RX ORDER — HEPARIN SODIUM,PORCINE 10 UNIT/ML
5 VIAL (ML) INTRAVENOUS EVERY 24 HOURS
Status: DISCONTINUED | OUTPATIENT
Start: 2019-01-01 | End: 2019-01-01

## 2019-01-01 RX ORDER — GLYCOPYRROLATE 0.2 MG/ML
INJECTION, SOLUTION INTRAMUSCULAR; INTRAVENOUS PRN
Status: DISCONTINUED | OUTPATIENT
Start: 2019-01-01 | End: 2019-01-01

## 2019-01-01 RX ORDER — METHYLPREDNISOLONE SODIUM SUCCINATE 125 MG/2ML
125 INJECTION, POWDER, LYOPHILIZED, FOR SOLUTION INTRAMUSCULAR; INTRAVENOUS ONCE
Status: COMPLETED | OUTPATIENT
Start: 2019-01-01 | End: 2019-01-01

## 2019-01-01 RX ORDER — IPRATROPIUM BROMIDE AND ALBUTEROL SULFATE 2.5; .5 MG/3ML; MG/3ML
3 SOLUTION RESPIRATORY (INHALATION)
Status: DISCONTINUED | OUTPATIENT
Start: 2019-01-01 | End: 2019-01-01

## 2019-01-01 RX ORDER — PREDNISONE 10 MG/1
40 TABLET ORAL DAILY
Qty: 12 TABLET | Refills: 0 | Status: SHIPPED | OUTPATIENT
Start: 2019-01-01 | End: 2019-01-01

## 2019-01-01 RX ORDER — LIDOCAINE 4 G/G
2 PATCH TOPICAL
Status: DISCONTINUED | OUTPATIENT
Start: 2019-01-01 | End: 2019-01-01 | Stop reason: HOSPADM

## 2019-01-01 RX ORDER — BUPROPION HYDROCHLORIDE 150 MG/1
150 TABLET ORAL EVERY MORNING
Status: DISCONTINUED | OUTPATIENT
Start: 2019-01-01 | End: 2019-01-01 | Stop reason: HOSPADM

## 2019-01-01 RX ORDER — DEXTROSE MONOHYDRATE, SODIUM CHLORIDE, AND POTASSIUM CHLORIDE 50; 1.49; 4.5 G/1000ML; G/1000ML; G/1000ML
INJECTION, SOLUTION INTRAVENOUS CONTINUOUS
Status: DISCONTINUED | OUTPATIENT
Start: 2019-01-01 | End: 2019-01-01

## 2019-01-01 RX ORDER — ALBUTEROL SULFATE 0.83 MG/ML
2.5 SOLUTION RESPIRATORY (INHALATION) EVERY 4 HOURS PRN
Status: DISCONTINUED | OUTPATIENT
Start: 2019-01-01 | End: 2019-01-01

## 2019-01-01 RX ORDER — ALBUTEROL SULFATE 0.83 MG/ML
SOLUTION RESPIRATORY (INHALATION)
Status: COMPLETED
Start: 2019-01-01 | End: 2019-01-01

## 2019-01-01 RX ORDER — IBUPROFEN 800 MG/1
800 TABLET, FILM COATED ORAL EVERY 8 HOURS PRN
Qty: 20 TABLET | Refills: 0 | Status: ON HOLD | OUTPATIENT
Start: 2019-01-01 | End: 2019-01-01

## 2019-01-01 RX ORDER — HEPARIN SODIUM (PORCINE) LOCK FLUSH IV SOLN 100 UNIT/ML 100 UNIT/ML
500 SOLUTION INTRAVENOUS ONCE
Status: COMPLETED | OUTPATIENT
Start: 2019-01-01 | End: 2019-01-01

## 2019-01-01 RX ORDER — AMOXICILLIN 250 MG
2 CAPSULE ORAL 2 TIMES DAILY
Status: DISCONTINUED | OUTPATIENT
Start: 2019-01-01 | End: 2019-01-01 | Stop reason: HOSPADM

## 2019-01-01 RX ORDER — CLONAZEPAM 0.5 MG/1
1-2 TABLET ORAL
Status: DISCONTINUED | OUTPATIENT
Start: 2019-01-01 | End: 2019-01-01

## 2019-01-01 RX ORDER — ALBUTEROL SULFATE 90 UG/1
1-2 AEROSOL, METERED RESPIRATORY (INHALATION) EVERY 6 HOURS PRN
Status: DISCONTINUED | OUTPATIENT
Start: 2019-01-01 | End: 2019-01-01

## 2019-01-01 RX ORDER — DIGOXIN 0.25 MG/ML
125 INJECTION INTRAMUSCULAR; INTRAVENOUS ONCE
Status: COMPLETED | OUTPATIENT
Start: 2019-01-01 | End: 2019-01-01

## 2019-01-01 RX ORDER — CEFAZOLIN SODIUM 1 G/50ML
SOLUTION INTRAVENOUS
Status: DISCONTINUED
Start: 2019-01-01 | End: 2019-01-01 | Stop reason: HOSPADM

## 2019-01-01 RX ORDER — METOPROLOL TARTRATE 1 MG/ML
5 INJECTION, SOLUTION INTRAVENOUS EVERY 6 HOURS PRN
Status: DISCONTINUED | OUTPATIENT
Start: 2019-01-01 | End: 2019-01-01 | Stop reason: HOSPADM

## 2019-01-01 RX ORDER — FUROSEMIDE 10 MG/ML
20 INJECTION INTRAMUSCULAR; INTRAVENOUS ONCE
Status: COMPLETED | OUTPATIENT
Start: 2019-01-01 | End: 2019-01-01

## 2019-01-01 RX ORDER — NOREPINEPHRINE BITARTRATE 0.06 MG/ML
.03-.2 INJECTION, SOLUTION INTRAVENOUS CONTINUOUS
Status: DISCONTINUED | OUTPATIENT
Start: 2019-01-01 | End: 2019-01-01 | Stop reason: HOSPADM

## 2019-01-01 RX ORDER — HYDROMORPHONE HYDROCHLORIDE 1 MG/ML
.3-.5 INJECTION, SOLUTION INTRAMUSCULAR; INTRAVENOUS; SUBCUTANEOUS EVERY 4 HOURS PRN
Status: DISCONTINUED | OUTPATIENT
Start: 2019-01-01 | End: 2019-01-01

## 2019-01-01 RX ORDER — LEVALBUTEROL 1.25 MG/.5ML
0.63 SOLUTION, CONCENTRATE RESPIRATORY (INHALATION) EVERY 6 HOURS PRN
Status: DISCONTINUED | OUTPATIENT
Start: 2019-01-01 | End: 2019-01-01

## 2019-01-01 RX ORDER — HYDROMORPHONE HYDROCHLORIDE 2 MG/1
2 TABLET ORAL EVERY 4 HOURS PRN
Status: DISCONTINUED | OUTPATIENT
Start: 2019-01-01 | End: 2019-01-01

## 2019-01-01 RX ORDER — MAGNESIUM HYDROXIDE 1200 MG/15ML
LIQUID ORAL PRN
Status: DISCONTINUED | OUTPATIENT
Start: 2019-01-01 | End: 2019-01-01 | Stop reason: HOSPADM

## 2019-01-01 RX ORDER — LIDOCAINE HYDROCHLORIDE 10 MG/ML
10 INJECTION, SOLUTION EPIDURAL; INFILTRATION; INTRACAUDAL; PERINEURAL ONCE
Status: COMPLETED | OUTPATIENT
Start: 2019-01-01 | End: 2019-01-01

## 2019-01-01 RX ORDER — POLYETHYLENE GLYCOL 3350 17 G/17G
17 POWDER, FOR SOLUTION ORAL DAILY PRN
Qty: 12 PACKET | Refills: 0 | Status: SHIPPED | OUTPATIENT
Start: 2019-01-01

## 2019-01-01 RX ORDER — PIPERACILLIN SODIUM, TAZOBACTAM SODIUM 3; .375 G/15ML; G/15ML
3.38 INJECTION, POWDER, LYOPHILIZED, FOR SOLUTION INTRAVENOUS ONCE
Status: COMPLETED | OUTPATIENT
Start: 2019-01-01 | End: 2019-01-01

## 2019-01-01 RX ORDER — IBUPROFEN 400 MG/1
800 TABLET, FILM COATED ORAL 3 TIMES DAILY
Status: DISCONTINUED | OUTPATIENT
Start: 2019-01-01 | End: 2019-01-01 | Stop reason: HOSPADM

## 2019-01-01 RX ORDER — IPRATROPIUM BROMIDE AND ALBUTEROL SULFATE 2.5; .5 MG/3ML; MG/3ML
3 SOLUTION RESPIRATORY (INHALATION) ONCE
Status: COMPLETED | OUTPATIENT
Start: 2019-01-01 | End: 2019-01-01

## 2019-01-01 RX ORDER — LORAZEPAM 0.5 MG/1
1 TABLET ORAL ONCE
Status: COMPLETED | OUTPATIENT
Start: 2019-01-01 | End: 2019-01-01

## 2019-01-01 RX ORDER — PROPOFOL 10 MG/ML
INJECTION, EMULSION INTRAVENOUS PRN
Status: DISCONTINUED | OUTPATIENT
Start: 2019-01-01 | End: 2019-01-01

## 2019-01-01 RX ORDER — MIRTAZAPINE 15 MG/1
15 TABLET, FILM COATED ORAL AT BEDTIME
Status: DISCONTINUED | OUTPATIENT
Start: 2019-01-01 | End: 2019-01-01 | Stop reason: HOSPADM

## 2019-01-01 RX ORDER — BUPROPION HYDROCHLORIDE 150 MG/1
150 TABLET ORAL EVERY MORNING
Status: ON HOLD | COMMUNITY
End: 2019-01-01

## 2019-01-01 RX ORDER — MAGNESIUM SULFATE HEPTAHYDRATE 40 MG/ML
2 INJECTION, SOLUTION INTRAVENOUS ONCE
Status: COMPLETED | OUTPATIENT
Start: 2019-01-01 | End: 2019-01-01

## 2019-01-01 RX ORDER — MORPHINE SULFATE 4 MG/ML
4 INJECTION, SOLUTION INTRAMUSCULAR; INTRAVENOUS ONCE
Status: COMPLETED | OUTPATIENT
Start: 2019-01-01 | End: 2019-01-01

## 2019-01-01 RX ORDER — MAGNESIUM SULFATE HEPTAHYDRATE 40 MG/ML
4 INJECTION, SOLUTION INTRAVENOUS EVERY 4 HOURS PRN
Status: DISCONTINUED | OUTPATIENT
Start: 2019-01-01 | End: 2019-01-01

## 2019-01-01 RX ORDER — NICOTINE POLACRILEX 4 MG
15-30 LOZENGE BUCCAL
Status: DISCONTINUED | OUTPATIENT
Start: 2019-01-01 | End: 2019-01-01 | Stop reason: DRUGHIGH

## 2019-01-01 RX ORDER — CYCLOBENZAPRINE HCL 10 MG
10 TABLET ORAL ONCE
Status: COMPLETED | OUTPATIENT
Start: 2019-01-01 | End: 2019-01-01

## 2019-01-01 RX ORDER — ACETAMINOPHEN 325 MG/1
650 TABLET ORAL ONCE
Status: COMPLETED | OUTPATIENT
Start: 2019-01-01 | End: 2019-01-01

## 2019-01-01 RX ORDER — CYCLOBENZAPRINE HCL 5 MG
5 TABLET ORAL 3 TIMES DAILY PRN
Qty: 30 TABLET | Refills: 0 | Status: ON HOLD | OUTPATIENT
Start: 2019-01-01 | End: 2019-01-01

## 2019-01-01 RX ORDER — MORPHINE SULFATE 100 MG/5ML
10 SOLUTION ORAL EVERY 4 HOURS PRN
Status: DISCONTINUED | OUTPATIENT
Start: 2019-01-01 | End: 2019-01-01

## 2019-01-01 RX ORDER — HEPARIN SODIUM (PORCINE) LOCK FLUSH IV SOLN 100 UNIT/ML 100 UNIT/ML
5 SOLUTION INTRAVENOUS
Status: DISCONTINUED | OUTPATIENT
Start: 2019-01-01 | End: 2019-01-01 | Stop reason: HOSPADM

## 2019-01-01 RX ORDER — ALBUTEROL SULFATE 90 UG/1
1-2 AEROSOL, METERED RESPIRATORY (INHALATION) EVERY 6 HOURS PRN
Status: ON HOLD | COMMUNITY
End: 2019-01-01

## 2019-01-01 RX ORDER — MORPHINE SULFATE 15 MG/1
15 TABLET, FILM COATED, EXTENDED RELEASE ORAL EVERY 12 HOURS SCHEDULED
Status: DISCONTINUED | OUTPATIENT
Start: 2019-01-01 | End: 2019-01-01

## 2019-01-01 RX ORDER — OLANZAPINE 5 MG/1
5 TABLET, ORALLY DISINTEGRATING ORAL AT BEDTIME
Status: DISCONTINUED | OUTPATIENT
Start: 2019-01-01 | End: 2019-01-01

## 2019-01-01 RX ORDER — CLONAZEPAM 0.5 MG/1
0.5 TABLET ORAL 2 TIMES DAILY
Status: DISCONTINUED | OUTPATIENT
Start: 2019-01-01 | End: 2019-01-01 | Stop reason: HOSPADM

## 2019-01-01 RX ORDER — SODIUM CHLORIDE, SODIUM LACTATE, POTASSIUM CHLORIDE, CALCIUM CHLORIDE 600; 310; 30; 20 MG/100ML; MG/100ML; MG/100ML; MG/100ML
INJECTION, SOLUTION INTRAVENOUS CONTINUOUS
Status: DISCONTINUED | OUTPATIENT
Start: 2019-01-01 | End: 2019-01-01 | Stop reason: HOSPADM

## 2019-01-01 RX ORDER — ALBUTEROL SULFATE 0.83 MG/ML
3 SOLUTION RESPIRATORY (INHALATION)
Status: DISCONTINUED | OUTPATIENT
Start: 2019-01-01 | End: 2019-01-01

## 2019-01-01 RX ORDER — CALCIUM CARBONATE 500 MG/1
1000 TABLET, CHEWABLE ORAL 4 TIMES DAILY PRN
Status: DISCONTINUED | OUTPATIENT
Start: 2019-01-01 | End: 2019-01-01 | Stop reason: HOSPADM

## 2019-01-01 RX ORDER — SULFAMETHOXAZOLE/TRIMETHOPRIM 800-160 MG
1 TABLET ORAL 2 TIMES DAILY
Qty: 20 TABLET | Refills: 0 | Status: SHIPPED | OUTPATIENT
Start: 2019-01-01 | End: 2019-01-01

## 2019-01-01 RX ORDER — IBUPROFEN 400 MG/1
800 TABLET, FILM COATED ORAL EVERY 8 HOURS PRN
Status: DISCONTINUED | OUTPATIENT
Start: 2019-01-01 | End: 2019-01-01 | Stop reason: DRUGHIGH

## 2019-01-01 RX ORDER — AMOXICILLIN 250 MG
1 CAPSULE ORAL 2 TIMES DAILY
Status: DISCONTINUED | OUTPATIENT
Start: 2019-01-01 | End: 2019-01-01 | Stop reason: HOSPADM

## 2019-01-01 RX ORDER — HEPARIN SODIUM (PORCINE) LOCK FLUSH IV SOLN 100 UNIT/ML 100 UNIT/ML
300 SOLUTION INTRAVENOUS ONCE
Status: COMPLETED | OUTPATIENT
Start: 2019-01-01 | End: 2019-01-01

## 2019-01-01 RX ORDER — PROCHLORPERAZINE 25 MG
25 SUPPOSITORY, RECTAL RECTAL EVERY 12 HOURS PRN
Status: DISCONTINUED | OUTPATIENT
Start: 2019-01-01 | End: 2019-01-01 | Stop reason: HOSPADM

## 2019-01-01 RX ORDER — CYCLOBENZAPRINE HCL 10 MG
10 TABLET ORAL 3 TIMES DAILY PRN
Status: DISCONTINUED | OUTPATIENT
Start: 2019-01-01 | End: 2019-01-01 | Stop reason: HOSPADM

## 2019-01-01 RX ORDER — POTASSIUM CL/LIDO/0.9 % NACL 10MEQ/0.1L
10 INTRAVENOUS SOLUTION, PIGGYBACK (ML) INTRAVENOUS
Status: DISCONTINUED | OUTPATIENT
Start: 2019-01-01 | End: 2019-01-01

## 2019-01-01 RX ORDER — NITROGLYCERIN 0.4 MG/1
0.4 TABLET SUBLINGUAL EVERY 5 MIN PRN
Status: DISCONTINUED | OUTPATIENT
Start: 2019-01-01 | End: 2019-01-01 | Stop reason: HOSPADM

## 2019-01-01 RX ORDER — LEVALBUTEROL 1.25 MG/.5ML
1.25 SOLUTION, CONCENTRATE RESPIRATORY (INHALATION) EVERY 8 HOURS PRN
Status: DISCONTINUED | OUTPATIENT
Start: 2019-01-01 | End: 2019-01-01 | Stop reason: HOSPADM

## 2019-01-01 RX ORDER — LORAZEPAM 2 MG/ML
INJECTION INTRAMUSCULAR
Status: COMPLETED
Start: 2019-01-01 | End: 2019-01-01

## 2019-01-01 RX ORDER — PROPOFOL 10 MG/ML
10-20 INJECTION, EMULSION INTRAVENOUS EVERY 30 MIN PRN
Status: DISCONTINUED | OUTPATIENT
Start: 2019-01-01 | End: 2019-01-01 | Stop reason: HOSPADM

## 2019-01-01 RX ORDER — POTASSIUM CHLORIDE 1.5 G/1.58G
20-40 POWDER, FOR SOLUTION ORAL
Status: DISCONTINUED | OUTPATIENT
Start: 2019-01-01 | End: 2019-01-01 | Stop reason: HOSPADM

## 2019-01-01 RX ORDER — IPRATROPIUM BROMIDE AND ALBUTEROL SULFATE 2.5; .5 MG/3ML; MG/3ML
SOLUTION RESPIRATORY (INHALATION)
Status: DISCONTINUED
Start: 2019-01-01 | End: 2019-01-01 | Stop reason: HOSPADM

## 2019-01-01 RX ORDER — LEVALBUTEROL 1.25 MG/.5ML
0.1 SOLUTION, CONCENTRATE RESPIRATORY (INHALATION) EVERY 8 HOURS PRN
Status: DISCONTINUED | OUTPATIENT
Start: 2019-01-01 | End: 2019-01-01

## 2019-01-01 RX ORDER — LIDOCAINE HYDROCHLORIDE 10 MG/ML
INJECTION, SOLUTION INFILTRATION; PERINEURAL
Status: DISCONTINUED
Start: 2019-01-01 | End: 2019-01-01 | Stop reason: HOSPADM

## 2019-01-01 RX ORDER — IPRATROPIUM BROMIDE AND ALBUTEROL SULFATE 2.5; .5 MG/3ML; MG/3ML
3 SOLUTION RESPIRATORY (INHALATION) 4 TIMES DAILY
Status: DISCONTINUED | OUTPATIENT
Start: 2019-01-01 | End: 2019-01-01

## 2019-01-01 RX ORDER — HEPARIN SODIUM (PORCINE) LOCK FLUSH IV SOLN 100 UNIT/ML 100 UNIT/ML
SOLUTION INTRAVENOUS
Status: COMPLETED
Start: 2019-01-01 | End: 2019-01-01

## 2019-01-01 RX ORDER — HEPARIN SODIUM (PORCINE) LOCK FLUSH IV SOLN 100 UNIT/ML 100 UNIT/ML
5 SOLUTION INTRAVENOUS
Status: DISCONTINUED | OUTPATIENT
Start: 2019-01-01 | End: 2019-01-01

## 2019-01-01 RX ORDER — FLUMAZENIL 0.1 MG/ML
0.2 INJECTION, SOLUTION INTRAVENOUS
Status: DISCONTINUED | OUTPATIENT
Start: 2019-01-01 | End: 2019-01-01 | Stop reason: HOSPADM

## 2019-01-01 RX ORDER — METOCLOPRAMIDE 5 MG/1
10 TABLET ORAL EVERY 6 HOURS PRN
Status: DISCONTINUED | OUTPATIENT
Start: 2019-01-01 | End: 2019-01-01

## 2019-01-01 RX ORDER — MAGNESIUM SULFATE HEPTAHYDRATE 40 MG/ML
4 INJECTION, SOLUTION INTRAVENOUS EVERY 4 HOURS PRN
Status: DISCONTINUED | OUTPATIENT
Start: 2019-01-01 | End: 2019-01-01 | Stop reason: HOSPADM

## 2019-01-01 RX ORDER — ALBUTEROL SULFATE 0.83 MG/ML
2.5 SOLUTION RESPIRATORY (INHALATION)
Status: DISCONTINUED | OUTPATIENT
Start: 2019-01-01 | End: 2019-01-01 | Stop reason: HOSPADM

## 2019-01-01 RX ORDER — FUROSEMIDE 10 MG/ML
40 INJECTION INTRAMUSCULAR; INTRAVENOUS EVERY 8 HOURS
Status: DISCONTINUED | OUTPATIENT
Start: 2019-01-01 | End: 2019-01-01 | Stop reason: HOSPADM

## 2019-01-01 RX ORDER — LORAZEPAM 0.5 MG/1
0.5 TABLET ORAL ONCE
Status: COMPLETED | OUTPATIENT
Start: 2019-01-01 | End: 2019-01-01

## 2019-01-01 RX ORDER — LEVOFLOXACIN 500 MG/1
500 TABLET, FILM COATED ORAL DAILY
Status: DISCONTINUED | OUTPATIENT
Start: 2019-01-01 | End: 2019-01-01

## 2019-01-01 RX ORDER — IBUPROFEN 100 MG/5ML
400 SUSPENSION, ORAL (FINAL DOSE FORM) ORAL EVERY 8 HOURS PRN
Status: DISCONTINUED | OUTPATIENT
Start: 2019-01-01 | End: 2019-01-01

## 2019-01-01 RX ORDER — HYDROMORPHONE HYDROCHLORIDE 2 MG/1
2 TABLET ORAL
Qty: 15 TABLET | Refills: 0 | Status: ON HOLD | OUTPATIENT
Start: 2019-01-01 | End: 2019-01-01

## 2019-01-01 RX ORDER — LORAZEPAM 0.5 MG/1
0.5 TABLET ORAL EVERY 8 HOURS PRN
Qty: 10 TABLET | Refills: 0 | Status: ON HOLD | OUTPATIENT
Start: 2019-01-01 | End: 2019-01-01

## 2019-01-01 RX ORDER — POTASSIUM CL/LIDO/0.9 % NACL 10MEQ/0.1L
10 INTRAVENOUS SOLUTION, PIGGYBACK (ML) INTRAVENOUS
Status: DISCONTINUED | OUTPATIENT
Start: 2019-01-01 | End: 2019-01-01 | Stop reason: HOSPADM

## 2019-01-01 RX ORDER — POTASSIUM CHLORIDE 7.45 MG/ML
10 INJECTION INTRAVENOUS
Status: DISCONTINUED | OUTPATIENT
Start: 2019-01-01 | End: 2019-01-01 | Stop reason: HOSPADM

## 2019-01-01 RX ORDER — DIPHENHYDRAMINE HYDROCHLORIDE 50 MG/ML
25 INJECTION INTRAMUSCULAR; INTRAVENOUS EVERY 6 HOURS PRN
Status: DISCONTINUED | OUTPATIENT
Start: 2019-01-01 | End: 2019-01-01 | Stop reason: HOSPADM

## 2019-01-01 RX ORDER — IBUPROFEN 400 MG/1
800 TABLET, FILM COATED ORAL EVERY 8 HOURS PRN
Status: DISCONTINUED | OUTPATIENT
Start: 2019-01-01 | End: 2019-01-01 | Stop reason: HOSPADM

## 2019-01-01 RX ORDER — KETOROLAC TROMETHAMINE 15 MG/ML
15 INJECTION, SOLUTION INTRAMUSCULAR; INTRAVENOUS ONCE
Status: COMPLETED | OUTPATIENT
Start: 2019-01-01 | End: 2019-01-01

## 2019-01-01 RX ORDER — METOCLOPRAMIDE 5 MG/1
10 TABLET ORAL EVERY 6 HOURS PRN
Status: DISCONTINUED | OUTPATIENT
Start: 2019-01-01 | End: 2019-01-01 | Stop reason: HOSPADM

## 2019-01-01 RX ORDER — FENTANYL CITRATE 50 UG/ML
INJECTION, SOLUTION INTRAMUSCULAR; INTRAVENOUS
Status: DISCONTINUED
Start: 2019-01-01 | End: 2019-01-01 | Stop reason: HOSPADM

## 2019-01-01 RX ORDER — FUROSEMIDE 10 MG/ML
40 INJECTION INTRAMUSCULAR; INTRAVENOUS ONCE
Status: COMPLETED | OUTPATIENT
Start: 2019-01-01 | End: 2019-01-01

## 2019-01-01 RX ORDER — CLONAZEPAM 1 MG/1
2 TABLET ORAL AT BEDTIME
Status: ON HOLD | COMMUNITY
End: 2019-01-01

## 2019-01-01 RX ORDER — POLYETHYLENE GLYCOL 3350 17 G/17G
17 POWDER, FOR SOLUTION ORAL DAILY PRN
Status: DISCONTINUED | OUTPATIENT
Start: 2019-01-01 | End: 2019-01-01 | Stop reason: HOSPADM

## 2019-01-01 RX ORDER — PREDNISONE 20 MG/1
40 TABLET ORAL DAILY
Qty: 8 TABLET | Refills: 0 | Status: ON HOLD | OUTPATIENT
Start: 2019-01-01 | End: 2019-01-01

## 2019-01-01 RX ORDER — DEXAMETHASONE SODIUM PHOSPHATE 4 MG/ML
INJECTION, SOLUTION INTRA-ARTICULAR; INTRALESIONAL; INTRAMUSCULAR; INTRAVENOUS; SOFT TISSUE PRN
Status: DISCONTINUED | OUTPATIENT
Start: 2019-01-01 | End: 2019-01-01

## 2019-01-01 RX ORDER — BUPROPION HYDROCHLORIDE 150 MG/1
150 TABLET ORAL EVERY MORNING
Status: DISCONTINUED | OUTPATIENT
Start: 2019-01-01 | End: 2019-01-01

## 2019-01-01 RX ORDER — FENTANYL CITRATE 50 UG/ML
INJECTION, SOLUTION INTRAMUSCULAR; INTRAVENOUS
Status: DISCONTINUED
Start: 2019-01-01 | End: 2019-01-01 | Stop reason: WASHOUT

## 2019-01-01 RX ORDER — SCOLOPAMINE TRANSDERMAL SYSTEM 1 MG/1
1 PATCH, EXTENDED RELEASE TRANSDERMAL ONCE
Status: DISCONTINUED | OUTPATIENT
Start: 2019-01-01 | End: 2019-01-01

## 2019-01-01 RX ORDER — ALBUTEROL SULFATE 90 UG/1
2 AEROSOL, METERED RESPIRATORY (INHALATION) EVERY 4 HOURS PRN
Status: DISCONTINUED | OUTPATIENT
Start: 2019-01-01 | End: 2019-01-01

## 2019-01-01 RX ORDER — HYDROXYZINE HYDROCHLORIDE 25 MG/1
50 TABLET, FILM COATED ORAL EVERY 6 HOURS PRN
Status: DISCONTINUED | OUTPATIENT
Start: 2019-01-01 | End: 2019-01-01

## 2019-01-01 RX ORDER — HYDROMORPHONE HYDROCHLORIDE 1 MG/ML
.3-.5 INJECTION, SOLUTION INTRAMUSCULAR; INTRAVENOUS; SUBCUTANEOUS
Status: DISCONTINUED | OUTPATIENT
Start: 2019-01-01 | End: 2019-01-01 | Stop reason: HOSPADM

## 2019-01-01 RX ORDER — ACETAMINOPHEN 325 MG/1
650 TABLET ORAL EVERY 4 HOURS PRN
Status: DISCONTINUED | OUTPATIENT
Start: 2019-01-01 | End: 2019-01-01

## 2019-01-01 RX ORDER — IPRATROPIUM BROMIDE AND ALBUTEROL SULFATE 2.5; .5 MG/3ML; MG/3ML
3 SOLUTION RESPIRATORY (INHALATION) 4 TIMES DAILY
Status: DISCONTINUED | OUTPATIENT
Start: 2019-01-01 | End: 2019-01-01 | Stop reason: DRUGHIGH

## 2019-01-01 RX ORDER — ONDANSETRON 2 MG/ML
INJECTION INTRAMUSCULAR; INTRAVENOUS
Status: DISCONTINUED
Start: 2019-01-01 | End: 2019-01-01 | Stop reason: HOSPADM

## 2019-01-01 RX ORDER — ACETAMINOPHEN 650 MG/1
650 SUPPOSITORY RECTAL EVERY 4 HOURS PRN
Status: DISCONTINUED | OUTPATIENT
Start: 2019-01-01 | End: 2019-01-01

## 2019-01-01 RX ORDER — ALBUMIN (HUMAN) 12.5 G/50ML
50 SOLUTION INTRAVENOUS ONCE
Status: COMPLETED | OUTPATIENT
Start: 2019-01-01 | End: 2019-01-01

## 2019-01-01 RX ORDER — ADENOSINE 3 MG/ML
6 INJECTION, SOLUTION INTRAVENOUS ONCE
Status: COMPLETED | OUTPATIENT
Start: 2019-01-01 | End: 2019-01-01

## 2019-01-01 RX ORDER — IOPAMIDOL 755 MG/ML
73 INJECTION, SOLUTION INTRAVASCULAR ONCE
Status: COMPLETED | OUTPATIENT
Start: 2019-01-01 | End: 2019-01-01

## 2019-01-01 RX ORDER — MAGNESIUM SULFATE HEPTAHYDRATE 40 MG/ML
2 INJECTION, SOLUTION INTRAVENOUS DAILY PRN
Status: DISCONTINUED | OUTPATIENT
Start: 2019-01-01 | End: 2019-01-01

## 2019-01-01 RX ORDER — POTASSIUM CHLORIDE 29.8 MG/ML
20 INJECTION INTRAVENOUS
Status: DISCONTINUED | OUTPATIENT
Start: 2019-01-01 | End: 2019-01-01 | Stop reason: HOSPADM

## 2019-01-01 RX ORDER — FENTANYL CITRATE 50 UG/ML
INJECTION, SOLUTION INTRAMUSCULAR; INTRAVENOUS PRN
Status: DISCONTINUED | OUTPATIENT
Start: 2019-01-01 | End: 2019-01-01

## 2019-01-01 RX ORDER — SODIUM CHLORIDE 9 MG/ML
INJECTION, SOLUTION INTRAVENOUS CONTINUOUS
Status: DISCONTINUED | OUTPATIENT
Start: 2019-01-01 | End: 2019-01-01

## 2019-01-01 RX ORDER — POTASSIUM CHLORIDE 1.5 G/1.58G
40 POWDER, FOR SOLUTION ORAL ONCE
Status: COMPLETED | OUTPATIENT
Start: 2019-01-01 | End: 2019-01-01

## 2019-01-01 RX ORDER — ALBUTEROL SULFATE 5 MG/ML
2.5 SOLUTION RESPIRATORY (INHALATION) EVERY 4 HOURS
Status: DISCONTINUED | OUTPATIENT
Start: 2019-01-01 | End: 2019-01-01

## 2019-01-01 RX ORDER — SCOLOPAMINE TRANSDERMAL SYSTEM 1 MG/1
1 PATCH, EXTENDED RELEASE TRANSDERMAL
Status: DISCONTINUED | OUTPATIENT
Start: 2019-01-01 | End: 2019-01-01 | Stop reason: HOSPADM

## 2019-01-01 RX ORDER — METOPROLOL SUCCINATE 50 MG/1
50 TABLET, EXTENDED RELEASE ORAL DAILY
Qty: 30 TABLET | Refills: 0 | Status: SHIPPED | OUTPATIENT
Start: 2019-01-01

## 2019-01-01 RX ORDER — LORAZEPAM 1 MG/1
1 TABLET ORAL 2 TIMES DAILY PRN
Qty: 6 TABLET | Refills: 0 | Status: ON HOLD | OUTPATIENT
Start: 2019-01-01 | End: 2019-01-01

## 2019-01-01 RX ORDER — LEVALBUTEROL 1.25 MG/.5ML
0.63 SOLUTION, CONCENTRATE RESPIRATORY (INHALATION) ONCE
Status: DISCONTINUED | OUTPATIENT
Start: 2019-01-01 | End: 2019-01-01 | Stop reason: HOSPADM

## 2019-01-01 RX ORDER — CYCLOBENZAPRINE HCL 5 MG
5 TABLET ORAL 3 TIMES DAILY PRN
Status: DISCONTINUED | OUTPATIENT
Start: 2019-01-01 | End: 2019-01-01 | Stop reason: HOSPADM

## 2019-01-01 RX ORDER — BISACODYL 10 MG
10 SUPPOSITORY, RECTAL RECTAL DAILY PRN
Status: DISCONTINUED | OUTPATIENT
Start: 2019-01-01 | End: 2019-01-01

## 2019-01-01 RX ORDER — CLONAZEPAM 0.5 MG/1
0.5 TABLET ORAL ONCE
Status: COMPLETED | OUTPATIENT
Start: 2019-01-01 | End: 2019-01-01

## 2019-01-01 RX ORDER — HEPARIN SODIUM 5000 [USP'U]/.5ML
5000 INJECTION, SOLUTION INTRAVENOUS; SUBCUTANEOUS EVERY 8 HOURS
Status: DISCONTINUED | OUTPATIENT
Start: 2019-01-01 | End: 2019-01-01

## 2019-01-01 RX ORDER — HYDROMORPHONE HYDROCHLORIDE 1 MG/ML
0.5 INJECTION, SOLUTION INTRAMUSCULAR; INTRAVENOUS; SUBCUTANEOUS
Status: DISCONTINUED | OUTPATIENT
Start: 2019-01-01 | End: 2019-01-01 | Stop reason: ALTCHOICE

## 2019-01-01 RX ORDER — ONDANSETRON 2 MG/ML
4 INJECTION INTRAMUSCULAR; INTRAVENOUS EVERY 6 HOURS PRN
Status: DISCONTINUED | OUTPATIENT
Start: 2019-01-01 | End: 2019-01-01

## 2019-01-01 RX ORDER — POTASSIUM CHLORIDE 1500 MG/1
40 TABLET, EXTENDED RELEASE ORAL EVERY 4 HOURS
Status: COMPLETED | OUTPATIENT
Start: 2019-01-01 | End: 2019-01-01

## 2019-01-01 RX ORDER — LORAZEPAM 2 MG/ML
1 INJECTION INTRAMUSCULAR ONCE
Status: DISCONTINUED | OUTPATIENT
Start: 2019-01-01 | End: 2019-01-01

## 2019-01-01 RX ORDER — IOPAMIDOL 755 MG/ML
85 INJECTION, SOLUTION INTRAVASCULAR ONCE
Status: COMPLETED | OUTPATIENT
Start: 2019-01-01 | End: 2019-01-01

## 2019-01-01 RX ORDER — FUROSEMIDE 10 MG/ML
20 INJECTION INTRAMUSCULAR; INTRAVENOUS ONCE
Status: DISCONTINUED | OUTPATIENT
Start: 2019-01-01 | End: 2019-01-01

## 2019-01-01 RX ORDER — ONDANSETRON 2 MG/ML
4 INJECTION INTRAMUSCULAR; INTRAVENOUS EVERY 30 MIN PRN
Status: DISCONTINUED | OUTPATIENT
Start: 2019-01-01 | End: 2019-01-01 | Stop reason: HOSPADM

## 2019-01-01 RX ORDER — ALBUTEROL SULFATE 90 UG/1
1 AEROSOL, METERED RESPIRATORY (INHALATION) EVERY 4 HOURS PRN
Status: DISCONTINUED | OUTPATIENT
Start: 2019-01-01 | End: 2019-01-01

## 2019-01-01 RX ORDER — PANTOPRAZOLE SODIUM 40 MG/1
40 TABLET, DELAYED RELEASE ORAL
Qty: 30 TABLET | Refills: 0 | Status: SHIPPED | OUTPATIENT
Start: 2019-01-01

## 2019-01-01 RX ORDER — METOPROLOL TARTRATE 1 MG/ML
2.5 INJECTION, SOLUTION INTRAVENOUS EVERY 6 HOURS PRN
Status: DISCONTINUED | OUTPATIENT
Start: 2019-01-01 | End: 2019-01-01

## 2019-01-01 RX ORDER — IOPAMIDOL 755 MG/ML
72 INJECTION, SOLUTION INTRAVASCULAR ONCE
Status: COMPLETED | OUTPATIENT
Start: 2019-01-01 | End: 2019-01-01

## 2019-01-01 RX ORDER — ALBUTEROL SULFATE 0.83 MG/ML
2.5 SOLUTION RESPIRATORY (INHALATION) EVERY 4 HOURS PRN
Status: DISCONTINUED | OUTPATIENT
Start: 2019-01-01 | End: 2019-01-01 | Stop reason: HOSPADM

## 2019-01-01 RX ORDER — ONDANSETRON 4 MG/1
4 TABLET, ORALLY DISINTEGRATING ORAL EVERY 6 HOURS PRN
Status: DISCONTINUED | OUTPATIENT
Start: 2019-01-01 | End: 2019-01-01

## 2019-01-01 RX ORDER — POTASSIUM CHLORIDE 1500 MG/1
20-40 TABLET, EXTENDED RELEASE ORAL
Status: DISCONTINUED | OUTPATIENT
Start: 2019-01-01 | End: 2019-01-01

## 2019-01-01 RX ORDER — BISACODYL 10 MG
10 SUPPOSITORY, RECTAL RECTAL DAILY PRN
Qty: 5 SUPPOSITORY | Refills: 0 | Status: SHIPPED | OUTPATIENT
Start: 2019-01-01

## 2019-01-01 RX ORDER — PIPERACILLIN SODIUM, TAZOBACTAM SODIUM 4; .5 G/20ML; G/20ML
4.5 INJECTION, POWDER, LYOPHILIZED, FOR SOLUTION INTRAVENOUS EVERY 6 HOURS
Status: DISCONTINUED | OUTPATIENT
Start: 2019-01-01 | End: 2019-01-01 | Stop reason: HOSPADM

## 2019-01-01 RX ORDER — SODIUM CHLORIDE 9 MG/ML
INJECTION, SOLUTION INTRAVENOUS CONTINUOUS
Status: DISCONTINUED | OUTPATIENT
Start: 2019-01-01 | End: 2019-01-01 | Stop reason: HOSPADM

## 2019-01-01 RX ORDER — SCOLOPAMINE TRANSDERMAL SYSTEM 1 MG/1
1 PATCH, EXTENDED RELEASE TRANSDERMAL
Status: DISCONTINUED | OUTPATIENT
Start: 2019-01-01 | End: 2019-01-01

## 2019-01-01 RX ORDER — ONDANSETRON 2 MG/ML
4 INJECTION INTRAMUSCULAR; INTRAVENOUS EVERY 6 HOURS PRN
Status: DISCONTINUED | OUTPATIENT
Start: 2019-01-01 | End: 2019-01-01 | Stop reason: HOSPADM

## 2019-01-01 RX ORDER — DEXTROSE MONOHYDRATE 25 G/50ML
25-50 INJECTION, SOLUTION INTRAVENOUS
Status: DISCONTINUED | OUTPATIENT
Start: 2019-01-01 | End: 2019-01-01

## 2019-01-01 RX ORDER — ACETAMINOPHEN 500 MG
1000 TABLET ORAL 3 TIMES DAILY
Status: DISCONTINUED | OUTPATIENT
Start: 2019-01-01 | End: 2019-01-01 | Stop reason: HOSPADM

## 2019-01-01 RX ORDER — METHYLPREDNISOLONE SODIUM SUCCINATE 125 MG/2ML
60 INJECTION, POWDER, LYOPHILIZED, FOR SOLUTION INTRAMUSCULAR; INTRAVENOUS EVERY 8 HOURS
Status: DISCONTINUED | OUTPATIENT
Start: 2019-01-01 | End: 2019-01-01 | Stop reason: HOSPADM

## 2019-01-01 RX ORDER — TRAMADOL HYDROCHLORIDE 50 MG/1
50 TABLET ORAL EVERY 6 HOURS PRN
Status: DISCONTINUED | OUTPATIENT
Start: 2019-01-01 | End: 2019-01-01

## 2019-01-01 RX ORDER — IOPAMIDOL 755 MG/ML
78 INJECTION, SOLUTION INTRAVASCULAR ONCE
Status: COMPLETED | OUTPATIENT
Start: 2019-01-01 | End: 2019-01-01

## 2019-01-01 RX ORDER — QUETIAPINE FUMARATE 50 MG/1
50 TABLET, FILM COATED ORAL 3 TIMES DAILY
Qty: 90 TABLET | Refills: 0 | Status: SHIPPED | OUTPATIENT
Start: 2019-01-01 | End: 2019-01-01

## 2019-01-01 RX ORDER — AMOXICILLIN 250 MG
1 CAPSULE ORAL 2 TIMES DAILY PRN
Qty: 30 TABLET | Refills: 0 | Status: SHIPPED | OUTPATIENT
Start: 2019-01-01

## 2019-01-01 RX ORDER — LEVALBUTEROL INHALATION SOLUTION 0.31 MG/3ML
1 SOLUTION RESPIRATORY (INHALATION) EVERY 8 HOURS PRN
Qty: 30 ML | Refills: 0 | Status: ON HOLD | OUTPATIENT
Start: 2019-01-01 | End: 2019-01-01

## 2019-01-01 RX ORDER — PREDNISONE 20 MG/1
40 TABLET ORAL DAILY
Status: DISPENSED | OUTPATIENT
Start: 2019-01-01 | End: 2019-01-01

## 2019-01-01 RX ORDER — ACETAMINOPHEN 325 MG/1
975 TABLET ORAL EVERY 8 HOURS
Status: DISPENSED | OUTPATIENT
Start: 2019-01-01 | End: 2019-01-01

## 2019-01-01 RX ORDER — HYDROMORPHONE HYDROCHLORIDE 2 MG/1
2 TABLET ORAL
Qty: 20 TABLET | Refills: 0 | Status: ON HOLD | OUTPATIENT
Start: 2019-01-01 | End: 2019-01-01

## 2019-01-01 RX ORDER — LORAZEPAM 2 MG/ML
1 INJECTION INTRAMUSCULAR ONCE
Status: COMPLETED | OUTPATIENT
Start: 2019-01-01 | End: 2019-01-01

## 2019-01-01 RX ORDER — AZITHROMYCIN 500 MG/1
500 INJECTION, POWDER, LYOPHILIZED, FOR SOLUTION INTRAVENOUS EVERY 24 HOURS
Status: DISCONTINUED | OUTPATIENT
Start: 2019-01-01 | End: 2019-01-01

## 2019-01-01 RX ORDER — MAGNESIUM SULFATE HEPTAHYDRATE 40 MG/ML
2 INJECTION, SOLUTION INTRAVENOUS DAILY PRN
Status: DISCONTINUED | OUTPATIENT
Start: 2019-01-01 | End: 2019-01-01 | Stop reason: HOSPADM

## 2019-01-01 RX ORDER — POTASSIUM CHLORIDE 1500 MG/1
20 TABLET, EXTENDED RELEASE ORAL ONCE
Status: DISCONTINUED | OUTPATIENT
Start: 2019-01-01 | End: 2019-01-01 | Stop reason: HOSPADM

## 2019-01-01 RX ORDER — TRAMADOL HYDROCHLORIDE 50 MG/1
50 TABLET ORAL EVERY 6 HOURS PRN
Qty: 5 TABLET | Refills: 0 | Status: ON HOLD | OUTPATIENT
Start: 2019-01-01 | End: 2019-01-01

## 2019-01-01 RX ORDER — PROCHLORPERAZINE MALEATE 5 MG
10 TABLET ORAL EVERY 6 HOURS PRN
Status: DISCONTINUED | OUTPATIENT
Start: 2019-01-01 | End: 2019-01-01

## 2019-01-01 RX ORDER — LEVOFLOXACIN 750 MG/1
750 TABLET, FILM COATED ORAL DAILY
Qty: 5 TABLET | Refills: 0 | Status: ON HOLD | OUTPATIENT
Start: 2019-01-01 | End: 2019-01-01

## 2019-01-01 RX ORDER — METOPROLOL TARTRATE 1 MG/ML
INJECTION, SOLUTION INTRAVENOUS
Status: DISCONTINUED
Start: 2019-01-01 | End: 2019-01-01 | Stop reason: HOSPADM

## 2019-01-01 RX ORDER — PROCHLORPERAZINE MALEATE 5 MG
10 TABLET ORAL EVERY 6 HOURS PRN
Status: DISCONTINUED | OUTPATIENT
Start: 2019-01-01 | End: 2019-01-01 | Stop reason: HOSPADM

## 2019-01-01 RX ORDER — PROCHLORPERAZINE MALEATE 10 MG
10 TABLET ORAL EVERY 6 HOURS PRN
Status: DISCONTINUED | OUTPATIENT
Start: 2019-01-01 | End: 2019-01-01 | Stop reason: HOSPADM

## 2019-01-01 RX ORDER — METHYLPREDNISOLONE SODIUM SUCCINATE 40 MG/ML
40 INJECTION, POWDER, LYOPHILIZED, FOR SOLUTION INTRAMUSCULAR; INTRAVENOUS DAILY
Status: DISCONTINUED | OUTPATIENT
Start: 2019-01-01 | End: 2019-01-01 | Stop reason: HOSPADM

## 2019-01-01 RX ORDER — ALBUMIN, HUMAN INJ 5% 5 %
25 SOLUTION INTRAVENOUS ONCE
Status: COMPLETED | OUTPATIENT
Start: 2019-01-01 | End: 2019-01-01

## 2019-01-01 RX ORDER — HYDROMORPHONE HYDROCHLORIDE 1 MG/ML
.3-.5 INJECTION, SOLUTION INTRAMUSCULAR; INTRAVENOUS; SUBCUTANEOUS EVERY 4 HOURS PRN
Status: DISCONTINUED | OUTPATIENT
Start: 2019-01-01 | End: 2019-01-01 | Stop reason: DRUGHIGH

## 2019-01-01 RX ORDER — ONDANSETRON 4 MG/1
4 TABLET, ORALLY DISINTEGRATING ORAL EVERY 8 HOURS
Status: DISCONTINUED | OUTPATIENT
Start: 2019-01-01 | End: 2019-01-01

## 2019-01-01 RX ORDER — HYDROMORPHONE HYDROCHLORIDE 1 MG/ML
0.3 INJECTION, SOLUTION INTRAMUSCULAR; INTRAVENOUS; SUBCUTANEOUS EVERY 4 HOURS PRN
Status: COMPLETED | OUTPATIENT
Start: 2019-01-01 | End: 2019-01-01

## 2019-01-01 RX ORDER — FUROSEMIDE 20 MG
20 TABLET ORAL 2 TIMES DAILY
Qty: 60 TABLET | Refills: 0 | Status: SHIPPED | OUTPATIENT
Start: 2019-01-01

## 2019-01-01 RX ORDER — HYDROXYZINE HYDROCHLORIDE 25 MG/1
50 TABLET, FILM COATED ORAL EVERY 4 HOURS PRN
Status: DISCONTINUED | OUTPATIENT
Start: 2019-01-01 | End: 2019-01-01 | Stop reason: HOSPADM

## 2019-01-01 RX ORDER — HYDROMORPHONE HYDROCHLORIDE 2 MG/1
2 TABLET ORAL
Status: DISCONTINUED | OUTPATIENT
Start: 2019-01-01 | End: 2019-01-01 | Stop reason: DRUGHIGH

## 2019-01-01 RX ORDER — LORAZEPAM 2 MG/ML
0.5 INJECTION INTRAMUSCULAR
Status: DISCONTINUED | OUTPATIENT
Start: 2019-01-01 | End: 2019-01-01 | Stop reason: HOSPADM

## 2019-01-01 RX ORDER — SODIUM CHLORIDE, SODIUM GLUCONATE, SODIUM ACETATE, POTASSIUM CHLORIDE AND MAGNESIUM CHLORIDE 526; 502; 368; 37; 30 MG/100ML; MG/100ML; MG/100ML; MG/100ML; MG/100ML
1000 INJECTION, SOLUTION INTRAVENOUS ONCE
Status: COMPLETED | OUTPATIENT
Start: 2019-01-01 | End: 2019-01-01

## 2019-01-01 RX ORDER — FENTANYL CITRATE 50 UG/ML
25-50 INJECTION, SOLUTION INTRAMUSCULAR; INTRAVENOUS
Status: DISCONTINUED | OUTPATIENT
Start: 2019-01-01 | End: 2019-01-01 | Stop reason: HOSPADM

## 2019-01-01 RX ORDER — CALCIUM CHLORIDE 100 MG/ML
1 INJECTION INTRAVENOUS; INTRAVENTRICULAR ONCE
Status: DISCONTINUED | OUTPATIENT
Start: 2019-01-01 | End: 2019-01-01 | Stop reason: HOSPADM

## 2019-01-01 RX ORDER — DULOXETIN HYDROCHLORIDE 30 MG/1
30 CAPSULE, DELAYED RELEASE ORAL DAILY
Status: DISCONTINUED | OUTPATIENT
Start: 2019-01-01 | End: 2019-01-01 | Stop reason: HOSPADM

## 2019-01-01 RX ORDER — CALCIUM CHLORIDE 100 MG/ML
INJECTION INTRAVENOUS; INTRAVENTRICULAR
Status: COMPLETED
Start: 2019-01-01 | End: 2019-01-01

## 2019-01-01 RX ORDER — ALBUTEROL SULFATE 90 UG/1
1-2 AEROSOL, METERED RESPIRATORY (INHALATION) EVERY 6 HOURS PRN
Qty: 1 INHALER | Refills: 0 | Status: SHIPPED | OUTPATIENT
Start: 2019-01-01

## 2019-01-01 RX ORDER — ACETAMINOPHEN 325 MG/1
650 TABLET ORAL EVERY 4 HOURS
Status: DISCONTINUED | OUTPATIENT
Start: 2019-01-01 | End: 2019-01-01

## 2019-01-01 RX ORDER — HEPARIN SODIUM,PORCINE 10 UNIT/ML
5 VIAL (ML) INTRAVENOUS EVERY 24 HOURS
Status: DISCONTINUED | OUTPATIENT
Start: 2019-01-01 | End: 2019-01-01 | Stop reason: HOSPADM

## 2019-01-01 RX ORDER — METOPROLOL TARTRATE 1 MG/ML
5 INJECTION, SOLUTION INTRAVENOUS ONCE
Status: DISCONTINUED | OUTPATIENT
Start: 2019-01-01 | End: 2019-01-01

## 2019-01-01 RX ORDER — LIDOCAINE 4 G/G
2 PATCH TOPICAL EVERY 24 HOURS
Status: DISCONTINUED | OUTPATIENT
Start: 2019-01-01 | End: 2019-01-01 | Stop reason: HOSPADM

## 2019-01-01 RX ORDER — LEVALBUTEROL 1.25 MG/.5ML
0.63 SOLUTION, CONCENTRATE RESPIRATORY (INHALATION) 3 TIMES DAILY
Status: DISCONTINUED | OUTPATIENT
Start: 2019-01-01 | End: 2019-01-01 | Stop reason: HOSPADM

## 2019-01-01 RX ORDER — CHLORHEXIDINE GLUCONATE ORAL RINSE 1.2 MG/ML
15 SOLUTION DENTAL EVERY 12 HOURS
Status: DISCONTINUED | OUTPATIENT
Start: 2019-01-01 | End: 2019-01-01 | Stop reason: HOSPADM

## 2019-01-01 RX ORDER — CEFTRIAXONE 2 G/1
2 INJECTION, POWDER, FOR SOLUTION INTRAMUSCULAR; INTRAVENOUS ONCE
Status: COMPLETED | OUTPATIENT
Start: 2019-01-01 | End: 2019-01-01

## 2019-01-01 RX ORDER — DOCUSATE SODIUM 100 MG/1
100 CAPSULE, LIQUID FILLED ORAL 2 TIMES DAILY
Status: DISCONTINUED | OUTPATIENT
Start: 2019-01-01 | End: 2019-01-01 | Stop reason: HOSPADM

## 2019-01-01 RX ORDER — QUETIAPINE FUMARATE 25 MG/1
50 TABLET, FILM COATED ORAL
Status: DISCONTINUED | OUTPATIENT
Start: 2019-01-01 | End: 2019-01-01 | Stop reason: HOSPADM

## 2019-01-01 RX ORDER — POTASSIUM CHLORIDE 1500 MG/1
20 TABLET, EXTENDED RELEASE ORAL ONCE
Status: DISCONTINUED | OUTPATIENT
Start: 2019-01-01 | End: 2019-01-01

## 2019-01-01 RX ORDER — LORAZEPAM 0.5 MG/1
.5-1 TABLET ORAL EVERY 6 HOURS PRN
Status: DISCONTINUED | OUTPATIENT
Start: 2019-01-01 | End: 2019-01-01

## 2019-01-01 RX ORDER — IOPAMIDOL 755 MG/ML
79 INJECTION, SOLUTION INTRAVASCULAR ONCE
Status: DISCONTINUED | OUTPATIENT
Start: 2019-01-01 | End: 2019-01-01 | Stop reason: HOSPADM

## 2019-01-01 RX ORDER — LORAZEPAM 2 MG/ML
INJECTION INTRAMUSCULAR
Status: DISCONTINUED
Start: 2019-01-01 | End: 2019-01-01 | Stop reason: HOSPADM

## 2019-01-01 RX ORDER — ALBUTEROL SULFATE 0.83 MG/ML
2.5 SOLUTION RESPIRATORY (INHALATION) EVERY 4 HOURS PRN
Qty: 30 VIAL | Refills: 3 | Status: ON HOLD | OUTPATIENT
Start: 2019-01-01 | End: 2019-01-01

## 2019-01-01 RX ORDER — ALBUTEROL SULFATE 0.83 MG/ML
SOLUTION RESPIRATORY (INHALATION)
Status: DISCONTINUED
Start: 2019-01-01 | End: 2019-01-01 | Stop reason: WASHOUT

## 2019-01-01 RX ORDER — MORPHINE SULFATE 100 MG/5ML
10 SOLUTION ORAL
Status: DISCONTINUED | OUTPATIENT
Start: 2019-01-01 | End: 2019-01-01

## 2019-01-01 RX ORDER — LIDOCAINE HYDROCHLORIDE 20 MG/ML
INJECTION, SOLUTION INFILTRATION; PERINEURAL PRN
Status: DISCONTINUED | OUTPATIENT
Start: 2019-01-01 | End: 2019-01-01

## 2019-01-01 RX ORDER — LEVOFLOXACIN 5 MG/ML
750 INJECTION, SOLUTION INTRAVENOUS ONCE
Status: COMPLETED | OUTPATIENT
Start: 2019-01-01 | End: 2019-01-01

## 2019-01-01 RX ORDER — LEVALBUTEROL INHALATION SOLUTION 0.31 MG/3ML
0.63 SOLUTION RESPIRATORY (INHALATION) EVERY 4 HOURS PRN
Status: DISCONTINUED | OUTPATIENT
Start: 2019-01-01 | End: 2019-01-01

## 2019-01-01 RX ORDER — METOCLOPRAMIDE HYDROCHLORIDE 5 MG/ML
10 INJECTION INTRAMUSCULAR; INTRAVENOUS EVERY 6 HOURS PRN
Status: DISCONTINUED | OUTPATIENT
Start: 2019-01-01 | End: 2019-01-01

## 2019-01-01 RX ORDER — LORAZEPAM 0.5 MG/1
0.5 TABLET ORAL EVERY 8 HOURS PRN
Status: DISCONTINUED | OUTPATIENT
Start: 2019-01-01 | End: 2019-01-01 | Stop reason: HOSPADM

## 2019-01-01 RX ORDER — BUPROPION HYDROCHLORIDE 100 MG/1
100 TABLET ORAL DAILY
Status: CANCELLED | OUTPATIENT
Start: 2019-01-01

## 2019-01-01 RX ORDER — METOPROLOL SUCCINATE 25 MG/1
50 TABLET, EXTENDED RELEASE ORAL DAILY
Status: DISCONTINUED | OUTPATIENT
Start: 2019-01-01 | End: 2019-01-01 | Stop reason: HOSPADM

## 2019-01-01 RX ORDER — LORAZEPAM 0.5 MG/1
0.5 TABLET ORAL EVERY 8 HOURS PRN
Status: DISCONTINUED | OUTPATIENT
Start: 2019-01-01 | End: 2019-01-01 | Stop reason: DRUGHIGH

## 2019-01-01 RX ORDER — BISACODYL 5 MG
10 TABLET, DELAYED RELEASE (ENTERIC COATED) ORAL DAILY PRN
Status: DISCONTINUED | OUTPATIENT
Start: 2019-01-01 | End: 2019-01-01 | Stop reason: HOSPADM

## 2019-01-01 RX ORDER — LEVALBUTEROL INHALATION SOLUTION 0.31 MG/3ML
0.63 SOLUTION RESPIRATORY (INHALATION) ONCE
Status: DISCONTINUED | OUTPATIENT
Start: 2019-01-01 | End: 2019-01-01 | Stop reason: CLARIF

## 2019-01-01 RX ORDER — QUETIAPINE FUMARATE 25 MG/1
25 TABLET, FILM COATED ORAL
Status: DISCONTINUED | OUTPATIENT
Start: 2019-01-01 | End: 2019-01-01

## 2019-01-01 RX ORDER — QUETIAPINE FUMARATE 25 MG/1
25 TABLET, FILM COATED ORAL ONCE
Status: COMPLETED | OUTPATIENT
Start: 2019-01-01 | End: 2019-01-01

## 2019-01-01 RX ORDER — CEFAZOLIN SODIUM 1 G/50ML
2 SOLUTION INTRAVENOUS
Status: DISCONTINUED | OUTPATIENT
Start: 2019-01-01 | End: 2019-01-01

## 2019-01-01 RX ORDER — SODIUM CHLORIDE, SODIUM GLUCONATE, SODIUM ACETATE, POTASSIUM CHLORIDE AND MAGNESIUM CHLORIDE 526; 502; 368; 37; 30 MG/100ML; MG/100ML; MG/100ML; MG/100ML; MG/100ML
500 INJECTION, SOLUTION INTRAVENOUS ONCE
Status: COMPLETED | OUTPATIENT
Start: 2019-01-01 | End: 2019-01-01

## 2019-01-01 RX ORDER — ALBUTEROL SULFATE 0.83 MG/ML
2.5 SOLUTION RESPIRATORY (INHALATION) EVERY 4 HOURS PRN
Status: ON HOLD | COMMUNITY
End: 2019-01-01

## 2019-01-01 RX ORDER — SODIUM CHLORIDE, SODIUM LACTATE, POTASSIUM CHLORIDE, CALCIUM CHLORIDE 600; 310; 30; 20 MG/100ML; MG/100ML; MG/100ML; MG/100ML
INJECTION, SOLUTION INTRAVENOUS CONTINUOUS
Status: CANCELLED | OUTPATIENT
Start: 2019-01-01

## 2019-01-01 RX ORDER — CEPHALEXIN 500 MG/1
500 CAPSULE ORAL 3 TIMES DAILY
Qty: 21 CAPSULE | Refills: 0 | Status: SHIPPED | OUTPATIENT
Start: 2019-01-01 | End: 2019-01-01

## 2019-01-01 RX ORDER — CLONAZEPAM 0.5 MG/1
1 TABLET ORAL
Status: DISCONTINUED | OUTPATIENT
Start: 2019-01-01 | End: 2019-01-01

## 2019-01-01 RX ORDER — SERTRALINE HYDROCHLORIDE 100 MG/1
150 TABLET, FILM COATED ORAL DAILY
COMMUNITY

## 2019-01-01 RX ORDER — LIDOCAINE 4 G/G
1 PATCH TOPICAL
Status: DISCONTINUED | OUTPATIENT
Start: 2019-01-01 | End: 2019-01-01 | Stop reason: HOSPADM

## 2019-01-01 RX ORDER — BISACODYL 5 MG
5 TABLET, DELAYED RELEASE (ENTERIC COATED) ORAL DAILY PRN
Status: DISCONTINUED | OUTPATIENT
Start: 2019-01-01 | End: 2019-01-01 | Stop reason: HOSPADM

## 2019-01-01 RX ORDER — HYDROMORPHONE HYDROCHLORIDE 1 MG/ML
0.5 INJECTION, SOLUTION INTRAMUSCULAR; INTRAVENOUS; SUBCUTANEOUS EVERY 4 HOURS PRN
Status: DISCONTINUED | OUTPATIENT
Start: 2019-01-01 | End: 2019-01-01

## 2019-01-01 RX ORDER — CEFTRIAXONE 2 G/1
2 INJECTION, POWDER, FOR SOLUTION INTRAMUSCULAR; INTRAVENOUS EVERY 24 HOURS
Status: DISCONTINUED | OUTPATIENT
Start: 2019-01-01 | End: 2019-01-01 | Stop reason: HOSPADM

## 2019-01-01 RX ORDER — METOPROLOL SUCCINATE 25 MG/1
25 TABLET, EXTENDED RELEASE ORAL DAILY
Status: DISCONTINUED | OUTPATIENT
Start: 2019-01-01 | End: 2019-01-01

## 2019-01-01 RX ORDER — NEOSTIGMINE METHYLSULFATE 1 MG/ML
VIAL (ML) INJECTION PRN
Status: DISCONTINUED | OUTPATIENT
Start: 2019-01-01 | End: 2019-01-01

## 2019-01-01 RX ORDER — PANTOPRAZOLE SODIUM 40 MG/1
40 TABLET, DELAYED RELEASE ORAL
Status: DISCONTINUED | OUTPATIENT
Start: 2019-01-01 | End: 2019-01-01 | Stop reason: HOSPADM

## 2019-01-01 RX ORDER — HYDROMORPHONE HYDROCHLORIDE 1 MG/ML
0.5 INJECTION, SOLUTION INTRAMUSCULAR; INTRAVENOUS; SUBCUTANEOUS
Status: DISCONTINUED | OUTPATIENT
Start: 2019-01-01 | End: 2019-01-01

## 2019-01-01 RX ORDER — MIRTAZAPINE 7.5 MG/1
7.5 TABLET, FILM COATED ORAL AT BEDTIME
Status: DISCONTINUED | OUTPATIENT
Start: 2019-01-01 | End: 2019-01-01 | Stop reason: HOSPADM

## 2019-01-01 RX ORDER — ALBUTEROL SULFATE 90 UG/1
1-2 AEROSOL, METERED RESPIRATORY (INHALATION) EVERY 6 HOURS PRN
Status: DISCONTINUED | OUTPATIENT
Start: 2019-01-01 | End: 2019-01-01 | Stop reason: HOSPADM

## 2019-01-01 RX ORDER — PROPOFOL 10 MG/ML
5-75 INJECTION, EMULSION INTRAVENOUS CONTINUOUS
Status: DISCONTINUED | OUTPATIENT
Start: 2019-01-01 | End: 2019-01-01 | Stop reason: DRUGHIGH

## 2019-01-01 RX ORDER — SERTRALINE HYDROCHLORIDE 100 MG/1
100 TABLET, FILM COATED ORAL DAILY
Status: CANCELLED | OUTPATIENT
Start: 2019-01-01

## 2019-01-01 RX ORDER — CYCLOBENZAPRINE HCL 10 MG
10 TABLET ORAL 3 TIMES DAILY PRN
Qty: 30 TABLET | Refills: 0 | Status: ON HOLD | OUTPATIENT
Start: 2019-01-01 | End: 2019-01-01

## 2019-01-01 RX ORDER — MORPHINE SULFATE 2 MG/ML
2 INJECTION, SOLUTION INTRAMUSCULAR; INTRAVENOUS ONCE
Status: COMPLETED | OUTPATIENT
Start: 2019-01-01 | End: 2019-01-01

## 2019-01-01 RX ORDER — LEVOFLOXACIN 500 MG/1
500 TABLET, FILM COATED ORAL DAILY
Qty: 4 TABLET | Refills: 0 | Status: ON HOLD | OUTPATIENT
Start: 2019-01-01 | End: 2019-01-01

## 2019-01-01 RX ORDER — CEFAZOLIN SODIUM 1 G/50ML
2 SOLUTION INTRAVENOUS
Status: COMPLETED | OUTPATIENT
Start: 2019-01-01 | End: 2019-01-01

## 2019-01-01 RX ORDER — ONDANSETRON 4 MG/1
4 TABLET, ORALLY DISINTEGRATING ORAL ONCE
Status: COMPLETED | OUTPATIENT
Start: 2019-01-01 | End: 2019-01-01

## 2019-01-01 RX ORDER — METOCLOPRAMIDE HYDROCHLORIDE 5 MG/ML
10 INJECTION INTRAMUSCULAR; INTRAVENOUS EVERY 6 HOURS PRN
Status: DISCONTINUED | OUTPATIENT
Start: 2019-01-01 | End: 2019-01-01 | Stop reason: HOSPADM

## 2019-01-01 RX ORDER — PROPOFOL 10 MG/ML
INJECTION, EMULSION INTRAVENOUS
Status: COMPLETED
Start: 2019-01-01 | End: 2019-01-01

## 2019-01-01 RX ORDER — QUETIAPINE FUMARATE 50 MG/1
50 TABLET, FILM COATED ORAL 3 TIMES DAILY
Status: DISCONTINUED | OUTPATIENT
Start: 2019-01-01 | End: 2019-01-01

## 2019-01-01 RX ORDER — LEVALBUTEROL 1.25 MG/.5ML
1.25 SOLUTION, CONCENTRATE RESPIRATORY (INHALATION) EVERY 6 HOURS PRN
Status: DISCONTINUED | OUTPATIENT
Start: 2019-01-01 | End: 2019-01-01

## 2019-01-01 RX ORDER — METOPROLOL TARTRATE 1 MG/ML
5 INJECTION, SOLUTION INTRAVENOUS ONCE
Status: COMPLETED | OUTPATIENT
Start: 2019-01-01 | End: 2019-01-01

## 2019-01-01 RX ORDER — HEPARIN SODIUM,PORCINE 10 UNIT/ML
5-10 VIAL (ML) INTRAVENOUS
Status: DISCONTINUED | OUTPATIENT
Start: 2019-01-01 | End: 2019-01-01 | Stop reason: HOSPADM

## 2019-01-01 RX ORDER — GUAIFENESIN 600 MG/1
1200 TABLET, EXTENDED RELEASE ORAL 2 TIMES DAILY
Status: DISCONTINUED | OUTPATIENT
Start: 2019-01-01 | End: 2019-01-01 | Stop reason: HOSPADM

## 2019-01-01 RX ORDER — ALBUTEROL SULFATE 0.83 MG/ML
5 SOLUTION RESPIRATORY (INHALATION) ONCE
Status: COMPLETED | OUTPATIENT
Start: 2019-01-01 | End: 2019-01-01

## 2019-01-01 RX ORDER — HYDROMORPHONE HYDROCHLORIDE 1 MG/ML
.3-.5 INJECTION, SOLUTION INTRAMUSCULAR; INTRAVENOUS; SUBCUTANEOUS EVERY 5 MIN PRN
Status: DISCONTINUED | OUTPATIENT
Start: 2019-01-01 | End: 2019-01-01 | Stop reason: HOSPADM

## 2019-01-01 RX ORDER — HYDROMORPHONE HYDROCHLORIDE 1 MG/ML
0.3 INJECTION, SOLUTION INTRAMUSCULAR; INTRAVENOUS; SUBCUTANEOUS EVERY 4 HOURS PRN
Status: DISCONTINUED | OUTPATIENT
Start: 2019-01-01 | End: 2019-01-01

## 2019-01-01 RX ORDER — PROPOFOL 10 MG/ML
5-75 INJECTION, EMULSION INTRAVENOUS CONTINUOUS
Status: DISCONTINUED | OUTPATIENT
Start: 2019-01-01 | End: 2019-01-01 | Stop reason: HOSPADM

## 2019-01-01 RX ORDER — ONDANSETRON 2 MG/ML
4 INJECTION INTRAMUSCULAR; INTRAVENOUS
Status: COMPLETED | OUTPATIENT
Start: 2019-01-01 | End: 2019-01-01

## 2019-01-01 RX ORDER — ACETAMINOPHEN 650 MG/1
650 SUPPOSITORY RECTAL EVERY 4 HOURS PRN
Status: DISCONTINUED | OUTPATIENT
Start: 2019-01-01 | End: 2019-01-01 | Stop reason: HOSPADM

## 2019-01-01 RX ORDER — HYDROXYZINE HYDROCHLORIDE 25 MG/1
25 TABLET, FILM COATED ORAL EVERY 4 HOURS PRN
Status: DISCONTINUED | OUTPATIENT
Start: 2019-01-01 | End: 2019-01-01 | Stop reason: HOSPADM

## 2019-01-01 RX ORDER — DIPHENHYDRAMINE HCL 25 MG
25-50 CAPSULE ORAL EVERY 6 HOURS PRN
Status: DISCONTINUED | OUTPATIENT
Start: 2019-01-01 | End: 2019-01-01

## 2019-01-01 RX ORDER — DULOXETIN HYDROCHLORIDE 30 MG/1
30 CAPSULE, DELAYED RELEASE ORAL DAILY
Qty: 30 CAPSULE | Refills: 0 | Status: SHIPPED | OUTPATIENT
Start: 2019-01-01 | End: 2019-10-08

## 2019-01-01 RX ORDER — LEVALBUTEROL 1.25 MG/.5ML
0.63 SOLUTION, CONCENTRATE RESPIRATORY (INHALATION) EVERY 6 HOURS PRN
Status: DISCONTINUED | OUTPATIENT
Start: 2019-01-01 | End: 2019-01-01 | Stop reason: HOSPADM

## 2019-01-01 RX ORDER — OLANZAPINE 5 MG/1
5 TABLET, ORALLY DISINTEGRATING ORAL AT BEDTIME
Status: DISCONTINUED | OUTPATIENT
Start: 2019-01-01 | End: 2019-01-01 | Stop reason: HOSPADM

## 2019-01-01 RX ORDER — OLANZAPINE 5 MG/1
5 TABLET, ORALLY DISINTEGRATING ORAL 3 TIMES DAILY PRN
Status: DISCONTINUED | OUTPATIENT
Start: 2019-01-01 | End: 2019-01-01 | Stop reason: HOSPADM

## 2019-01-01 RX ORDER — IBUPROFEN 800 MG/1
800 TABLET, FILM COATED ORAL EVERY 8 HOURS PRN
Qty: 100 TABLET | Refills: 0 | Status: SHIPPED | OUTPATIENT
Start: 2019-01-01

## 2019-01-01 RX ORDER — LORAZEPAM 2 MG/ML
1 INJECTION INTRAMUSCULAR EVERY 6 HOURS
Status: COMPLETED | OUTPATIENT
Start: 2019-01-01 | End: 2019-01-01

## 2019-01-01 RX ORDER — DIPHENHYDRAMINE HCL 25 MG
25-50 CAPSULE ORAL EVERY 6 HOURS PRN
Status: DISCONTINUED | OUTPATIENT
Start: 2019-01-01 | End: 2019-01-01 | Stop reason: HOSPADM

## 2019-01-01 RX ORDER — LEVALBUTEROL 1.25 MG/.5ML
1.25 SOLUTION, CONCENTRATE RESPIRATORY (INHALATION) EVERY 4 HOURS PRN
Status: DISCONTINUED | OUTPATIENT
Start: 2019-01-01 | End: 2019-01-01 | Stop reason: HOSPADM

## 2019-01-01 RX ORDER — QUETIAPINE FUMARATE 25 MG/1
25 TABLET, FILM COATED ORAL 3 TIMES DAILY
Qty: 90 TABLET | Refills: 0 | Status: SHIPPED | OUTPATIENT
Start: 2019-01-01

## 2019-01-01 RX ORDER — FORMOTEROL FUMARATE DIHYDRATE 20 UG/2ML
20 SOLUTION RESPIRATORY (INHALATION) EVERY 12 HOURS
Status: DISCONTINUED | OUTPATIENT
Start: 2019-01-01 | End: 2019-01-01 | Stop reason: CLARIF

## 2019-01-01 RX ORDER — TRAMADOL HYDROCHLORIDE 50 MG/1
50 TABLET ORAL ONCE
Status: COMPLETED | OUTPATIENT
Start: 2019-01-01 | End: 2019-01-01

## 2019-01-01 RX ORDER — BUPIVACAINE HYDROCHLORIDE 5 MG/ML
INJECTION, SOLUTION PERINEURAL PRN
Status: DISCONTINUED | OUTPATIENT
Start: 2019-01-01 | End: 2019-01-01 | Stop reason: HOSPADM

## 2019-01-01 RX ORDER — METOPROLOL SUCCINATE 25 MG/1
25 TABLET, EXTENDED RELEASE ORAL ONCE
Status: COMPLETED | OUTPATIENT
Start: 2019-01-01 | End: 2019-01-01

## 2019-01-01 RX ORDER — HYDROMORPHONE HYDROCHLORIDE 2 MG/1
2 TABLET ORAL
Status: DISCONTINUED | OUTPATIENT
Start: 2019-01-01 | End: 2019-01-01 | Stop reason: HOSPADM

## 2019-01-01 RX ORDER — LIDOCAINE HYDROCHLORIDE 20 MG/ML
JELLY TOPICAL ONCE
Status: COMPLETED | OUTPATIENT
Start: 2019-01-01 | End: 2019-01-01

## 2019-01-01 RX ORDER — HYDROCODONE BITARTRATE AND ACETAMINOPHEN 5; 325 MG/1; MG/1
1-2 TABLET ORAL EVERY 4 HOURS PRN
Status: DISCONTINUED | OUTPATIENT
Start: 2019-01-01 | End: 2019-01-01 | Stop reason: HOSPADM

## 2019-01-01 RX ORDER — POTASSIUM CHLORIDE 29.8 MG/ML
20 INJECTION INTRAVENOUS
Status: DISCONTINUED | OUTPATIENT
Start: 2019-01-01 | End: 2019-01-01

## 2019-01-01 RX ORDER — LEVALBUTEROL INHALATION SOLUTION 0.31 MG/3ML
1 SOLUTION RESPIRATORY (INHALATION) EVERY 8 HOURS PRN
Status: DISCONTINUED | OUTPATIENT
Start: 2019-01-01 | End: 2019-01-01

## 2019-01-01 RX ORDER — SODIUM CHLORIDE, SODIUM LACTATE, POTASSIUM CHLORIDE, CALCIUM CHLORIDE 600; 310; 30; 20 MG/100ML; MG/100ML; MG/100ML; MG/100ML
INJECTION, SOLUTION INTRAVENOUS CONTINUOUS PRN
Status: DISCONTINUED | OUTPATIENT
Start: 2019-01-01 | End: 2019-01-01

## 2019-01-01 RX ORDER — ACETAMINOPHEN 325 MG/1
650 TABLET ORAL EVERY 4 HOURS PRN
Qty: 100 TABLET | Refills: 0 | Status: SHIPPED | OUTPATIENT
Start: 2019-01-01

## 2019-01-01 RX ORDER — DOXYCYCLINE 100 MG/10ML
100 INJECTION, POWDER, LYOPHILIZED, FOR SOLUTION INTRAVENOUS EVERY 12 HOURS
Status: DISCONTINUED | OUTPATIENT
Start: 2019-01-01 | End: 2019-01-01 | Stop reason: HOSPADM

## 2019-01-01 RX ORDER — FUROSEMIDE 20 MG
20 TABLET ORAL DAILY
Qty: 3 TABLET | Refills: 0 | Status: ON HOLD | OUTPATIENT
Start: 2019-01-01 | End: 2019-01-01

## 2019-01-01 RX ORDER — GINSENG 100 MG
CAPSULE ORAL DAILY
Status: DISCONTINUED | OUTPATIENT
Start: 2019-01-01 | End: 2019-01-01

## 2019-01-01 RX ORDER — ARFORMOTEROL TARTRATE 15 UG/2ML
15 SOLUTION RESPIRATORY (INHALATION) EVERY 12 HOURS
Status: DISCONTINUED | OUTPATIENT
Start: 2019-01-01 | End: 2019-01-01 | Stop reason: HOSPADM

## 2019-01-01 RX ORDER — IBUPROFEN 600 MG/1
600 TABLET, FILM COATED ORAL 4 TIMES DAILY
Status: DISCONTINUED | OUTPATIENT
Start: 2019-01-01 | End: 2019-01-01 | Stop reason: HOSPADM

## 2019-01-01 RX ORDER — FENTANYL CITRATE 50 UG/ML
50 INJECTION, SOLUTION INTRAMUSCULAR; INTRAVENOUS
Status: DISCONTINUED | OUTPATIENT
Start: 2019-01-01 | End: 2019-01-01 | Stop reason: HOSPADM

## 2019-01-01 RX ORDER — ONDANSETRON 4 MG/1
4 TABLET, ORALLY DISINTEGRATING ORAL EVERY 30 MIN PRN
Status: DISCONTINUED | OUTPATIENT
Start: 2019-01-01 | End: 2019-01-01 | Stop reason: HOSPADM

## 2019-01-01 RX ORDER — ONDANSETRON 2 MG/ML
INJECTION INTRAMUSCULAR; INTRAVENOUS
Status: COMPLETED
Start: 2019-01-01 | End: 2019-01-01

## 2019-01-01 RX ORDER — POLYETHYLENE GLYCOL 3350 17 G/17G
17 POWDER, FOR SOLUTION ORAL 2 TIMES DAILY PRN
Status: DISCONTINUED | OUTPATIENT
Start: 2019-01-01 | End: 2019-01-01 | Stop reason: HOSPADM

## 2019-01-01 RX ORDER — LIDOCAINE 4 G/G
2 PATCH TOPICAL EVERY 24 HOURS
Qty: 30 PATCH | Refills: 0 | Status: ON HOLD | OUTPATIENT
Start: 2019-01-01 | End: 2019-01-01

## 2019-01-01 RX ORDER — LABETALOL HYDROCHLORIDE 5 MG/ML
10 INJECTION, SOLUTION INTRAVENOUS EVERY 6 HOURS PRN
Status: DISCONTINUED | OUTPATIENT
Start: 2019-01-01 | End: 2019-01-01 | Stop reason: HOSPADM

## 2019-01-01 RX ORDER — HYDRALAZINE HYDROCHLORIDE 10 MG/1
10 TABLET, FILM COATED ORAL 4 TIMES DAILY PRN
Status: DISCONTINUED | OUTPATIENT
Start: 2019-01-01 | End: 2019-01-01 | Stop reason: HOSPADM

## 2019-01-01 RX ORDER — FUROSEMIDE 10 MG/ML
40 INJECTION INTRAMUSCULAR; INTRAVENOUS EVERY 12 HOURS
Status: DISCONTINUED | OUTPATIENT
Start: 2019-01-01 | End: 2019-01-01

## 2019-01-01 RX ORDER — HYDROMORPHONE HYDROCHLORIDE 2 MG/1
2 TABLET ORAL EVERY 6 HOURS PRN
Qty: 10 TABLET | Refills: 0 | Status: SHIPPED | OUTPATIENT
Start: 2019-01-01 | End: 2019-01-01

## 2019-01-01 RX ORDER — CLONAZEPAM 0.5 MG/1
0.5 TABLET ORAL 2 TIMES DAILY
Status: DISCONTINUED | OUTPATIENT
Start: 2019-01-01 | End: 2019-01-01

## 2019-01-01 RX ORDER — FENTANYL CITRATE 50 UG/ML
25-50 INJECTION, SOLUTION INTRAMUSCULAR; INTRAVENOUS EVERY 5 MIN PRN
Status: DISCONTINUED | OUTPATIENT
Start: 2019-01-01 | End: 2019-01-01 | Stop reason: HOSPADM

## 2019-01-01 RX ORDER — PROCHLORPERAZINE MALEATE 10 MG
10 TABLET ORAL EVERY 6 HOURS PRN
Status: DISCONTINUED | OUTPATIENT
Start: 2019-01-01 | End: 2019-01-01

## 2019-01-01 RX ORDER — LIDOCAINE 4 G/G
1 PATCH TOPICAL EVERY 24 HOURS
Qty: 10 PATCH | Refills: 0 | Status: SHIPPED | OUTPATIENT
Start: 2019-01-01

## 2019-01-01 RX ADMIN — VANCOMYCIN HYDROCHLORIDE 2000 MG: 5 INJECTION, POWDER, LYOPHILIZED, FOR SOLUTION INTRAVENOUS at 17:05

## 2019-01-01 RX ADMIN — SODIUM CHLORIDE, PRESERVATIVE FREE 5 ML: 5 INJECTION INTRAVENOUS at 05:55

## 2019-01-01 RX ADMIN — Medication 5 ML: at 11:14

## 2019-01-01 RX ADMIN — ENOXAPARIN SODIUM 100 MG: 100 INJECTION SUBCUTANEOUS at 13:56

## 2019-01-01 RX ADMIN — ACETAMINOPHEN 975 MG: 325 TABLET, FILM COATED ORAL at 23:56

## 2019-01-01 RX ADMIN — SERTRALINE HYDROCHLORIDE 150 MG: 100 TABLET ORAL at 08:24

## 2019-01-01 RX ADMIN — IPRATROPIUM BROMIDE AND ALBUTEROL SULFATE 3 ML: .5; 3 SOLUTION RESPIRATORY (INHALATION) at 11:00

## 2019-01-01 RX ADMIN — SERTRALINE HYDROCHLORIDE 150 MG: 100 TABLET ORAL at 09:26

## 2019-01-01 RX ADMIN — LEVALBUTEROL HYDROCHLORIDE 1.25 MG: 1.25 SOLUTION, CONCENTRATE RESPIRATORY (INHALATION) at 04:35

## 2019-01-01 RX ADMIN — LIDOCAINE HYDROCHLORIDE 10 ML: 10 INJECTION, SOLUTION INFILTRATION; PERINEURAL at 09:19

## 2019-01-01 RX ADMIN — IPRATROPIUM BROMIDE 0.5 MG: 0.5 SOLUTION RESPIRATORY (INHALATION) at 13:38

## 2019-01-01 RX ADMIN — SODIUM PHOSPHATE, MONOBASIC, MONOHYDRATE 10 MMOL: 276; 142 INJECTION, SOLUTION INTRAVENOUS at 05:52

## 2019-01-01 RX ADMIN — IBUPROFEN 800 MG: 400 TABLET ORAL at 21:01

## 2019-01-01 RX ADMIN — Medication 5 ML: at 11:12

## 2019-01-01 RX ADMIN — IPRATROPIUM BROMIDE AND ALBUTEROL SULFATE 3 ML: .5; 3 SOLUTION RESPIRATORY (INHALATION) at 10:22

## 2019-01-01 RX ADMIN — LIDOCAINE HYDROCHLORIDE: 20 JELLY TOPICAL at 11:37

## 2019-01-01 RX ADMIN — MINERAL OIL AND PETROLATUM: 150; 830 OINTMENT OPHTHALMIC at 20:52

## 2019-01-01 RX ADMIN — CALCIUM CARBONATE (ANTACID) CHEW TAB 500 MG 1000 MG: 500 CHEW TAB at 04:05

## 2019-01-01 RX ADMIN — IPRATROPIUM BROMIDE AND ALBUTEROL SULFATE 3 ML: .5; 3 SOLUTION RESPIRATORY (INHALATION) at 20:21

## 2019-01-01 RX ADMIN — Medication 5 ML: at 16:04

## 2019-01-01 RX ADMIN — IPRATROPIUM BROMIDE AND ALBUTEROL SULFATE 3 ML: .5; 3 SOLUTION RESPIRATORY (INHALATION) at 12:00

## 2019-01-01 RX ADMIN — LORAZEPAM 0.5 MG: 2 INJECTION INTRAMUSCULAR; INTRAVENOUS at 15:30

## 2019-01-01 RX ADMIN — MIDAZOLAM 1 MG: 1 INJECTION INTRAMUSCULAR; INTRAVENOUS at 12:53

## 2019-01-01 RX ADMIN — FUROSEMIDE 40 MG: 10 INJECTION, SOLUTION INTRAVENOUS at 07:54

## 2019-01-01 RX ADMIN — ALBUMIN HUMAN 12.5 G: 0.25 SOLUTION INTRAVENOUS at 15:53

## 2019-01-01 RX ADMIN — IPRATROPIUM BROMIDE 0.5 MG: 0.5 SOLUTION RESPIRATORY (INHALATION) at 07:40

## 2019-01-01 RX ADMIN — GUAIFENESIN 1200 MG: 600 TABLET, EXTENDED RELEASE ORAL at 21:14

## 2019-01-01 RX ADMIN — HYDROMORPHONE HYDROCHLORIDE 0.5 MG: 1 INJECTION, SOLUTION INTRAMUSCULAR; INTRAVENOUS; SUBCUTANEOUS at 16:21

## 2019-01-01 RX ADMIN — SENNOSIDES AND DOCUSATE SODIUM 2 TABLET: 8.6; 5 TABLET ORAL at 08:26

## 2019-01-01 RX ADMIN — CARVEDILOL 3.12 MG: 3.12 TABLET, FILM COATED ORAL at 18:14

## 2019-01-01 RX ADMIN — PROPOFOL 50 MG: 10 INJECTION, EMULSION INTRAVENOUS at 13:56

## 2019-01-01 RX ADMIN — LIDOCAINE HYDROCHLORIDE 100 MG: 20 INJECTION, SOLUTION INFILTRATION; PERINEURAL at 13:02

## 2019-01-01 RX ADMIN — SERTRALINE HYDROCHLORIDE 150 MG: 100 TABLET ORAL at 09:54

## 2019-01-01 RX ADMIN — LORAZEPAM 1 MG: 0.5 TABLET ORAL at 16:49

## 2019-01-01 RX ADMIN — CLONAZEPAM 2 MG: 1 TABLET ORAL at 22:20

## 2019-01-01 RX ADMIN — GLYCOPYRROLATE 0.8 MG: 0.2 INJECTION, SOLUTION INTRAMUSCULAR; INTRAVENOUS at 13:59

## 2019-01-01 RX ADMIN — SODIUM CHLORIDE, PRESERVATIVE FREE 5 ML: 5 INJECTION INTRAVENOUS at 22:00

## 2019-01-01 RX ADMIN — PIPERACILLIN SODIUM,TAZOBACTAM SODIUM 3.38 G: 3; .375 INJECTION, POWDER, FOR SOLUTION INTRAVENOUS at 16:18

## 2019-01-01 RX ADMIN — LORAZEPAM 0.5 MG: 2 INJECTION INTRAMUSCULAR; INTRAVENOUS at 01:35

## 2019-01-01 RX ADMIN — PIPERACILLIN SODIUM,TAZOBACTAM SODIUM 3.38 G: 3; .375 INJECTION, POWDER, FOR SOLUTION INTRAVENOUS at 21:29

## 2019-01-01 RX ADMIN — Medication 100 MG: at 11:19

## 2019-01-01 RX ADMIN — HYPROMELLOSE 2906 (4000 MPA.S) AND HYPROMELLOSE 2906 (50 MPA.S): 25; 25 SOLUTION OPHTHALMIC at 16:29

## 2019-01-01 RX ADMIN — SODIUM CHLORIDE, PRESERVATIVE FREE 5 ML: 5 INJECTION INTRAVENOUS at 01:34

## 2019-01-01 RX ADMIN — IOPAMIDOL 78 ML: 755 INJECTION, SOLUTION INTRAVENOUS at 00:32

## 2019-01-01 RX ADMIN — PIPERACILLIN SODIUM,TAZOBACTAM SODIUM 3.38 G: 3; .375 INJECTION, POWDER, FOR SOLUTION INTRAVENOUS at 04:08

## 2019-01-01 RX ADMIN — POTASSIUM CHLORIDE, DEXTROSE MONOHYDRATE AND SODIUM CHLORIDE 100 ML/HR: 150; 5; 450 INJECTION, SOLUTION INTRAVENOUS at 12:05

## 2019-01-01 RX ADMIN — SODIUM CHLORIDE, PRESERVATIVE FREE 5 ML: 5 INJECTION INTRAVENOUS at 04:35

## 2019-01-01 RX ADMIN — ADENOSINE 6 MG: 3 INJECTION, SOLUTION INTRAVENOUS at 00:55

## 2019-01-01 RX ADMIN — LEVOFLOXACIN 500 MG: 500 TABLET, FILM COATED ORAL at 09:27

## 2019-01-01 RX ADMIN — PROCHLORPERAZINE MALEATE 10 MG: 5 TABLET ORAL at 22:17

## 2019-01-01 RX ADMIN — SODIUM CHLORIDE, PRESERVATIVE FREE 5 ML: 5 INJECTION INTRAVENOUS at 04:48

## 2019-01-01 RX ADMIN — SODIUM CHLORIDE 500 ML: 9 INJECTION, SOLUTION INTRAVENOUS at 12:03

## 2019-01-01 RX ADMIN — HYDROCODONE BITARTRATE AND ACETAMINOPHEN 2 TABLET: 5; 325 TABLET ORAL at 00:42

## 2019-01-01 RX ADMIN — IPRATROPIUM BROMIDE 0.5 MG: 0.5 SOLUTION RESPIRATORY (INHALATION) at 15:31

## 2019-01-01 RX ADMIN — HYDROMORPHONE HYDROCHLORIDE 2 MG: 2 TABLET ORAL at 00:33

## 2019-01-01 RX ADMIN — PIPERACILLIN SODIUM,TAZOBACTAM SODIUM 3.38 G: 3; .375 INJECTION, POWDER, FOR SOLUTION INTRAVENOUS at 10:31

## 2019-01-01 RX ADMIN — IPRATROPIUM BROMIDE 0.5 MG: 0.5 SOLUTION RESPIRATORY (INHALATION) at 15:29

## 2019-01-01 RX ADMIN — HYDROMORPHONE HYDROCHLORIDE 0.5 MG: 1 INJECTION, SOLUTION INTRAMUSCULAR; INTRAVENOUS; SUBCUTANEOUS at 03:51

## 2019-01-01 RX ADMIN — DOXYCYCLINE 100 MG: 100 INJECTION, POWDER, LYOPHILIZED, FOR SOLUTION INTRAVENOUS at 10:38

## 2019-01-01 RX ADMIN — SODIUM CHLORIDE 3 UNITS/HR: 9 INJECTION, SOLUTION INTRAVENOUS at 06:17

## 2019-01-01 RX ADMIN — Medication 10 MEQ: at 04:47

## 2019-01-01 RX ADMIN — BUPROPION HYDROCHLORIDE 150 MG: 150 TABLET, FILM COATED, EXTENDED RELEASE ORAL at 16:26

## 2019-01-01 RX ADMIN — BUPROPION HYDROCHLORIDE 150 MG: 150 TABLET, FILM COATED, EXTENDED RELEASE ORAL at 09:54

## 2019-01-01 RX ADMIN — HEPARIN SODIUM 1450 UNITS/HR: 10000 INJECTION, SOLUTION INTRAVENOUS at 02:15

## 2019-01-01 RX ADMIN — MORPHINE SULFATE 4 MG: 4 INJECTION INTRAVENOUS at 22:48

## 2019-01-01 RX ADMIN — DOXYCYCLINE 100 MG: 100 INJECTION, POWDER, LYOPHILIZED, FOR SOLUTION INTRAVENOUS at 13:46

## 2019-01-01 RX ADMIN — IPRATROPIUM BROMIDE 0.5 MG: 0.5 SOLUTION RESPIRATORY (INHALATION) at 07:37

## 2019-01-01 RX ADMIN — MINERAL OIL AND PETROLATUM: 150; 830 OINTMENT OPHTHALMIC at 20:25

## 2019-01-01 RX ADMIN — EPOPROSTENOL 20 NG/KG/MIN: 1.5 INJECTION, POWDER, LYOPHILIZED, FOR SOLUTION INTRAVENOUS at 20:56

## 2019-01-01 RX ADMIN — CLONAZEPAM 0.5 MG: 0.5 TABLET ORAL at 10:15

## 2019-01-01 RX ADMIN — VANCOMYCIN HYDROCHLORIDE 2000 MG: 5 INJECTION, POWDER, LYOPHILIZED, FOR SOLUTION INTRAVENOUS at 15:51

## 2019-01-01 RX ADMIN — INSULIN ASPART 3 UNITS: 100 INJECTION, SOLUTION INTRAVENOUS; SUBCUTANEOUS at 04:31

## 2019-01-01 RX ADMIN — LIDOCAINE HYDROCHLORIDE 5 ML: 10 INJECTION, SOLUTION EPIDURAL; INFILTRATION; INTRACAUDAL; PERINEURAL at 09:33

## 2019-01-01 RX ADMIN — Medication 5 ML: at 07:09

## 2019-01-01 RX ADMIN — SODIUM CHLORIDE: 9 INJECTION, SOLUTION INTRAVENOUS at 17:09

## 2019-01-01 RX ADMIN — IPRATROPIUM BROMIDE AND ALBUTEROL SULFATE 3 ML: .5; 3 SOLUTION RESPIRATORY (INHALATION) at 11:10

## 2019-01-01 RX ADMIN — Medication 1 MG: at 23:25

## 2019-01-01 RX ADMIN — HYDROMORPHONE HYDROCHLORIDE 0.5 MG: 1 INJECTION, SOLUTION INTRAMUSCULAR; INTRAVENOUS; SUBCUTANEOUS at 06:25

## 2019-01-01 RX ADMIN — Medication 5 ML: at 22:12

## 2019-01-01 RX ADMIN — SODIUM CHLORIDE: 900 INJECTION INTRAVENOUS at 05:07

## 2019-01-01 RX ADMIN — ENOXAPARIN SODIUM 40 MG: 40 INJECTION SUBCUTANEOUS at 05:01

## 2019-01-01 RX ADMIN — KETOROLAC TROMETHAMINE 15 MG: 15 INJECTION, SOLUTION INTRAMUSCULAR; INTRAVENOUS at 07:07

## 2019-01-01 RX ADMIN — QUETIAPINE 25 MG: 25 TABLET ORAL at 05:57

## 2019-01-01 RX ADMIN — POTASSIUM CHLORIDE 20 MEQ: 29.8 INJECTION, SOLUTION INTRAVENOUS at 16:41

## 2019-01-01 RX ADMIN — METHYLPREDNISOLONE SODIUM SUCCINATE 62.5 MG: 125 INJECTION, POWDER, FOR SOLUTION INTRAMUSCULAR; INTRAVENOUS at 02:04

## 2019-01-01 RX ADMIN — Medication 1 MG: at 22:01

## 2019-01-01 RX ADMIN — POTASSIUM CHLORIDE 40 MEQ: 1500 TABLET, EXTENDED RELEASE ORAL at 18:52

## 2019-01-01 RX ADMIN — LEVALBUTEROL HYDROCHLORIDE 1.25 MG: 1.25 SOLUTION, CONCENTRATE RESPIRATORY (INHALATION) at 07:54

## 2019-01-01 RX ADMIN — SODIUM CHLORIDE 100 ML: 9 INJECTION, SOLUTION INTRAVENOUS at 18:46

## 2019-01-01 RX ADMIN — POTASSIUM CHLORIDE 40 MEQ: 1.5 POWDER, FOR SOLUTION ORAL at 04:33

## 2019-01-01 RX ADMIN — POTASSIUM CHLORIDE 20 MEQ: 1500 TABLET, EXTENDED RELEASE ORAL at 16:53

## 2019-01-01 RX ADMIN — LIDOCAINE 2 PATCH: 560 PATCH PERCUTANEOUS; TOPICAL; TRANSDERMAL at 09:16

## 2019-01-01 RX ADMIN — IBUPROFEN 400 MG: 200 SUSPENSION ORAL at 15:09

## 2019-01-01 RX ADMIN — RANITIDINE 150 MG: 150 TABLET ORAL at 17:09

## 2019-01-01 RX ADMIN — HYDROMORPHONE HYDROCHLORIDE 0.5 MG: 1 INJECTION, SOLUTION INTRAMUSCULAR; INTRAVENOUS; SUBCUTANEOUS at 08:49

## 2019-01-01 RX ADMIN — SENNOSIDES AND DOCUSATE SODIUM 2 TABLET: 8.6; 5 TABLET ORAL at 21:14

## 2019-01-01 RX ADMIN — IPRATROPIUM BROMIDE 0.5 MG: 0.5 SOLUTION RESPIRATORY (INHALATION) at 07:07

## 2019-01-01 RX ADMIN — CHLORHEXIDINE GLUCONATE 15 ML: 1.2 RINSE ORAL at 20:23

## 2019-01-01 RX ADMIN — GUAIFENESIN 1200 MG: 600 TABLET, EXTENDED RELEASE ORAL at 20:57

## 2019-01-01 RX ADMIN — BUPROPION HYDROCHLORIDE 150 MG: 150 TABLET, FILM COATED, EXTENDED RELEASE ORAL at 09:28

## 2019-01-01 RX ADMIN — IPRATROPIUM BROMIDE AND ALBUTEROL SULFATE 3 ML: .5; 3 SOLUTION RESPIRATORY (INHALATION) at 15:06

## 2019-01-01 RX ADMIN — ACETAMINOPHEN 650 MG: 160 SUSPENSION ORAL at 05:33

## 2019-01-01 RX ADMIN — LEVALBUTEROL HYDROCHLORIDE 0.63 MG: 1.25 SOLUTION, CONCENTRATE RESPIRATORY (INHALATION) at 17:29

## 2019-01-01 RX ADMIN — HYDROMORPHONE HYDROCHLORIDE 2 MG: 2 TABLET ORAL at 07:38

## 2019-01-01 RX ADMIN — GUAIFENESIN 1200 MG: 600 TABLET, EXTENDED RELEASE ORAL at 11:08

## 2019-01-01 RX ADMIN — FENTANYL CITRATE 50 MCG: 50 INJECTION INTRAMUSCULAR; INTRAVENOUS at 07:25

## 2019-01-01 RX ADMIN — SODIUM CHLORIDE 3 UNITS/HR: 9 INJECTION, SOLUTION INTRAVENOUS at 20:14

## 2019-01-01 RX ADMIN — SERTRALINE HYDROCHLORIDE 150 MG: 50 TABLET ORAL at 08:32

## 2019-01-01 RX ADMIN — DOCUSATE SODIUM 286 ML: 50 LIQUID ORAL at 08:04

## 2019-01-01 RX ADMIN — LEVALBUTEROL HYDROCHLORIDE 1.25 MG: 1.25 SOLUTION, CONCENTRATE RESPIRATORY (INHALATION) at 22:43

## 2019-01-01 RX ADMIN — Medication 1 MG: at 21:15

## 2019-01-01 RX ADMIN — CLONAZEPAM 0.5 MG: 0.5 TABLET ORAL at 22:19

## 2019-01-01 RX ADMIN — HYDROMORPHONE HYDROCHLORIDE 0.5 MG: 1 INJECTION, SOLUTION INTRAMUSCULAR; INTRAVENOUS; SUBCUTANEOUS at 06:50

## 2019-01-01 RX ADMIN — IPRATROPIUM BROMIDE 0.5 MG: 0.5 SOLUTION RESPIRATORY (INHALATION) at 07:18

## 2019-01-01 RX ADMIN — BUPROPION HYDROCHLORIDE 150 MG: 150 TABLET, FILM COATED, EXTENDED RELEASE ORAL at 11:15

## 2019-01-01 RX ADMIN — SENNOSIDES AND DOCUSATE SODIUM 1 TABLET: 8.6; 5 TABLET ORAL at 08:33

## 2019-01-01 RX ADMIN — ENOXAPARIN SODIUM 40 MG: 40 INJECTION SUBCUTANEOUS at 21:41

## 2019-01-01 RX ADMIN — FENTANYL CITRATE 50 MCG: 50 INJECTION, SOLUTION INTRAMUSCULAR; INTRAVENOUS at 13:23

## 2019-01-01 RX ADMIN — HYDROMORPHONE HYDROCHLORIDE 0.5 MG: 1 INJECTION, SOLUTION INTRAMUSCULAR; INTRAVENOUS; SUBCUTANEOUS at 21:20

## 2019-01-01 RX ADMIN — AZITHROMYCIN MONOHYDRATE 500 MG: 500 INJECTION, POWDER, LYOPHILIZED, FOR SOLUTION INTRAVENOUS at 12:46

## 2019-01-01 RX ADMIN — IBUPROFEN 600 MG: 600 TABLET ORAL at 17:09

## 2019-01-01 RX ADMIN — SODIUM CHLORIDE, PRESERVATIVE FREE 5 ML: 5 INJECTION INTRAVENOUS at 22:19

## 2019-01-01 RX ADMIN — METOPROLOL TARTRATE 5 MG: 5 INJECTION INTRAVENOUS at 15:39

## 2019-01-01 RX ADMIN — DEXTROSE MONOHYDRATE 3 MCG/KG/MIN: 50 INJECTION, SOLUTION INTRAVENOUS at 01:02

## 2019-01-01 RX ADMIN — METHYLPREDNISOLONE SODIUM SUCCINATE 125 MG: 125 INJECTION, POWDER, FOR SOLUTION INTRAMUSCULAR; INTRAVENOUS at 05:18

## 2019-01-01 RX ADMIN — ALBUMIN HUMAN 25 G: 0.05 INJECTION, SOLUTION INTRAVENOUS at 06:30

## 2019-01-01 RX ADMIN — DEXMEDETOMIDINE HYDROCHLORIDE 8 MCG: 100 INJECTION, SOLUTION INTRAVENOUS at 13:45

## 2019-01-01 RX ADMIN — CLONAZEPAM 0.5 MG: 0.5 TABLET ORAL at 06:27

## 2019-01-01 RX ADMIN — CHLORHEXIDINE GLUCONATE 15 ML: 1.2 RINSE ORAL at 07:54

## 2019-01-01 RX ADMIN — CYCLOBENZAPRINE HYDROCHLORIDE 10 MG: 10 TABLET, FILM COATED ORAL at 01:59

## 2019-01-01 RX ADMIN — HYDROMORPHONE HYDROCHLORIDE 2 MG: 2 TABLET ORAL at 13:17

## 2019-01-01 RX ADMIN — LEVALBUTEROL HYDROCHLORIDE 0.5 ML: 1.25 SOLUTION, CONCENTRATE RESPIRATORY (INHALATION) at 10:21

## 2019-01-01 RX ADMIN — POTASSIUM CHLORIDE 40 MEQ: 1.5 POWDER, FOR SOLUTION ORAL at 09:43

## 2019-01-01 RX ADMIN — POTASSIUM CHLORIDE 40 MEQ: 1500 TABLET, EXTENDED RELEASE ORAL at 14:49

## 2019-01-01 RX ADMIN — CHLORHEXIDINE GLUCONATE 15 ML: 1.2 RINSE ORAL at 08:44

## 2019-01-01 RX ADMIN — HYDROMORPHONE HYDROCHLORIDE 0.5 MG: 1 INJECTION, SOLUTION INTRAMUSCULAR; INTRAVENOUS; SUBCUTANEOUS at 00:45

## 2019-01-01 RX ADMIN — PROCHLORPERAZINE MALEATE 10 MG: 5 TABLET ORAL at 18:15

## 2019-01-01 RX ADMIN — SODIUM BICARBONATE 50 MEQ: 84 INJECTION, SOLUTION INTRAVENOUS at 09:40

## 2019-01-01 RX ADMIN — RANITIDINE 150 MG: 150 TABLET ORAL at 19:45

## 2019-01-01 RX ADMIN — LEVALBUTEROL HYDROCHLORIDE 1.25 MG: 1.25 SOLUTION, CONCENTRATE RESPIRATORY (INHALATION) at 07:03

## 2019-01-01 RX ADMIN — ACETAMINOPHEN 650 MG: 160 SUSPENSION ORAL at 22:34

## 2019-01-01 RX ADMIN — POTASSIUM CHLORIDE 40 MEQ: 1500 TABLET, EXTENDED RELEASE ORAL at 09:39

## 2019-01-01 RX ADMIN — CARVEDILOL 3.12 MG: 3.12 TABLET, FILM COATED ORAL at 10:25

## 2019-01-01 RX ADMIN — SODIUM PHOSPHATE, MONOBASIC, MONOHYDRATE 15 MMOL: 276; 142 INJECTION, SOLUTION INTRAVENOUS at 08:14

## 2019-01-01 RX ADMIN — HYPROMELLOSE 2906 (4000 MPA.S) AND HYPROMELLOSE 2906 (50 MPA.S): 25; 25 SOLUTION OPHTHALMIC at 21:00

## 2019-01-01 RX ADMIN — Medication 10 MEQ: at 07:49

## 2019-01-01 RX ADMIN — ROCURONIUM BROMIDE 50 MG: 10 SOLUTION INTRAVENOUS at 11:52

## 2019-01-01 RX ADMIN — PIPERACILLIN AND TAZOBACTAM 4.5 G: 4; .5 INJECTION, POWDER, FOR SOLUTION INTRAVENOUS at 17:55

## 2019-01-01 RX ADMIN — SODIUM CHLORIDE, PRESERVATIVE FREE 5 ML: 5 INJECTION INTRAVENOUS at 10:43

## 2019-01-01 RX ADMIN — PANTOPRAZOLE SODIUM 40 MG: 40 TABLET, DELAYED RELEASE ORAL at 08:16

## 2019-01-01 RX ADMIN — CYCLOBENZAPRINE HYDROCHLORIDE 10 MG: 10 TABLET, FILM COATED ORAL at 14:09

## 2019-01-01 RX ADMIN — HEPARIN SODIUM (PORCINE) LOCK FLUSH IV SOLN 100 UNIT/ML 500 UNITS: 100 SOLUTION at 17:47

## 2019-01-01 RX ADMIN — LORAZEPAM 0.5 MG: 0.5 TABLET ORAL at 13:54

## 2019-01-01 RX ADMIN — LEVALBUTEROL HYDROCHLORIDE 0.63 MG: 1.25 SOLUTION, CONCENTRATE RESPIRATORY (INHALATION) at 19:54

## 2019-01-01 RX ADMIN — FUROSEMIDE 20 MG: 10 INJECTION, SOLUTION INTRAVENOUS at 10:08

## 2019-01-01 RX ADMIN — SODIUM CHLORIDE 5.5 UNITS/HR: 9 INJECTION, SOLUTION INTRAVENOUS at 11:42

## 2019-01-01 RX ADMIN — DIPHENHYDRAMINE HYDROCHLORIDE 50 MG: 25 CAPSULE ORAL at 01:03

## 2019-01-01 RX ADMIN — SODIUM CHLORIDE, PRESERVATIVE FREE 5 ML: 5 INJECTION INTRAVENOUS at 22:47

## 2019-01-01 RX ADMIN — CEFAZOLIN SODIUM 2 G: 10 INJECTION, POWDER, FOR SOLUTION INTRAVENOUS at 08:23

## 2019-01-01 RX ADMIN — SODIUM CHLORIDE: 9 INJECTION, SOLUTION INTRAVENOUS at 14:48

## 2019-01-01 RX ADMIN — SODIUM CHLORIDE, PRESERVATIVE FREE 5 ML: 5 INJECTION INTRAVENOUS at 21:47

## 2019-01-01 RX ADMIN — RANITIDINE 150 MG: 150 TABLET ORAL at 19:49

## 2019-01-01 RX ADMIN — FUROSEMIDE 60 MG: 10 INJECTION, SOLUTION INTRAVENOUS at 13:30

## 2019-01-01 RX ADMIN — INSULIN ASPART 3 UNITS: 100 INJECTION, SOLUTION INTRAVENOUS; SUBCUTANEOUS at 00:55

## 2019-01-01 RX ADMIN — IPRATROPIUM BROMIDE AND ALBUTEROL SULFATE 3 ML: 2.5; .5 SOLUTION RESPIRATORY (INHALATION) at 05:25

## 2019-01-01 RX ADMIN — OLANZAPINE 5 MG: 5 TABLET, ORALLY DISINTEGRATING ORAL at 22:19

## 2019-01-01 RX ADMIN — LEVALBUTEROL HYDROCHLORIDE 0.63 MG: 1.25 SOLUTION, CONCENTRATE RESPIRATORY (INHALATION) at 20:36

## 2019-01-01 RX ADMIN — RANITIDINE HYDROCHLORIDE 150 MG: 150 SOLUTION ORAL at 20:23

## 2019-01-01 RX ADMIN — FAMOTIDINE 20 MG: 10 INJECTION, SOLUTION INTRAVENOUS at 20:16

## 2019-01-01 RX ADMIN — LIDOCAINE 1 PATCH: 560 PATCH PERCUTANEOUS; TOPICAL; TRANSDERMAL at 01:32

## 2019-01-01 RX ADMIN — GUAIFENESIN 1200 MG: 600 TABLET, EXTENDED RELEASE ORAL at 19:48

## 2019-01-01 RX ADMIN — MORPHINE SULFATE 10 MG: 100 SOLUTION ORAL at 02:37

## 2019-01-01 RX ADMIN — LIDOCAINE 1 PATCH: 560 PATCH PERCUTANEOUS; TOPICAL; TRANSDERMAL at 21:20

## 2019-01-01 RX ADMIN — CHLORHEXIDINE GLUCONATE 15 ML: 1.2 RINSE ORAL at 08:13

## 2019-01-01 RX ADMIN — ALBUTEROL SULFATE 2.5 MG: 2.5 SOLUTION RESPIRATORY (INHALATION) at 18:32

## 2019-01-01 RX ADMIN — METHYLPREDNISOLONE SODIUM SUCCINATE 62.5 MG: 125 INJECTION, POWDER, FOR SOLUTION INTRAMUSCULAR; INTRAVENOUS at 02:02

## 2019-01-01 RX ADMIN — QUETIAPINE 25 MG: 25 TABLET ORAL at 03:55

## 2019-01-01 RX ADMIN — SODIUM CHLORIDE, PRESERVATIVE FREE 5 ML: 5 INJECTION INTRAVENOUS at 09:27

## 2019-01-01 RX ADMIN — ACETAMINOPHEN 1000 MG: 500 TABLET, FILM COATED ORAL at 23:07

## 2019-01-01 RX ADMIN — SODIUM CHLORIDE 1000 ML: 9 INJECTION, SOLUTION INTRAVENOUS at 08:41

## 2019-01-01 RX ADMIN — METHYLPREDNISOLONE SODIUM SUCCINATE 62.5 MG: 125 INJECTION, POWDER, FOR SOLUTION INTRAMUSCULAR; INTRAVENOUS at 09:12

## 2019-01-01 RX ADMIN — SUGAMMADEX 200 MG: 100 INJECTION, SOLUTION INTRAVENOUS at 14:15

## 2019-01-01 RX ADMIN — QUETIAPINE FUMARATE 50 MG: 50 TABLET ORAL at 18:20

## 2019-01-01 RX ADMIN — ACETAMINOPHEN 500 MG: 500 TABLET, FILM COATED ORAL at 16:18

## 2019-01-01 RX ADMIN — LIDOCAINE HYDROCHLORIDE 20 ML: 10; .005 INJECTION, SOLUTION EPIDURAL; INFILTRATION; INTRACAUDAL; PERINEURAL at 09:20

## 2019-01-01 RX ADMIN — HYDROMORPHONE HYDROCHLORIDE 0.5 MG: 1 INJECTION, SOLUTION INTRAMUSCULAR; INTRAVENOUS; SUBCUTANEOUS at 14:27

## 2019-01-01 RX ADMIN — SODIUM CHLORIDE, PRESERVATIVE FREE 5 ML: 5 INJECTION INTRAVENOUS at 06:23

## 2019-01-01 RX ADMIN — Medication 1 MG: at 22:17

## 2019-01-01 RX ADMIN — LEVALBUTEROL HYDROCHLORIDE 1.25 MG: 1.25 SOLUTION, CONCENTRATE RESPIRATORY (INHALATION) at 20:08

## 2019-01-01 RX ADMIN — SENNOSIDES AND DOCUSATE SODIUM 1 TABLET: 8.6; 5 TABLET ORAL at 20:26

## 2019-01-01 RX ADMIN — PROPOFOL 30 MCG/KG/MIN: 10 INJECTION, EMULSION INTRAVENOUS at 02:02

## 2019-01-01 RX ADMIN — ALBUTEROL SULFATE 2.5 MG: 2.5 SOLUTION RESPIRATORY (INHALATION) at 11:58

## 2019-01-01 RX ADMIN — MIRTAZAPINE 7.5 MG: 7.5 TABLET ORAL at 22:18

## 2019-01-01 RX ADMIN — POTASSIUM CHLORIDE 20 MEQ: 29.8 INJECTION, SOLUTION INTRAVENOUS at 19:06

## 2019-01-01 RX ADMIN — SODIUM CHLORIDE, PRESERVATIVE FREE 5 ML: 5 INJECTION INTRAVENOUS at 14:13

## 2019-01-01 RX ADMIN — LEVALBUTEROL HYDROCHLORIDE 0.63 MG: 1.25 SOLUTION, CONCENTRATE RESPIRATORY (INHALATION) at 07:34

## 2019-01-01 RX ADMIN — QUETIAPINE 25 MG: 25 TABLET ORAL at 20:55

## 2019-01-01 RX ADMIN — POTASSIUM CHLORIDE 20 MEQ: 29.8 INJECTION, SOLUTION INTRAVENOUS at 17:54

## 2019-01-01 RX ADMIN — VANCOMYCIN HYDROCHLORIDE 2000 MG: 5 INJECTION, POWDER, LYOPHILIZED, FOR SOLUTION INTRAVENOUS at 04:47

## 2019-01-01 RX ADMIN — LEVALBUTEROL HYDROCHLORIDE 0.63 MG: 1.25 SOLUTION, CONCENTRATE RESPIRATORY (INHALATION) at 12:27

## 2019-01-01 RX ADMIN — LEVALBUTEROL HYDROCHLORIDE 0.63 MG: 1.25 SOLUTION, CONCENTRATE RESPIRATORY (INHALATION) at 07:32

## 2019-01-01 RX ADMIN — POTASSIUM CHLORIDE 20 MEQ: 1500 TABLET, EXTENDED RELEASE ORAL at 19:46

## 2019-01-01 RX ADMIN — Medication 10 MEQ: at 09:05

## 2019-01-01 RX ADMIN — ACETAMINOPHEN 650 MG: 160 SUSPENSION ORAL at 17:45

## 2019-01-01 RX ADMIN — LIDOCAINE HYDROCHLORIDE 5 ML: 10 INJECTION, SOLUTION INFILTRATION; PERINEURAL at 09:29

## 2019-01-01 RX ADMIN — HEPARIN SODIUM (PORCINE) LOCK FLUSH IV SOLN 100 UNIT/ML 5 ML: 100 SOLUTION at 13:18

## 2019-01-01 RX ADMIN — LEVALBUTEROL HYDROCHLORIDE 0.63 MG: 1.25 SOLUTION, CONCENTRATE RESPIRATORY (INHALATION) at 19:49

## 2019-01-01 RX ADMIN — IBUPROFEN 400 MG: 200 SUSPENSION ORAL at 20:23

## 2019-01-01 RX ADMIN — HYDROMORPHONE HYDROCHLORIDE 0.5 MG: 1 INJECTION, SOLUTION INTRAMUSCULAR; INTRAVENOUS; SUBCUTANEOUS at 05:43

## 2019-01-01 RX ADMIN — HYDROMORPHONE HYDROCHLORIDE 0.3 MG: 1 INJECTION, SOLUTION INTRAMUSCULAR; INTRAVENOUS; SUBCUTANEOUS at 01:56

## 2019-01-01 RX ADMIN — RANITIDINE 150 MG: 150 TABLET ORAL at 21:42

## 2019-01-01 RX ADMIN — POTASSIUM CHLORIDE, DEXTROSE MONOHYDRATE AND SODIUM CHLORIDE: 150; 5; 450 INJECTION, SOLUTION INTRAVENOUS at 18:35

## 2019-01-01 RX ADMIN — METHYLPREDNISOLONE SODIUM SUCCINATE 125 MG: 125 INJECTION, POWDER, FOR SOLUTION INTRAMUSCULAR; INTRAVENOUS at 01:40

## 2019-01-01 RX ADMIN — METHYLPREDNISOLONE SODIUM SUCCINATE 40 MG: 40 INJECTION, POWDER, FOR SOLUTION INTRAMUSCULAR; INTRAVENOUS at 09:02

## 2019-01-01 RX ADMIN — Medication 5 ML: at 09:04

## 2019-01-01 RX ADMIN — HYDROXYZINE HYDROCHLORIDE 25 MG: 25 TABLET ORAL at 03:01

## 2019-01-01 RX ADMIN — RANITIDINE 150 MG: 150 TABLET ORAL at 06:17

## 2019-01-01 RX ADMIN — ENOXAPARIN SODIUM 40 MG: 40 INJECTION SUBCUTANEOUS at 15:34

## 2019-01-01 RX ADMIN — SERTRALINE HYDROCHLORIDE 150 MG: 50 TABLET ORAL at 16:26

## 2019-01-01 RX ADMIN — SERTRALINE HYDROCHLORIDE 150 MG: 100 TABLET ORAL at 11:15

## 2019-01-01 RX ADMIN — IPRATROPIUM BROMIDE 0.5 MG: 0.5 SOLUTION RESPIRATORY (INHALATION) at 20:36

## 2019-01-01 RX ADMIN — INSULIN ASPART 2 UNITS: 100 INJECTION, SOLUTION INTRAVENOUS; SUBCUTANEOUS at 15:54

## 2019-01-01 RX ADMIN — ALBUTEROL SULFATE 2.5 MG: 2.5 SOLUTION RESPIRATORY (INHALATION) at 00:51

## 2019-01-01 RX ADMIN — SODIUM CHLORIDE 1.5 UNITS/HR: 9 INJECTION, SOLUTION INTRAVENOUS at 08:11

## 2019-01-01 RX ADMIN — SODIUM CHLORIDE: 900 INJECTION INTRAVENOUS at 11:09

## 2019-01-01 RX ADMIN — LORAZEPAM 0.5 MG: 0.5 TABLET ORAL at 03:20

## 2019-01-01 RX ADMIN — HYDROCODONE BITARTRATE AND ACETAMINOPHEN 2 TABLET: 5; 325 TABLET ORAL at 17:18

## 2019-01-01 RX ADMIN — FUROSEMIDE 40 MG: 10 INJECTION, SOLUTION INTRAVENOUS at 09:50

## 2019-01-01 RX ADMIN — LEVALBUTEROL HYDROCHLORIDE 0.63 MG: 1.25 SOLUTION, CONCENTRATE RESPIRATORY (INHALATION) at 07:51

## 2019-01-01 RX ADMIN — IOPAMIDOL 72 ML: 755 INJECTION, SOLUTION INTRAVENOUS at 16:29

## 2019-01-01 RX ADMIN — ACETAMINOPHEN 650 MG: 160 SUSPENSION ORAL at 10:38

## 2019-01-01 RX ADMIN — METOPROLOL SUCCINATE 25 MG: 25 TABLET, EXTENDED RELEASE ORAL at 08:17

## 2019-01-01 RX ADMIN — MAGNESIUM SULFATE HEPTAHYDRATE 2 G: 40 INJECTION, SOLUTION INTRAVENOUS at 00:05

## 2019-01-01 RX ADMIN — CLONAZEPAM 0.5 MG: 0.5 TABLET ORAL at 21:05

## 2019-01-01 RX ADMIN — QUETIAPINE 25 MG: 25 TABLET ORAL at 10:31

## 2019-01-01 RX ADMIN — POTASSIUM CHLORIDE, DEXTROSE MONOHYDRATE AND SODIUM CHLORIDE: 150; 5; 450 INJECTION, SOLUTION INTRAVENOUS at 21:58

## 2019-01-01 RX ADMIN — LORAZEPAM 1 MG: 2 INJECTION INTRAMUSCULAR; INTRAVENOUS at 13:33

## 2019-01-01 RX ADMIN — FUROSEMIDE 40 MG: 10 INJECTION, SOLUTION INTRAVENOUS at 14:13

## 2019-01-01 RX ADMIN — ENOXAPARIN SODIUM 40 MG: 40 INJECTION SUBCUTANEOUS at 16:30

## 2019-01-01 RX ADMIN — LORAZEPAM 1 MG: 2 INJECTION INTRAMUSCULAR; INTRAVENOUS at 19:13

## 2019-01-01 RX ADMIN — FAMOTIDINE 20 MG: 10 INJECTION, SOLUTION INTRAVENOUS at 08:13

## 2019-01-01 RX ADMIN — LIDOCAINE HYDROCHLORIDE 10 ML: 10 INJECTION, SOLUTION EPIDURAL; INFILTRATION; INTRACAUDAL; PERINEURAL at 13:48

## 2019-01-01 RX ADMIN — HYDROMORPHONE HYDROCHLORIDE 0.5 MG: 1 INJECTION, SOLUTION INTRAMUSCULAR; INTRAVENOUS; SUBCUTANEOUS at 13:23

## 2019-01-01 RX ADMIN — IBUPROFEN 600 MG: 600 TABLET ORAL at 13:05

## 2019-01-01 RX ADMIN — AMIODARONE HYDROCHLORIDE 0.5 MG/MIN: 50 INJECTION, SOLUTION INTRAVENOUS at 10:32

## 2019-01-01 RX ADMIN — SODIUM CHLORIDE, PRESERVATIVE FREE 5 ML: 5 INJECTION INTRAVENOUS at 01:47

## 2019-01-01 RX ADMIN — SERTRALINE HYDROCHLORIDE 150 MG: 100 TABLET ORAL at 08:09

## 2019-01-01 RX ADMIN — IPRATROPIUM BROMIDE AND ALBUTEROL SULFATE 3 ML: .5; 3 SOLUTION RESPIRATORY (INHALATION) at 00:05

## 2019-01-01 RX ADMIN — SODIUM CHLORIDE 96 ML: 9 INJECTION, SOLUTION INTRAVENOUS at 10:12

## 2019-01-01 RX ADMIN — Medication 5 ML: at 19:01

## 2019-01-01 RX ADMIN — IPRATROPIUM BROMIDE AND ALBUTEROL SULFATE 3 ML: .5; 3 SOLUTION RESPIRATORY (INHALATION) at 19:58

## 2019-01-01 RX ADMIN — IPRATROPIUM BROMIDE 0.5 MG: 0.5 SOLUTION RESPIRATORY (INHALATION) at 16:10

## 2019-01-01 RX ADMIN — GUAIFENESIN 1200 MG: 600 TABLET, EXTENDED RELEASE ORAL at 09:26

## 2019-01-01 RX ADMIN — HYDROMORPHONE HYDROCHLORIDE 0.3 MG: 1 INJECTION, SOLUTION INTRAMUSCULAR; INTRAVENOUS; SUBCUTANEOUS at 21:04

## 2019-01-01 RX ADMIN — RANITIDINE HYDROCHLORIDE 150 MG: 150 SOLUTION ORAL at 08:15

## 2019-01-01 RX ADMIN — PIPERACILLIN SODIUM,TAZOBACTAM SODIUM 3.38 G: 3; .375 INJECTION, POWDER, FOR SOLUTION INTRAVENOUS at 10:56

## 2019-01-01 RX ADMIN — PIPERACILLIN AND TAZOBACTAM 4.5 G: 4; .5 INJECTION, POWDER, FOR SOLUTION INTRAVENOUS at 05:06

## 2019-01-01 RX ADMIN — ONDANSETRON 4 MG: 2 INJECTION INTRAMUSCULAR; INTRAVENOUS at 03:34

## 2019-01-01 RX ADMIN — METHYLPREDNISOLONE SODIUM SUCCINATE 62.5 MG: 125 INJECTION, POWDER, FOR SOLUTION INTRAMUSCULAR; INTRAVENOUS at 10:22

## 2019-01-01 RX ADMIN — IPRATROPIUM BROMIDE AND ALBUTEROL SULFATE 3 ML: .5; 3 SOLUTION RESPIRATORY (INHALATION) at 23:55

## 2019-01-01 RX ADMIN — HYDROMORPHONE HYDROCHLORIDE 0.5 MG: 1 INJECTION, SOLUTION INTRAMUSCULAR; INTRAVENOUS; SUBCUTANEOUS at 05:53

## 2019-01-01 RX ADMIN — CLONAZEPAM 2 MG: 1 TABLET ORAL at 20:43

## 2019-01-01 RX ADMIN — LORAZEPAM 1 MG: 0.5 TABLET ORAL at 19:17

## 2019-01-01 RX ADMIN — ARFORMOTEROL TARTRATE 15 MCG: 15 SOLUTION RESPIRATORY (INHALATION) at 08:04

## 2019-01-01 RX ADMIN — LEVALBUTEROL HYDROCHLORIDE 0.63 MG: 1.25 SOLUTION, CONCENTRATE RESPIRATORY (INHALATION) at 13:17

## 2019-01-01 RX ADMIN — LEVALBUTEROL HYDROCHLORIDE 0.63 MG: 1.25 SOLUTION, CONCENTRATE RESPIRATORY (INHALATION) at 20:01

## 2019-01-01 RX ADMIN — LIDOCAINE 1 PATCH: 560 PATCH PERCUTANEOUS; TOPICAL; TRANSDERMAL at 19:50

## 2019-01-01 RX ADMIN — EPOPROSTENOL 20 NG/KG/MIN: 1.5 INJECTION, POWDER, LYOPHILIZED, FOR SOLUTION INTRAVENOUS at 15:08

## 2019-01-01 RX ADMIN — POTASSIUM CHLORIDE 40 MEQ: 1.5 POWDER, FOR SOLUTION ORAL at 05:33

## 2019-01-01 RX ADMIN — DOCUSATE SODIUM 100 MG: 100 CAPSULE, LIQUID FILLED ORAL at 11:28

## 2019-01-01 RX ADMIN — CARVEDILOL 3.12 MG: 3.12 TABLET, FILM COATED ORAL at 18:22

## 2019-01-01 RX ADMIN — LORAZEPAM 0.5 MG: 0.5 TABLET ORAL at 11:55

## 2019-01-01 RX ADMIN — Medication 10 ML: at 07:38

## 2019-01-01 RX ADMIN — FUROSEMIDE 40 MG: 10 INJECTION, SOLUTION INTRAVENOUS at 02:01

## 2019-01-01 RX ADMIN — PIPERACILLIN SODIUM,TAZOBACTAM SODIUM 3.38 G: 3; .375 INJECTION, POWDER, FOR SOLUTION INTRAVENOUS at 03:54

## 2019-01-01 RX ADMIN — PIPERACILLIN AND TAZOBACTAM 4.5 G: 4; .5 INJECTION, POWDER, FOR SOLUTION INTRAVENOUS at 17:54

## 2019-01-01 RX ADMIN — FUROSEMIDE 20 MG: 10 INJECTION, SOLUTION INTRAVENOUS at 09:23

## 2019-01-01 RX ADMIN — CLONAZEPAM 2 MG: 0.5 TABLET ORAL at 21:12

## 2019-01-01 RX ADMIN — ALBUTEROL SULFATE 2 PUFF: 90 AEROSOL, METERED RESPIRATORY (INHALATION) at 21:12

## 2019-01-01 RX ADMIN — MORPHINE SULFATE 4 MG: 4 INJECTION INTRAVENOUS at 16:12

## 2019-01-01 RX ADMIN — IPRATROPIUM BROMIDE AND ALBUTEROL SULFATE 3 ML: .5; 3 SOLUTION RESPIRATORY (INHALATION) at 23:35

## 2019-01-01 RX ADMIN — METHYLPREDNISOLONE SODIUM SUCCINATE 62.5 MG: 125 INJECTION, POWDER, FOR SOLUTION INTRAMUSCULAR; INTRAVENOUS at 17:54

## 2019-01-01 RX ADMIN — IPRATROPIUM BROMIDE AND ALBUTEROL SULFATE 3 ML: .5; 3 SOLUTION RESPIRATORY (INHALATION) at 08:45

## 2019-01-01 RX ADMIN — SCOPALAMINE 1 PATCH: 1 PATCH, EXTENDED RELEASE TRANSDERMAL at 12:46

## 2019-01-01 RX ADMIN — MIRTAZAPINE 7.5 MG: 7.5 TABLET ORAL at 20:55

## 2019-01-01 RX ADMIN — CLONAZEPAM 2 MG: 1 TABLET ORAL at 22:01

## 2019-01-01 RX ADMIN — IPRATROPIUM BROMIDE 0.5 MG: 0.5 SOLUTION RESPIRATORY (INHALATION) at 10:07

## 2019-01-01 RX ADMIN — LIDOCAINE 2 PATCH: 560 PATCH PERCUTANEOUS; TOPICAL; TRANSDERMAL at 20:26

## 2019-01-01 RX ADMIN — PIPERACILLIN AND TAZOBACTAM 4.5 G: 4; .5 INJECTION, POWDER, FOR SOLUTION INTRAVENOUS at 05:34

## 2019-01-01 RX ADMIN — LORAZEPAM 1 MG: 2 INJECTION INTRAMUSCULAR; INTRAVENOUS at 01:12

## 2019-01-01 RX ADMIN — PIPERACILLIN SODIUM,TAZOBACTAM SODIUM 3.38 G: 3; .375 INJECTION, POWDER, FOR SOLUTION INTRAVENOUS at 16:42

## 2019-01-01 RX ADMIN — HYDROMORPHONE HYDROCHLORIDE 0.5 MG: 1 INJECTION, SOLUTION INTRAMUSCULAR; INTRAVENOUS; SUBCUTANEOUS at 04:09

## 2019-01-01 RX ADMIN — PROPOFOL 150 MG: 10 INJECTION, EMULSION INTRAVENOUS at 13:02

## 2019-01-01 RX ADMIN — HEPARIN 300 UNITS: 100 SYRINGE at 06:24

## 2019-01-01 RX ADMIN — CARVEDILOL 3.12 MG: 3.12 TABLET, FILM COATED ORAL at 09:55

## 2019-01-01 RX ADMIN — LEVALBUTEROL HYDROCHLORIDE 0.63 MG: 1.25 SOLUTION, CONCENTRATE RESPIRATORY (INHALATION) at 15:29

## 2019-01-01 RX ADMIN — TRAMADOL HYDROCHLORIDE 50 MG: 50 TABLET ORAL at 06:10

## 2019-01-01 RX ADMIN — ACETAMINOPHEN 975 MG: 325 TABLET, FILM COATED ORAL at 10:09

## 2019-01-01 RX ADMIN — LEVALBUTEROL HYDROCHLORIDE 1.25 MG: 1.25 SOLUTION, CONCENTRATE RESPIRATORY (INHALATION) at 19:20

## 2019-01-01 RX ADMIN — BUPROPION HYDROCHLORIDE 150 MG: 150 TABLET, FILM COATED, EXTENDED RELEASE ORAL at 09:06

## 2019-01-01 RX ADMIN — LORAZEPAM 0.5 MG: 2 INJECTION INTRAMUSCULAR; INTRAVENOUS at 17:03

## 2019-01-01 RX ADMIN — LORAZEPAM 0.5 MG: 2 INJECTION INTRAMUSCULAR at 01:35

## 2019-01-01 RX ADMIN — METOPROLOL TARTRATE 5 MG: 5 INJECTION INTRAVENOUS at 02:15

## 2019-01-01 RX ADMIN — LEVALBUTEROL HYDROCHLORIDE 0.63 MG: 1.25 SOLUTION, CONCENTRATE RESPIRATORY (INHALATION) at 13:38

## 2019-01-01 RX ADMIN — ENOXAPARIN SODIUM 40 MG: 40 INJECTION SUBCUTANEOUS at 15:28

## 2019-01-01 RX ADMIN — CEFAZOLIN 2 G: 1 INJECTION, POWDER, FOR SOLUTION INTRAMUSCULAR; INTRAVENOUS at 13:20

## 2019-01-01 RX ADMIN — CYCLOBENZAPRINE HYDROCHLORIDE 10 MG: 10 TABLET, FILM COATED ORAL at 02:05

## 2019-01-01 RX ADMIN — DEXTROSE MONOHYDRATE 1 MCG/KG/MIN: 50 INJECTION, SOLUTION INTRAVENOUS at 03:52

## 2019-01-01 RX ADMIN — KETOROLAC TROMETHAMINE 15 MG: 15 INJECTION, SOLUTION INTRAMUSCULAR; INTRAVENOUS at 09:43

## 2019-01-01 RX ADMIN — IPRATROPIUM BROMIDE AND ALBUTEROL SULFATE 3 ML: .5; 3 SOLUTION RESPIRATORY (INHALATION) at 18:35

## 2019-01-01 RX ADMIN — ALBUTEROL SULFATE 2.5 MG: 2.5 SOLUTION RESPIRATORY (INHALATION) at 20:43

## 2019-01-01 RX ADMIN — BUPROPION HYDROCHLORIDE 150 MG: 150 TABLET, FILM COATED, EXTENDED RELEASE ORAL at 08:26

## 2019-01-01 RX ADMIN — QUETIAPINE FUMARATE 50 MG: 50 TABLET ORAL at 08:24

## 2019-01-01 RX ADMIN — METOPROLOL SUCCINATE 50 MG: 25 TABLET, EXTENDED RELEASE ORAL at 10:02

## 2019-01-01 RX ADMIN — CLONAZEPAM 2 MG: 1 TABLET ORAL at 20:57

## 2019-01-01 RX ADMIN — ENOXAPARIN SODIUM 40 MG: 40 INJECTION SUBCUTANEOUS at 16:20

## 2019-01-01 RX ADMIN — PIPERACILLIN SODIUM,TAZOBACTAM SODIUM 3.38 G: 3; .375 INJECTION, POWDER, FOR SOLUTION INTRAVENOUS at 05:24

## 2019-01-01 RX ADMIN — CLONAZEPAM 0.5 MG: 0.5 TABLET ORAL at 08:17

## 2019-01-01 RX ADMIN — PIPERACILLIN AND TAZOBACTAM 4.5 G: 4; .5 INJECTION, POWDER, FOR SOLUTION INTRAVENOUS at 06:14

## 2019-01-01 RX ADMIN — CEFTRIAXONE SODIUM 2 G: 2 INJECTION, POWDER, FOR SOLUTION INTRAMUSCULAR; INTRAVENOUS at 08:47

## 2019-01-01 RX ADMIN — ACETAMINOPHEN 650 MG: 160 SUSPENSION ORAL at 05:04

## 2019-01-01 RX ADMIN — PIPERACILLIN SODIUM AND TAZOBACTAM SODIUM 4.5 G: 4; .5 INJECTION, POWDER, LYOPHILIZED, FOR SOLUTION INTRAVENOUS at 16:11

## 2019-01-01 RX ADMIN — CEFAZOLIN 2 G: 1 INJECTION, POWDER, FOR SOLUTION INTRAMUSCULAR; INTRAVENOUS at 12:01

## 2019-01-01 RX ADMIN — QUETIAPINE 25 MG: 25 TABLET ORAL at 22:12

## 2019-01-01 RX ADMIN — SODIUM CHLORIDE 3 UNITS/HR: 9 INJECTION, SOLUTION INTRAVENOUS at 15:09

## 2019-01-01 RX ADMIN — CEFTRIAXONE SODIUM 2 G: 2 INJECTION, POWDER, FOR SOLUTION INTRAMUSCULAR; INTRAVENOUS at 08:32

## 2019-01-01 RX ADMIN — METHYLPREDNISOLONE SODIUM SUCCINATE 62.5 MG: 125 INJECTION, POWDER, FOR SOLUTION INTRAMUSCULAR; INTRAVENOUS at 10:12

## 2019-01-01 RX ADMIN — POTASSIUM CHLORIDE 40 MEQ: 1500 TABLET, EXTENDED RELEASE ORAL at 14:16

## 2019-01-01 RX ADMIN — IPRATROPIUM BROMIDE 0.5 MG: 0.5 SOLUTION RESPIRATORY (INHALATION) at 00:53

## 2019-01-01 RX ADMIN — POTASSIUM CHLORIDE 20 MEQ: 29.8 INJECTION, SOLUTION INTRAVENOUS at 12:46

## 2019-01-01 RX ADMIN — POTASSIUM CHLORIDE, DEXTROSE MONOHYDRATE AND SODIUM CHLORIDE: 150; 5; 450 INJECTION, SOLUTION INTRAVENOUS at 08:36

## 2019-01-01 RX ADMIN — SODIUM PHOSPHATE, MONOBASIC, MONOHYDRATE 15 MMOL: 276; 142 INJECTION, SOLUTION INTRAVENOUS at 08:34

## 2019-01-01 RX ADMIN — IOPAMIDOL 85 ML: 755 INJECTION, SOLUTION INTRAVENOUS at 07:34

## 2019-01-01 RX ADMIN — BUPROPION HYDROCHLORIDE 150 MG: 150 TABLET, FILM COATED, EXTENDED RELEASE ORAL at 08:58

## 2019-01-01 RX ADMIN — ALBUTEROL SULFATE 2.5 MG: 2.5 SOLUTION RESPIRATORY (INHALATION) at 22:03

## 2019-01-01 RX ADMIN — ARFORMOTEROL TARTRATE 15 MCG: 15 SOLUTION RESPIRATORY (INHALATION) at 18:35

## 2019-01-01 RX ADMIN — BUPROPION HYDROCHLORIDE 150 MG: 150 TABLET, FILM COATED, EXTENDED RELEASE ORAL at 11:49

## 2019-01-01 RX ADMIN — SODIUM CHLORIDE: 900 INJECTION INTRAVENOUS at 14:37

## 2019-01-01 RX ADMIN — CLONAZEPAM 0.5 MG: 0.5 TABLET ORAL at 11:15

## 2019-01-01 RX ADMIN — FUROSEMIDE 20 MG: 10 INJECTION, SOLUTION INTRAVENOUS at 16:42

## 2019-01-01 RX ADMIN — LEVALBUTEROL HYDROCHLORIDE 1.25 MG: 1.25 SOLUTION, CONCENTRATE RESPIRATORY (INHALATION) at 11:45

## 2019-01-01 RX ADMIN — POTASSIUM CHLORIDE 20 MEQ: 1.5 POWDER, FOR SOLUTION ORAL at 08:15

## 2019-01-01 RX ADMIN — LEVALBUTEROL HYDROCHLORIDE 0.63 MG: 1.25 SOLUTION, CONCENTRATE RESPIRATORY (INHALATION) at 18:25

## 2019-01-01 RX ADMIN — HYDROMORPHONE HYDROCHLORIDE 0.3 MG: 1 INJECTION, SOLUTION INTRAMUSCULAR; INTRAVENOUS; SUBCUTANEOUS at 05:06

## 2019-01-01 RX ADMIN — FUROSEMIDE 20 MG: 10 INJECTION, SOLUTION INTRAVENOUS at 06:27

## 2019-01-01 RX ADMIN — Medication 6000 UNITS: at 02:23

## 2019-01-01 RX ADMIN — CLONAZEPAM 2 MG: 0.5 TABLET ORAL at 20:27

## 2019-01-01 RX ADMIN — CEFTRIAXONE SODIUM 2 G: 2 INJECTION, POWDER, FOR SOLUTION INTRAMUSCULAR; INTRAVENOUS at 08:27

## 2019-01-01 RX ADMIN — IPRATROPIUM BROMIDE AND ALBUTEROL SULFATE 3 ML: .5; 3 SOLUTION RESPIRATORY (INHALATION) at 02:08

## 2019-01-01 RX ADMIN — FUROSEMIDE 60 MG: 10 INJECTION, SOLUTION INTRAVENOUS at 09:29

## 2019-01-01 RX ADMIN — IPRATROPIUM BROMIDE AND ALBUTEROL SULFATE 3 ML: .5; 3 SOLUTION RESPIRATORY (INHALATION) at 01:57

## 2019-01-01 RX ADMIN — ONDANSETRON 4 MG: 2 INJECTION INTRAMUSCULAR; INTRAVENOUS at 09:11

## 2019-01-01 RX ADMIN — HYDROMORPHONE HYDROCHLORIDE 0.5 MG: 1 INJECTION, SOLUTION INTRAMUSCULAR; INTRAVENOUS; SUBCUTANEOUS at 22:31

## 2019-01-01 RX ADMIN — PANTOPRAZOLE SODIUM 40 MG: 40 TABLET, DELAYED RELEASE ORAL at 19:45

## 2019-01-01 RX ADMIN — SODIUM CHLORIDE, PRESERVATIVE FREE 5 ML: 5 INJECTION INTRAVENOUS at 04:09

## 2019-01-01 RX ADMIN — ONDANSETRON 4 MG: 4 TABLET, ORALLY DISINTEGRATING ORAL at 05:42

## 2019-01-01 RX ADMIN — RANITIDINE HYDROCHLORIDE 150 MG: 150 SOLUTION ORAL at 08:34

## 2019-01-01 RX ADMIN — OLANZAPINE 5 MG: 5 TABLET, ORALLY DISINTEGRATING ORAL at 11:12

## 2019-01-01 RX ADMIN — DIPHENHYDRAMINE HYDROCHLORIDE 50 MG: 25 CAPSULE ORAL at 11:49

## 2019-01-01 RX ADMIN — SENNOSIDES AND DOCUSATE SODIUM 2 TABLET: 8.6; 5 TABLET ORAL at 08:57

## 2019-01-01 RX ADMIN — HYDROMORPHONE HYDROCHLORIDE 1 MG: 2 TABLET ORAL at 04:16

## 2019-01-01 RX ADMIN — LIDOCAINE 2 PATCH: 560 PATCH PERCUTANEOUS; TOPICAL; TRANSDERMAL at 08:02

## 2019-01-01 RX ADMIN — KETAMINE HYDROCHLORIDE 25 MG/HR: 50 INJECTION INTRAMUSCULAR; INTRAVENOUS at 11:14

## 2019-01-01 RX ADMIN — TRAMADOL HYDROCHLORIDE 50 MG: 50 TABLET ORAL at 00:16

## 2019-01-01 RX ADMIN — CALCIUM CHLORIDE 1 G: 100 INJECTION INTRAVENOUS; INTRAVENTRICULAR at 15:46

## 2019-01-01 RX ADMIN — POTASSIUM CHLORIDE 20 MEQ: 29.8 INJECTION, SOLUTION INTRAVENOUS at 00:50

## 2019-01-01 RX ADMIN — EPOPROSTENOL 20 NG/KG/MIN: 1.5 INJECTION, POWDER, LYOPHILIZED, FOR SOLUTION INTRAVENOUS at 10:12

## 2019-01-01 RX ADMIN — ACETAMINOPHEN 650 MG: 325 TABLET, FILM COATED ORAL at 02:02

## 2019-01-01 RX ADMIN — IPRATROPIUM BROMIDE 0.5 MG: 0.5 SOLUTION RESPIRATORY (INHALATION) at 19:21

## 2019-01-01 RX ADMIN — IPRATROPIUM BROMIDE AND ALBUTEROL SULFATE 3 ML: 2.5; .5 SOLUTION RESPIRATORY (INHALATION) at 15:29

## 2019-01-01 RX ADMIN — PREDNISONE 40 MG: 20 TABLET ORAL at 14:52

## 2019-01-01 RX ADMIN — FUROSEMIDE 60 MG: 10 INJECTION, SOLUTION INTRAVENOUS at 18:35

## 2019-01-01 RX ADMIN — QUETIAPINE 25 MG: 25 TABLET ORAL at 22:58

## 2019-01-01 RX ADMIN — ALBUTEROL SULFATE 2.5 MG: 2.5 SOLUTION RESPIRATORY (INHALATION) at 08:28

## 2019-01-01 RX ADMIN — HYDROCODONE BITARTRATE AND ACETAMINOPHEN 2 TABLET: 5; 325 TABLET ORAL at 15:56

## 2019-01-01 RX ADMIN — SODIUM CHLORIDE, PRESERVATIVE FREE 5 ML: 5 INJECTION INTRAVENOUS at 06:07

## 2019-01-01 RX ADMIN — LEVALBUTEROL HYDROCHLORIDE 1.25 MG: 1.25 SOLUTION, CONCENTRATE RESPIRATORY (INHALATION) at 19:08

## 2019-01-01 RX ADMIN — METHYLPREDNISOLONE SODIUM SUCCINATE 62.5 MG: 125 INJECTION, POWDER, FOR SOLUTION INTRAMUSCULAR; INTRAVENOUS at 10:38

## 2019-01-01 RX ADMIN — ACETAMINOPHEN 975 MG: 325 TABLET, FILM COATED ORAL at 07:37

## 2019-01-01 RX ADMIN — QUETIAPINE 25 MG: 25 TABLET ORAL at 17:17

## 2019-01-01 RX ADMIN — FUROSEMIDE 40 MG: 10 INJECTION, SOLUTION INTRAVENOUS at 14:07

## 2019-01-01 RX ADMIN — IPRATROPIUM BROMIDE AND ALBUTEROL SULFATE 3 ML: .5; 3 SOLUTION RESPIRATORY (INHALATION) at 02:12

## 2019-01-01 RX ADMIN — RANITIDINE 150 MG: 150 TABLET ORAL at 08:09

## 2019-01-01 RX ADMIN — SODIUM CHLORIDE 1000 ML: 9 INJECTION, SOLUTION INTRAVENOUS at 15:45

## 2019-01-01 RX ADMIN — SODIUM CHLORIDE, PRESERVATIVE FREE 5 ML: 5 INJECTION INTRAVENOUS at 14:37

## 2019-01-01 RX ADMIN — LEVALBUTEROL HYDROCHLORIDE 0.63 MG: 1.25 SOLUTION, CONCENTRATE RESPIRATORY (INHALATION) at 13:12

## 2019-01-01 RX ADMIN — QUETIAPINE 25 MG: 25 TABLET ORAL at 13:56

## 2019-01-01 RX ADMIN — CLONAZEPAM 0.5 MG: 0.5 TABLET ORAL at 08:09

## 2019-01-01 RX ADMIN — Medication 5 ML: at 06:00

## 2019-01-01 RX ADMIN — HYDROMORPHONE HYDROCHLORIDE 2 MG: 2 TABLET ORAL at 01:59

## 2019-01-01 RX ADMIN — SERTRALINE HYDROCHLORIDE 150 MG: 100 TABLET ORAL at 11:08

## 2019-01-01 RX ADMIN — IPRATROPIUM BROMIDE 0.5 MG: 0.5 SOLUTION RESPIRATORY (INHALATION) at 07:49

## 2019-01-01 RX ADMIN — SODIUM CHLORIDE: 9 INJECTION, SOLUTION INTRAVENOUS at 16:45

## 2019-01-01 RX ADMIN — Medication 5 ML: at 00:48

## 2019-01-01 RX ADMIN — IPRATROPIUM BROMIDE 0.5 MG: 0.5 SOLUTION RESPIRATORY (INHALATION) at 19:08

## 2019-01-01 RX ADMIN — HYPROMELLOSE 2906 (4000 MPA.S) AND HYPROMELLOSE 2906 (50 MPA.S): 25; 25 SOLUTION OPHTHALMIC at 07:54

## 2019-01-01 RX ADMIN — LEVALBUTEROL HYDROCHLORIDE 0.63 MG: 1.25 SOLUTION, CONCENTRATE RESPIRATORY (INHALATION) at 19:58

## 2019-01-01 RX ADMIN — CLONAZEPAM 2 MG: 1 TABLET ORAL at 21:02

## 2019-01-01 RX ADMIN — HYDROMORPHONE HYDROCHLORIDE 0.3 MG: 1 INJECTION, SOLUTION INTRAMUSCULAR; INTRAVENOUS; SUBCUTANEOUS at 07:23

## 2019-01-01 RX ADMIN — EPOPROSTENOL 20 NG/KG/MIN: 1.5 INJECTION, POWDER, LYOPHILIZED, FOR SOLUTION INTRAVENOUS at 09:33

## 2019-01-01 RX ADMIN — IPRATROPIUM BROMIDE 0.5 MG: 0.5 SOLUTION RESPIRATORY (INHALATION) at 07:51

## 2019-01-01 RX ADMIN — NOREPINEPHRINE BITARTRATE 0.2 MCG/KG/MIN: 1 INJECTION INTRAVENOUS at 16:16

## 2019-01-01 RX ADMIN — CYCLOBENZAPRINE HYDROCHLORIDE 10 MG: 10 TABLET, FILM COATED ORAL at 06:38

## 2019-01-01 RX ADMIN — LIDOCAINE 2 PATCH: 560 PATCH PERCUTANEOUS; TOPICAL; TRANSDERMAL at 20:25

## 2019-01-01 RX ADMIN — ALBUTEROL SULFATE 2.5 MG: 2.5 SOLUTION RESPIRATORY (INHALATION) at 05:49

## 2019-01-01 RX ADMIN — ALBUTEROL SULFATE 2.5 MG: 2.5 SOLUTION RESPIRATORY (INHALATION) at 04:28

## 2019-01-01 RX ADMIN — DEXAMETHASONE SODIUM PHOSPHATE 4 MG: 4 INJECTION, SOLUTION INTRA-ARTICULAR; INTRALESIONAL; INTRAMUSCULAR; INTRAVENOUS; SOFT TISSUE at 13:23

## 2019-01-01 RX ADMIN — LORAZEPAM 1 MG: 2 INJECTION INTRAMUSCULAR; INTRAVENOUS at 06:26

## 2019-01-01 RX ADMIN — PROPOFOL 40 MCG/KG/MIN: 10 INJECTION, EMULSION INTRAVENOUS at 10:11

## 2019-01-01 RX ADMIN — IPRATROPIUM BROMIDE AND ALBUTEROL SULFATE 3 ML: .5; 3 SOLUTION RESPIRATORY (INHALATION) at 15:50

## 2019-01-01 RX ADMIN — VANCOMYCIN HYDROCHLORIDE 2000 MG: 5 INJECTION, POWDER, LYOPHILIZED, FOR SOLUTION INTRAVENOUS at 21:51

## 2019-01-01 RX ADMIN — IPRATROPIUM BROMIDE 0.5 MG: 0.5 SOLUTION RESPIRATORY (INHALATION) at 18:25

## 2019-01-01 RX ADMIN — LEVOFLOXACIN 750 MG: 5 INJECTION, SOLUTION INTRAVENOUS at 06:27

## 2019-01-01 RX ADMIN — ALBUTEROL SULFATE 2.5 MG: 2.5 SOLUTION RESPIRATORY (INHALATION) at 17:53

## 2019-01-01 RX ADMIN — HEPARIN SODIUM (PORCINE) LOCK FLUSH IV SOLN 100 UNIT/ML 500 UNITS: 100 SOLUTION at 09:21

## 2019-01-01 RX ADMIN — IPRATROPIUM BROMIDE AND ALBUTEROL SULFATE 3 ML: .5; 3 SOLUTION RESPIRATORY (INHALATION) at 16:00

## 2019-01-01 RX ADMIN — POTASSIUM CHLORIDE, DEXTROSE MONOHYDRATE AND SODIUM CHLORIDE: 150; 5; 450 INJECTION, SOLUTION INTRAVENOUS at 03:57

## 2019-01-01 RX ADMIN — VANCOMYCIN HYDROCHLORIDE 2000 MG: 5 INJECTION, POWDER, LYOPHILIZED, FOR SOLUTION INTRAVENOUS at 03:52

## 2019-01-01 RX ADMIN — PROPOFOL 30 MCG/KG/MIN: 10 INJECTION, EMULSION INTRAVENOUS at 06:54

## 2019-01-01 RX ADMIN — SERTRALINE HYDROCHLORIDE 150 MG: 100 TABLET ORAL at 09:10

## 2019-01-01 RX ADMIN — HEPARIN SODIUM (PORCINE) LOCK FLUSH IV SOLN 100 UNIT/ML 5 ML: 100 SOLUTION at 13:50

## 2019-01-01 RX ADMIN — RANITIDINE 150 MG: 150 TABLET ORAL at 22:18

## 2019-01-01 RX ADMIN — AZITHROMYCIN MONOHYDRATE 250 MG: 250 TABLET ORAL at 07:55

## 2019-01-01 RX ADMIN — NOREPINEPHRINE BITARTRATE 0.09 MCG/KG/MIN: 1 INJECTION INTRAVENOUS at 12:00

## 2019-01-01 RX ADMIN — METHYLPREDNISOLONE SODIUM SUCCINATE 62.5 MG: 125 INJECTION, POWDER, FOR SOLUTION INTRAMUSCULAR; INTRAVENOUS at 01:12

## 2019-01-01 RX ADMIN — LIDOCAINE 2 PATCH: 560 PATCH PERCUTANEOUS; TOPICAL; TRANSDERMAL at 10:16

## 2019-01-01 RX ADMIN — IPRATROPIUM BROMIDE AND ALBUTEROL SULFATE 3 ML: .5; 3 SOLUTION RESPIRATORY (INHALATION) at 19:03

## 2019-01-01 RX ADMIN — ENOXAPARIN SODIUM 40 MG: 40 INJECTION SUBCUTANEOUS at 13:05

## 2019-01-01 RX ADMIN — IPRATROPIUM BROMIDE AND ALBUTEROL SULFATE 3 ML: .5; 3 SOLUTION RESPIRATORY (INHALATION) at 14:40

## 2019-01-01 RX ADMIN — LIDOCAINE 2 PATCH: 560 PATCH PERCUTANEOUS; TOPICAL; TRANSDERMAL at 11:08

## 2019-01-01 RX ADMIN — SERTRALINE HYDROCHLORIDE 150 MG: 50 TABLET ORAL at 08:48

## 2019-01-01 RX ADMIN — IPRATROPIUM BROMIDE AND ALBUTEROL SULFATE 3 ML: .5; 3 SOLUTION RESPIRATORY (INHALATION) at 09:31

## 2019-01-01 RX ADMIN — MORPHINE SULFATE 10 MG: 100 SOLUTION ORAL at 11:43

## 2019-01-01 RX ADMIN — LORAZEPAM 0.5 MG: 2 INJECTION INTRAMUSCULAR; INTRAVENOUS at 21:31

## 2019-01-01 RX ADMIN — IPRATROPIUM BROMIDE 0.5 MG: 0.5 SOLUTION RESPIRATORY (INHALATION) at 20:01

## 2019-01-01 RX ADMIN — HYDROMORPHONE HYDROCHLORIDE 0.5 MG: 1 INJECTION, SOLUTION INTRAMUSCULAR; INTRAVENOUS; SUBCUTANEOUS at 19:11

## 2019-01-01 RX ADMIN — HYDROMORPHONE HYDROCHLORIDE 2 MG: 2 TABLET ORAL at 18:47

## 2019-01-01 RX ADMIN — OLANZAPINE 5 MG: 5 TABLET, ORALLY DISINTEGRATING ORAL at 05:27

## 2019-01-01 RX ADMIN — IPRATROPIUM BROMIDE AND ALBUTEROL SULFATE: .5; 3 SOLUTION RESPIRATORY (INHALATION) at 02:25

## 2019-01-01 RX ADMIN — ALBUTEROL SULFATE 2.5 MG: 2.5 SOLUTION RESPIRATORY (INHALATION) at 07:45

## 2019-01-01 RX ADMIN — FENTANYL CITRATE 25 MCG/HR: 50 INJECTION INTRAVENOUS at 08:00

## 2019-01-01 RX ADMIN — POTASSIUM CHLORIDE 10 MEQ: 750 TABLET, EXTENDED RELEASE ORAL at 20:57

## 2019-01-01 RX ADMIN — Medication 5 ML: at 09:10

## 2019-01-01 RX ADMIN — LORAZEPAM 1 MG: 2 INJECTION INTRAMUSCULAR; INTRAVENOUS at 04:05

## 2019-01-01 RX ADMIN — PIPERACILLIN SODIUM,TAZOBACTAM SODIUM 3.38 G: 3; .375 INJECTION, POWDER, FOR SOLUTION INTRAVENOUS at 09:59

## 2019-01-01 RX ADMIN — ACETAMINOPHEN 975 MG: 325 TABLET, FILM COATED ORAL at 17:09

## 2019-01-01 RX ADMIN — HUMAN ALBUMIN MICROSPHERES AND PERFLUTREN 9 ML: 10; .22 INJECTION, SOLUTION INTRAVENOUS at 09:00

## 2019-01-01 RX ADMIN — FAMOTIDINE 20 MG: 10 INJECTION, SOLUTION INTRAVENOUS at 15:14

## 2019-01-01 RX ADMIN — POTASSIUM CHLORIDE 20 MEQ: 1.5 POWDER, FOR SOLUTION ORAL at 15:56

## 2019-01-01 RX ADMIN — IBUPROFEN 400 MG: 200 SUSPENSION ORAL at 01:33

## 2019-01-01 RX ADMIN — SERTRALINE HYDROCHLORIDE 150 MG: 100 TABLET ORAL at 07:41

## 2019-01-01 RX ADMIN — IPRATROPIUM BROMIDE AND ALBUTEROL SULFATE 3 ML: .5; 3 SOLUTION RESPIRATORY (INHALATION) at 15:01

## 2019-01-01 RX ADMIN — PIPERACILLIN SODIUM,TAZOBACTAM SODIUM 3.38 G: 3; .375 INJECTION, POWDER, FOR SOLUTION INTRAVENOUS at 17:16

## 2019-01-01 RX ADMIN — LORAZEPAM 1 MG: 2 INJECTION INTRAMUSCULAR; INTRAVENOUS at 09:15

## 2019-01-01 RX ADMIN — DIPHENHYDRAMINE HYDROCHLORIDE 50 MG: 25 CAPSULE ORAL at 23:20

## 2019-01-01 RX ADMIN — IPRATROPIUM BROMIDE AND ALBUTEROL SULFATE 3 ML: .5; 3 SOLUTION RESPIRATORY (INHALATION) at 07:03

## 2019-01-01 RX ADMIN — SERTRALINE HYDROCHLORIDE 150 MG: 50 TABLET ORAL at 08:26

## 2019-01-01 RX ADMIN — POTASSIUM CHLORIDE 20 MEQ: 29.8 INJECTION, SOLUTION INTRAVENOUS at 09:50

## 2019-01-01 RX ADMIN — METHYLPREDNISOLONE 125 MG: 125 INJECTION, POWDER, LYOPHILIZED, FOR SOLUTION INTRAMUSCULAR; INTRAVENOUS at 04:05

## 2019-01-01 RX ADMIN — INSULIN ASPART 2 UNITS: 100 INJECTION, SOLUTION INTRAVENOUS; SUBCUTANEOUS at 12:22

## 2019-01-01 RX ADMIN — IBUPROFEN 800 MG: 600 TABLET ORAL at 23:25

## 2019-01-01 RX ADMIN — METOPROLOL SUCCINATE 25 MG: 25 TABLET, EXTENDED RELEASE ORAL at 08:25

## 2019-01-01 RX ADMIN — HYDROMORPHONE HYDROCHLORIDE 0.5 MG: 1 INJECTION, SOLUTION INTRAMUSCULAR; INTRAVENOUS; SUBCUTANEOUS at 17:10

## 2019-01-01 RX ADMIN — RANITIDINE 150 MG: 150 TABLET ORAL at 06:07

## 2019-01-01 RX ADMIN — HUMAN ALBUMIN MICROSPHERES AND PERFLUTREN 9 ML: 10; .22 INJECTION, SOLUTION INTRAVENOUS at 14:15

## 2019-01-01 RX ADMIN — ONDANSETRON 4 MG: 4 TABLET, ORALLY DISINTEGRATING ORAL at 21:21

## 2019-01-01 RX ADMIN — HYDROMORPHONE HYDROCHLORIDE 0.3 MG: 1 INJECTION, SOLUTION INTRAMUSCULAR; INTRAVENOUS; SUBCUTANEOUS at 16:20

## 2019-01-01 RX ADMIN — HYDROMORPHONE HYDROCHLORIDE 0.5 MG: 1 INJECTION, SOLUTION INTRAMUSCULAR; INTRAVENOUS; SUBCUTANEOUS at 14:49

## 2019-01-01 RX ADMIN — ACETAMINOPHEN 650 MG: 160 SUSPENSION ORAL at 17:05

## 2019-01-01 RX ADMIN — PIPERACILLIN AND TAZOBACTAM 4.5 G: 4; .5 INJECTION, POWDER, FOR SOLUTION INTRAVENOUS at 11:37

## 2019-01-01 RX ADMIN — SODIUM CHLORIDE 100 ML: 9 INJECTION, SOLUTION INTRAVENOUS at 07:34

## 2019-01-01 RX ADMIN — VASOPRESSIN 2.4 UNITS/HR: 20 INJECTION INTRAVENOUS at 10:12

## 2019-01-01 RX ADMIN — SODIUM CHLORIDE, PRESERVATIVE FREE 5 ML: 5 INJECTION INTRAVENOUS at 17:02

## 2019-01-01 RX ADMIN — ONDANSETRON 4 MG: 4 TABLET, ORALLY DISINTEGRATING ORAL at 22:51

## 2019-01-01 RX ADMIN — ONDANSETRON 4 MG: 4 TABLET, ORALLY DISINTEGRATING ORAL at 09:34

## 2019-01-01 RX ADMIN — ALBUTEROL SULFATE 2 PUFF: 90 AEROSOL, METERED RESPIRATORY (INHALATION) at 03:55

## 2019-01-01 RX ADMIN — IBUPROFEN 600 MG: 600 TABLET ORAL at 22:51

## 2019-01-01 RX ADMIN — IOPAMIDOL 78 ML: 755 INJECTION, SOLUTION INTRAVENOUS at 18:45

## 2019-01-01 RX ADMIN — ALBUTEROL SULFATE 2.5 MG: 2.5 SOLUTION RESPIRATORY (INHALATION) at 02:03

## 2019-01-01 RX ADMIN — LORAZEPAM 1 MG: 2 INJECTION INTRAMUSCULAR; INTRAVENOUS at 06:52

## 2019-01-01 RX ADMIN — IPRATROPIUM BROMIDE 0.5 MG: 0.5 SOLUTION RESPIRATORY (INHALATION) at 19:49

## 2019-01-01 RX ADMIN — PIPERACILLIN AND TAZOBACTAM 4.5 G: 4; .5 INJECTION, POWDER, FOR SOLUTION INTRAVENOUS at 00:02

## 2019-01-01 RX ADMIN — POTASSIUM CHLORIDE 20 MEQ: 1.5 POWDER, FOR SOLUTION ORAL at 05:34

## 2019-01-01 RX ADMIN — SCOPALAMINE 1 PATCH: 1 PATCH, EXTENDED RELEASE TRANSDERMAL at 05:46

## 2019-01-01 RX ADMIN — LEVALBUTEROL HYDROCHLORIDE 0.63 MG: 1.25 SOLUTION, CONCENTRATE RESPIRATORY (INHALATION) at 07:18

## 2019-01-01 RX ADMIN — PIPERACILLIN SODIUM,TAZOBACTAM SODIUM 3.38 G: 3; .375 INJECTION, POWDER, FOR SOLUTION INTRAVENOUS at 22:13

## 2019-01-01 RX ADMIN — IPRATROPIUM BROMIDE 0.5 MG: 0.5 SOLUTION RESPIRATORY (INHALATION) at 12:06

## 2019-01-01 RX ADMIN — HYDROMORPHONE HYDROCHLORIDE 2 MG: 2 TABLET ORAL at 10:05

## 2019-01-01 RX ADMIN — SODIUM CHLORIDE 95 ML: 9 INJECTION, SOLUTION INTRAVENOUS at 16:30

## 2019-01-01 RX ADMIN — LEVALBUTEROL HYDROCHLORIDE 0.5 ML: 1.25 SOLUTION, CONCENTRATE RESPIRATORY (INHALATION) at 12:12

## 2019-01-01 RX ADMIN — IOPAMIDOL 73 ML: 755 INJECTION, SOLUTION INTRAVENOUS at 10:11

## 2019-01-01 RX ADMIN — POTASSIUM CHLORIDE 40 MEQ: 1500 TABLET, EXTENDED RELEASE ORAL at 15:00

## 2019-01-01 RX ADMIN — MAGNESIUM SULFATE HEPTAHYDRATE 2 G: 40 INJECTION, SOLUTION INTRAVENOUS at 15:55

## 2019-01-01 RX ADMIN — MORPHINE SULFATE 2 MG: 2 INJECTION, SOLUTION INTRAMUSCULAR; INTRAVENOUS at 04:33

## 2019-01-01 RX ADMIN — Medication 5 ML: at 10:09

## 2019-01-01 RX ADMIN — PROPOFOL 100 MG: 10 INJECTION, EMULSION INTRAVENOUS at 06:52

## 2019-01-01 RX ADMIN — LEVOFLOXACIN 500 MG: 500 TABLET, FILM COATED ORAL at 11:08

## 2019-01-01 RX ADMIN — CARVEDILOL 3.12 MG: 3.12 TABLET, FILM COATED ORAL at 17:09

## 2019-01-01 RX ADMIN — LORAZEPAM 0.5 MG: 0.5 TABLET ORAL at 07:11

## 2019-01-01 RX ADMIN — ACETAMINOPHEN 650 MG: 160 SUSPENSION ORAL at 22:44

## 2019-01-01 RX ADMIN — SODIUM CHLORIDE, SODIUM GLUCONATE, SODIUM ACETATE, POTASSIUM CHLORIDE AND MAGNESIUM CHLORIDE 1000 ML: 526; 502; 368; 37; 30 INJECTION, SOLUTION INTRAVENOUS at 12:50

## 2019-01-01 RX ADMIN — ONDANSETRON 4 MG: 2 INJECTION INTRAMUSCULAR; INTRAVENOUS at 13:42

## 2019-01-01 RX ADMIN — LEVALBUTEROL HYDROCHLORIDE 0.63 MG: 1.25 SOLUTION, CONCENTRATE RESPIRATORY (INHALATION) at 15:31

## 2019-01-01 RX ADMIN — POTASSIUM CHLORIDE 20 MEQ: 29.8 INJECTION, SOLUTION INTRAVENOUS at 15:58

## 2019-01-01 RX ADMIN — FUROSEMIDE 20 MG: 10 INJECTION, SOLUTION INTRAVENOUS at 19:01

## 2019-01-01 RX ADMIN — HEPARIN SODIUM 1000 UNITS/HR: 10000 INJECTION, SOLUTION INTRAVENOUS at 08:35

## 2019-01-01 RX ADMIN — FAMOTIDINE 20 MG: 10 INJECTION, SOLUTION INTRAVENOUS at 06:54

## 2019-01-01 RX ADMIN — CARVEDILOL 3.12 MG: 3.12 TABLET, FILM COATED ORAL at 08:58

## 2019-01-01 RX ADMIN — IPRATROPIUM BROMIDE AND ALBUTEROL SULFATE 3 ML: .5; 3 SOLUTION RESPIRATORY (INHALATION) at 11:09

## 2019-01-01 RX ADMIN — Medication 10 MEQ: at 08:12

## 2019-01-01 RX ADMIN — POTASSIUM CHLORIDE 20 MEQ: 29.8 INJECTION, SOLUTION INTRAVENOUS at 10:44

## 2019-01-01 RX ADMIN — LORAZEPAM 1 MG: 2 INJECTION INTRAMUSCULAR; INTRAVENOUS at 00:21

## 2019-01-01 RX ADMIN — DEXMEDETOMIDINE HYDROCHLORIDE 12 MCG: 100 INJECTION, SOLUTION INTRAVENOUS at 13:32

## 2019-01-01 RX ADMIN — MINERAL OIL AND PETROLATUM: 150; 830 OINTMENT OPHTHALMIC at 08:44

## 2019-01-01 RX ADMIN — CLONAZEPAM 0.5 MG: 0.5 TABLET ORAL at 21:42

## 2019-01-01 RX ADMIN — HYDROMORPHONE HYDROCHLORIDE 0.3 MG: 1 INJECTION, SOLUTION INTRAMUSCULAR; INTRAVENOUS; SUBCUTANEOUS at 15:09

## 2019-01-01 RX ADMIN — HYDROMORPHONE HYDROCHLORIDE 0.5 MG: 1 INJECTION, SOLUTION INTRAMUSCULAR; INTRAVENOUS; SUBCUTANEOUS at 00:32

## 2019-01-01 RX ADMIN — ALBUTEROL SULFATE 2.5 MG: 2.5 SOLUTION RESPIRATORY (INHALATION) at 17:05

## 2019-01-01 RX ADMIN — ALBUTEROL SULFATE 2.5 MG: 2.5 SOLUTION RESPIRATORY (INHALATION) at 00:22

## 2019-01-01 RX ADMIN — BUPROPION HYDROCHLORIDE 150 MG: 150 TABLET, FILM COATED, EXTENDED RELEASE ORAL at 07:41

## 2019-01-01 RX ADMIN — ONDANSETRON: 2 INJECTION INTRAMUSCULAR; INTRAVENOUS at 02:48

## 2019-01-01 RX ADMIN — IPRATROPIUM BROMIDE AND ALBUTEROL SULFATE 3 ML: .5; 3 SOLUTION RESPIRATORY (INHALATION) at 08:04

## 2019-01-01 RX ADMIN — HYDROMORPHONE HYDROCHLORIDE 2 MG: 2 TABLET ORAL at 11:02

## 2019-01-01 RX ADMIN — RANITIDINE 150 MG: 150 TABLET ORAL at 21:05

## 2019-01-01 RX ADMIN — Medication 5 ML: at 16:41

## 2019-01-01 RX ADMIN — PROCHLORPERAZINE EDISYLATE 10 MG: 5 INJECTION INTRAMUSCULAR; INTRAVENOUS at 20:11

## 2019-01-01 RX ADMIN — QUETIAPINE 25 MG: 25 TABLET ORAL at 21:05

## 2019-01-01 RX ADMIN — ALBUTEROL SULFATE 2.5 MG: 2.5 SOLUTION RESPIRATORY (INHALATION) at 13:09

## 2019-01-01 RX ADMIN — PIPERACILLIN AND TAZOBACTAM 4.5 G: 4; .5 INJECTION, POWDER, FOR SOLUTION INTRAVENOUS at 12:02

## 2019-01-01 RX ADMIN — POTASSIUM CHLORIDE 10 MEQ: 750 TABLET, EXTENDED RELEASE ORAL at 14:52

## 2019-01-01 RX ADMIN — ONDANSETRON 4 MG: 2 INJECTION INTRAMUSCULAR; INTRAVENOUS at 08:36

## 2019-01-01 RX ADMIN — DULOXETINE HYDROCHLORIDE 30 MG: 30 CAPSULE, DELAYED RELEASE ORAL at 08:24

## 2019-01-01 RX ADMIN — POTASSIUM CHLORIDE 10 MEQ: 750 TABLET, EXTENDED RELEASE ORAL at 09:54

## 2019-01-01 RX ADMIN — LEVALBUTEROL HYDROCHLORIDE 0.63 MG: 1.25 SOLUTION, CONCENTRATE RESPIRATORY (INHALATION) at 00:53

## 2019-01-01 RX ADMIN — IPRATROPIUM BROMIDE AND ALBUTEROL SULFATE 3 ML: .5; 3 SOLUTION RESPIRATORY (INHALATION) at 05:27

## 2019-01-01 RX ADMIN — IBUPROFEN 600 MG: 600 TABLET ORAL at 08:58

## 2019-01-01 RX ADMIN — IPRATROPIUM BROMIDE AND ALBUTEROL SULFATE 3 ML: .5; 3 SOLUTION RESPIRATORY (INHALATION) at 06:55

## 2019-01-01 RX ADMIN — AZITHROMYCIN MONOHYDRATE 250 MG: 500 INJECTION, POWDER, LYOPHILIZED, FOR SOLUTION INTRAVENOUS at 12:16

## 2019-01-01 RX ADMIN — PIPERACILLIN AND TAZOBACTAM 4.5 G: 4; .5 INJECTION, POWDER, FOR SOLUTION INTRAVENOUS at 00:04

## 2019-01-01 RX ADMIN — POTASSIUM CHLORIDE 20 MEQ: 1.5 POWDER, FOR SOLUTION ORAL at 06:52

## 2019-01-01 RX ADMIN — ACETAMINOPHEN 1000 MG: 500 TABLET, FILM COATED ORAL at 08:24

## 2019-01-01 RX ADMIN — MORPHINE SULFATE 4 MG: 4 INJECTION INTRAVENOUS at 03:18

## 2019-01-01 RX ADMIN — SODIUM CHLORIDE, SODIUM GLUCONATE, SODIUM ACETATE, POTASSIUM CHLORIDE AND MAGNESIUM CHLORIDE 500 ML: 526; 502; 368; 37; 30 INJECTION, SOLUTION INTRAVENOUS at 18:47

## 2019-01-01 RX ADMIN — HUMAN ALBUMIN MICROSPHERES AND PERFLUTREN 9 ML: 10; .22 INJECTION, SOLUTION INTRAVENOUS at 13:12

## 2019-01-01 RX ADMIN — IBUPROFEN 800 MG: 400 TABLET ORAL at 15:53

## 2019-01-01 RX ADMIN — HYDROXYZINE HYDROCHLORIDE 50 MG: 25 TABLET ORAL at 17:30

## 2019-01-01 RX ADMIN — DEXTROSE MONOHYDRATE 3 MCG/KG/MIN: 50 INJECTION, SOLUTION INTRAVENOUS at 13:58

## 2019-01-01 RX ADMIN — POTASSIUM CHLORIDE 20 MEQ: 1500 TABLET, EXTENDED RELEASE ORAL at 17:19

## 2019-01-01 RX ADMIN — MORPHINE SULFATE 4 MG: 4 INJECTION INTRAVENOUS at 06:38

## 2019-01-01 RX ADMIN — ALBUTEROL SULFATE 2.5 MG: 2.5 SOLUTION RESPIRATORY (INHALATION) at 18:58

## 2019-01-01 RX ADMIN — VASOPRESSIN 2.4 UNITS/HR: 20 INJECTION INTRAVENOUS at 12:19

## 2019-01-01 RX ADMIN — CLONAZEPAM 1 MG: 0.5 TABLET ORAL at 20:39

## 2019-01-01 RX ADMIN — CLONAZEPAM 0.5 MG: 0.5 TABLET ORAL at 09:02

## 2019-01-01 RX ADMIN — POTASSIUM CHLORIDE 20 MEQ: 29.8 INJECTION, SOLUTION INTRAVENOUS at 23:26

## 2019-01-01 RX ADMIN — Medication 5 ML: at 09:41

## 2019-01-01 RX ADMIN — LEVALBUTEROL HYDROCHLORIDE 0.63 MG: 1.25 SOLUTION, CONCENTRATE RESPIRATORY (INHALATION) at 07:40

## 2019-01-01 RX ADMIN — ONDANSETRON 4 MG: 4 TABLET, ORALLY DISINTEGRATING ORAL at 17:01

## 2019-01-01 RX ADMIN — IBUPROFEN 800 MG: 600 TABLET ORAL at 13:21

## 2019-01-01 RX ADMIN — LIDOCAINE 2 PATCH: 560 PATCH PERCUTANEOUS; TOPICAL; TRANSDERMAL at 11:55

## 2019-01-01 RX ADMIN — IOPAMIDOL 73 ML: 755 INJECTION, SOLUTION INTRAVENOUS at 17:07

## 2019-01-01 RX ADMIN — PROCHLORPERAZINE MALEATE 10 MG: 5 TABLET ORAL at 10:09

## 2019-01-01 RX ADMIN — QUETIAPINE FUMARATE 25 MG: 25 TABLET ORAL at 04:32

## 2019-01-01 RX ADMIN — HYDROMORPHONE HYDROCHLORIDE 0.5 MG: 1 INJECTION, SOLUTION INTRAMUSCULAR; INTRAVENOUS; SUBCUTANEOUS at 21:48

## 2019-01-01 RX ADMIN — PANTOPRAZOLE SODIUM 40 MG: 40 TABLET, DELAYED RELEASE ORAL at 08:24

## 2019-01-01 RX ADMIN — SENNOSIDES AND DOCUSATE SODIUM 1 TABLET: 8.6; 5 TABLET ORAL at 22:05

## 2019-01-01 RX ADMIN — ALBUTEROL SULFATE 2.5 MG: 2.5 SOLUTION RESPIRATORY (INHALATION) at 20:25

## 2019-01-01 RX ADMIN — IBUPROFEN 400 MG: 200 SUSPENSION ORAL at 08:34

## 2019-01-01 RX ADMIN — POTASSIUM CHLORIDE 20 MEQ: 29.8 INJECTION, SOLUTION INTRAVENOUS at 13:55

## 2019-01-01 RX ADMIN — ONDANSETRON 4 MG: 4 TABLET, ORALLY DISINTEGRATING ORAL at 05:10

## 2019-01-01 RX ADMIN — IPRATROPIUM BROMIDE AND ALBUTEROL SULFATE 3 ML: .5; 3 SOLUTION RESPIRATORY (INHALATION) at 19:12

## 2019-01-01 RX ADMIN — METHYLPREDNISOLONE SODIUM SUCCINATE 40 MG: 40 INJECTION, POWDER, FOR SOLUTION INTRAMUSCULAR; INTRAVENOUS at 09:53

## 2019-01-01 RX ADMIN — IPRATROPIUM BROMIDE AND ALBUTEROL SULFATE 3 ML: .5; 3 SOLUTION RESPIRATORY (INHALATION) at 09:46

## 2019-01-01 RX ADMIN — HYDROMORPHONE HYDROCHLORIDE 0.5 MG: 1 INJECTION, SOLUTION INTRAMUSCULAR; INTRAVENOUS; SUBCUTANEOUS at 08:37

## 2019-01-01 RX ADMIN — ACETAMINOPHEN 650 MG: 325 TABLET, FILM COATED ORAL at 06:27

## 2019-01-01 RX ADMIN — HYDROMORPHONE HYDROCHLORIDE 0.5 MG: 1 INJECTION, SOLUTION INTRAMUSCULAR; INTRAVENOUS; SUBCUTANEOUS at 09:43

## 2019-01-01 RX ADMIN — IPRATROPIUM BROMIDE 0.5 MG: 0.5 SOLUTION RESPIRATORY (INHALATION) at 19:54

## 2019-01-01 RX ADMIN — PIPERACILLIN SODIUM,TAZOBACTAM SODIUM 3.38 G: 3; .375 INJECTION, POWDER, FOR SOLUTION INTRAVENOUS at 00:56

## 2019-01-01 RX ADMIN — MIDAZOLAM HYDROCHLORIDE 0.5 MG: 1 INJECTION, SOLUTION INTRAMUSCULAR; INTRAVENOUS at 08:56

## 2019-01-01 RX ADMIN — IPRATROPIUM BROMIDE AND ALBUTEROL SULFATE 3 ML: .5; 3 SOLUTION RESPIRATORY (INHALATION) at 06:58

## 2019-01-01 RX ADMIN — HYDROCODONE BITARTRATE AND ACETAMINOPHEN 2 TABLET: 5; 325 TABLET ORAL at 19:17

## 2019-01-01 RX ADMIN — METHYLPREDNISOLONE SODIUM SUCCINATE 62.5 MG: 125 INJECTION, POWDER, FOR SOLUTION INTRAMUSCULAR; INTRAVENOUS at 17:55

## 2019-01-01 RX ADMIN — LEVALBUTEROL HYDROCHLORIDE 1.25 MG: 1.25 SOLUTION, CONCENTRATE RESPIRATORY (INHALATION) at 12:09

## 2019-01-01 RX ADMIN — SERTRALINE HYDROCHLORIDE 150 MG: 100 TABLET ORAL at 10:31

## 2019-01-01 RX ADMIN — POTASSIUM CHLORIDE 20 MEQ: 1500 TABLET, EXTENDED RELEASE ORAL at 14:53

## 2019-01-01 RX ADMIN — IPRATROPIUM BROMIDE AND ALBUTEROL SULFATE 3 ML: .5; 3 SOLUTION RESPIRATORY (INHALATION) at 07:27

## 2019-01-01 RX ADMIN — FUROSEMIDE 40 MG: 10 INJECTION, SOLUTION INTRAVENOUS at 13:33

## 2019-01-01 RX ADMIN — ALBUTEROL SULFATE 5 MG: 2.5 SOLUTION RESPIRATORY (INHALATION) at 09:27

## 2019-01-01 RX ADMIN — POTASSIUM CHLORIDE 20 MEQ: 1.5 POWDER, FOR SOLUTION ORAL at 01:36

## 2019-01-01 RX ADMIN — HEPARIN SODIUM (PORCINE) LOCK FLUSH IV SOLN 100 UNIT/ML 5 ML: 100 SOLUTION at 20:08

## 2019-01-01 RX ADMIN — PANTOPRAZOLE SODIUM 40 MG: 40 TABLET, DELAYED RELEASE ORAL at 05:57

## 2019-01-01 RX ADMIN — IBUPROFEN 800 MG: 400 TABLET ORAL at 07:23

## 2019-01-01 RX ADMIN — DIGOXIN 125 MCG: 0.25 INJECTION INTRAMUSCULAR; INTRAVENOUS at 02:04

## 2019-01-01 RX ADMIN — IBUPROFEN 600 MG: 600 TABLET ORAL at 21:15

## 2019-01-01 RX ADMIN — ALBUTEROL SULFATE 2.5 MG: 2.5 SOLUTION RESPIRATORY (INHALATION) at 10:38

## 2019-01-01 RX ADMIN — LEVALBUTEROL HYDROCHLORIDE 0.63 MG: 1.25 SOLUTION, CONCENTRATE RESPIRATORY (INHALATION) at 13:19

## 2019-01-01 RX ADMIN — CLONAZEPAM 2 MG: 1 TABLET ORAL at 19:49

## 2019-01-01 RX ADMIN — RANITIDINE 150 MG: 150 TABLET ORAL at 04:16

## 2019-01-01 RX ADMIN — ATROPINE SULFATE 1 MG: 0.1 INJECTION PARENTERAL at 16:10

## 2019-01-01 RX ADMIN — FENTANYL CITRATE 50 MCG: 50 INJECTION INTRAMUSCULAR; INTRAVENOUS at 08:52

## 2019-01-01 RX ADMIN — PIPERACILLIN AND TAZOBACTAM 4.5 G: 4; .5 INJECTION, POWDER, FOR SOLUTION INTRAVENOUS at 06:40

## 2019-01-01 RX ADMIN — MORPHINE SULFATE 4 MG: 4 INJECTION, SOLUTION INTRAMUSCULAR; INTRAVENOUS at 04:10

## 2019-01-01 RX ADMIN — ALBUMIN HUMAN 50 G: 0.25 SOLUTION INTRAVENOUS at 09:10

## 2019-01-01 RX ADMIN — INSULIN ASPART 4 UNITS: 100 INJECTION, SOLUTION INTRAVENOUS; SUBCUTANEOUS at 20:38

## 2019-01-01 RX ADMIN — BUPROPION HYDROCHLORIDE 150 MG: 150 TABLET, FILM COATED, EXTENDED RELEASE ORAL at 08:33

## 2019-01-01 RX ADMIN — HYDROMORPHONE HYDROCHLORIDE 0.5 MG: 1 INJECTION, SOLUTION INTRAMUSCULAR; INTRAVENOUS; SUBCUTANEOUS at 13:25

## 2019-01-01 RX ADMIN — EPINEPHRINE 0.03 MCG/KG/MIN: 1 INJECTION INTRAMUSCULAR; INTRAVENOUS; SUBCUTANEOUS at 15:48

## 2019-01-01 RX ADMIN — ALBUTEROL SULFATE 2.5 MG: 2.5 SOLUTION RESPIRATORY (INHALATION) at 09:42

## 2019-01-01 RX ADMIN — CARVEDILOL 3.12 MG: 3.12 TABLET, FILM COATED ORAL at 07:40

## 2019-01-01 RX ADMIN — PIPERACILLIN SODIUM,TAZOBACTAM SODIUM 3.38 G: 3; .375 INJECTION, POWDER, FOR SOLUTION INTRAVENOUS at 21:42

## 2019-01-01 RX ADMIN — PIPERACILLIN AND TAZOBACTAM 4.5 G: 4; .5 INJECTION, POWDER, FOR SOLUTION INTRAVENOUS at 11:49

## 2019-01-01 RX ADMIN — HYDROMORPHONE HYDROCHLORIDE 0.5 MG: 1 INJECTION, SOLUTION INTRAMUSCULAR; INTRAVENOUS; SUBCUTANEOUS at 23:36

## 2019-01-01 RX ADMIN — ENOXAPARIN SODIUM 40 MG: 40 INJECTION SUBCUTANEOUS at 16:26

## 2019-01-01 RX ADMIN — FUROSEMIDE 40 MG: 10 INJECTION, SOLUTION INTRAVENOUS at 18:29

## 2019-01-01 RX ADMIN — HYDROMORPHONE HYDROCHLORIDE 0.5 MG: 1 INJECTION, SOLUTION INTRAMUSCULAR; INTRAVENOUS; SUBCUTANEOUS at 00:24

## 2019-01-01 RX ADMIN — FUROSEMIDE 20 MG: 10 INJECTION, SOLUTION INTRAVENOUS at 14:36

## 2019-01-01 RX ADMIN — METHYLPREDNISOLONE SODIUM SUCCINATE 125 MG: 125 INJECTION, POWDER, FOR SOLUTION INTRAMUSCULAR; INTRAVENOUS at 15:45

## 2019-01-01 RX ADMIN — CHLORHEXIDINE GLUCONATE 15 ML: 1.2 RINSE ORAL at 20:31

## 2019-01-01 RX ADMIN — CLONAZEPAM 2 MG: 1 TABLET ORAL at 21:21

## 2019-01-01 RX ADMIN — AMIODARONE HYDROCHLORIDE 0.5 MG/MIN: 50 INJECTION, SOLUTION INTRAVENOUS at 02:35

## 2019-01-01 RX ADMIN — SODIUM CHLORIDE 93 ML: 9 INJECTION, SOLUTION INTRAVENOUS at 17:07

## 2019-01-01 RX ADMIN — METOPROLOL SUCCINATE 25 MG: 25 TABLET, EXTENDED RELEASE ORAL at 13:05

## 2019-01-01 RX ADMIN — GUAIFENESIN 1200 MG: 600 TABLET, EXTENDED RELEASE ORAL at 09:54

## 2019-01-01 RX ADMIN — ALBUTEROL SULFATE 5 MG: 2.5 SOLUTION RESPIRATORY (INHALATION) at 08:44

## 2019-01-01 RX ADMIN — DIPHENHYDRAMINE HYDROCHLORIDE 50 MG: 25 CAPSULE ORAL at 18:58

## 2019-01-01 RX ADMIN — CLONAZEPAM 0.5 MG: 0.5 TABLET ORAL at 13:33

## 2019-01-01 RX ADMIN — ALBUTEROL SULFATE 2.5 MG: 2.5 SOLUTION RESPIRATORY (INHALATION) at 03:18

## 2019-01-01 RX ADMIN — SODIUM CHLORIDE 500 ML: 9 INJECTION, SOLUTION INTRAVENOUS at 17:00

## 2019-01-01 RX ADMIN — ENOXAPARIN SODIUM 40 MG: 40 INJECTION SUBCUTANEOUS at 19:51

## 2019-01-01 RX ADMIN — PANTOPRAZOLE SODIUM 40 MG: 40 TABLET, DELAYED RELEASE ORAL at 06:07

## 2019-01-01 RX ADMIN — SENNOSIDES AND DOCUSATE SODIUM 1 TABLET: 8.6; 5 TABLET ORAL at 08:49

## 2019-01-01 RX ADMIN — POTASSIUM CHLORIDE 20 MEQ: 1500 TABLET, EXTENDED RELEASE ORAL at 14:37

## 2019-01-01 RX ADMIN — HYDROMORPHONE HYDROCHLORIDE 2 MG: 2 TABLET ORAL at 22:51

## 2019-01-01 RX ADMIN — IPRATROPIUM BROMIDE AND ALBUTEROL SULFATE 3 ML: .5; 3 SOLUTION RESPIRATORY (INHALATION) at 12:15

## 2019-01-01 RX ADMIN — HEPARIN SODIUM 1450 UNITS/HR: 10000 INJECTION, SOLUTION INTRAVENOUS at 09:59

## 2019-01-01 RX ADMIN — DIPHENHYDRAMINE HYDROCHLORIDE 50 MG: 25 CAPSULE ORAL at 11:28

## 2019-01-01 RX ADMIN — CHLORHEXIDINE GLUCONATE 15 ML: 1.2 RINSE ORAL at 20:02

## 2019-01-01 RX ADMIN — HYDROMORPHONE HYDROCHLORIDE 0.5 MG: 1 INJECTION, SOLUTION INTRAMUSCULAR; INTRAVENOUS; SUBCUTANEOUS at 07:51

## 2019-01-01 RX ADMIN — LEVALBUTEROL HYDROCHLORIDE 0.63 MG: 1.25 SOLUTION, CONCENTRATE RESPIRATORY (INHALATION) at 02:31

## 2019-01-01 RX ADMIN — METOPROLOL SUCCINATE 25 MG: 25 TABLET, EXTENDED RELEASE ORAL at 13:27

## 2019-01-01 RX ADMIN — SERTRALINE HYDROCHLORIDE 150 MG: 100 TABLET ORAL at 08:58

## 2019-01-01 RX ADMIN — HYDROCODONE BITARTRATE AND ACETAMINOPHEN 2 TABLET: 5; 325 TABLET ORAL at 23:20

## 2019-01-01 RX ADMIN — CLONAZEPAM 1 MG: 0.5 TABLET ORAL at 02:44

## 2019-01-01 RX ADMIN — HYDROMORPHONE HYDROCHLORIDE 0.5 MG: 1 INJECTION, SOLUTION INTRAMUSCULAR; INTRAVENOUS; SUBCUTANEOUS at 09:10

## 2019-01-01 RX ADMIN — IPRATROPIUM BROMIDE 0.5 MG: 0.5 SOLUTION RESPIRATORY (INHALATION) at 20:35

## 2019-01-01 RX ADMIN — IPRATROPIUM BROMIDE AND ALBUTEROL SULFATE 3 ML: .5; 3 SOLUTION RESPIRATORY (INHALATION) at 15:47

## 2019-01-01 RX ADMIN — LORAZEPAM 1 MG: 2 INJECTION INTRAMUSCULAR; INTRAVENOUS at 13:57

## 2019-01-01 RX ADMIN — CLONAZEPAM 0.5 MG: 0.5 TABLET ORAL at 21:16

## 2019-01-01 RX ADMIN — ALBUTEROL SULFATE 2 PUFF: 90 AEROSOL, METERED RESPIRATORY (INHALATION) at 13:10

## 2019-01-01 RX ADMIN — LIDOCAINE 2 PATCH: 560 PATCH PERCUTANEOUS; TOPICAL; TRANSDERMAL at 09:49

## 2019-01-01 RX ADMIN — SENNOSIDES AND DOCUSATE SODIUM 1 TABLET: 8.6; 5 TABLET ORAL at 10:10

## 2019-01-01 RX ADMIN — IBUPROFEN 800 MG: 400 TABLET ORAL at 08:09

## 2019-01-01 RX ADMIN — ALBUTEROL SULFATE 2.5 MG: 2.5 SOLUTION RESPIRATORY (INHALATION) at 18:09

## 2019-01-01 RX ADMIN — PIPERACILLIN SODIUM,TAZOBACTAM SODIUM 3.38 G: 3; .375 INJECTION, POWDER, FOR SOLUTION INTRAVENOUS at 04:00

## 2019-01-01 RX ADMIN — SODIUM CHLORIDE, PRESERVATIVE FREE 5 ML: 5 INJECTION INTRAVENOUS at 15:41

## 2019-01-01 RX ADMIN — PIPERACILLIN SODIUM,TAZOBACTAM SODIUM 3.38 G: 3; .375 INJECTION, POWDER, FOR SOLUTION INTRAVENOUS at 21:04

## 2019-01-01 RX ADMIN — IBUPROFEN 600 MG: 600 TABLET ORAL at 18:14

## 2019-01-01 RX ADMIN — SERTRALINE HYDROCHLORIDE 150 MG: 100 TABLET ORAL at 11:16

## 2019-01-01 RX ADMIN — FENTANYL CITRATE 50 MCG: 50 INJECTION, SOLUTION INTRAMUSCULAR; INTRAVENOUS at 13:58

## 2019-01-01 RX ADMIN — ROCURONIUM BROMIDE 30 MG: 10 INJECTION INTRAVENOUS at 13:17

## 2019-01-01 RX ADMIN — MINERAL OIL AND PETROLATUM: 150; 830 OINTMENT OPHTHALMIC at 08:14

## 2019-01-01 RX ADMIN — ENOXAPARIN SODIUM 40 MG: 40 INJECTION SUBCUTANEOUS at 13:00

## 2019-01-01 RX ADMIN — POTASSIUM CHLORIDE 20 MEQ: 1500 TABLET, EXTENDED RELEASE ORAL at 01:34

## 2019-01-01 RX ADMIN — LORAZEPAM 1 MG: 2 INJECTION INTRAMUSCULAR; INTRAVENOUS at 02:04

## 2019-01-01 RX ADMIN — RANITIDINE 150 MG: 150 TABLET ORAL at 18:14

## 2019-01-01 RX ADMIN — SENNOSIDES AND DOCUSATE SODIUM 1 TABLET: 8.6; 5 TABLET ORAL at 21:21

## 2019-01-01 RX ADMIN — HYDROMORPHONE HYDROCHLORIDE 1 MG: 2 TABLET ORAL at 22:51

## 2019-01-01 RX ADMIN — PIPERACILLIN SODIUM,TAZOBACTAM SODIUM 3.38 G: 3; .375 INJECTION, POWDER, FOR SOLUTION INTRAVENOUS at 03:39

## 2019-01-01 RX ADMIN — DOXYCYCLINE 100 MG: 100 INJECTION, POWDER, LYOPHILIZED, FOR SOLUTION INTRAVENOUS at 21:20

## 2019-01-01 RX ADMIN — HEPARIN SODIUM 1200 UNITS/HR: 10000 INJECTION, SOLUTION INTRAVENOUS at 06:54

## 2019-01-01 RX ADMIN — ENOXAPARIN SODIUM 40 MG: 40 INJECTION SUBCUTANEOUS at 11:14

## 2019-01-01 RX ADMIN — VASOPRESSIN 2.4 UNITS/HR: 20 INJECTION INTRAVENOUS at 17:50

## 2019-01-01 RX ADMIN — Medication 5 ML: at 20:03

## 2019-01-01 RX ADMIN — PIPERACILLIN SODIUM,TAZOBACTAM SODIUM 3.38 G: 3; .375 INJECTION, POWDER, FOR SOLUTION INTRAVENOUS at 16:40

## 2019-01-01 RX ADMIN — LORAZEPAM 1 MG: 2 INJECTION INTRAMUSCULAR; INTRAVENOUS at 06:04

## 2019-01-01 RX ADMIN — TRAMADOL HYDROCHLORIDE 50 MG: 50 TABLET ORAL at 16:47

## 2019-01-01 RX ADMIN — FENTANYL CITRATE 50 MCG: 50 INJECTION, SOLUTION INTRAMUSCULAR; INTRAVENOUS at 13:03

## 2019-01-01 RX ADMIN — ALBUTEROL SULFATE 2.5 MG: 2.5 SOLUTION RESPIRATORY (INHALATION) at 00:56

## 2019-01-01 RX ADMIN — BUPROPION HYDROCHLORIDE 150 MG: 150 TABLET, FILM COATED, EXTENDED RELEASE ORAL at 08:48

## 2019-01-01 RX ADMIN — IPRATROPIUM BROMIDE 0.5 MG: 0.5 SOLUTION RESPIRATORY (INHALATION) at 07:35

## 2019-01-01 RX ADMIN — ARFORMOTEROL TARTRATE 15 MCG: 15 SOLUTION RESPIRATORY (INHALATION) at 20:20

## 2019-01-01 RX ADMIN — SODIUM CHLORIDE, PRESERVATIVE FREE 5 ML: 5 INJECTION INTRAVENOUS at 05:56

## 2019-01-01 RX ADMIN — HUMAN ALBUMIN MICROSPHERES AND PERFLUTREN 9 ML: 10; .22 INJECTION, SOLUTION INTRAVENOUS at 12:34

## 2019-01-01 RX ADMIN — LORAZEPAM 1 MG: 0.5 TABLET ORAL at 02:21

## 2019-01-01 RX ADMIN — ONDANSETRON 4 MG: 4 TABLET, ORALLY DISINTEGRATING ORAL at 11:21

## 2019-01-01 RX ADMIN — HYPROMELLOSE 2906 (4000 MPA.S) AND HYPROMELLOSE 2906 (50 MPA.S): 25; 25 SOLUTION OPHTHALMIC at 17:00

## 2019-01-01 RX ADMIN — DEXTROSE MONOHYDRATE 3 MCG/KG/MIN: 50 INJECTION, SOLUTION INTRAVENOUS at 01:57

## 2019-01-01 RX ADMIN — FENTANYL CITRATE 50 MCG/HR: 50 INJECTION INTRAVENOUS at 10:50

## 2019-01-01 RX ADMIN — IPRATROPIUM BROMIDE 0.5 MG: 0.5 SOLUTION RESPIRATORY (INHALATION) at 19:59

## 2019-01-01 RX ADMIN — MAGNESIUM SULFATE HEPTAHYDRATE 2 G: 40 INJECTION, SOLUTION INTRAVENOUS at 00:35

## 2019-01-01 RX ADMIN — TRAMADOL HYDROCHLORIDE 50 MG: 50 TABLET ORAL at 16:25

## 2019-01-01 RX ADMIN — LIDOCAINE HYDROCHLORIDE 10 ML: 10 INJECTION, SOLUTION EPIDURAL; INFILTRATION; INTRACAUDAL; PERINEURAL at 15:53

## 2019-01-01 RX ADMIN — IPRATROPIUM BROMIDE 0.5 MG: 0.5 SOLUTION RESPIRATORY (INHALATION) at 13:19

## 2019-01-01 RX ADMIN — LIDOCAINE 2 PATCH: 560 PATCH PERCUTANEOUS; TOPICAL; TRANSDERMAL at 09:28

## 2019-01-01 RX ADMIN — GUAIFENESIN 1200 MG: 600 TABLET, EXTENDED RELEASE ORAL at 10:10

## 2019-01-01 RX ADMIN — ACETAMINOPHEN 650 MG: 325 TABLET, FILM COATED ORAL at 20:16

## 2019-01-01 RX ADMIN — ACETAMINOPHEN 650 MG: 325 TABLET, FILM COATED ORAL at 10:35

## 2019-01-01 RX ADMIN — LORAZEPAM 1 MG: 2 INJECTION INTRAMUSCULAR; INTRAVENOUS at 19:54

## 2019-01-01 RX ADMIN — SODIUM CHLORIDE, POTASSIUM CHLORIDE, SODIUM LACTATE AND CALCIUM CHLORIDE: 600; 310; 30; 20 INJECTION, SOLUTION INTRAVENOUS at 12:33

## 2019-01-01 RX ADMIN — IPRATROPIUM BROMIDE 0.5 MG: 0.5 SOLUTION RESPIRATORY (INHALATION) at 15:13

## 2019-01-01 RX ADMIN — HYDROMORPHONE HYDROCHLORIDE 0.5 MG: 1 INJECTION, SOLUTION INTRAMUSCULAR; INTRAVENOUS; SUBCUTANEOUS at 21:14

## 2019-01-01 RX ADMIN — ARFORMOTEROL TARTRATE 15 MCG: 15 SOLUTION RESPIRATORY (INHALATION) at 20:08

## 2019-01-01 RX ADMIN — SODIUM CHLORIDE, PRESERVATIVE FREE 5 ML: 5 INJECTION INTRAVENOUS at 11:57

## 2019-01-01 RX ADMIN — IPRATROPIUM BROMIDE 0.5 MG: 0.5 SOLUTION RESPIRATORY (INHALATION) at 02:31

## 2019-01-01 RX ADMIN — FUROSEMIDE 40 MG: 10 INJECTION, SOLUTION INTRAVENOUS at 01:33

## 2019-01-01 RX ADMIN — PROPOFOL 20 MCG/KG/MIN: 10 INJECTION, EMULSION INTRAVENOUS at 07:02

## 2019-01-01 RX ADMIN — MIDAZOLAM HYDROCHLORIDE 1 MG: 1 INJECTION, SOLUTION INTRAMUSCULAR; INTRAVENOUS at 08:49

## 2019-01-01 RX ADMIN — PROPOFOL 20 MCG/KG/MIN: 10 INJECTION, EMULSION INTRAVENOUS at 13:37

## 2019-01-01 RX ADMIN — PIPERACILLIN SODIUM,TAZOBACTAM SODIUM 3.38 G: 3; .375 INJECTION, POWDER, FOR SOLUTION INTRAVENOUS at 21:24

## 2019-01-01 RX ADMIN — ACETAMINOPHEN 650 MG: 325 TABLET, FILM COATED ORAL at 08:58

## 2019-01-01 RX ADMIN — IPRATROPIUM BROMIDE 0.5 MG: 0.5 SOLUTION RESPIRATORY (INHALATION) at 13:17

## 2019-01-01 RX ADMIN — HYDROCODONE BITARTRATE AND ACETAMINOPHEN 2 TABLET: 5; 325 TABLET ORAL at 11:49

## 2019-01-01 RX ADMIN — EPOPROSTENOL 20 NG/KG/MIN: 1.5 INJECTION, POWDER, LYOPHILIZED, FOR SOLUTION INTRAVENOUS at 03:39

## 2019-01-01 RX ADMIN — METHYLPREDNISOLONE SODIUM SUCCINATE 125 MG: 125 INJECTION, POWDER, FOR SOLUTION INTRAMUSCULAR; INTRAVENOUS at 08:41

## 2019-01-01 RX ADMIN — SERTRALINE HYDROCHLORIDE 150 MG: 100 TABLET ORAL at 09:05

## 2019-01-01 RX ADMIN — DEXTROSE MONOHYDRATE 4 MCG/KG/MIN: 50 INJECTION, SOLUTION INTRAVENOUS at 10:31

## 2019-01-01 RX ADMIN — SODIUM CHLORIDE: 900 INJECTION INTRAVENOUS at 12:30

## 2019-01-01 RX ADMIN — EPOPROSTENOL 20 NG/KG/MIN: 1.5 INJECTION, POWDER, LYOPHILIZED, FOR SOLUTION INTRAVENOUS at 15:09

## 2019-01-01 RX ADMIN — SODIUM CHLORIDE 2 UNITS/HR: 9 INJECTION, SOLUTION INTRAVENOUS at 00:59

## 2019-01-01 RX ADMIN — PIPERACILLIN SODIUM,TAZOBACTAM SODIUM 3.38 G: 3; .375 INJECTION, POWDER, FOR SOLUTION INTRAVENOUS at 10:06

## 2019-01-01 RX ADMIN — ONDANSETRON 4 MG: 4 TABLET, ORALLY DISINTEGRATING ORAL at 13:09

## 2019-01-01 RX ADMIN — NEOSTIGMINE METHYLSULFATE 5 MG: 1 INJECTION, SOLUTION INTRAVENOUS at 13:59

## 2019-01-01 RX ADMIN — SODIUM BICARBONATE 50 MEQ: 84 INJECTION, SOLUTION INTRAVENOUS at 15:43

## 2019-01-01 RX ADMIN — CLONAZEPAM 2 MG: 0.5 TABLET ORAL at 22:05

## 2019-01-01 RX ADMIN — IPRATROPIUM BROMIDE AND ALBUTEROL SULFATE 3 ML: .5; 3 SOLUTION RESPIRATORY (INHALATION) at 05:25

## 2019-01-01 RX ADMIN — HYDROMORPHONE HYDROCHLORIDE 2 MG: 2 TABLET ORAL at 14:37

## 2019-01-01 RX ADMIN — PIPERACILLIN SODIUM,TAZOBACTAM SODIUM 3.38 G: 3; .375 INJECTION, POWDER, FOR SOLUTION INTRAVENOUS at 11:15

## 2019-01-01 RX ADMIN — IPRATROPIUM BROMIDE AND ALBUTEROL SULFATE 3 ML: .5; 3 SOLUTION RESPIRATORY (INHALATION) at 15:29

## 2019-01-01 RX ADMIN — SODIUM CHLORIDE 1000 ML: 9 INJECTION, SOLUTION INTRAVENOUS at 00:25

## 2019-01-01 RX ADMIN — SUCCINYLCHOLINE CHLORIDE 160 MG: 20 INJECTION, SOLUTION INTRAMUSCULAR; INTRAVENOUS; PARENTERAL at 13:03

## 2019-01-01 RX ADMIN — CLONAZEPAM 0.5 MG: 0.5 TABLET ORAL at 21:23

## 2019-01-01 RX ADMIN — FENTANYL CITRATE 25 MCG: 50 INJECTION INTRAMUSCULAR; INTRAVENOUS at 08:58

## 2019-01-01 RX ADMIN — SODIUM CHLORIDE: 9 INJECTION, SOLUTION INTRAVENOUS at 02:06

## 2019-01-01 RX ADMIN — LIDOCAINE 1 PATCH: 560 PATCH PERCUTANEOUS; TOPICAL; TRANSDERMAL at 00:54

## 2019-01-01 RX ADMIN — HYDROXYZINE HYDROCHLORIDE 25 MG: 25 TABLET ORAL at 02:15

## 2019-01-01 RX ADMIN — ALBUTEROL SULFATE 2.5 MG: 2.5 SOLUTION RESPIRATORY (INHALATION) at 23:10

## 2019-01-01 RX ADMIN — PROPOFOL 30 MCG/KG/MIN: 10 INJECTION, EMULSION INTRAVENOUS at 13:40

## 2019-01-01 RX ADMIN — HYDROMORPHONE HYDROCHLORIDE 0.5 MG: 1 INJECTION, SOLUTION INTRAMUSCULAR; INTRAVENOUS; SUBCUTANEOUS at 12:05

## 2019-01-01 RX ADMIN — ENOXAPARIN SODIUM 40 MG: 40 INJECTION SUBCUTANEOUS at 20:03

## 2019-01-01 RX ADMIN — ENOXAPARIN SODIUM 40 MG: 40 INJECTION SUBCUTANEOUS at 21:16

## 2019-01-01 RX ADMIN — ONDANSETRON 4 MG: 4 TABLET, ORALLY DISINTEGRATING ORAL at 13:00

## 2019-01-01 RX ADMIN — KETAMINE HYDROCHLORIDE 35 MG/HR: 50 INJECTION INTRAMUSCULAR; INTRAVENOUS at 17:53

## 2019-01-01 RX ADMIN — HYDROMORPHONE HYDROCHLORIDE 0.5 MG: 1 INJECTION, SOLUTION INTRAMUSCULAR; INTRAVENOUS; SUBCUTANEOUS at 13:00

## 2019-01-01 RX ADMIN — RANITIDINE HYDROCHLORIDE 150 MG: 150 SOLUTION ORAL at 20:52

## 2019-01-01 RX ADMIN — HYDROMORPHONE HYDROCHLORIDE 0.5 MG: 1 INJECTION, SOLUTION INTRAMUSCULAR; INTRAVENOUS; SUBCUTANEOUS at 23:53

## 2019-01-01 RX ADMIN — DOCUSATE SODIUM 100 MG: 100 CAPSULE, LIQUID FILLED ORAL at 09:39

## 2019-01-01 RX ADMIN — PROPOFOL 50 MG: 10 INJECTION, EMULSION INTRAVENOUS at 13:13

## 2019-01-01 RX ADMIN — HEPARIN SODIUM 1000 UNITS/HR: 10000 INJECTION, SOLUTION INTRAVENOUS at 08:33

## 2019-01-01 RX ADMIN — ENOXAPARIN SODIUM 40 MG: 40 INJECTION SUBCUTANEOUS at 19:39

## 2019-01-01 RX ADMIN — QUETIAPINE FUMARATE 50 MG: 50 TABLET ORAL at 02:25

## 2019-01-01 RX ADMIN — GUAIFENESIN 1200 MG: 600 TABLET, EXTENDED RELEASE ORAL at 08:58

## 2019-01-01 RX ADMIN — SENNOSIDES AND DOCUSATE SODIUM 2 TABLET: 8.6; 5 TABLET ORAL at 09:20

## 2019-01-01 RX ADMIN — SODIUM CHLORIDE, PRESERVATIVE FREE 5 ML: 5 INJECTION INTRAVENOUS at 05:58

## 2019-01-01 RX ADMIN — METHYLPREDNISOLONE SODIUM SUCCINATE 40 MG: 40 INJECTION, POWDER, FOR SOLUTION INTRAMUSCULAR; INTRAVENOUS at 08:17

## 2019-01-01 RX ADMIN — CARVEDILOL 3.12 MG: 3.12 TABLET, FILM COATED ORAL at 10:10

## 2019-01-01 RX ADMIN — ACETAMINOPHEN 650 MG: 160 SUSPENSION ORAL at 10:24

## 2019-01-01 RX ADMIN — LEVALBUTEROL HYDROCHLORIDE 1.25 MG: 1.25 SOLUTION, CONCENTRATE RESPIRATORY (INHALATION) at 16:08

## 2019-01-01 RX ADMIN — RANITIDINE 150 MG: 150 TABLET ORAL at 09:10

## 2019-01-01 RX ADMIN — PREDNISONE 40 MG: 20 TABLET ORAL at 09:54

## 2019-01-01 RX ADMIN — Medication 5 ML: at 05:17

## 2019-01-01 RX ADMIN — HEPARIN SODIUM (PORCINE) LOCK FLUSH IV SOLN 100 UNIT/ML 5 ML: 100 SOLUTION at 14:09

## 2019-01-01 RX ADMIN — CLONAZEPAM 2 MG: 1 TABLET ORAL at 21:14

## 2019-01-01 RX ADMIN — PIPERACILLIN AND TAZOBACTAM 4.5 G: 4; .5 INJECTION, POWDER, FOR SOLUTION INTRAVENOUS at 17:45

## 2019-01-01 RX ADMIN — IPRATROPIUM BROMIDE AND ALBUTEROL SULFATE 3 ML: .5; 3 SOLUTION RESPIRATORY (INHALATION) at 13:40

## 2019-01-01 RX ADMIN — CYCLOBENZAPRINE HYDROCHLORIDE 10 MG: 10 TABLET, FILM COATED ORAL at 18:07

## 2019-01-01 RX ADMIN — SODIUM CHLORIDE 500 ML: 9 INJECTION, SOLUTION INTRAVENOUS at 05:43

## 2019-01-01 RX ADMIN — CLONAZEPAM 0.5 MG: 0.5 TABLET ORAL at 20:03

## 2019-01-01 RX ADMIN — CARVEDILOL 3.12 MG: 3.12 TABLET, FILM COATED ORAL at 17:40

## 2019-01-01 RX ADMIN — GUAIFENESIN 1200 MG: 600 TABLET, EXTENDED RELEASE ORAL at 22:19

## 2019-01-01 RX ADMIN — SODIUM CHLORIDE, PRESERVATIVE FREE 5 ML: 5 INJECTION INTRAVENOUS at 02:08

## 2019-01-01 RX ADMIN — AZITHROMYCIN MONOHYDRATE 250 MG: 250 TABLET ORAL at 11:03

## 2019-01-01 RX ADMIN — EPINEPHRINE 0.3 MG: 0.1 INJECTION, SOLUTION ENDOTRACHEAL; INTRACARDIAC; INTRAVENOUS at 15:47

## 2019-01-01 RX ADMIN — HEPARIN SODIUM (PORCINE) LOCK FLUSH IV SOLN 100 UNIT/ML 5 ML: 100 SOLUTION at 01:21

## 2019-01-01 RX ADMIN — CARVEDILOL 3.12 MG: 3.12 TABLET, FILM COATED ORAL at 14:52

## 2019-01-01 RX ADMIN — PIPERACILLIN AND TAZOBACTAM 4.5 G: 4; .5 INJECTION, POWDER, FOR SOLUTION INTRAVENOUS at 23:12

## 2019-01-01 RX ADMIN — METHYLPREDNISOLONE SODIUM SUCCINATE 62.5 MG: 125 INJECTION, POWDER, FOR SOLUTION INTRAMUSCULAR; INTRAVENOUS at 17:45

## 2019-01-01 RX ADMIN — PIPERACILLIN AND TAZOBACTAM 4.5 G: 4; .5 INJECTION, POWDER, FOR SOLUTION INTRAVENOUS at 12:21

## 2019-01-01 RX ADMIN — VANCOMYCIN HYDROCHLORIDE 2000 MG: 5 INJECTION, POWDER, LYOPHILIZED, FOR SOLUTION INTRAVENOUS at 04:17

## 2019-01-01 RX ADMIN — CEFTRIAXONE SODIUM 2 G: 2 INJECTION, POWDER, FOR SOLUTION INTRAMUSCULAR; INTRAVENOUS at 09:45

## 2019-01-01 RX ADMIN — TRAMADOL HYDROCHLORIDE 50 MG: 50 TABLET, COATED ORAL at 09:30

## 2019-01-01 RX ADMIN — HYDROMORPHONE HYDROCHLORIDE 0.5 MG: 1 INJECTION, SOLUTION INTRAMUSCULAR; INTRAVENOUS; SUBCUTANEOUS at 18:23

## 2019-01-01 ASSESSMENT — MIFFLIN-ST. JEOR
SCORE: 1747.12
SCORE: 1692.7
SCORE: 1755.74
SCORE: 1765.27
SCORE: 1733.06
SCORE: 1759.37
SCORE: 1688.62
SCORE: 1544.82
SCORE: 1706.31
SCORE: 1544.36
SCORE: 1608.32
SCORE: 1733.06
SCORE: 1560.69
SCORE: 1708.13
SCORE: 1864.15
SCORE: 1860.98
SCORE: 1640.99
SCORE: 1481.32
SCORE: 1694.13
SCORE: 1870.95

## 2019-01-01 ASSESSMENT — ENCOUNTER SYMPTOMS
ABDOMINAL PAIN: 1
BACK PAIN: 1
COUGH: 1
SHORTNESS OF BREATH: 1
BACK PAIN: 1
CHILLS: 0
SHORTNESS OF BREATH: 1
VOMITING: 0
WHEEZING: 1
NERVOUS/ANXIOUS: 1
COUGH: 1
COUGH: 1
FEVER: 0
DIFFICULTY URINATING: 0
SHORTNESS OF BREATH: 1
ABDOMINAL DISTENTION: 1
SHORTNESS OF BREATH: 1
COUGH: 1
FEVER: 0
DIARRHEA: 0
DIAPHORESIS: 0
SHORTNESS OF BREATH: 1
NERVOUS/ANXIOUS: 1
SHORTNESS OF BREATH: 1
WEAKNESS: 1
ABDOMINAL PAIN: 0
FEVER: 0
COUGH: 1
BACK PAIN: 1
NERVOUS/ANXIOUS: 1
DYSURIA: 0
SHORTNESS OF BREATH: 1
MYALGIAS: 0
NAUSEA: 0
PALPITATIONS: 1
DIARRHEA: 0
APPETITE CHANGE: 1
FEVER: 0
COUGH: 1
NAUSEA: 1
SHORTNESS OF BREATH: 1
SHORTNESS OF BREATH: 0
COUGH: 1
ARTHRALGIAS: 1
BACK PAIN: 1
FEVER: 0
CHILLS: 0
ABDOMINAL PAIN: 0
FATIGUE: 1
WHEEZING: 1
FEVER: 0
VOMITING: 0
CHEST TIGHTNESS: 1
COUGH: 1
BACK PAIN: 1
FEVER: 0
VOMITING: 0
DYSURIA: 0
SHORTNESS OF BREATH: 1
FEVER: 0
SHORTNESS OF BREATH: 1
CONSTIPATION: 1

## 2019-01-01 ASSESSMENT — ACTIVITIES OF DAILY LIVING (ADL)
ADLS_ACUITY_SCORE: 15
ADLS_ACUITY_SCORE: 13
DRESS: 0-->INDEPENDENT
ADLS_ACUITY_SCORE: 15
RETIRED_COMMUNICATION: 0-->UNDERSTANDS/COMMUNICATES WITHOUT DIFFICULTY
ADLS_ACUITY_SCORE: 14
ADLS_ACUITY_SCORE: 15
ADLS_ACUITY_SCORE: 14
ADLS_ACUITY_SCORE: 14
ADLS_ACUITY_SCORE: 13
ADLS_ACUITY_SCORE: 12
ADLS_ACUITY_SCORE: 13
ADLS_ACUITY_SCORE: 13
ADLS_ACUITY_SCORE: 15
ADLS_ACUITY_SCORE: 14
ADLS_ACUITY_SCORE: 13
ADLS_ACUITY_SCORE: 13
TRANSFERRING: 0-->INDEPENDENT
ADLS_ACUITY_SCORE: 13
ADLS_ACUITY_SCORE: 21
ADLS_ACUITY_SCORE: 13
ADLS_ACUITY_SCORE: 15
ADLS_ACUITY_SCORE: 21
ADLS_ACUITY_SCORE: 16
ADLS_ACUITY_SCORE: 13
ADLS_ACUITY_SCORE: 12
TOILETING: 0-->INDEPENDENT
ADLS_ACUITY_SCORE: 15
ADLS_ACUITY_SCORE: 21
ADLS_ACUITY_SCORE: 13
RETIRED_COMMUNICATION: 0-->UNDERSTANDS/COMMUNICATES WITHOUT DIFFICULTY
RETIRED_EATING: 0-->INDEPENDENT
ADLS_ACUITY_SCORE: 13
ADLS_ACUITY_SCORE: 12
ADLS_ACUITY_SCORE: 13
ADLS_ACUITY_SCORE: 15
ADLS_ACUITY_SCORE: 13
FALL_HISTORY_WITHIN_LAST_SIX_MONTHS: NO
ADLS_ACUITY_SCORE: 16
ADLS_ACUITY_SCORE: 15
ADLS_ACUITY_SCORE: 15
ADLS_ACUITY_SCORE: 13
ADLS_ACUITY_SCORE: 12
ADLS_ACUITY_SCORE: 14
ADLS_ACUITY_SCORE: 13
ADLS_ACUITY_SCORE: 12
TOILETING: 1-->ASSISTIVE EQUIPMENT
ADLS_ACUITY_SCORE: 12
ADLS_ACUITY_SCORE: 12
BATHING: 1-->ASSISTIVE EQUIPMENT
ADLS_ACUITY_SCORE: 13
ADLS_ACUITY_SCORE: 21
ADLS_ACUITY_SCORE: 21
ADLS_ACUITY_SCORE: 13
ADLS_ACUITY_SCORE: 16
ADLS_ACUITY_SCORE: 13
ADLS_ACUITY_SCORE: 12
RETIRED_EATING: 0-->INDEPENDENT
ADLS_ACUITY_SCORE: 12
ADLS_ACUITY_SCORE: 13
ADLS_ACUITY_SCORE: 12
ADLS_ACUITY_SCORE: 16
ADLS_ACUITY_SCORE: 21
COGNITION: 0 - NO COGNITION ISSUES REPORTED
ADLS_ACUITY_SCORE: 12
ADLS_ACUITY_SCORE: 13
ADLS_ACUITY_SCORE: 12
ADLS_ACUITY_SCORE: 15
ADLS_ACUITY_SCORE: 21
ADLS_ACUITY_SCORE: 21
ADLS_ACUITY_SCORE: 12
ADLS_ACUITY_SCORE: 12
AMBULATION: 0-->INDEPENDENT
ADLS_ACUITY_SCORE: 15
ADLS_ACUITY_SCORE: 13
ADLS_ACUITY_SCORE: 15
ADLS_ACUITY_SCORE: 13
ADLS_ACUITY_SCORE: 16
ADLS_ACUITY_SCORE: 21
ADLS_ACUITY_SCORE: 13
ADLS_ACUITY_SCORE: 13
ADLS_ACUITY_SCORE: 15
ADLS_ACUITY_SCORE: 12
ADLS_ACUITY_SCORE: 13
ADLS_ACUITY_SCORE: 14
ADLS_ACUITY_SCORE: 13
COGNITION: 0 - NO COGNITION ISSUES REPORTED
ADLS_ACUITY_SCORE: 15
ADLS_ACUITY_SCORE: 13
SWALLOWING: 0-->SWALLOWS FOODS/LIQUIDS WITHOUT DIFFICULTY
ADLS_ACUITY_SCORE: 12
ADLS_ACUITY_SCORE: 21
ADLS_ACUITY_SCORE: 13
BATHING: 0-->INDEPENDENT
ADLS_ACUITY_SCORE: 12
ADLS_ACUITY_SCORE: 13
ADLS_ACUITY_SCORE: 13
TRANSFERRING: 0-->INDEPENDENT
ADLS_ACUITY_SCORE: 14
ADLS_ACUITY_SCORE: 15
ADLS_ACUITY_SCORE: 15
ADLS_ACUITY_SCORE: 13
ADLS_ACUITY_SCORE: 21
FALL_HISTORY_WITHIN_LAST_SIX_MONTHS: NO
ADLS_ACUITY_SCORE: 14
ADLS_ACUITY_SCORE: 15
ADLS_ACUITY_SCORE: 15
ADLS_ACUITY_SCORE: 13
ADLS_ACUITY_SCORE: 21
ADLS_ACUITY_SCORE: 15
ADLS_ACUITY_SCORE: 13
DRESS: 0-->INDEPENDENT
ADLS_ACUITY_SCORE: 13
ADLS_ACUITY_SCORE: 13
SWALLOWING: 0-->SWALLOWS FOODS/LIQUIDS WITHOUT DIFFICULTY
ADLS_ACUITY_SCORE: 12
ADLS_ACUITY_SCORE: 21
ADLS_ACUITY_SCORE: 16
ADLS_ACUITY_SCORE: 15
ADLS_ACUITY_SCORE: 13
ADLS_ACUITY_SCORE: 21
ADLS_ACUITY_SCORE: 14
ADLS_ACUITY_SCORE: 13
ADLS_ACUITY_SCORE: 15
ADLS_ACUITY_SCORE: 12
ADLS_ACUITY_SCORE: 13
ADLS_ACUITY_SCORE: 15
ADLS_ACUITY_SCORE: 21
ADLS_ACUITY_SCORE: 13
AMBULATION: 0-->INDEPENDENT
ADLS_ACUITY_SCORE: 16
ADLS_ACUITY_SCORE: 17
ADLS_ACUITY_SCORE: 15
ADLS_ACUITY_SCORE: 12
ADLS_ACUITY_SCORE: 12
ADLS_ACUITY_SCORE: 13
ADLS_ACUITY_SCORE: 15
ADLS_ACUITY_SCORE: 13
ADLS_ACUITY_SCORE: 13
ADLS_ACUITY_SCORE: 21
ADLS_ACUITY_SCORE: 13
ADLS_ACUITY_SCORE: 13

## 2019-01-25 NOTE — TELEPHONE ENCOUNTER
Please abstract the following data from this visit with this patient into the appropriate field in Epic:    Pap smear done on this date: 02/15/17 (approximately), [facility] , results were negative. Blanca Todd CMA on 1/25/2019 at 11:50 AM

## 2019-02-27 NOTE — TELEPHONE ENCOUNTER
I saw Megha in radiation oncology clinic after discussing her with Dr. Saab. She has mild breast erythema and breast edema concerning for cellulitis. I would like to start her on Keflex, 500mg TID for 7 days and have her follow up with me next week, sooner if symptoms are worsening. I think she can continue with radiation therapy at this time. I did discuss consideration of chemotherapy for her again and she does not wish to pursue this.     Lesly Aponte MD  [facility]  [phone]

## 2019-03-07 NOTE — PROGRESS NOTES
[facility] Breast Surgery Postoperative Note    S: Megha is here for follow up after starting abx for left breast cellulitis. She reports no left breast pain. She has been undergoing radiation therapy daily with Dr. Ulloa. No fevers or chills. Breast still feels swollen.     Breasts: left breast with significantly improved erythema. Continues to be edematous. Some skin thickening c/w radiaiton changes    A/P  Megha Campbell is recovering from a large left breast partial mastectomy with axillary node dissection (11/21/2018) and re-excision of the inferior margin with negative final margins. She is not interested in chemotherapy despite ongoing discussion regarding the triple negative nature of her breast cancer. Her cellulitis has resolved with course of keflex and she is doing well. She will continue with radiation. I will see her back after she completes radiation, sooner if there are concerns.     Thank you for the opportunity to help in her care.    Lesly Aponte  [facility]  [phone]    Please route or send letter to:  Primary Care Provider (PCP) and Referring Provider

## 2019-03-08 NOTE — LETTER
2019    Re:  Megha Campbell   :  1965    [facility] Breast Surgery Postoperative Note     S: Megha is here for follow up after starting abx for left breast cellulitis. She reports no left breast pain. She has been undergoing radiation therapy daily with Dr. Ulloa. No fevers or chills. Breast still feels swollen.      Breasts: left breast with significantly improved erythema. Continues to be edematous. Some skin thickening c/w radiaiton changes     A/P  Megha Campbell is recovering from a large left breast partial mastectomy with axillary node dissection (2018) and re-excision of the inferior margin with negative final margins. She is not interested in chemotherapy despite ongoing discussion regarding the triple negative nature of her breast cancer. Her cellulitis has resolved with course of keflex and she is doing well. She will continue with radiation. I will see her back after she completes radiation, sooner if there are concerns.      Thank you for the opportunity to help in her care.     Lesly Aponte

## 2019-03-20 NOTE — TELEPHONE ENCOUNTER
Antonio, a nurse from [facility]iation [facility] is calling this afternoon to inform me that patient was seen today for radiation therapy and mentioned that she noticed a scant amount of yellowish colored drainage from her left breast incision yesterday.  Antonio states patient has now recovered from her cellulitis and does not have any redness, swelling, feeling hot to touch, or fevers.  Patient finished her course of Keflex and has no complaints of discomfort or other breast or axillary concerns at this time.     Megha Campbell is recovering from a large left breast partial mastectomy with axillary node dissection (11/21/2018) and re-excision of the inferior margin with negative final margins.    Antonio mentioned that patient will be back in tomorrow for radiation therapy and if there are any other concerns, he will call Celena, Oncology Nurse Coordinator to inform.    I informed Antonio that I will give Dr. Aponte an update on this call so she is aware, and I encouraged Antonio to call us back with any questions or concerns.  Antonio verbalized understanding.  Salud Frausto, RN, BSN

## 2019-04-26 NOTE — TELEPHONE ENCOUNTER
Patient left message requesting call back to discuss left breast pain.    Pain is intermittent. Sharp and stabbing in nature. Worse at night. She is taking ibuprofen with little relief. She denies redness of the breast. Reports the swelling is decreasing. She is afebrile.    Above reviewed with Dr. Aponte. Ok to prescribe Tylenol #3. Follow up in clinic.    Patient is scheduled to meet with Dr. Aponte on 5/17 check in 3:50 pm at [facility]. Megha verbalized understanding.    Celena REBOLLEDON, RN, OCN  Oncology Care Coordinator  [facility]  [phone]

## 2019-05-17 NOTE — TELEPHONE ENCOUNTER
Megha called in as she will not be able to make her appt this afternoon. She is having sharp pains on her left breast and it is very swollen again. The skin is more red also. We discussed that she may have cellulitis and or abscess. I would recommend abx and ultrasound to further evaluate. She would like to try a muscle relaxant medication to see if that would help the pain, this is reasonable and sent to her pharmacy. I encouraged tylenol and ibuprofen as well. Tramadol also sent to pharmacy.   - bactrim for 10 day course  - US left breast Monday  - follow up with me next week    Lesly Aponte MD  [facility]  [phone]

## 2019-06-18 NOTE — TELEPHONE ENCOUNTER
2nd message left for patient. Requested call back to discuss left breast pain in greater detail.    Celena REBOLLEDON, RN, OCN  Oncology Care Coordinator  [facility]  [phone]

## 2019-06-18 NOTE — TELEPHONE ENCOUNTER
Name of caller: Patient    Reason for Call:  Having sharp pain in breast    Surgeon:  Dr. Aponte    Recent Surgery:  No    If yes, when & what type:  (Left breast lumpectomy with SLN bx 3/8/19)      Best phone number to reach pt at is: [phone]  Ok to leave a message with medical info? Yes.    Pharmacy preferred (if calling for a refill): N/A

## 2019-06-20 NOTE — TELEPHONE ENCOUNTER
Megha called to report left breast pain. Patient reports it to be intermittent, sharp, and stabbing. She is taking ibuprofen as needed with little relief. She had previously been rx Flexeril which did not help with the pain. She is wearing a supportive bra and icing as needed.    She also reports left breast to be swollen and heavier than the right breast. She denies redness. She is afebrile.     Above reviewed with Dr. Aponte. Patient to have repeat left breast ultrasound tomorrow. Will send rx for pain relief to pharmacy. Both parties in agreement of plan.    Celena REBOLLEDON, RN, OCN  Oncology Care Coordinator  [facility]  [phone]

## 2019-07-17 NOTE — TELEPHONE ENCOUNTER
"Reason for call:  Other   Patient called regarding (reason for call): appointment  Additional comments: Pt had bypass surg in 2004 here at , under name \"Megha Randolph.\"  Her oncologist verbally (not in record) referred her for possible revision. PT is hoping to be seen this Friday due to new job(appts are avail), Please reach   out to patient. Thank you.   Phone number to reach patient:  Home number on file [phone] (home)    Best Time:  any    Can we leave a detailed message on this number?  YES    "

## 2019-07-18 NOTE — TELEPHONE ENCOUNTER
Name of caller: Patient    Reason for Call:  Left breast pain (Patient would like to speak directly to SEW)    Surgeon:  Dr. Aponte    Recent Surgery:  No (Left lumpectomy 11/27/18 & 3/8/19)  If yes, when & what type:  N/A      Best phone number to reach pt at is: [phone]  Ok to leave a message with medical info? Yes.    Pharmacy preferred (if calling for a refill): N/A

## 2019-07-31 NOTE — TELEPHONE ENCOUNTER
Name of caller: Patient    Reason for Call: Breast feels very heavy and full--wondering if she should have it drained    Surgeon:  Dr. Aponte    Recent Surgery:  No (Left breast 11/27/18, 12/7/18, 3/8/19)    If yes, when & what type:  N/A      Best phone number to reach pt at is: [phone]  Ok to leave a message with medical info? Yes.    Pharmacy preferred (if calling for a refill): N/A

## 2019-07-31 NOTE — TELEPHONE ENCOUNTER
Megha scheduled for left breast ultrasound with possible aspiration today check in at 11 am at [facility]. Megha declined appointment. Patient wishes to be seen at [facility]. Will have scheduling reach out to patient to arrange date and time that works best for patient. All parties in agreement of plan.    Celena REBOLLEDON, RN, OCN  Oncology Care Coordinator  [facility]  [phone]

## 2019-07-31 NOTE — TELEPHONE ENCOUNTER
I called Megha @ [phone], and left a voicemail message asking for patient to call us back.  I will send a message to Dr. Aponte to inform of patient's call.  Awaiting a return call from patient.  Salud Frausto, RN, BSN

## 2019-07-31 NOTE — TELEPHONE ENCOUNTER
Call returned by patient. She reports fullness, heaviness, and tenderness of left breast. She denies associated redness. She is afebrile.    Above reviewed with Dr. Aponte. Patient to come to clinic for left breast ultrasound possible aspiration. Megha verbalized understanding. Will f/u with scheduling for appt details.    Await returned call.    Celena REBOLLEDON, RN, OCN  Oncology Care Coordinator  [facility]  [phone]

## 2019-07-31 NOTE — TELEPHONE ENCOUNTER
Lesly Aponte MD Jahr, Victoria S RN   Caller: Unspecified (Today,  7:48 AM)             Yes, she should have a left breast ultrasound and drainage if fluid present. Thanks!

## 2019-08-01 NOTE — TELEPHONE ENCOUNTER
Megha requesting refill of her albuterol solution for her nebulizer. Informed Megha, she should contact her pcp for refill. Megha verbalized understanding.    Dr. Aponte in agreement of above.    Celena Ingram BSN, RN, OCN  Oncology Care Coordinator  [facility]  [phone]

## 2019-08-08 NOTE — ED PROVIDER NOTES
History     Chief Complaint:  Shortness of breath    HPI   Megha Campbell is a 54 year old female with breast cancer, s/p lumpectomy and radiation treatment finished in April with no complications since then, and a history of asthma who presents with shortness of breath. The patient says that her asthma has not acted up in years, but on Monday 8/5 she started experiencing chest pain and developed a cough. She says that following the cough she also started feeling lung tightness and back pain. The patient says that the back pain is back enough that it presents her from sleeping. The patient says that the nebulizers that she tried at home did not help. She says that the shortness of breath is worse when she is walking and laying down. The patient denies any vomiting, diarrhea, urinary problems, recent travel, sick contacts, use of tobacco or alcohol, or any history of blood clots.       Cardiac/PE/DVT Risk Factors:  The patient reports no history of former tobacco use, diabetes, hypertension, hyperlipidemia.They note no family history of heart disease. The patient reports no personal or family history of PE, DVT, or clotting disorder. The patient is not on hormone replacement therapy or birth control. The patient has a positive history of cancer or chemotherapy treatment. They deny any recent travel, surgery, or other extended immobilization.      Allergies:  Hydrocodone acetaminophen  Oxycodone     Medications:    Wellbutrin   Clonazepam  Flexeril  Benadryl  Sertraline     Past Medical History:    Breast cancer  Depressive disorder  Obese  Asthma     Past Surgical History:    Breast biopsy  Gastric bypass  Lumpectomy breast with seed localization   Tubal ligation     Family History:    Breast cancer     Social History:  Smoking Status: Never Smoker  Smokeless Tobacco: Never Used  Alcohol Use: Positive  Drug Use: Negative  PCP: Laure Zapata   Marital Status:          Review of Systems   Respiratory:  Positive for cough and shortness of breath.    Cardiovascular: Positive for chest pain.   Gastrointestinal: Negative for diarrhea and vomiting.   Genitourinary: Negative for difficulty urinating and dysuria.   Musculoskeletal: Positive for back pain.   All other systems reviewed and are negative.        Physical Exam     Patient Vitals for the past 24 hrs:   BP Temp Temp src Pulse Heart Rate Resp SpO2 Weight   08/08/19 1910 -- -- -- -- -- -- 99 % --   08/08/19 1750 -- -- -- -- -- -- 99 % --   08/08/19 1730 118/84 -- -- 107 -- -- 100 % --   08/08/19 1630 -- -- -- -- -- -- 100 % --   08/08/19 1550 -- -- -- -- -- 22 99 % --   08/08/19 1545 -- -- -- -- -- -- 100 % --   08/08/19 1432 127/70 98.5  F (36.9  C) Temporal 113 113 (!) 32 100 % 90.7 kg (200 lb)        Physical Exam   Constitutional:  Patient is oriented to person, place, and time. They appear well-developed and well-nourished. Mild distress secondary to asthma exacerbation.    HENT:   Mouth/Throat:   Oropharynx is clear and moist.   Eyes:    Conjunctivae normal and EOM are normal. Pupils are equal, round, and reactive to light.   Neck:    Normal range of motion.   Cardiovascular: Normal rate, regular rhythm and normal heart sounds.  Exam reveals no gallop and no friction rub.  No murmur heard.  Pulmonary/Chest:  Mildly tachypneic patient has no rales. Diminished breath sounds, trace expiatory wheezes.   Abdominal:   Soft. Bowel sounds are normal. Patient exhibits no mass. There is no tenderness. There is no rebound and no guarding.   Musculoskeletal:  Normal range of motion. Patient exhibits no edema.   Neurological:   Patient is alert and oriented to person, place, and time. Patient has normal strength. No cranial nerve deficit or sensory deficit. GCS 15  Skin:   Skin is warm and dry. No rash noted. No erythema. patient has a firmness without erythema on the left underside of her breast which is chronic. No fluctuance.   Psychiatric:   Anxious        Emergency  Department Course     ECG:  ECG taken at 1429, ECG read at 1510  Sinus tachycardia  Low voltage QRS  Inferior infarct, age undetermined  Abnormal ECG  No significant change compared to ECD dated 10/26/17  Rate 113 bpm. OK interval 128 ms. QRS duration 74 ms. QT/QTc 340/466 ms. P-R-T axes 43 7 16.    Imaging:  Radiology findings were communicated with the patient who voiced understanding of the findings.    CT Chest Pulmonary Embolism w Contrast  1. No evidence of pulmonary embolus.  2. Large left pleural effusion with associated atelectasis.  3. Probable mediastinal and bilateral hilar adenopathy.  4. Patchy nodular densities in the right upper lobe and right lower  lobe posteriorly as well as in the right lung apex. Given the  patient's history these could be related to metastatic disease and  should be assumed to be metastatic disease until proven otherwise.  They are somewhat ill-defined and theoretically could also be  inflammatory or infectious in nature. This will require close CT  follow-up.  5. 5 cm probable seroma based on prior history in the inferior left  breast with prominent skin thickening in the left breast. Inflammation  extends from this area into the axilla where there appears to be a 1  cm short axis diameter left axillary lymph node. Inflammation extends  along the chest wall into the upper abdominal wall.  COLIN ANDRES MD  Reading per radiology    Chest XR,  PA & LAT  Two views of the chest are performed. There is a new  moderate to large left pleural effusion obscuring the left heart  border. Right lung is well expanded and clear. No right pleural fluid.  No evidence of pneumothorax.  SATHYA EASTON MD  Reading per radiology    Laboratory:  Laboratory findings were communicated with the patient who voiced understanding of the findings.    Blood Gas ISTAT gases lactate rosie POCT: pH 7.40, PCO2 46, PO2 30, Bicarbonate 28, O2 Sat 57, lactic acid 1.4   D Dimer: 2.3 (H)  CBC: WBC 6.1, HGB 12.2, PLT  257  BMP: BUN 6 (L), calcium 8.4 (L) o/w WNL (Creatinine 0.62)  Troponin (Collected 1505): <0.015  NT proBNP: 86    Interventions:  1440 Duoneb 3 mL   1545 solu-Medrol 125 mg IV  1545 NS 1000 mL IV  1547 Duoneb 3 mL  1703 Ativan 0.5 mg IV      Emergency Department Course:    1501 Nursing notes and vitals reviewed.    1503 I performed an exam of the patient as documented above.     1505 IV was inserted and blood was drawn for laboratory testing, results above.     1513 A ISTAT sample was obtained for laboratory testing as documented above.     1520 The patient was sent for a XR while in the emergency department, results above.     1554 Patient rechecked and updated.      1656 Patient rechecked and updated.       1719 The patient was sent for a CT while in the emergency department, results above.      2011 The patient is discharged to home.     Impression & Plan      Medical Decision Making:  Megha Campbell is a 54 year old female who presents to the emergency department today for evaluation of shortness of breath. She initially  thought she was having an asthma exacerbation and they gave her a Duoneb prior to my evaluation as she had been waiting in triage. When I saw here he had trace expiratory wheezes and diminished breath sounds on the left side. Basic blood work and EKG were performed. Cardiac marker is normal, her D dimer is elevated, and her risk factors are recent cancer, so a CT of her chest was performed. In the meantime a chest Xray showed a moderate left sided pleural effusion and she never had this before. CT further shows that there is a pretty good sized effusion, no PE, there are some opacities in the right lung, which given her history if concerning for metastatic disease. She was made aware of these findings. There is no underlying pneumonia, there is a seroma on the breast at the surgery site which they've obviously been watching but with chronic inflammation most likely secondary to radiation, no  abscess or cellulitis. The patient will need thoracentesis, it is not emergent. I do not feel that she needs to be hospitalized for this. I discussed this with her. She is very anxious to go home because the wait in the ED has been very long today. I did try to speak with radiology to make an outpatient appointment, however the patient got very upset and just wanted to leave so she will follow up with Dr. Hirsch regarding this and she will have to do an outpatient order because at this point the patient is declining to stay any longer. The patient is not hypoxic and again does not need emergent thoracentesis. I suspect that this has been accumulating slowly for some time, unfortunately I suspect it may be due to her underlying cancer.       Diagnosis:    ICD-10-CM    1. Pleural effusion on left J90      Disposition:   Findings and plan explained to the Patient. Patient discharged home with instructions regarding supportive care, medications, and reasons to return. The importance of close follow-up was reviewed.     Discharge Medications:     Review of your medicines         START taking      Dose / Directions   Lorazepam 1 MG tablet  Commonly known as:  ATIVAN      Dose:  1 mg  Take 1 tablet (1 mg) by mouth 2 times daily as needed for anxiety or sleep  Quantity:  6 tablet  Refills:  0              Where to get your medicines      Some of these will need a paper prescription and others can be bought over the counter. Ask your nurse if you have questions.    Bring a paper prescription for each of these medications    Lorazepam 1 MG tablet         Scribe Disclosure:  I, Ronnell Chen, am serving as a scribe at 3:03 PM on 8/8/2019 to document services personally performed by Nurys Gregg MD based on my observations and the provider's statements to me.       EMERGENCY DEPARTMENT       Nurys Gregg MD  08/11/19 0027

## 2019-08-08 NOTE — ED AVS SNAPSHOT
Emergency Department  [address] [place] [address]  Phone:  [phone]  Fax:  [phone]                                    Megha Campbell   MRN: [id number]    Department:   Emergency Department   Date of Visit:  8/8/2019           After Visit Summary Signature Page    I have received my discharge instructions, and my questions have been answered. I have discussed any challenges I see with this plan with the nurse or doctor.    ..........................................................................................................................................  Patient/Patient Representative Signature      ..........................................................................................................................................  Patient Representative Print Name and Relationship to Patient    ..................................................               ................................................  Date                                   Time    ..........................................................................................................................................  Reviewed by Signature/Title    ...................................................              ..............................................  Date                                               Time          22EPIC Rev 08/18

## 2019-08-09 NOTE — PROGRESS NOTES
Patient to US for thoracentesis- removed 1000 ml from left side.  Patient tolerates well with coughing at the end of the procedure.  VS within baseline, denies pain, and bandage CDI.  Return to care suites.

## 2019-08-09 NOTE — PROCEDURES
RADIOLOGY POST PROCEDURE NOTE    Patient name: Megha Campbell  MRN: [id number]  : 1965    Pre-procedure diagnosis: left pleural effusion.   Post-procedure diagnosis: Same    Procedure Date/Time: 2019  3:56 PM  Procedure: left thoracentesis.   Estimated blood loss: None  Specimen(s) collected with description: pleural fluid.     The patient tolerated the procedure well with no immediate complications.  Significant findings:none    See imaging dictation for procedural details.    Provider name: Martha Josue  Assistant(s):None

## 2019-08-09 NOTE — PROGRESS NOTES
Care Suites Admission Nursing Note    Reason for admission:  Thoracentesis, first one  CS arrival time:  1342  Accompanied by:  alone  Name/phone of DC :  self  Medications held:  none  Consent signed:  MD to obtain  Abnormal assessment/labs:  None.  INR POC done today:  1.0, platelets 257, done yesterday in ED.  If abnormal, provider notified: n/a  Education/questions answered:  Yes/AVS to patient.  Plan:  Thoracentesis    1430  Report to Oliva DOTSON.

## 2019-08-09 NOTE — DISCHARGE INSTRUCTIONS
Thoracentesis Discharge Instructions     After you go home:      You may resume your normal diet    Care of Puncture Site:      For the first 48 hrs, check your puncture site every couple hours while you are awake     If there is a bandaid - you may remove it tomorrow morning    You may shower tomorrow    No tub baths, whirlpools or swimming until your puncture site has fully healed     Activity:      You may go back to normal activity in 24 hours    Wait 48 hours before lifting, straining, exercise or other strenuous activity    Medicines:      You may resume all your medications    For minor pain, you may take Acetaminophen (Tylenol) or Ibuprofen (Advil)            Call the provider who ordered this procedure if:      The site is red, swollen, hot or tender    Blood or fluid is draining from the site    You have chills or a fever greater than 101 F (38 C)    Shortness of breath    Pain that is getting worse    Any questions or concerns      Additional Information:      During this test, a needle will sometimes enter the lung. This can cause the lung to collapse - called a pneumothorax. Symptoms include severe chest pain, breathing problems or increased blood in your sputum (phlegm).     Call  911 or go to the Emergency Room if:      Severe chest pain or trouble breathing    Increased blood in your sputum (phlegm)    Bleeding that you cannot control      If you have questions call:        [facility] Radiology Dept @ [phone]      The provider who performed your procedure was _________________.

## 2019-08-09 NOTE — TELEPHONE ENCOUNTER
Megha notified, per Dr. Aponte should have ED follow up with her primary care provider for further work up. Megha verbalized understanding.    Celena Ingram BSN, RN, OCN  Oncology Care Coordinator  [facility]  [phone]

## 2019-08-09 NOTE — TELEPHONE ENCOUNTER
Name of caller: Patient    Reason for Call:  Patient was in ER yesterday with fluid in her lungs, but it was not removed during her visit.  Wondering how she can get that scheduled. (Told her this may go through primary, but she did want to get SEW opinion)    Surgeon:  Dr. Aponte    Recent Surgery:  No    If yes, when & what type:  N/A      Best phone number to reach pt at is: [phone]  Ok to leave a message with medical info? Yes.    Pharmacy preferred (if calling for a refill): N/A

## 2019-08-09 NOTE — PROGRESS NOTES
Care Suites Discharge Nursing Note    Education/questions answered: yes  Patient DC location: home  Accompanied by: self  CS discharge time: 5464

## 2019-08-14 NOTE — DISCHARGE INSTRUCTIONS
Thoracentesis Discharge Instructions     After you go home:      You may resume your normal diet    Care of Puncture Site:      For the first 48 hrs, check your puncture site every couple hours while you are awake     If there is a bandaid - you may remove it tomorrow morning    You may shower tomorrow    No tub baths, whirlpools or swimming until your puncture site has fully healed     Activity:      You may go back to normal activity in 24 hours    Wait 48 hours before lifting, straining, exercise or other strenuous activity    Medicines:      You may resume all your medications    For minor pain, you may take Acetaminophen (Tylenol) or Ibuprofen (Advil)            Call the provider who ordered this procedure if:      The site is red, swollen, hot or tender    Blood or fluid is draining from the site    You have chills or a fever greater than 101 F (38 C)    Shortness of breath    Pain that is getting worse    Any questions or concerns      Additional Information:      During this test, a needle will sometimes enter the lung. This can cause the lung to collapse - called a pneumothorax. Symptoms include severe chest pain, breathing problems or increased blood in your sputum (phlegm).     Call  911 or go to the Emergency Room if:      Severe chest pain or trouble breathing    Increased blood in your sputum (phlegm)    Bleeding that you cannot control      If you have questions call:        [facility] Radiology Dept @ [phone]      The provider who performed your procedure was _________________.

## 2019-08-14 NOTE — PROGRESS NOTES
I was asked to admit Ms. Campbell to observation unit for further evaluation of her shortness of breath and tachycardia.  A chest x-ray recently done indicates that she has bilateral pleural effusion more on the left side, she had a thoracentesis recently and the pleural fluid did indicate adenocarcinoma in it, she was diagnosed with a stage III breast cancer and she underwent radiation, at the time she had met with Dr. Bianchi  and refused chemotherapy.  Patient has a history of significant generalized anxiety disorder, she is on multiple medications for that, she does not like to be in the hospital.  I visited with the patient in the emergency room and she was under the impression that she will be able to go home later today.  The idea of admission was to connect her to an oncologist, she did not disclose that she had an oncologist to the ER physician, and get thoracentesis done vs discuss with the thoracic surgery if she would qualify for Pleurx catheter versus pleurodesis.  I  discussed these plans with the patient and  at that point she insisted that the news that she has adenocarcinoma cells in the pleural fluid is quite overwhelming for her and  she would have thoracentesis done and then go home, meet with oncology outpatient.  She is quite confident that  would be generous to see her in the clinic soon.  I had recommended her to stay, get all the consults done, make a decision about her recurrent pleural effusion and do a thoracentesis now and possible discharge tomorrow morning.  The patient did not want to proceed with that line of action.  I communicated this with Dr. Elliott-the ER physician and she would plan to do thoracentesis and discharge from the ER.  I will admit the patient to observation unit in case if she changes her mind and decides to stay overnight in the hospital.  So far she had decided not to stay.  She has sinus tachycardia, possibly secondary to her anxiety, she is resting  in room air with sats in 90s.

## 2019-08-14 NOTE — PROGRESS NOTES
Care Suites Admission Nursing Note    Reason for admission: Thoracentesis  CS arrival time: 1220    Accompanied by: self  Medications held: N/A  Abnormal assessment/labs: N/A    Education/questions answered: Procedure explained. All questions & concerns addressed.    Plan: home post procedure    A/O.  D/C instructions reviewed with pt with verbal understanding received. Copy given to pt. Pt resting on cart, denies additional needs at this time, call light within reach.

## 2019-08-14 NOTE — PROGRESS NOTES
RECEIVING UNIT ED HANDOFF REVIEW    ED Nurse Handoff Report was reviewed by: Dax Mondragon on August 14, 2019 at 9:34 AM

## 2019-08-14 NOTE — ED NOTES
Call light answered pt a little confused to the plan asking to speak to DrBrandi Explained MD had talked with oncology just waiting to hear from IR re: thoracentesis.  Requesting pain meds

## 2019-08-14 NOTE — ED TRIAGE NOTES
Wheezing and SOB since the 9th after her thoracentesis.  Hx of asthma also.   Happened to look at discharge instructions and realized she should go to ER with these symptoms.

## 2019-08-14 NOTE — ED PROVIDER NOTES
History   Chief Complaint:  Shortness of Breath    HPI   Megha Campbell is a 54 year old female, with a history of s/p thoracentesis 5 days ago and breast cancer remission, who presents to the ED for evaluation of shortness of breath. The patient reports having increased shortness of breath this morning when she woke up about an hour and a half ago. She notes she has been coughing more and having increased lower back pain since the thoracentesis. She states she is unable to lay down without having pain. She is also unable to lay down on her back or on her stomach without pain in her back. Since the thoracentesis she had slight relief of her shortness of breath and pain, but has since returned to the level prior to her thoracentesis. The patient states she was unable to see her primary care doctor for a follow up as they were unable to perform the check up with her current care provider. The patient denies any fever, chest pain, nausea, or any other acute symptoms.    Allergies:  Hydrocodone-Acetaminophen  Oxycodone     Medications:    Wellbutrin  Flexeril  Ativan  Sertraline     Past Medical History:    Breast cancer  Depression  Obese  Asthma     Past Surgical History:    Breast biopsy  Gastric bypass  Lumpectomy     Family History:    Breast cancer    Social History:  Negative for tobacco use.  Positive for alcohol use.  Negative for drug use.  Marital Status:       Review of Systems   Constitutional: Negative for fever.   Respiratory: Positive for cough and shortness of breath.    Cardiovascular: Negative for chest pain.   Gastrointestinal: Negative for abdominal pain and nausea.   Musculoskeletal: Positive for back pain.   10 point review of systems performed and is negative except as above and in HPI.    Physical Exam     Patient Vitals for the past 24 hrs:   BP Temp Temp src Pulse Heart Rate Resp SpO2 Height Weight   08/14/19 0900 117/83 -- -- -- 105 20 90 % -- --   08/14/19 0645 -- -- -- -- 108 26 96  "% -- --   08/14/19 0613 -- -- -- -- 106 10 94 % -- --   08/14/19 0528 -- -- -- -- -- -- 100 % -- --   08/14/19 0525 133/77 -- -- 116 -- -- 95 % -- --   08/14/19 0522 91/82 98.9  F (37.2  C) Temporal 130 130 28 95 % 1.702 m (5' 7\") 100.3 kg (221 lb 3.2 oz)     Physical Exam  General: Resting on the gurney, appears slightly uncomfortable  Head:  The scalp, face, and head appear normal  Mouth/Throat: Mucus membranes are moist  CV:  Heart rapid, but regular rate    Normal S1 and S2  No pathological murmur   Resp:  Diminished breath sounds, worst on the left.    Non-labored, no retractions or accessory muscle use    No coarseness    No wheezing   GI:  Abdomen is soft, no rigidity    No tenderness to palpation  MS:  Normal motor assessment of all extremities.    Good capillary refill noted.    No lower extremity edema, redness, swelling, or excess warmth  Skin:  No rash or lesions noted.  Neuro:  Speech is normal and fluent. No apparent deficit.  Psych: Awake. Alert.  Slightly anxious affect.      Appropriate interactions.    Emergency Department Course   Imaging:  XR Chest, G/E 2 views:   Moderate left pleural effusion has decreased in size and is partially loculated. Associated mild opacity in the left lower lung, likely atelectasis. New minimal infiltrate or atelectasis right upper lung. Minimal interstitial opacities right lower lung. Normal heart size.     As per radiology.     Laboratory:  CBC: HGB 11.3 (L) o/w WNL. (WBC 6.2, )     CMP: K 3.3 (L),  (H), Ca 8.2 (L), Albumin 2.6 (L), o/w WNL (Creatinine 0.67)    ISTAT gases lactate rosie POCT: pH: 7.42, PCO2: 37 (L), PO2: 46, Bicarbonate: 24, O2 Sat: 82, Lactic acid (0613): 0.5 (L)    UA: Squamous Epithelial/ HPF 2 (H), Mucous Present, o/w Negative    INR: 0.97    Interventions:  0525: Duoneb 3 mL nebulization   0707: Toradol 15 mg IV    Emergency Department Course:  Past medical records, nursing notes, and vitals reviewed.  0644: I performed an exam of " the patient and obtained history, as documented above.  IV inserted and blood drawn.  The patient was sent for a chest x-ray while in the emergency department, findings above.    Findings and plan explained to the Patient who consents to admission.     0725: Discussed the patient with Dr. Garcia, who will admit the patient to an obs bed for further monitoring, evaluation, and treatment.    0845: I spoke to Dr. Garcia on-call for hospitalist service.    0906: I spoke to Dr. Winston on-call for oncology.    0945: I spoke to radiology.    1008: I rechecked the patient. Explained findings to patient. The patient no longer wants to be admitted for further observation/treatment and is leaving despite consistent conversation on reasons to stay.     Impression & Plan    Medical Decision Making:  Megha Campbell is a 54 year old female who presents today complaining of back pain and shortness of breath.  The patient was recently seen by Dr. Xavier and had a CT PE study which showed a large effusion and no PE.  Dr. Garcia was able to arrange for a thoracentesis for her as an outpatient which was completed several days ago and came back positive for malignancy.  Patient has not seen a doctor or discussed her results prior to her visit today.  She presents because of increased shortness of breath and pain similar to prior to her thoracentesis.  I discussed her results and attempted an observational stay which she had agreed to, however the patient eventually refused hospital-based evaluation.  I did arrange for outpt visit with her oncologist tomorrow.    Diagnosis:    ICD-10-CM    1. Pleural effusion J90 INR     Disposition:  Discharged to home.    Ceasar Rios  8/14/2019    EMERGENCY DEPARTMENT  Scribe Disclosure:  Ceasar CARDOSO, am serving as a scribe at 6:44 AM on 8/14/2019 to document services personally performed by Karon Elliott MD based on my observations and the provider's statements to me.         Lexi  Karon SKINNER MD  08/14/19 2971

## 2019-08-14 NOTE — DISCHARGE SUMMARY
Care Suites Discharge Nursing Note    Education/questions answered: yes  Patient DC location: home  Accompanied by: self  CS discharge time: 1430    Patient discharged to home. Thoracentesis did not take place due to the amount of fluid visible on ultrasound. Patient agreed to return to ED if breathing becomes labored or is short of breath.

## 2019-08-14 NOTE — ED NOTES
"[facility]  ED Nurse Handoff Report    ED Chief complaint: Shortness of Breath      ED Diagnosis:   Final diagnoses:   None       Code Status: Full Code    Allergies:   Allergies   Allergen Reactions     Hydrocodone-Acetaminophen      Itchy  Patient states she is able to take it with Benadryl.       Oxycodone Itching and Rash       Activity level - Baseline/Home:  Independent  Activity Level - Current:   Independent    Patient's Preferred language: english   Needed?: No    Isolation: No  Infection: Not Applicable  Bariatric?: No    Vital Signs:   Vitals:    08/14/19 0522 08/14/19 0528 08/14/19 0613 08/14/19 0645   BP: 91/82      Pulse: 130      Resp: 28  10 26   Temp: 98.9  F (37.2  C)      TempSrc: Temporal      SpO2: 95% 100% 94% 96%   Weight: 100.3 kg (221 lb 3.2 oz)      Height: 1.702 m (5' 7\")          Cardiac Rhythm: ,   Cardiac  Cardiac Rhythm: Sinus tachycardia    Pain level:      Is this patient confused?: No   Does this patient have a guardian?  Yes         If yes, is there guardianship documents in the Epic \"Code/ACP\" activity?  N/A         Guardian Notified?  N/A  [place] - Suicide Severity Rating Scale Completed?  Yes  If yes, what color did the patient score?  White    Patient Report: Initial Complaint:Wheezing and SOB since the 9th after her thoracentesis.  Hx of asthma also.   Happened to look at discharge instructions and realized she should go to ER with these symptoms   Focused Assessment: shortness of breath  Tests Performed: lab cxr  Abnormal Results:   Results for orders placed or performed during the hospital encounter of 08/14/19   Chest XR,  PA & LAT    Narrative    XR CHEST 2 VW  8/14/2019 5:53 AM     INDICATION: Wheezing - six days status post thoracentesis.    COMPARISON: CT 8/8/2019. Chest x-ray 8/8/2019.      Impression    IMPRESSION: Moderate left pleural effusion has decreased in size and  is partially loculated. Associated mild opacity in the left " lower  lung, likely atelectasis. New minimal infiltrate or atelectasis right  upper lung. Minimal interstitial opacities right lower lung. Normal  heart size.    TONY BLAKE MD   CBC with platelets differential   Result Value Ref Range    WBC 6.2 4.0 - 11.0 10e9/L    RBC Count 4.37 3.8 - 5.2 10e12/L    Hemoglobin 11.3 (L) 11.7 - 15.7 g/dL    Hematocrit 34.7 (L) 35.0 - 47.0 %    MCV 79 78 - 100 fl    MCH 25.9 (L) 26.5 - 33.0 pg    MCHC 32.6 31.5 - 36.5 g/dL    RDW 14.8 10.0 - 15.0 %    Platelet Count 236 150 - 450 10e9/L    Diff Method Automated Method     % Neutrophils 74.8 %    % Lymphocytes 11.0 %    % Monocytes 11.1 %    % Eosinophils 2.7 %    % Basophils 0.2 %    % Immature Granulocytes 0.2 %    Nucleated RBCs 0 0 /100    Absolute Neutrophil 4.7 1.6 - 8.3 10e9/L    Absolute Lymphocytes 0.7 (L) 0.8 - 5.3 10e9/L    Absolute Monocytes 0.7 0.0 - 1.3 10e9/L    Absolute Eosinophils 0.2 0.0 - 0.7 10e9/L    Absolute Basophils 0.0 0.0 - 0.2 10e9/L    Abs Immature Granulocytes 0.0 0 - 0.4 10e9/L    Absolute Nucleated RBC 0.0    Comprehensive metabolic panel   Result Value Ref Range    Sodium 139 133 - 144 mmol/L    Potassium 3.3 (L) 3.4 - 5.3 mmol/L    Chloride 104 94 - 109 mmol/L    Carbon Dioxide 27 20 - 32 mmol/L    Anion Gap 8 3 - 14 mmol/L    Glucose 103 (H) 70 - 99 mg/dL    Urea Nitrogen 7 7 - 30 mg/dL    Creatinine 0.67 0.52 - 1.04 mg/dL    GFR Estimate >90 >60 mL/min/[1.73_m2]    GFR Estimate If Black >90 >60 mL/min/[1.73_m2]    Calcium 8.2 (L) 8.5 - 10.1 mg/dL    Bilirubin Total 0.3 0.2 - 1.3 mg/dL    Albumin 2.6 (L) 3.4 - 5.0 g/dL    Protein Total 7.3 6.8 - 8.8 g/dL    Alkaline Phosphatase 111 40 - 150 U/L    ALT 15 0 - 50 U/L    AST 15 0 - 45 U/L   UA with Microscopic   Result Value Ref Range    Color Urine Light Yellow     Appearance Urine Clear     Glucose Urine Negative NEG^Negative mg/dL    Bilirubin Urine Negative NEG^Negative    Ketones Urine Negative NEG^Negative mg/dL    Specific Gravity Urine  1.005 1.003 - 1.035    Blood Urine Negative NEG^Negative    pH Urine 6.5 5.0 - 7.0 pH    Protein Albumin Urine Negative NEG^Negative mg/dL    Urobilinogen mg/dL Normal 0.0 - 2.0 mg/dL    Nitrite Urine Negative NEG^Negative    Leukocyte Esterase Urine Negative NEG^Negative    Source Midstream Urine     WBC Urine <1 0 - 5 /HPF    RBC Urine <1 0 - 2 /HPF    Squamous Epithelial /HPF Urine 2 (H) 0 - 1 /HPF    Mucous Urine Present (A) NEG^Negative /LPF   ISTAT gases lactate rosie POCT   Result Value Ref Range    Ph Venous 7.42 7.32 - 7.43 pH    PCO2 Venous 37 (L) 40 - 50 mm Hg    PO2 Venous 46 25 - 47 mm Hg    Bicarbonate Venous 24 21 - 28 mmol/L    O2 Sat Venous 82 %    Lactic Acid 0.5 (L) 0.7 - 2.1 mmol/L     Treatments provided: duoneb    Family Comments:     OBS brochure/video discussed/provided to patient/family: Yes              Name of person given brochure if not patient:               Relationship to patient:     ED Medications:   Medications   ipratropium - albuterol 0.5 mg/2.5 mg/3 mL (DUONEB) neb solution 3 mL (3 mLs Nebulization Given 8/14/19 0010)   ketorolac (TORADOL) injection 15 mg (15 mg Intravenous Given 8/14/19 0707)       Drips infusing?:  No    For the majority of the shift this patient was Green.   Interventions performed were .    Severe Sepsis OR Septic Shock Diagnosis Present: No    To be done/followed up on inpatient unit:      ED NURSE PHONE NUMBER: er

## 2019-08-14 NOTE — PHARMACY-ADMISSION MEDICATION HISTORY
Admission medication history interview status for the 8/14/2019  admission is complete. See EPIC admission navigator for prior to admission medications     Medication history source reliability:Good    Actions taken by pharmacist (provider contacted, etc):Veriifed all Rx medications with patient's retail pharmacy records through [facility] in [facility].      Additional medication history information not noted on PTA med list :None    Medication reconciliation/reorder completed by provider prior to medication history? Yes    Time spent in this activity: 15 minutes    Prior to Admission medications    Medication Sig Last Dose Taking? Auth Provider   albuterol (PROAIR HFA/PROVENTIL HFA/VENTOLIN HFA) 108 (90 Base) MCG/ACT inhaler Inhale 1-2 puffs into the lungs every 6 hours as needed for shortness of breath / dyspnea or wheezing prn med Yes Unknown, Entered By History   buPROPion (WELLBUTRIN XL) 150 MG 24 hr tablet Take 150 mg by mouth every morning 8/14/2019 at am Yes Unknown, Entered By History   CLONAZEPAM PO Take 1 mg by mouth At Bedtime  8/13/2019 at pm Yes Reported, Patient   cyclobenzaprine (FLEXERIL) 10 MG tablet Take 1 tablet (10 mg) by mouth 3 times daily as needed for muscle spasms prn med Yes Lesly Aponte MD   diphenhydrAMINE (BENADRYL) 25 MG tablet Take 1-2 tablets (25-50 mg) by mouth every 6 hours as needed for itching or allergies prn med Yes Lesly Aponte MD   LORazepam (ATIVAN) 1 MG tablet Take 1 tablet (1 mg) by mouth 2 times daily as needed for anxiety or sleep prn med Yes Nurys Gregg MD   SERTRALINE HCL PO Take 150 mg by mouth daily  8/14/2019 at am Yes Reported, Patient   order for DME Equipment being ordered: front closure bra for post-op and recovery.    *Scheduled for seed localized, left breast lumpectomy, sentinel lymph node biopsy on 11/5/18*   Lesly Aponte MD

## 2019-08-14 NOTE — ED AVS SNAPSHOT
Emergency Department  [address] [place] [address]  Phone:  [phone]  Fax:  [phone]                                    Megha Campbell   MRN: [id number]    Department:   Emergency Department   Date of Visit:  8/14/2019           After Visit Summary Signature Page    I have received my discharge instructions, and my questions have been answered. I have discussed any challenges I see with this plan with the nurse or doctor.    ..........................................................................................................................................  Patient/Patient Representative Signature      ..........................................................................................................................................  Patient Representative Print Name and Relationship to Patient    ..................................................               ................................................  Date                                   Time    ..........................................................................................................................................  Reviewed by Signature/Title    ...................................................              ..............................................  Date                                               Time          22EPIC Rev 08/18

## 2019-08-16 PROBLEM — C50.919 BREAST CANCER (H): Status: ACTIVE | Noted: 2018-01-01

## 2019-08-16 PROBLEM — J18.9 RIGHT LOWER LOBE PNEUMONIA: Status: ACTIVE | Noted: 2019-01-01

## 2019-08-16 PROBLEM — C50.512 MALIGNANT NEOPLASM OF LOWER-OUTER QUADRANT OF LEFT BREAST OF FEMALE, ESTROGEN RECEPTOR NEGATIVE (H): Status: ACTIVE | Noted: 2018-01-01

## 2019-08-16 PROBLEM — J18.9 PNEUMONIA OF RIGHT LUNG DUE TO INFECTIOUS ORGANISM: Status: ACTIVE | Noted: 2019-01-01

## 2019-08-16 PROBLEM — J91.0 MALIGNANT PLEURAL EFFUSION (H): Status: ACTIVE | Noted: 2019-01-01

## 2019-08-16 PROBLEM — Z17.1 MALIGNANT NEOPLASM OF LOWER-OUTER QUADRANT OF LEFT BREAST OF FEMALE, ESTROGEN RECEPTOR NEGATIVE (H): Status: ACTIVE | Noted: 2018-01-01

## 2019-08-16 NOTE — PHARMACY-ADMISSION MEDICATION HISTORY
Admission medication history interview status for the 8/16/2019  admission is complete. See EPIC admission navigator for prior to admission medications     Medication history source reliability:Good    Actions taken by pharmacist (provider contacted, etc): Chart review. Face to face interview with patient      Additional medication history information not noted on PTA med list : patient was just here 2 days ago with similar symptoms. Only nebs were added to list.    Medication reconciliation/reorder completed by provider prior to medication history? No    Time spent in this activity: 10 minutes    Prior to Admission medications    Medication Sig Last Dose Taking? Auth Provider   albuterol (PROAIR HFA/PROVENTIL HFA/VENTOLIN HFA) 108 (90 Base) MCG/ACT inhaler Inhale 1-2 puffs into the lungs every 6 hours as needed for shortness of breath / dyspnea or wheezing 8/16/2019 at am Yes Unknown, Entered By History   albuterol (PROVENTIL) (2.5 MG/3ML) 0.083% neb solution Take 2.5 mg by nebulization every 4 hours as needed for shortness of breath / dyspnea or wheezing 8/16/2019 at am Yes Unknown, Entered By History   buPROPion (WELLBUTRIN XL) 150 MG 24 hr tablet Take 150 mg by mouth every morning 8/16/2019 at am Yes Unknown, Entered By History   clonazePAM (KLONOPIN) 1 MG tablet Take 2 mg by mouth At Bedtime 8/15/2019 at hs Yes Unknown, Entered By History   cyclobenzaprine (FLEXERIL) 10 MG tablet Take 1 tablet (10 mg) by mouth 3 times daily as needed for muscle spasms 8/15/2019 at prn Yes Lesly Aponte MD   diphenhydrAMINE (BENADRYL) 25 MG tablet Take 1-2 tablets (25-50 mg) by mouth every 6 hours as needed for itching or allergies Past Week at prn Yes Lesly Aponte MD   LORazepam (ATIVAN) 1 MG tablet Take 1 tablet (1 mg) by mouth 2 times daily as needed for anxiety or sleep Past Week at prn Yes Nurys Gregg MD   SERTRALINE HCL PO Take 150 mg by mouth daily  8/16/2019 at am Yes Reported, Patient   traMADol  (ULTRAM) 50 MG tablet Take 1 tablet (50 mg) by mouth every 6 hours as needed for pain Past Week at prn Yes Karon Elliott MD

## 2019-08-16 NOTE — CONSULTS
[facility]    Oncology Consultation     Date of Admission:  8/16/2019  Date of Consult (When I saw the patient): 08/16/19    Assessment & Plan   Megha Campbell is a 54 year old female who was admitted on 8/16/2019. I was asked to see the patient for breast cancer.    Breast Cancer  -diagnosed with triple negative breast cancer 11/2018  -[id number] stage III with 8 cm tumor and 5 positive lymph nodes, extranodal extension, LVI  -s/p lumpectomy with re-excision 12/2018   -s/p radiation 2/4/19-4/1/19  -declined chemotherapy (both neoadjuvant or adjuvant)  -CT 8/8/19 revealed a large left pleural effusion  -thoracentesis 8/9/19 confirmed metastatic adenocarcinoma, ER/MN negative, consistent with breast primary  -CT thoracic and lumbar spine ordered and pending  -once pneumonia resolved plan outpatient PET/CT  -pathology to check tumor for androgen receptors and PDL1 (ordered and pending) to determine if androgen blockade or immunotherapy may be treatment options  -prognosis and that her cancer is now metastatic and no longer curable discussed  -she has declined chemotherapy in the past but today we discussed this at length, and she may be amenable to this after discharge based on above pending results    Pleural Effusion  -malignant  -would consider VATS with pleurodesis if symptomatic recurrence    ID  -CT 8/16/19 most suggestive of pneumonia  -agree with antibiotics per hospitalist    Hematology  -h/o B12 and iron deficiency post gastrectomy  -[employer]    Jamaal Winston    Code Status    Full Code    Primary Care Physician   Laure Zapata MD    History of Present Illness   Megha Campbell is a 54 year old female who presents with pain.    Past Medical History   I have reviewed this patient's medical history and updated it with pertinent information if needed.   Past Medical History:   Diagnosis Date     Breast cancer in female (H)     breast ca     Depressive disorder      Obese      Uncomplicated  asthma        Past Surgical History   I have reviewed this patient's surgical history and updated it with pertinent information if needed.  Past Surgical History:   Procedure Laterality Date     BIOPSY BREAST Left 12/7/2018    Procedure: RE-EXCISION LEFT BREAST INFERIOR MARGIN, ASPIRATION OF AXILLARY SEROMA;  Surgeon: Lesly Aponte MD;  Location: [facility] OR     GASTRIC BYPASS  2004    abdominal plasty, and scar tissue removal     LUMPECTOMY BREAST WITH SEED LOCALIZATION Left 11/21/2018    Procedure: SEED LOCALIZED LEFT BREAST LUMPECTOMY WITH LEFT AXILLARY LYMPH NODE DISSECTION;  Surgeon: Lesly Aponte MD;  Location:  OR       Prior to Admission Medications   Prior to Admission Medications   Prescriptions Last Dose Informant Patient Reported? Taking?   LORazepam (ATIVAN) 1 MG tablet Past Week at prn Self No Yes   Sig: Take 1 tablet (1 mg) by mouth 2 times daily as needed for anxiety or sleep   SERTRALINE HCL PO 8/16/2019 at am Self Yes Yes   Sig: Take 150 mg by mouth daily    albuterol (PROAIR HFA/PROVENTIL HFA/VENTOLIN HFA) 108 (90 Base) MCG/ACT inhaler 8/16/2019 at am Self Yes Yes   Sig: Inhale 1-2 puffs into the lungs every 6 hours as needed for shortness of breath / dyspnea or wheezing   albuterol (PROVENTIL) (2.5 MG/3ML) 0.083% neb solution 8/16/2019 at am Self Yes Yes   Sig: Take 2.5 mg by nebulization every 4 hours as needed for shortness of breath / dyspnea or wheezing   buPROPion (WELLBUTRIN XL) 150 MG 24 hr tablet 8/16/2019 at am Self Yes Yes   Sig: Take 150 mg by mouth every morning   clonazePAM (KLONOPIN) 1 MG tablet 8/15/2019 at hs Self Yes Yes   Sig: Take 2 mg by mouth At Bedtime   cyclobenzaprine (FLEXERIL) 10 MG tablet 8/15/2019 at prn Self No Yes   Sig: Take 1 tablet (10 mg) by mouth 3 times daily as needed for muscle spasms   diphenhydrAMINE (BENADRYL) 25 MG tablet Past Week at prn Self No Yes   Sig: Take 1-2 tablets (25-50 mg) by mouth every 6 hours as needed for itching or allergies    traMADol (ULTRAM) 50 MG tablet Past Week at prn Self No Yes   Sig: Take 1 tablet (50 mg) by mouth every 6 hours as needed for pain      Facility-Administered Medications: None     Allergies   Allergies   Allergen Reactions     Hydrocodone-Acetaminophen Itching     Patient states she is able to take it with Benadryl.       Oxycodone Itching and Rash       Social History   I have reviewed this patient's social history and updated it with pertinent information if needed. Megha Campbell  reports that she has never smoked. She has never used smokeless tobacco. She reports that she drinks alcohol. She reports that she does not use drugs.    Family History   I have reviewed this patient's family history and updated it with pertinent information if needed.   Family History   Problem Relation Age of Onset     Breast Cancer Mother        Review of Systems   The 5 point Review of Systems is negative other than noted in the HPI or here.     Physical Exam   Temp: 95.8  F (35.4  C) Temp src: Oral BP: 130/61 Pulse: 108   Resp: 22 SpO2: 93 % O2 Device: None (Room air)    Vital Signs with Ranges  Temp:  [95.8  F (35.4  C)-98.6  F (37  C)] 95.8  F (35.4  C)  Pulse:  [] 108  Resp:  [22-25] 22  BP: (130-165)/(61-93) 130/61  SpO2:  [93 %-98 %] 93 %  200 lbs 0 oz    Constitutional: awake, cooperative, no acute distress  Breast; Right breast without mass, left breast seroma and edema post surgery and radiation  Hematologic / Lymphatic: no cervical lymphadenopathy  GI:  soft, non-distended, non-tender, no masses palpated, no hepatosplenomegally  Skin: no bruising or bleeding  Musculoskeletal: no pedal edema    Data   Results for orders placed or performed during the hospital encounter of 08/16/19 (from the past 24 hour(s))   EKG 12 lead   Result Value Ref Range    Interpretation ECG Click View Image link to view waveform and result    Chest XR,  PA & LAT    Narrative    CHEST TWO VIEWS  8/16/2019 8:27 AM     HISTORY:  Left chest  pain, shortness of breath and productive cough  status post thoracentesis with metastatic breast cancer; check for  pneumonia or pneumothorax.    COMPARISON: Chest x-ray 8/8/2019.      Impression    IMPRESSION: Two views of the chest are performed. Increasing  infiltrate right upper lobe and right lower lobe is noted.  Retrocardiac opacity appears slightly improved along with the  loculated-appearing left pleural fluid collection. No significant  right pleural fluid. No evidence of pneumothorax. Heart size appears  within normal limits.    SATHYA EASTON MD   Blood culture   Result Value Ref Range    Specimen Description Blood Right Arm     Special Requests Aerobic and anaerobic bottles received     Culture Micro No growth after 4 hours    D dimer quantitative   Result Value Ref Range    D Dimer 3.2 (H) 0.0 - 0.50 ug/ml FEU   CBC with platelets differential   Result Value Ref Range    WBC 5.5 4.0 - 11.0 10e9/L    RBC Count 4.67 3.8 - 5.2 10e12/L    Hemoglobin 11.7 11.7 - 15.7 g/dL    Hematocrit 37.1 35.0 - 47.0 %    MCV 79 78 - 100 fl    MCH 25.1 (L) 26.5 - 33.0 pg    MCHC 31.5 31.5 - 36.5 g/dL    RDW 15.0 10.0 - 15.0 %    Platelet Count 264 150 - 450 10e9/L    Diff Method Automated Method     % Neutrophils 77.3 %    % Lymphocytes 9.6 %    % Monocytes 9.6 %    % Eosinophils 3.1 %    % Basophils 0.2 %    % Immature Granulocytes 0.2 %    Absolute Neutrophil 4.3 1.6 - 8.3 10e9/L    Absolute Lymphocytes 0.5 (L) 0.8 - 5.3 10e9/L    Absolute Monocytes 0.5 0.0 - 1.3 10e9/L    Absolute Eosinophils 0.2 0.0 - 0.7 10e9/L    Absolute Basophils 0.0 0.0 - 0.2 10e9/L    Abs Immature Granulocytes 0.0 0 - 0.4 10e9/L   Basic metabolic panel   Result Value Ref Range    Sodium 137 133 - 144 mmol/L    Potassium 3.3 (L) 3.4 - 5.3 mmol/L    Chloride 102 94 - 109 mmol/L    Carbon Dioxide 27 20 - 32 mmol/L    Anion Gap 8 3 - 14 mmol/L    Glucose 97 70 - 99 mg/dL    Urea Nitrogen 6 (L) 7 - 30 mg/dL    Creatinine 0.70 0.52 - 1.04 mg/dL    GFR  Estimate >90 >60 mL/min/[1.73_m2]    GFR Estimate If Black >90 >60 mL/min/[1.73_m2]    Calcium 8.4 (L) 8.5 - 10.1 mg/dL   Magnesium   Result Value Ref Range    Magnesium 2.1 1.6 - 2.3 mg/dL   ISTAT gases lactate rosie POCT   Result Value Ref Range    Ph Venous 7.43 7.32 - 7.43 pH    PCO2 Venous 46 40 - 50 mm Hg    PO2 Venous 40 25 - 47 mm Hg    Bicarbonate Venous 30 (H) 21 - 28 mmol/L    O2 Sat Venous 76 %    Lactic Acid 0.6 (L) 0.7 - 2.1 mmol/L   CT Chest Pulmonary Embolism w Contrast    Narrative    CT CHEST PULMONARY EMBOLISM WITH CONTRAST 8/16/2019 10:29 AM     HISTORY: Check for pulmonary embolism--chest pain and elevated  D-dimer.     COMPARISON: CT chest 8/8/2019.    TECHNIQUE: Thin section axial images are performed from the thoracic  inlet to the lung bases utilizing 73 mL of Isovue 370 IV contrast  without adverse event. Coronal reformatted images are also generated.  Radiation dose for this scan was reduced using automated exposure  control, adjustment of the mA and/or kV according to patient size, or  iterative reconstruction technique.    FINDINGS:     Chest: Patchy airspace opacities in the lung apices bilaterally, right  greater than left, are again noted. Area of more masslike  consolidation posterior right upper lobe is new possibly reflecting an  area of pneumonia. This measures approximately 1.9 cm in diameter on  series 9, image 114. Patchy areas of right lung base nodularity is  also noted. These appear slightly more prominent in the short interval  since prior CT. The largest measures 1.6 cm on image 96. Left pleural  effusion is stable if not slightly decreased since prior exam but is  now loculated in appearance. Enlarged mediastinal and left hilar lymph  nodes are suspected. Left hilar kinza mass measures 2.5 x 1.4 cm,  previously 2.2 x 1.3 cm. Esophagus is unremarkable. No enlarged right  axillary lymph nodes. Postsurgical changes left breast and left axilla  are noted. Probable left breast  seroma is minimally changed measuring  6.3 cm. Area of postsurgical change left axilla is stable in  appearance on image 53. Esophagus is unremarkable. Heart is normal in  size. Thoracic aorta is unremarkable. Limited images upper abdomen is  unremarkable. Gastric bypass surgical changes are stable. Bone window  examination is within normal limits.      Impression    IMPRESSION:  1. Increasing patchy infiltrative changes with areas of nodularity  predominantly in the right lung. Due to interval progression since  recent prior CT, this is thought to be infectious in nature rather  than metastatic. Metastatic disease cannot be completely ruled out.  2. Prominent mediastinal and left hilar lymph nodes. Minimal interval  change.  3. Postsurgical changes left breast and left axilla, unchanged since  prior study. Skin thickening overlying left chest wall and breast  probably reflect postradiation change.  4. Slight interval decrease in the overall volume of the left pleural  effusion. This currently appears more loculated.    SATHYA EASTON MD   Blood culture   Result Value Ref Range    Specimen Description Blood Left Hand     Special Requests Received in aerobic bottle only     Culture Micro PENDING    Procalcitonin   Result Value Ref Range    Procalcitonin <0.05 ng/ml

## 2019-08-16 NOTE — ED NOTES
"[facility]  ED Nurse Handoff Report    ED Chief complaint: Cough      ED Diagnosis:   Final diagnoses:   None       Code Status: Full Code    Allergies:   Allergies   Allergen Reactions     Hydrocodone-Acetaminophen Itching     Patient states she is able to take it with Benadryl.       Oxycodone Itching and Rash       Activity level - Baseline/Home:  Independent  Activity Level - Current:   Independent    Patient's Preferred language: English   Needed?: No    Isolation: No  Infection: Not Applicable  Bariatric?: No    Vital Signs:   Vitals:    08/16/19 0654 08/16/19 0845 08/16/19 1000   BP: (!) 144/69     Pulse: 113     Resp: 25     Temp: 98.6  F (37  C)     TempSrc: Oral     SpO2: 98% 98% 93%   Weight: 90.7 kg (200 lb)     Height: 1.727 m (5' 8\")         Cardiac Rhythm: ,   Cardiac  Cardiac Rhythm: Sinus tachycardia    Pain level: 0-10 Pain Scale: 2    Is this patient confused?: No   Does this patient have a guardian?  No         If yes, is there guardianship documents in the Epic \"Code/ACP\" activity?  N/A         Guardian Notified?  N/A  [place] - Suicide Severity Rating Scale Completed?  Yes  If yes, what color did the patient score?  White    Patient Report: Initial Complaint: Chest/back pain with cough and SOB  Focused Assessment: Pt has been seen 3 times for these sx; pt reports not being able to rest at night and SOB; pt found to have pneumonia   Tests Performed: CT, CXR  Abnormal Results: CXR  Treatments provided: ABX    Family Comments: no family     OBS brochure/video discussed/provided to patient/family: N/A                ED Medications:   Medications   azithromycin (ZITHROMAX) 500 mg vial to attach to  mL bag (500 mg Intravenous Given 8/16/19 1246)   HYDROmorphone (PF) (DILAUDID) injection 0.5 mg (0.5 mg Intravenous Given 8/16/19 2431)   ipratropium - albuterol 0.5 mg/2.5 mg/3 mL (DUONEB) neb solution 3 mL (3 mLs Nebulization Given 8/16/19 0703)   0.9% sodium chloride " BOLUS (0 mLs Intravenous Stopped 8/16/19 0944)   methylPREDNISolone sodium succinate (solu-MEDROL) injection 125 mg (125 mg Intravenous Given 8/16/19 0841)   albuterol (PROVENTIL) neb solution 5 mg (5 mg Nebulization Given 8/16/19 0844)   acetaminophen (TYLENOL) tablet 650 mg (650 mg Oral Given 8/16/19 0858)   cefTRIAXone (ROCEPHIN) 2 g vial to attach to  ml bag for ADULTS or NS 50 ml bag for PEDS (0 g Intravenous Stopped 8/16/19 1038)   ketorolac (TORADOL) injection 15 mg (15 mg Intravenous Given 8/16/19 0943)   potassium chloride (KLOR-CON) Packet 40 mEq (40 mEq Oral Given 8/16/19 0943)   Saline Flush (96 mLs Intravenous Given 8/16/19 1012)   iopamidol (ISOVUE-370) solution 73 mL (73 mLs Intravenous Given 8/16/19 1011)       Drips infusing?:  No    For the majority of the shift this patient was Green.   Interventions performed were .    Severe Sepsis OR Septic Shock Diagnosis Present: No    To be done/followed up on inpatient unit:  abx     ED NURSE PHONE NUMBER: *[phone]

## 2019-08-16 NOTE — ED TRIAGE NOTES
Pt seen here 8/14 for same issue. Pt reports her asthma and coughing is getting worse. Pt reports she is coughing up yellow sputum. Denies fever or chills. She has been using her inhaler and nebulizer with minimal relief.

## 2019-08-16 NOTE — H&P
[facility]    History and Physical : Hospitalist Service:        Date of Admission:  8/16/2019    Cumulative Summary:   Megha Campbell is a 54 year old female with past medical history significant for recent diagnosis of left breast cancer status post left breast partial mastectomy and axillary node dissection in December 2018 by Dr. Hirsch, status post radiation finished in April 2019, history of asthma and recent diagnosis of malignant pleural effusion last week who was admitted from the emergency room due to the concern for possible pneumonia associated with worsening productive cough, shortness of breath and new back pain.    Assessment & Plan     Principal Problem:  Pneumonia of right lung due to infectious organism (8/16/2019) :  Malignant pleural effusion (8/16/2019)  Uncomplicated asthma:  Patient is denying any fever or chills, she has been seen twice in the emergency room in the last couple of days, she does not have any leukocytosis.  Her lactic acid is in normal range.  CT scan of the chest done on August 8 was negative for PE but showed large left pleural effusion with associated atelectasis with medicine and bilateral hilar adenopathy.  Patchy nodular densities in the right upper and lower lobe were seen and at that time there were thought to be most likely secondary to metastatic disease.  Patient underwent thoracentesis 5 days ago and cytology is concerning for malignant cells in the effusion concerning for metastatic breast cancer.  As patient return to ER again due to ongoing productive cough, shortness of breath and new back pain, CT scan of the chest was repeated, which is showing increasing patchy infiltrative changes.  Due to interval progression in a short period of time at this point infectious etiology is a higher probability than metastatic disease although metastatic disease cannot be completely ruled out.    --Admit patient to general medical floor with  telemetry.  --Activity as tolerated with fall precautions.  --Start patient on general diet.  --Start patient on IV ceftriaxone and azithromycin.  -- start patient on gentle IV fluids normal saline at 75 ml/hr  -- will check pro calcitonin also as her presentation still remains questionable if it is all related to infectious etiology or she also have underlying malignancy   --At this time patient does not have hypoxia associated with pneumonia, will not to start patient on prednisone at this point.Patient does have history of underlying asthma but does not have any wheezing at this point.  --We will order DuoNeb nebulizer treatment 4 times daily and every 2 hours as needed  --At this point CT scan is not showing reaccumulation of effusion, so repeated thoracentesis is not recommended at this point.  -- will order lumber and thoracic spine Ct without contrast to rule out metastatic involvement , patient does not have any neurogenic claudication symptoms at this point.    Malignant neoplasm of lower-outer quadrant of left breast of female, estrogen receptor negative (H) (11/16/2018): as above, status post left breast partial mastectomy and axillary node dissection and reexcision of inferior margin in December 2018 by , status post radiation , finished in April 2019    -- will consult [facility], she had outpatient appointment with  this afternoon as an out patient to have further discussion regarding her malignant pleural effusion.  -- she once again told me that she is     Obese : Patient has undergone gastric bypass surgery in the past, she thinks that she might have again some weight back.  She was interested in outpatient follow-up with gastric bypass surgery and having a follow-up EGD as an outpatient to evaluate if her bypass is a still functional  -- Patient will benefit from getting established with outpatient PCP as she is requesting to be getting established with new PCP, she will also  benefit from outpatient gastric bypass surgery follow-up.    Depressive disorder :  --Start patient on PTA medications once pharmacy has reconsulted medications    Diet: Regular diet  High Catheter: not present  DVT Prophylaxis: Enoxaparin (Lovenox) SQ  Code Status: Full Code    Disposition: Expecting patient to stay more than 2 nights, physical, occupational and  are consulted.    The patient's care was discussed with the Patient and ER Team.    Eliana Iqbal MD,[facility] medicine   ----------------------------------------------------------------------------------------------------------------------    Primary Care Physician   Laure Zapata MD    Chief Complaint   Worsening of Shortness of breath     History is obtained from the patient    Patient Active Problem List   Diagnosis     Malignant neoplasm of lower-outer quadrant of left breast of female, estrogen receptor negative (H)     Breast cancer (H)     Pneumonia of right lung due to infectious organism     Malignant pleural effusion     Uncomplicated asthma     Obese     Depressive disorder       History of Present Illness   Megha Campbell is a 54 year old female with past medical history significant for recent diagnosis of left breast cancer status post left breast partial mastectomy and axillary node dissection and reexcision of inferior margin in December 2018 by , status post radiation , finished in April 2019, history of asthma and a recent diagnosis of malignant pleural effusion who presented to the emergency room for worsening cough, shortness of breath and back pain.    As above, patient has a recent diagnosis of left breast cancer has undergone partial mastectomy and axillary node dissection and reexcision of inferior margin in December by surgery, status post radiation which she finished in April 2019.  Patient has developed shortness of breath for which she was evaluated in the emergency room initially on August 8  at that time CT scan of the chest was negative for pulmonary embolism, showed large left pleural effusion with associated atelectasis, mediastinal and bilateral hilar adenopathy and patchy nodular densities in the right upper lobe and right lower lobe posteriorly.  Given the patient history it was thought to be related to metastatic disease and should be assumed metastatic disease until proven otherwise.  5 cm probable seroma based on prior history in the inferior left breast with prominent to skin thickening in the left breast was also seen.  Inflammation was extending from breast into the axilla where there appears to be a 1 cm short axis diameter left axillary node.  Patient was anxious to go home and decided to undergo outpatient thoracentesis and then follow-up with Dr. Butterfield as an outpatient.    Patient was again evaluated in the emergency room on August 14 after outpatient thoracentesis.  She was also having increased lower back pain.  It seems like that her pleural effusion had metastatic breast cancer cells, patient was offered observational stay to have further discussion with her oncology team with patient decided to leave the hospital and was planning to have outpatient follow-up with her oncologist today.    In the last couple of days patient breathing has improved somewhat but she continues to have productive cough, she is denying any fever or chills, feels tired and fatigued, she is also having significant discomfort in her back especially when she is lying down.  She was further evaluated in the emergency room.  Once again patient did not have any leukocytosis, CT scan of the chest was repeated which was concerning for increasing patchy infiltrative changes with areas of nodularity in the right lung.  Due to interval progression since recent prior CT, this is thought to be infectious in nature rather than metastatic although metastatic disease cannot be completely ruled out.  At this time patient  is in agreement with getting admitted to the hospital for further evaluation and management of her pneumonia and also seen by her oncologist.  Patient politely continues to refuse chemotherapy at this point.    Review of Systems   CONSTITUTIONAL:  positive for fatigue and generalized weakness.  EYES: negative for blurred vision and visual disturbance  HEENT:  negative for hoarseness and voice change  RESPIRATORY: Positive for cough with sputum and dyspnea, no wheezing and chest pain  CARDIOVASCULAR: Negative for chest pain, palpitations, orthopnea, exertional chest pressure/discomfort  GASTROINTESTINAL: Negative for abd pain, nausea , vomiting ,constipation and abdominal pain  GENITOURINARY: Negative for burning /urgency and frequency.  HEMATOLOGIC/LYMPHATIC:  negative  ALLERGIC/IMMUNOLOGIC:  negative for drug reactions  ENDOCRINE:  negative for diabetic symptoms including polyuria, polydipsia and weight loss  MUSCULOSKELETAL:  positive for arthralgias, worsening thoracic and lower back pain for last few weeks.  NEUROLOGICAL:  negative  BEHAVIOR/PSYCH: does have history of depression and anxiety    Past Medical History    I have reviewed this patient's medical history and updated it with pertinent information if needed.   Past Medical History:   Diagnosis Date     Breast cancer in female (H)     breast ca     Depressive disorder      Obese      Uncomplicated asthma        Past Surgical History   I have reviewed this patient's surgical history and updated it with pertinent information if needed.  Past Surgical History:   Procedure Laterality Date     BIOPSY BREAST Left 12/7/2018    Procedure: RE-EXCISION LEFT BREAST INFERIOR MARGIN, ASPIRATION OF AXILLARY SEROMA;  Surgeon: Lesly Aponte MD;  Location: [facility] OR     GASTRIC BYPASS  2004    abdominal plasty, and scar tissue removal     LUMPECTOMY BREAST WITH SEED LOCALIZATION Left 11/21/2018    Procedure: SEED LOCALIZED LEFT BREAST LUMPECTOMY WITH LEFT AXILLARY LYMPH  NODE DISSECTION;  Surgeon: Lesly Aponte MD;  Location:  OR       Prior to Admission Medications   Prior to Admission Medications   Prescriptions Last Dose Informant Patient Reported? Taking?   CLONAZEPAM PO  Self Yes No   Sig: Take 1 mg by mouth At Bedtime    LORazepam (ATIVAN) 1 MG tablet  Self No No   Sig: Take 1 tablet (1 mg) by mouth 2 times daily as needed for anxiety or sleep   SERTRALINE HCL PO  Pharmacy Yes No   Sig: Take 150 mg by mouth daily    albuterol (PROAIR HFA/PROVENTIL HFA/VENTOLIN HFA) 108 (90 Base) MCG/ACT inhaler  Self Yes No   Sig: Inhale 1-2 puffs into the lungs every 6 hours as needed for shortness of breath / dyspnea or wheezing   buPROPion (WELLBUTRIN XL) 150 MG 24 hr tablet  Pharmacy Yes No   Sig: Take 150 mg by mouth every morning   cyclobenzaprine (FLEXERIL) 10 MG tablet  Self No No   Sig: Take 1 tablet (10 mg) by mouth 3 times daily as needed for muscle spasms   diphenhydrAMINE (BENADRYL) 25 MG tablet  Self No No   Sig: Take 1-2 tablets (25-50 mg) by mouth every 6 hours as needed for itching or allergies   traMADol (ULTRAM) 50 MG tablet   No No   Sig: Take 1 tablet (50 mg) by mouth every 6 hours as needed for pain      Facility-Administered Medications: None     Allergies   Allergies   Allergen Reactions     Hydrocodone-Acetaminophen Itching     Patient states she is able to take it with Benadryl.       Oxycodone Itching and Rash       Social History   I have reviewed this patient's social history and updated it with pertinent information if needed. Megha Campbell  reports that she has never smoked. She has never used smokeless tobacco. She reports that she drinks alcohol. She reports that she does not use drugs.   Currently she lives at home alone , she has two sons , one lives in [place] and other one lives in [place].    Family History   I have reviewed this patient's family history and updated it with pertinent information if needed.   Family History   Problem Relation Age of  Onset     Breast Cancer Mother        Physical Exam   Temp: 98.6  F (37  C) Temp src: Oral BP: (!) 144/69 Pulse: 113   Resp: 25 SpO2: 93 % O2 Device: None (Room air)    Vital Signs with Ranges  Temp:  [98.6  F (37  C)] 98.6  F (37  C)  Pulse:  [113] 113  Resp:  [25] 25  BP: (144)/(69) 144/69  SpO2:  [93 %-98 %] 93 %  200 lbs 0 oz    Constitutional: Awake, alert,oriented to time, place and person , cooperative, no apparent distress.Pleasent and cooperative   Eyes: Conjunctiva and pupils examined and normal.  HEENT: Moist mucous membranes, normal dentition.  Respiratory: Clear to auscultation bilaterally except decreased breatn sounds in bases , no crackles or wheezing.  Cardiovascular: Regular rate and rhythm, normal S1 and S2, and no murmur noted.  GI: Soft, non-distended, non-tender, normal bowel sounds.  Lymph/Hematologic: No anterior cervical or supraclavicular adenopathy.  Skin: No rashes, no cyanosis, no edema.  Musculoskeletal: No joint swelling, erythema or tenderness.  Neurologic: Cranial nerves 2-12 intact, normal strength and sensation.  Psychiatric: Alert, oriented to person, place and time, no obvious anxiety or depression at the time of evaluation     Data   Data reviewed today:  I personally reviewed the EKG tracing showing sinus tachycardia .    Recent Labs   Lab 08/16/19  0834 08/14/19  1021 08/14/19  0609   WBC 5.5  --  6.2   HGB 11.7  --  11.3*   MCV 79  --  79     --  236   INR  --  0.97  --      --  139   POTASSIUM 3.3*  --  3.3*   CHLORIDE 102  --  104   CO2 27  --  27   BUN 6*  --  7   CR 0.70  --  0.67   ANIONGAP 8  --  8   PABLO 8.4*  --  8.2*   GLC 97  --  103*   ALBUMIN  --   --  2.6*   PROTTOTAL  --   --  7.3   BILITOTAL  --   --  0.3   ALKPHOS  --   --  111   ALT  --   --  15   AST  --   --  15       Imaging:  Recent Results (from the past 24 hour(s))   Chest XR,  PA & LAT    Narrative    CHEST TWO VIEWS  8/16/2019 8:27 AM     HISTORY:  Left chest pain, shortness of breath and  productive cough  status post thoracentesis with metastatic breast cancer; check for  pneumonia or pneumothorax.    COMPARISON: Chest x-ray 8/8/2019.      Impression    IMPRESSION: Two views of the chest are performed. Increasing  infiltrate right upper lobe and right lower lobe is noted.  Retrocardiac opacity appears slightly improved along with the  loculated-appearing left pleural fluid collection. No significant  right pleural fluid. No evidence of pneumothorax. Heart size appears  within normal limits.    SATHYA EASTON MD   CT Chest Pulmonary Embolism w Contrast    Narrative    CT CHEST PULMONARY EMBOLISM WITH CONTRAST 8/16/2019 10:29 AM     HISTORY: Check for pulmonary embolism--chest pain and elevated  D-dimer.     COMPARISON: CT chest 8/8/2019.    TECHNIQUE: Thin section axial images are performed from the thoracic  inlet to the lung bases utilizing 73 mL of Isovue 370 IV contrast  without adverse event. Coronal reformatted images are also generated.  Radiation dose for this scan was reduced using automated exposure  control, adjustment of the mA and/or kV according to patient size, or  iterative reconstruction technique.    FINDINGS:     Chest: Patchy airspace opacities in the lung apices bilaterally, right  greater than left, are again noted. Area of more masslike  consolidation posterior right upper lobe is new possibly reflecting an  area of pneumonia. This measures approximately 1.9 cm in diameter on  series 9, image 114. Patchy areas of right lung base nodularity is  also noted. These appear slightly more prominent in the short interval  since prior CT. The largest measures 1.6 cm on image 96. Left pleural  effusion is stable if not slightly decreased since prior exam but is  now loculated in appearance. Enlarged mediastinal and left hilar lymph  nodes are suspected. Left hilar kinza mass measures 2.5 x 1.4 cm,  previously 2.2 x 1.3 cm. Esophagus is unremarkable. No enlarged right  axillary lymph nodes.  Postsurgical changes left breast and left axilla  are noted. Probable left breast seroma is minimally changed measuring  6.3 cm. Area of postsurgical change left axilla is stable in  appearance on image 53. Esophagus is unremarkable. Heart is normal in  size. Thoracic aorta is unremarkable. Limited images upper abdomen is  unremarkable. Gastric bypass surgical changes are stable. Bone window  examination is within normal limits.      Impression    IMPRESSION:  1. Increasing patchy infiltrative changes with areas of nodularity  predominantly in the right lung. Due to interval progression since  recent prior CT, this is thought to be infectious in nature rather  than metastatic. Metastatic disease cannot be completely ruled out.  2. Prominent mediastinal and left hilar lymph nodes. Minimal interval  change.  3. Postsurgical changes left breast and left axilla, unchanged since  prior study. Skin thickening overlying left chest wall and breast  probably reflect postradiation change.  4. Slight interval decrease in the overall volume of the left pleural  effusion. This currently appears more loculated.    SATHYA EASTON MD

## 2019-08-16 NOTE — PROGRESS NOTES
RECEIVING UNIT ED HANDOFF REVIEW    ED Nurse Handoff Report was reviewed by: Amber Scheierl on August 16, 2019 at 1:25 PM

## 2019-08-16 NOTE — ED PROVIDER NOTES
"  History     Chief Complaint:  Cough      HPI   Megha Campbell is a 54 year old female with a history of breast cancer, s/p lumpectomy and radiation treatment finished in April, asthma, and recent diagnosis of pleural effusion who presents to the ED for evaluation of a cough. Per chart review, the patient has recently had two emergency department visits in the setting of pleural effusion. She initially presented to the [facility] ED on 8/8 for shortness of breath and cough, at which time she obtained a chest x-ray and chest CT that showed a large left pleural effusion with associated atelectasis. The patient was initially recommended to undergo outpatient thoracentesis, which she had performed on 8/9 that came back positive for malignancy. She returned to the [facility] ED 2 days ago for increased shortness of breath and coughing since her procedure. She was sent for a repeat chest x-ray, as below but did not undergo repeat thoracentesis. The patient was recommended observational admission after oncology consult, although she expressed a strong desire to go home and was ultimately discharged.     The patient states that her breathing has significantly improved since discharge. However, she notes that ever since the thoracentesis on 8/9 she has been experiencing constant, \"excruciating\" left-sided back pain. She describes this as a sharp discomfort that is worse with lying down, and it has been keeping her up at night. Thus, she returned to the ED secondary to the severity of her pain. Here, the patient additionally notes that her cough has persisted and she has been bringing up yellow sputum for the past week. She denies any fevers, chills, vomiting, diarrhea, abdominal pain, or leg swelling. She is not currently on antibiotics. She does report a history of blood clot in the breast but states this has improved. Of note, the patient has a strong desire against admission today. She does have a follow-up " oncology appointment scheduled with Dr. Winston for later this afternoon.     CT Chest Pulmonary Embolism w Contrast (8/8/19)  1. No evidence of pulmonary embolus.  2. Large left pleural effusion with associated atelectasis.  3. Probable mediastinal and bilateral hilar adenopathy.  4. Patchy nodular densities in the right upper lobe and right lower  lobe posteriorly as well as in the right lung apex. Given the  patient's history these could be related to metastatic disease and  should be assumed to be metastatic disease until proven otherwise.  They are somewhat ill-defined and theoretically could also be  inflammatory or infectious in nature. This will require close CT  follow-up.  5. 5 cm probable seroma based on prior history in the inferior left  breast with prominent skin thickening in the left breast. Inflammation  extends from this area into the axilla where there appears to be a 1  cm short axis diameter left axillary lymph node. Inflammation extends  along the chest wall into the upper abdominal wall.  COLIN ANDRES MD    XR Chest, G/E 2 views: (8/14/19)  Moderate left pleural effusion has decreased in size and is partially loculated. Associated mild opacity in the left lower lung, likely atelectasis. New minimal infiltrate or atelectasis right upper lung. Minimal interstitial opacities right lower lung. Normal heart size.     Allergies:  Hydrocodone-Acetaminophen  Oxycodone     Medications:    Albuterol  Wellbutrin XL  Clonazepam  Flexeril  Benadryl  Ativan  Sertraline  Ultram     Past Medical History:    Breast cancer  Depression  Obesity  Asthma    Past Surgical History:    Breast biopsy  Gastric bypass  Breast lumpectomy (left) with seed localization    Family History:    Breast cancer    Social History:  Negative for tobacco use.  Alcohol Use: Yes (occasional)  Negative for drug use.   Marital Status:   [2]     Review of Systems   Constitutional: Negative for chills and fever.   Respiratory:  "Positive for cough. Negative for shortness of breath.    Cardiovascular: Negative for leg swelling.   Gastrointestinal: Negative for abdominal pain, diarrhea and vomiting.   Musculoskeletal: Positive for back pain.   All other systems reviewed and are negative.      Physical Exam     Patient Vitals for the past 24 hrs:   BP Temp Temp src Pulse Resp SpO2 Height Weight   08/16/19 1000 -- -- -- -- -- 93 % -- --   08/16/19 0845 -- -- -- -- -- 98 % -- --   08/16/19 0654 (!) 144/69 98.6  F (37  C) Oral 113 25 98 % 1.727 m (5' 8\") 90.7 kg (200 lb)        Physical Exam  Nursing note and vitals reviewed.  Constitutional:  Appears well-developed and well-nourished. Mildly tachypneic with tight-sounding cough.  HENT:   Head:    Atraumatic.   Mouth/Throat:   Oropharynx is clear and moist. No oropharyngeal exudate.   Eyes:    Pupils are equal, round, and reactive to light.   Neck:    Normal range of motion. Neck supple.      No tracheal deviation present. No thyromegaly present.   Cardiovascular:  Minimally tachycardic rate, regular rhythm, no murmur.  Pulmonary/Chest: No crackles. Few expiratory wheezes bilaterally and diminished air movement. Left breast enlarged and slightly more firm than the right.  Abdominal:   Soft. Bowel sounds are normal. Exhibits no distension and      no mass. There is no tenderness.      There is no rebound and no guarding.   Musculoskeletal:  Exhibits no edema.   Lymphadenopathy:  No cervical adenopathy.   Neurological:   Alert and oriented to person, place, and time.   Skin:    Skin is warm and dry. No rash noted. No pallor.      Emergency Department Course   ECG:  Indication: Tachycardia   Time: 0743  Vent. Rate 105 bpm. AZ interval 140. QRS duration 78. QT/QTc 356/470. P-R-T axis 51 -22 14.  Sinus tachycardia. Low voltage QRS. Inferior infarct, age undetermined. Cannot rule out anterior infarct, age undetermined. Abnormal ECG. Read time: 0748     Imaging:  Radiographic findings were communicated " with the patient who voiced understanding of the findings.  XR Chest PA & LAT:   Two views of the chest are performed. Increasing  infiltrate right upper lobe and right lower lobe is noted.  Retrocardiac opacity appears slightly improved along with the  loculated-appearing left pleural fluid collection. No significant  right pleural fluid. No evidence of pneumothorax. Heart size appears  within normal limits, as per radiology.     CT Chest PE w/ IV contrast:   1. Increasing patchy infiltrative changes with areas of nodularity  predominantly in the right lung. Due to interval progression since  recent prior CT, this is thought to be infectious in nature rather  than metastatic. Metastatic disease cannot be completely ruled out.  2. Prominent mediastinal and left hilar lymph nodes. Minimal interval  change.  3. Postsurgical changes left breast and left axilla, unchanged since  prior study. Skin thickening overlying left chest wall and breast  probably reflect postradiation change.  4. Slight interval decrease in the overall volume of the left pleural  effusion. This currently appears more loculated. As per radiology.      Laboratory:  CBC: WBC: 5.5, HGB: 11.7, PLT: 264  BMP: K 3.3 (L), BUN 6 (L), Calcium 8.4 (L), o/w WNL (Creatinine: 0.70)    D dimer: 3.2 (H)    0835 ISTAT gases lactate rosie POCT: Bicarb 30 (H), Lactic acid 0.6 (L), o/w WNL    Procalcitonin: pending upon admission    Blood cultures (X2): pending     Interventions:  0703 Duoneb 3 mL Nebulization   0841 NS 1L IV    Solu-Medrol 125 mg IV  0844 Proventil 5 mg Nebulization  0858 Tylenol 650 mg PO  0943 Toradol 15 mg IV   Dilaudid 0.5 mg IV   Klor-Con 40 mEq PO  0945 Rocephin 2 g IV  1246 Azithromycin 500 mg IV  Please see MAR for full list of medications administered in the ED.     Emergency Department Course:  Nursing notes and vitals reviewed. (0688) I performed an exam of the patient as documented above.     IV inserted. Medicine administered as  documented above. Blood drawn. This was sent to the lab for further testing, results above.    EKG obtained in the ED, see results above.      The patient was sent for a chest x-ray while in the emergency department, findings above.     (4264) I rechecked the patient and discussed the results of her workup thus far. Patient was tearful and rocking back and forth in pain.    The patient was sent for a chest CT, findings above.    (1217) I reevaluated the patient and provided an update in regards to her ED course.  We discussed the likely plan for admission, and she felt comfortable with this.    (1235)  I consulted with  of the hospitalist services. They are in agreement to accept the patient for admission.    (1242) I consulted with Dr. Winston, the patient's oncologist, regarding the patient's history and presentation here in the emergency department. He felt comfortable with her admission to the hospital.    Findings and plan explained to the Patient who consents to admission. Discussed the patient with , who will admit the patient to an oncology bed for further monitoring, evaluation, and treatment.    Impression & Plan    Medical Decision Making:  This patient has metastatic breast cancer with a malignant pleural effusion diagnosed recently. I feel that her pleuritic pain is likely due to pneumonia since chest CT here today showed a worsening infiltrates of the right upper and lower lungs, which is consistent with pneumonia. There are no signs of pulmonary embolism or pneumothorax today. Therefore, she was treated with ceftriaxone and Zithromax IV here, given pain control with Dilaudid and Toradol, and admitted to the oncology floor under the care of the hospitalist, .     Diagnosis:    ICD-10-CM    1. Acute pneumonia J18.9    2. Exacerbation of intermittent asthma, unspecified asthma severity J45.21    3. Metastatic breast cancer (H) C50.919        Disposition:  Admitted to   [facility].    Scribe Disclosure:  I, Joanne Lemus, am serving as a scribe on 8/16/2019 at 7:19 AM to personally document services performed by Shasha Ansari MD based on my observations and the provider's statements to me.      Joanne Lemus  8/16/2019    EMERGENCY DEPARTMENT       Shasha Ansari MD  08/16/19 1334

## 2019-08-17 NOTE — PROGRESS NOTES
08/17/19 0816   Quick Adds   Type of Visit Initial PT Evaluation   Living Environment   Lives With spouse   Living Arrangements apartment   Home Accessibility no concerns;other (see comments)  (elevator)   Transportation Anticipated family or friend will provide   Living Environment Comment patient has not concerns   Self-Care   Usual Activity Tolerance good   Current Activity Tolerance moderate   Regular Exercise No   Equipment Currently Used at Home none   Activity/Exercise/Self-Care Comment Patient reports she is is independent with all functional mobility at home; currently has increased back pain but still feels able to move around on her own.    Functional Level Prior   Ambulation 0-->independent   Transferring 0-->independent   Toileting 0-->independent   Bathing 0-->independent   Communication 0-->understands/communicates without difficulty   Swallowing 0-->swallows foods/liquids without difficulty   Cognition 0 - no cognition issues reported   Fall history within last six months no   Which of the above functional risks had a recent onset or change? none   General Information   Onset of Illness/Injury or Date of Surgery - Date 08/16/19   Referring Physician Eliana Iqbal MD   Patient/Family Goals Statement Currently in pain in LB and wanting pain relief; asking for nurse to be called which was done.    Pertinent History of Current Problem (include personal factors and/or comorbidities that impact the POC) Admitted with possible pnuemonia, worsening cough, SOB, and back pain   General Observations Patient just coming back from imaging.  In wc in [place] in notable pain in LB; agreeable to PT assessing her ambulate to her bed    Cognitive Status Examination   Orientation orientation to person, place and time   Level of Consciousness alert   Follows Commands and Answers Questions 100% of the time   Personal Safety and Judgment intact   Memory intact   Pain Assessment   Patient Currently in Pain Yes, see Vital  "Sign flowsheet  (10/10)   Integumentary/Edema   Integumentary/Edema Comments per patient she has lymphedema in left arm; not measured   Posture    Posture Forward head position   Posture Comments   (currently guarded in lumbar forward flexion)   Range of Motion (ROM)   ROM Comment not formally assessed   Strength   Strength Comments antigravity movement noted throughout   Bed Mobility   Bed Mobility Comments Sit to supine I'lly even with LBP   Transfer Skills   Transfer Comments Sit to and from stand I'lly even with LBP   Gait   Gait Comments Ambulated 30' without AD I'lly with no LOB or gait deviations; mild trunk forward flexion as guarded with LBP   Balance   Balance Comments No LOB noted   Sensory Examination   Sensory Perception Comments not assessed   Coordination   Coordination no deficits were identified   Muscle Tone   Muscle Tone no deficits were identified   Clinical Impression   Criteria for Skilled Therapeutic Intervention evaluation only;no problems identified which require skilled intervention  (unless lymph wraps indicated; please order if indicated)   PT Diagnosis LBP with mobility   Influenced by the following impairments pain   Functional limitations due to impairments   (independent with functional mobility despite LPB at this sheri)   Clinical Presentation Evolving/Changing  (LPB level changing)   Clinical Presentation Rationale per clinical judgment   Clinical Decision Making (Complexity) Low complexity   Therapy Frequency   (eval only)   Anticipated Equipment Needs at Discharge   (none anticipated)   Anticipated Discharge Disposition Home   Risk & Benefits of therapy have been explained Yes   Patient, Family & other staff in agreement with plan of care Yes   [facility] AM-PAC  \"6 Clicks\" V.2 Basic Mobility Inpatient Short Form   1. Turning from your back to your side while in a flat bed without using bedrails? 4 - None   2. Moving from lying on your back to sitting on the side of a flat " bed without using bedrails? 4 - None   3. Moving to and from a bed to a chair (including a wheelchair)? 4 - None   4. Standing up from a chair using your arms (e.g., wheelchair, or bedside chair)? 4 - None   5. To walk in hospital room? 4 - None   6. Climbing 3-5 steps with a railing? 3 - A Little   Basic Mobility Raw Score (Score out of 24.Lower scores equate to lower levels of function) 23

## 2019-08-17 NOTE — PLAN OF CARE
A &O x 4,  VSS. Tele: ST. C/o pain, gave prn ultram and flexeril w/good relief. Up ind in room and to BR, good UOP.  Tolerating regular diet. LS clear w/non-productive, dry cough, unable to collect sputum. PIV infusing NS @ 75 mL/hr w/intermittent ABX.    6015: pt noted she is now willing to have lumbar/thoracic spine CT today.

## 2019-08-17 NOTE — PLAN OF CARE
PT:  Patient seen for evaluation only.  Patient admitted with worsening cough, suspected pneumonia, SOB, and increasing LBP.  Upon PT arriving patient was just returning from imaging and was in a wc in the [place]; wanting to walk into room.  Patient reported she currently had 10/10 back pain but states she is independent with all mobility.  Given back pain was agreeable to PT evaluation.  At baseline patient lives with  in apartment with elevator.   works during the day.  She is independent at baseline with all functional mobility.  Discharge Planner PT   Patient plan for discharge: Home  Current status: Sit to and from stand I'lly.  Sit to supine I'lly.  Ambulated 30'+ in room without AD I'lly with minimal forward trunk positioning to guard for LBP rated at 10/10.  Patient insisted that she is still independent and is up in the room on her own so that she doesn't get weaker during hospital stay.  Requesting nurse to come for pain management.  Is in agreement that no skilled PT is indicated at this time.  Did state the physician at one time talked about lymph wraps for left U/E but has not ordered that.  Please order if indicated.  Will discharge PT at this time.  Please re-order if services are indicated at a later date.   Barriers to return to prior living situation: None noted  Recommendations for discharge: Home  Rationale for recommendations: Patient is functionally independent at this time.  If LBP progresses and patient requires need of AD or if lymph wraps are indicated, please re-order PT.       Entered by: Tonia León 08/17/2019 8:29 AM  Physical Therapy Discharge Summary    Reason for therapy discharge:    Eval only; no need for skilled PT at this time.     Progress towards therapy goal(s). See goals on Care Plan in [facility] electronic health record for goal details.  Eval only     Therapy recommendation(s):    No further therapy is recommended.

## 2019-08-17 NOTE — PLAN OF CARE
Discharge Planner OT   Patient plan for discharge: home  Current status: OT orders received and chart reviewed. Per PT, pt indep w/ all mobility despite 10/10 back pain. OT provided pt w/ spinal education packet and discussed avoidance of bending, lifting, and twisting to reduce back pain w/ ADL's/IADL's and mobility. Pt states she is able to push through the pain and complete all her own ADL's, but if completely necessary pt's  can assist upon discharge. Pt has a tub shower and enjoys taking baths. Pt educated in use of tub bench if needed and use of AE and locations to purchase. Pt declined any OOB mobility as pt drowsy from pain medications and just got comfortable.     No skilled IP OT needs identified as pt has no self-care concerns. Will complete IP OT order.   Barriers to return to prior living situation: defer to PT  Recommendations for discharge: defer to PT  Rationale for recommendations: defer to PT; IP OT order complete.       Entered by: Shereen Sears 08/17/2019 9:40 AM

## 2019-08-17 NOTE — PROGRESS NOTES
[facility]  Hospitalist Progress Note        Dione Hopper MD  08/17/2019        Interval History:      Patient is complaining lots of back pain  She requested Dilaudid  Per patient Dilaudid works the best  She had several questions  At this point she is declining for any chemo in future  Overall she is improving slowly but pain is bothersome         Assessment and Plan:      54 year old female with past medical history significant for recent diagnosis of left breast cancer status post left breast partial mastectomy and axillary node dissection in December 2018 by Dr. Hirsch, status post radiation finished in April 2019, history of asthma and recent diagnosis of malignant pleural effusion last week who was admitted on 8/16 from the emergency room due to the concern for possible pneumonia associated with worsening productive cough, shortness of breath and new back pain.        Principal Problem:  Pneumonia of right lung due to infectious organism (8/16/2019) :  Malignant pleural effusion (8/16/2019)  Uncomplicated asthma:    Patient is denying any fever or chills,   she was seen twice in the emergency room in the last few days, she does not have any leukocytosis.  Her lactic acid is in normal range.  CT scan of the chest done on August 8 was negative for PE but showed large left pleural effusion   Patient underwent thoracentesis which confirmed metastatic adenocarcinoma, ER/CO negative, consistent with breast primary  --she is on IV ceftriaxone and azithromycin for pneumonia  --  patient on gentle IV fluids normal saline at 75 ml/hr  Will discontinue IVF today    Per oncology patient will get outpatient PET and CT once pneumonia resolved  --They also recommended to consider VATS with pleurodeis if pleural effusion recur    --At this time patient does not have hypoxia associated with pneumonia, will not to start patient on prednisone at this point.Patient does have history of underlying asthma  but does not have any wheezing at this point.  --DuoNeb nebulizer treatment 4 times daily and every 2 hours as needed     Malignant neoplasm of lower-outer quadrant of left breast of female, estrogen receptor negative (H) (11/16/2018):  Appreciate oncology consult  -status post left breast partial mastectomy and axillary node dissection and reexcision of inferior margin in December 2018 by , status post radiation , finished in April 2019  -He is followed by [facility]   diagnosed with triple negative breast cancer 11/2018  -[id number] stage III with 8 cm tumor and 5 positive lymph nodes, extranodal extension,  -s/p lumpectomy with re-excision 12/2018   -s/p radiation 2/4/19-4/1/19  -declined chemotherapy (both neoadjuvant or adjuvant) in the past    Hematology  -h/o B12 and iron deficiency post gastrectomy  -[employer]      Back pain:  -CT of lumbar spine and thoracic spine showed degenerative changes but no metastatic disease  -patient  was requesting IV Dilaudid as she was in lots of pain  -IV Dilaudid 0.5 mg every 6 hours as ordered  Hold if oversedated  Lidocare patches ordered    Lymphedema of left upper arm:  -Inpatient lymphedema consult place    Palpable axillary lymph node:  -In the left axilla  -May need outpatient follow-up      Obese :   -Patient has undergone gastric bypass surgery in the past,   - she will also benefit from outpatient gastric bypass surgery follow-up.     Depressive disorder :  --Start patient on PTA medications once pharmacy has reconsulted medications     Diet:   -Regular diet    High Catheter:   -not present    DVT Prophylaxis:   -Enoxaparin (Lovenox) subcutaneous    Code Status:   -Full Code     Disposition:   -In 1 to 2 days pending clinical course    Was discussed with the patient and her bedside RN  Patient had several questions which were answered  Time spent greater than 35 minutes                   Physical Exam:      Heart Rate: 110, Blood pressure (!) 141/63,  "pulse 108, temperature 95.6  F (35.3  C), temperature source Oral, resp. rate 22, height 1.727 m (5' 8\"), weight 121.1 kg (266 lb 14.4 oz), last menstrual period 12/07/2017, SpO2 97 %, not currently breastfeeding.  Vitals:    08/16/19 0654 08/17/19 0700   Weight: 90.7 kg (200 lb) 121.1 kg (266 lb 14.4 oz)     Vital Signs with Ranges  Temp:  [95.6  F (35.3  C)-96.3  F (35.7  C)] 95.6  F (35.3  C)  Heart Rate:  [] 110  Resp:  [16-24] 22  BP: (125-148)/(62-80) 141/63  SpO2:  [94 %-100 %] 97 %  I/O's Last 24 hours  I/O last 3 completed shifts:  In: 1550 [P.O.:440; I.V.:1110]  Out: 2300 [Urine:2300]    Constitutional: Alert awake oriented     Lungs:  Fair air entry with decreased breath sounds in l lung bases   Cardiovascular: S1 and S2 no S3      Abdomen: Abdomen soft no guarding ,no rigidity, bowel sounds are positive     Skin:    Other:  She has palpable left axillary lymph node and lymphedema and left upper arm            Medications:          azithromycin  250 mg Intravenous Q24H     buPROPion  150 mg Oral QAM     cefTRIAXone  2 g Intravenous Q24H     clonazePAM  2 mg Oral At Bedtime     enoxaparin  40 mg Subcutaneous Q24H     ipratropium - albuterol 0.5 mg/2.5 mg/3 mL  3 mL Nebulization 4x Daily     lidocaine  2 patch Transdermal Q24h    And     [START ON 8/18/2019] lidocaine   Transdermal Q24H    And     lidocaine   Transdermal Q8H     senna-docusate  1 tablet Oral BID    Or     senna-docusate  2 tablet Oral BID     sertraline  150 mg Oral Daily     sodium chloride (PF)  3 mL Intracatheter Q8H     PRN Meds: acetaminophen, albuterol, albuterol, bisacodyl **OR** bisacodyl **OR** bisacodyl, bisacodyl, calcium carbonate, cyclobenzaprine, diphenhydrAMINE, HYDROmorphone, lidocaine 4%, lidocaine (buffered or not buffered), magnesium hydroxide, magnesium sulfate, melatonin, naloxone, ondansetron **OR** ondansetron, potassium chloride, potassium chloride with lidocaine, potassium chloride, potassium chloride, " potassium chloride, sodium chloride (PF), traMADol         Data:      All new lab and imaging data was reviewed.   Recent Labs   Lab Test 08/17/19  0715 08/16/19  0834 08/14/19  1021 08/14/19  0609   WBC 9.7 5.5  --  6.2   HGB 10.5* 11.7  --  11.3*   MCV 81 79  --  79    264  --  236   INR  --   --  0.97  --       Recent Labs   Lab Test 08/17/19  0715 08/16/19  2236 08/16/19  0834 08/14/19  0609     --  137 139   POTASSIUM 4.1 4.2 3.3* 3.3*   CHLORIDE 108  --  102 104   CO2 26  --  27 27   BUN 7  --  6* 7   CR 0.60  --  0.70 0.67   ANIONGAP 5  --  8 8   PABLO 8.5  --  8.4* 8.2*   GLC 97  --  97 103*     Recent Labs   Lab Test 08/08/19  1505 10/26/17  0345 10/26/17  0314   TROPI <0.015 <0.015 Canceled, Test credited

## 2019-08-17 NOTE — PLAN OF CARE
Slightly tachy. All other VSS. Tele sinus tachycardia. Poor pain control this AM. Orders for prn IV dilaudid with much improved pain control. Infrequent, nonproductive cough. Does IS independently. On IV antibiotics. Up independently in room. Lymphedema consult for edema in LUE. Plan pending clinical progress. Continue to monitor.

## 2019-08-17 NOTE — PLAN OF CARE
Discharge Planner OT   Patient plan for discharge: home  Current status: OT orders received and chart reviewed. Per PT, pt indep w/ all mobility and has no self-care deficits despite back pain. Pt and PT spoke about this as well, pt in agreement w/ no need for OT eval. No skilled IP OT needs identified. Will complete OT order.   Barriers to return to prior living situation: defer to PT   Recommendations for discharge: defer to PT  Rationale for recommendations: defer to PT; OT order completed.       Entered by: Shereen Sears 08/17/2019 8:57 AM

## 2019-08-17 NOTE — PLAN OF CARE
A&Ox4. VSS, ex tachy. LS clear, slightly diminished; using IS appropriately. Cough is nonproductive; no sputum culture obtained. Potassium replacement given; recheck was 4.2. Regular diet; tolerating well. Pt declined ordered CT of lumbar spine until discusses with physician. IVF @ 75mL/hr. Tele: sinus tach.

## 2019-08-17 NOTE — PROGRESS NOTES
[facility] Progress Note          Assessment and Plan:   Ms. Megha Campbell is a 54 year old woman who has a history of stage III breast cancer who was admitted on 8/16 with pneumonia    1. Stage III ( T3 N2a M0) triple negative left sided breast cancer  -diagnosis 11/2018  -[id number] stage III with 8 cm tumor and 5 positive lymph nodes, extranodal extension, LVI  -s/p lumpectomy with re-excision 12/2018   -s/p radiation 2/4/19-4/1/19  -declined chemotherapy (both neoadjuvant or adjuvant)  -CT 8/8/19 revealed a large left pleural effusion  -thoracentesis 8/9/19 confirmed metastatic adenocarcinoma, ER/DE negative, consistent with breast primary  -CT thoracic and lumbar spine shows no evidence of metastatic disease  -once pneumonia resolved plan outpatient PET/CT  -pathology to check tumor for androgen receptors and PDL1 (ordered and pending) to determine if androgen blockade or immunotherapy may be treatment options  -prognosis and that her cancer is now metastatic and no longer curable discussed  -she has declined chemotherapy in the past but today we discussed this at length, and she may be amenable to this after discharge based on above pending results     2. Pleural Effusion  -malignant  -would consider VATS with pleurodesis if symptomatic recurrence     3. ID  -CT 8/16/19 most suggestive of pneumonia  -agree with antibiotics per hospitalist     4. Hematology  -h/o B12 and iron deficiency post gastrectomy  -[employer]    DVT prophylaxis: Enoxaparin    Code Status: Full               Interval History:   Patient reports ongoing back pain requiring narcotics for relief. No complaints of dyspnea or chest pain.              Medications:       azithromycin  250 mg Intravenous Q24H     buPROPion  150 mg Oral QAM     cefTRIAXone  2 g Intravenous Q24H     clonazePAM  2 mg Oral At Bedtime     enoxaparin  40 mg Subcutaneous Q24H     ipratropium - albuterol 0.5 mg/2.5 mg/3 mL  3 mL Nebulization 4x Daily  "    senna-docusate  1 tablet Oral BID    Or     senna-docusate  2 tablet Oral BID     sertraline  150 mg Oral Daily     sodium chloride (PF)  3 mL Intracatheter Q8H     acetaminophen, albuterol, albuterol, bisacodyl **OR** bisacodyl **OR** bisacodyl, bisacodyl, calcium carbonate, cyclobenzaprine, diphenhydrAMINE, lidocaine 4%, lidocaine (buffered or not buffered), magnesium hydroxide, magnesium sulfate, melatonin, naloxone, ondansetron **OR** ondansetron, potassium chloride, potassium chloride with lidocaine, potassium chloride, potassium chloride, potassium chloride, sodium chloride (PF), traMADol               Physical Exam:   Blood pressure 125/62, pulse 108, temperature 96  F (35.6  C), temperature source Oral, resp. rate 22, height 1.727 m (5' 8\"), weight 121.1 kg (266 lb 14.4 oz), last menstrual period 2017, SpO2 94 %, not currently breastfeeding.  Wt Readings from Last 4 Encounters:   19 121.1 kg (266 lb 14.4 oz)   19 100.3 kg (221 lb 3.2 oz)   19 90.7 kg (200 lb)   19 90.7 kg (200 lb)         Vital Sign Ranges  Temperature Temp  Av.4  F (35.8  C)  Min: 95.7  F (35.4  C)  Max: 98.2  F (36.8  C)   Blood pressure Systolic (24hrs), Av , Min:125 , Max:165        Diastolic (24hrs), Av, Min:61, Max:93      Pulse Pulse  Av  Min: 98  Max: 108   Respirations Resp  Av.2  Min: 16  Max: 24   Pulse oximetry SpO2  Av.2 %  Min: 93 %  Max: 100 %         Intake/Output Summary (Last 24 hours) at 2019 1014  Last data filed at 2019 0538  Gross per 24 hour   Intake 1450 ml   Output 2100 ml   Net -650 ml       Constitutional: Sleepy but arousable, cooperative, no apparent distress   Lungs: Breath sounds distant bilaterally, no crackles or wheezing   Cardiovascular: Regular rate and rhythm, normal S1 and S2, and no murmur noted   Abdomen: Normal bowel sounds, soft, non-distended, non-tender   Skin: No rashes, no cyanosis, no upper extremity edema   Other: " Induration in left breast consistent with post radiation effect          Data:   Laboratory:  CMP  Recent Labs   Lab 08/17/19  0715 08/16/19  2236 08/16/19  0834 08/14/19  0609     --  137 139   POTASSIUM 4.1 4.2 3.3* 3.3*   CHLORIDE 108  --  102 104   CO2 26  --  27 27   ANIONGAP 5  --  8 8   GLC 97  --  97 103*   BUN 7  --  6* 7   CR 0.60  --  0.70 0.67   GFRESTIMATED >90  --  >90 >90   GFRESTBLACK >90  --  >90 >90   PABLO 8.5  --  8.4* 8.2*   MAG  --   --  2.1  --    PROTTOTAL  --   --   --  7.3   ALBUMIN  --   --   --  2.6*   BILITOTAL  --   --   --  0.3   ALKPHOS  --   --   --  111   AST  --   --   --  15   ALT  --   --   --  15     CBC  Recent Labs   Lab 08/17/19  0715 08/16/19  0834 08/14/19  0609   WBC 9.7 5.5 6.2   RBC 4.10 4.67 4.37   HGB 10.5* 11.7 11.3*   HCT 33.1* 37.1 34.7*   MCV 81 79 79   MCH 25.6* 25.1* 25.9*   MCHC 31.7 31.5 32.6   RDW 15.0 15.0 14.8    264 236     INR  Recent Labs   Lab 08/14/19  1021   INR 0.97       Imaging data:  Results for orders placed or performed during the hospital encounter of 08/16/19   Chest XR,  PA & LAT    Narrative    CHEST TWO VIEWS  8/16/2019 8:27 AM     HISTORY:  Left chest pain, shortness of breath and productive cough  status post thoracentesis with metastatic breast cancer; check for  pneumonia or pneumothorax.    COMPARISON: Chest x-ray 8/8/2019.      Impression    IMPRESSION: Two views of the chest are performed. Increasing  infiltrate right upper lobe and right lower lobe is noted.  Retrocardiac opacity appears slightly improved along with the  loculated-appearing left pleural fluid collection. No significant  right pleural fluid. No evidence of pneumothorax. Heart size appears  within normal limits.    SATHYA EASTON MD   CT Chest Pulmonary Embolism w Contrast    Narrative    CT CHEST PULMONARY EMBOLISM WITH CONTRAST 8/16/2019 10:29 AM     HISTORY: Check for pulmonary embolism--chest pain and elevated  D-dimer.     COMPARISON: CT chest  8/8/2019.    TECHNIQUE: Thin section axial images are performed from the thoracic  inlet to the lung bases utilizing 73 mL of Isovue 370 IV contrast  without adverse event. Coronal reformatted images are also generated.  Radiation dose for this scan was reduced using automated exposure  control, adjustment of the mA and/or kV according to patient size, or  iterative reconstruction technique.    FINDINGS:     Chest: Patchy airspace opacities in the lung apices bilaterally, right  greater than left, are again noted. Area of more masslike  consolidation posterior right upper lobe is new possibly reflecting an  area of pneumonia. This measures approximately 1.9 cm in diameter on  series 9, image 114. Patchy areas of right lung base nodularity is  also noted. These appear slightly more prominent in the short interval  since prior CT. The largest measures 1.6 cm on image 96. Left pleural  effusion is stable if not slightly decreased since prior exam but is  now loculated in appearance. Enlarged mediastinal and left hilar lymph  nodes are suspected. Left hilar kinza mass measures 2.5 x 1.4 cm,  previously 2.2 x 1.3 cm. Esophagus is unremarkable. No enlarged right  axillary lymph nodes. Postsurgical changes left breast and left axilla  are noted. Probable left breast seroma is minimally changed measuring  6.3 cm. Area of postsurgical change left axilla is stable in  appearance on image 53. Esophagus is unremarkable. Heart is normal in  size. Thoracic aorta is unremarkable. Limited images upper abdomen is  unremarkable. Gastric bypass surgical changes are stable. Bone window  examination is within normal limits.      Impression    IMPRESSION:  1. Increasing patchy infiltrative changes with areas of nodularity  predominantly in the right lung. Due to interval progression since  recent prior CT, this is thought to be infectious in nature rather  than metastatic. Metastatic disease cannot be completely ruled out.  2. Prominent  mediastinal and left hilar lymph nodes. Minimal interval  change.  3. Postsurgical changes left breast and left axilla, unchanged since  prior study. Skin thickening overlying left chest wall and breast  probably reflect postradiation change.  4. Slight interval decrease in the overall volume of the left pleural  effusion. This currently appears more loculated.    SATHYA EASTON MD   CT Lumbar Spine w/o Contrast    Narrative    CT LUMBAR SPINE WITHOUT CONTRAST  8/17/2019 8:12 AM     HISTORY: History of malignant breast cancer, recently diagnosed to  have malignant pleural effusion, also having thoracic and lumber spine  pain, concern for metastatic disease.     TECHNIQUE: Axial images of the lumbar spine were obtained without  intravenous contrast. Multiplanar reformations were performed.   Radiation dose for this scan was reduced using automated exposure  control, adjustment of the mA and/or kV according to patient size, or  iterative reconstruction technique.    COMPARISON: None.    FINDINGS:  There is no fracture or subluxation.  The distal spinal  cord and conus medullaris appear normal.  The paraspinous soft tissues  appear normal. No destructive bone lesion is seen.    T12-L1: Normal disc, facet joints, spinal canal and neural foramina.      L1-L2: Normal disc, facet joints, spinal canal and neural foramina.      L2-L3: Normal disc, facet joints, spinal canal and neural foramina.      L3-L4: Normal disc. Moderate degeneration of the right facet joint and  mild degeneration of the left facet joint. Normal spinal canal and  neural foramina.    L4-L5: Normal disc, facet joints, spinal canal and neural foramina.      L5-S1: Mild degenerative disc disease, otherwise normal.      Impression    IMPRESSION:    1. No evidence of metastatic disease.  2. Degenerative changes as described above.         ABRAM OROSCO MD   CT Thoracic Spine w/o Contrast    Narrative    CT THORACIC SPINE WITHOUT CONTRAST   8/17/2019 8:12 AM      HISTORY: History of malignant breast cancer, recently diagnosed to  have malignant pleural effusion, also having thoracic and lumber spine  pain, concern for metastatic disease.     TECHNIQUE: Axial images of the thoracic spine were obtained without  intravenous contrast. Multiplanar reformations were performed.   Radiation dose for this scan was reduced using automated exposure  control, adjustment of the mA and/or kV according to patient size, or  iterative reconstruction technique.    COMPARISON: None.    FINDINGS:  The alignment of the thoracic spine is normal.  There is  mild multilevel degenerative disc disease. There is scoliosis convex  to the right. No destructive lesion or fracture is seen. Large left  pleural effusion and small right pleural effusion are noted.      Impression    IMPRESSION:  1. No evidence of metastatic disease.  2. Degenerative changes.    MD To DUMAS MD

## 2019-08-18 NOTE — PLAN OF CARE
Tachy. Nebs changed to xopenex. Tele showing sinus tachycardia. All other VSS. LIND. LS dim. Small amount of sputum but not enough for sample; still collecting. Prn dilaudid for back pain. Does help some. IV needed x1 for breakthrough. Trying to offer po before pain spikes. Up independently in room. Voiding adequately. Plan pending improvement in respiratory status and pain control. Continue to monitor.

## 2019-08-18 NOTE — PROGRESS NOTES
D/I: SW notified by medical team that patient has no social work needs.  Consult acknowledged and completed.  P: SW will remain available to assist if needed.

## 2019-08-18 NOTE — PROGRESS NOTES
"[facility] Progress Note          Assessment and Plan:   Ms. Megha Campbell is a 54 year old woman who has a history of stage III breast cancer who was admitted on 8/16 with pneumonia     1. Stage III ( T3 N2a M0) triple negative left sided breast cancer  -diagnosis 11/2018  -[id number] stage III with 8 cm tumor and 5 positive lymph nodes, extranodal extension, LVI  -s/p lumpectomy with re-excision 12/2018   -s/p radiation 2/4/19-4/1/19  -declined chemotherapy (both neoadjuvant or adjuvant)  -CT 8/8/19 revealed a large left pleural effusion  -thoracentesis 8/9/19 confirmed metastatic adenocarcinoma, ER/NJ negative, consistent with breast primary  -CT thoracic and lumbar spine shows no evidence of metastatic disease  -once pneumonia resolved plan outpatient PET/CT  -pathology to check tumor for androgen receptors and PDL1 (ordered and pending) to determine if androgen blockade or immunotherapy may be treatment options  -prognosis and that her cancer is now metastatic and no longer curable discussed  -discussed use of chemotherapy. She relates that she wants to visit with a \"holisitic\" doctor first before making a final decision. I encouraged her to consider the various options and to follow up with Dr. Winston as an outpatient     2. Pleural Effusion  -malignant  -would consider VATS with pleurodesis if symptomatic recurrence     3. ID  -CT 8/16/19 most suggestive of pneumonia  -agree with antibiotics per hospitalist     4. Hematology  -h/o B12 and iron deficiency post gastrectomy  -[employer]     DVT prophylaxis: Enoxaparin     Code Status: Full             Interval History:   Patient reports feeling better today. She relates coughing has improved.              Medications:       azithromycin  250 mg Oral Daily     buPROPion  150 mg Oral QAM     cefTRIAXone  2 g Intravenous Q24H     clonazePAM  2 mg Oral At Bedtime     enoxaparin  40 mg Subcutaneous Q24H     ipratropium - albuterol 0.5 mg/2.5 " "mg/3 mL  3 mL Nebulization 4x Daily     lidocaine  2 patch Transdermal Q24h    And     lidocaine   Transdermal Q24H    And     lidocaine   Transdermal Q8H     senna-docusate  1 tablet Oral BID    Or     senna-docusate  2 tablet Oral BID     sertraline  150 mg Oral Daily     sodium chloride (PF)  3 mL Intracatheter Q8H     acetaminophen, albuterol, albuterol, bisacodyl **OR** bisacodyl **OR** bisacodyl, bisacodyl, calcium carbonate, cyclobenzaprine, diphenhydrAMINE, HYDROmorphone, HYDROmorphone, lidocaine 4%, lidocaine (buffered or not buffered), magnesium hydroxide, magnesium sulfate, melatonin, naloxone, ondansetron **OR** ondansetron, potassium chloride, potassium chloride with lidocaine, potassium chloride, potassium chloride, potassium chloride, sodium chloride (PF), traMADol               Physical Exam:   Blood pressure (!) 147/88, pulse 108, temperature 95.3  F (35.2  C), temperature source Oral, resp. rate 24, height 1.727 m (5' 8\"), weight 121.7 kg (268 lb 6.4 oz), last menstrual period 2017, SpO2 91 %, not currently breastfeeding.  Wt Readings from Last 4 Encounters:   19 121.7 kg (268 lb 6.4 oz)   19 100.3 kg (221 lb 3.2 oz)   19 90.7 kg (200 lb)   19 90.7 kg (200 lb)         Vital Sign Ranges  Temperature Temp  Av.3  F (35.2  C)  Min: 95.1  F (35.1  C)  Max: 95.6  F (35.3  C)   Blood pressure Systolic (24hrs), Av , Min:140 , Max:147        Diastolic (24hrs), Av, Min:63, Max:88      Pulse No data recorded   Respirations Resp  Av.3  Min: 18  Max: 24   Pulse oximetry SpO2  Av.7 %  Min: 91 %  Max: 97 %         Intake/Output Summary (Last 24 hours) at 2019 1014  Last data filed at 2019 0726  Gross per 24 hour   Intake 480 ml   Output 900 ml   Net -420 ml       Constitutional: Awake, alert, cooperative, no apparent distress   Lungs: Decreased breath ounds bilaterally, no crackles or wheezing   Cardiovascular: Regular rate and rhythm, normal S1 and " S2, and no murmur noted   Abdomen: Normal bowel sounds, soft, non-distended, non-tender   Skin: No rashes, no cyanosis, mild upper extremity edema   Other: Palpable mass in left axilla consistent with lymphadenopathy          Data:   Laboratory:  CMP  Recent Labs   Lab 08/17/19  0715 08/16/19  2236 08/16/19  0834 08/14/19  0609     --  137 139   POTASSIUM 4.1 4.2 3.3* 3.3*   CHLORIDE 108  --  102 104   CO2 26  --  27 27   ANIONGAP 5  --  8 8   GLC 97  --  97 103*   BUN 7  --  6* 7   CR 0.60  --  0.70 0.67   GFRESTIMATED >90  --  >90 >90   GFRESTBLACK >90  --  >90 >90   PABLO 8.5  --  8.4* 8.2*   MAG  --   --  2.1  --    PROTTOTAL  --   --   --  7.3   ALBUMIN  --   --   --  2.6*   BILITOTAL  --   --   --  0.3   ALKPHOS  --   --   --  111   AST  --   --   --  15   ALT  --   --   --  15     CBC  Recent Labs   Lab 08/17/19  0715 08/16/19  0834 08/14/19  0609   WBC 9.7 5.5 6.2   RBC 4.10 4.67 4.37   HGB 10.5* 11.7 11.3*   HCT 33.1* 37.1 34.7*   MCV 81 79 79   MCH 25.6* 25.1* 25.9*   MCHC 31.7 31.5 32.6   RDW 15.0 15.0 14.8    264 236     INR  Recent Labs   Lab 08/14/19  1021   INR 0.97       Imaging data:  Recent Results (from the past 48 hour(s))   CT Chest Pulmonary Embolism w Contrast    Narrative    CT CHEST PULMONARY EMBOLISM WITH CONTRAST 8/16/2019 10:29 AM     HISTORY: Check for pulmonary embolism--chest pain and elevated  D-dimer.     COMPARISON: CT chest 8/8/2019.    TECHNIQUE: Thin section axial images are performed from the thoracic  inlet to the lung bases utilizing 73 mL of Isovue 370 IV contrast  without adverse event. Coronal reformatted images are also generated.  Radiation dose for this scan was reduced using automated exposure  control, adjustment of the mA and/or kV according to patient size, or  iterative reconstruction technique.    FINDINGS:     Chest: Patchy airspace opacities in the lung apices bilaterally, right  greater than left, are again noted. Area of more masslike  consolidation  posterior right upper lobe is new possibly reflecting an  area of pneumonia. This measures approximately 1.9 cm in diameter on  series 9, image 114. Patchy areas of right lung base nodularity is  also noted. These appear slightly more prominent in the short interval  since prior CT. The largest measures 1.6 cm on image 96. Left pleural  effusion is stable if not slightly decreased since prior exam but is  now loculated in appearance. Enlarged mediastinal and left hilar lymph  nodes are suspected. Left hilar kinza mass measures 2.5 x 1.4 cm,  previously 2.2 x 1.3 cm. Esophagus is unremarkable. No enlarged right  axillary lymph nodes. Postsurgical changes left breast and left axilla  are noted. Probable left breast seroma is minimally changed measuring  6.3 cm. Area of postsurgical change left axilla is stable in  appearance on image 53. Esophagus is unremarkable. Heart is normal in  size. Thoracic aorta is unremarkable. Limited images upper abdomen is  unremarkable. Gastric bypass surgical changes are stable. Bone window  examination is within normal limits.      Impression    IMPRESSION:  1. Increasing patchy infiltrative changes with areas of nodularity  predominantly in the right lung. Due to interval progression since  recent prior CT, this is thought to be infectious in nature rather  than metastatic. Metastatic disease cannot be completely ruled out.  2. Prominent mediastinal and left hilar lymph nodes. Minimal interval  change.  3. Postsurgical changes left breast and left axilla, unchanged since  prior study. Skin thickening overlying left chest wall and breast  probably reflect postradiation change.  4. Slight interval decrease in the overall volume of the left pleural  effusion. This currently appears more loculated.    SATHYA EASTON MD   CT Thoracic Spine w/o Contrast    Narrative    CT THORACIC SPINE WITHOUT CONTRAST   8/17/2019 8:12 AM     HISTORY: History of malignant breast cancer, recently diagnosed  to  have malignant pleural effusion, also having thoracic and lumber spine  pain, concern for metastatic disease.     TECHNIQUE: Axial images of the thoracic spine were obtained without  intravenous contrast. Multiplanar reformations were performed.   Radiation dose for this scan was reduced using automated exposure  control, adjustment of the mA and/or kV according to patient size, or  iterative reconstruction technique.    COMPARISON: None.    FINDINGS:  The alignment of the thoracic spine is normal.  There is  mild multilevel degenerative disc disease. There is scoliosis convex  to the right. No destructive lesion or fracture is seen. Large left  pleural effusion and small right pleural effusion are noted.      Impression    IMPRESSION:  1. No evidence of metastatic disease.  2. Degenerative changes.    ABRAM OROSCO MD   CT Lumbar Spine w/o Contrast    Narrative    CT LUMBAR SPINE WITHOUT CONTRAST  8/17/2019 8:12 AM     HISTORY: History of malignant breast cancer, recently diagnosed to  have malignant pleural effusion, also having thoracic and lumber spine  pain, concern for metastatic disease.     TECHNIQUE: Axial images of the lumbar spine were obtained without  intravenous contrast. Multiplanar reformations were performed.   Radiation dose for this scan was reduced using automated exposure  control, adjustment of the mA and/or kV according to patient size, or  iterative reconstruction technique.    COMPARISON: None.    FINDINGS:  There is no fracture or subluxation.  The distal spinal  cord and conus medullaris appear normal.  The paraspinous soft tissues  appear normal. No destructive bone lesion is seen.    T12-L1: Normal disc, facet joints, spinal canal and neural foramina.      L1-L2: Normal disc, facet joints, spinal canal and neural foramina.      L2-L3: Normal disc, facet joints, spinal canal and neural foramina.      L3-L4: Normal disc. Moderate degeneration of the right facet joint and  mild  degeneration of the left facet joint. Normal spinal canal and  neural foramina.    L4-L5: Normal disc, facet joints, spinal canal and neural foramina.      L5-S1: Mild degenerative disc disease, otherwise normal.      Impression    IMPRESSION:    1. No evidence of metastatic disease.  2. Degenerative changes as described above.         MD To DUMAS MD

## 2019-08-18 NOTE — PLAN OF CARE
Discharge Planner PT   Patient plan for discharge: Home  Current status: PT was seen yesterday for functional mobility and is independent so no PT indicated. We received a new order for lymphadema for left UE. Therapist spoke with patient and evaluated left arm. Patient complains of not being able to get her clothes on over her arms. Both arms are large due to obesity and although not measured there doesn't appear to be a significant difference between right and left. No pitting edema noted and the skin is soft and pliable. Noted patient most likely to be discharged in next 1 to 2 days. Patients left arm swelling is not limiting her mobility and will not affect her discharge so feel patient would be best served by outpatient lymphadema so they could initiate the eval and treatment and then follow her. She may be appropriate for a sleeve for the left arm but again an outpatient lymphadema therapist would be more skilled in making that decision. Will defer lymphadema to outpatient. Therapist will put the order in the computer.   Barriers to return to prior living situation: none  Recommendations for discharge: Outpatient lymphadema therapy  Rationale for recommendations: Patients lymphadema needs would be better served by an outpatients lymphadema therapist.        Entered by: Desiree Alcazar 08/18/2019 9:03 AM

## 2019-08-18 NOTE — PROGRESS NOTES
[facility]    Medicine Progress Note - Hospitalist Service       Date of Admission:  8/16/2019  Assessment & Plan     Megha Campbell is a 54 year old female with past medical history significant for recent diagnosis of left breast cancer status post left breast partial mastectomy and axillary node dissection in December 2018 by Dr. Hirsch, status post radiation finished in April 2019, history of asthma and recent diagnosis of malignant pleural effusion last week who was admitted from the emergency room due to the concern for possible pneumonia associated with worsening productive cough, shortness of breath and new back pain.    Pneumonia of right lung due to infectious organism (8/16/2019) :  Malignant pleural effusion (8/16/2019)  Uncomplicated asthma:    Patient is denying any fever or chills however cough with sputum production and worsening dyspnea with back pain, she was seen twice in the emergency room prior to admission, she does not have any leukocytosis.  Her lactic acid is in normal range.  CT scan of the chest done on August 8 was negative for PE but showed large left pleural effusion with associated atelectasis with mediastinal and bilateral hilar adenopathy.  Patchy nodular densities in the right upper and lower lobe were seen and at that time there were thought to be most likely secondary to metastatic disease.  Patient underwent thoracentesis 8/14/2019 and cytology is concerning for malignant cells in the effusion concerning for metastatic breast cancer.  As patient had frequent ER visits subsequently due to shortness of breath and new back pain, CT scan of the chest was repeated, which is showing increasing patchy infiltrative changes.  Due to interval progression in a short period of time at this point, infectious etiology is a higher probability than metastatic disease although metastatic disease cannot be completely ruled out.  -Started on ceftriaxone and azithromycin, patient  "seems to be improving with her cough, continue  -Pain medication with oral/IV Dilaudid  --At this time patient does not have hypoxia associated with pneumonia and so will not to start patient on prednisone at this point.Patient does have history of underlying asthma but does not have any wheezing at this point.  -Continue with DuoNeb nebulizer treatment 4 times daily and every 2 hours as needed  --At this point CT scan is not showing reaccumulation of effusion, so repeated thoracentesis is not recommended at this point.  - lumber and thoracic spine Ct without evidence of metastatic disease    Malignant neoplasm of lower-outer quadrant of left breast of female, estrogen receptor negative:  Stage III triple negative([id number]) left-sided breast cancer diagnosed in November 2011   -status post left breast partial mastectomy and axillary node dissection and reexcision of inferior margin in December 2018 by , status post radiation , finished in April 2019  -Patient had declined chemotherapy both neoadjuvant/adjuvant  -CT done on 8/8/2019 had revealed large left pleural effusion, thoracentesis was done which confirmed metastatic adenocarcinoma consistent with breast primary  -No evidence of metastasis in the thoracic and lumbar spine   - [facility] follows with Dr. Winston as outpatient ,  -Patient apparently wants to visit with\" holistic doctor\" first before making a final decision regarding her cancer treatment     Obesity  Lymphedema:   Patient has undergone gastric bypass surgery in the past, she thinks that she might have again some weight back.  She was interested in outpatient follow-up with gastric bypass surgery and having a follow-up EGD as an outpatient to evaluate if her bypass is a still functional  Outpatient lymphedema referral  -- Patient will benefit from getting established with outpatient PCP as she is requesting to be getting established with new PCP, she will also benefit from " outpatient gastric bypass surgery follow-up.     Tachycardia, mild  -Likely from pain.  EKG done on admission shows sinus tachycardia  -Remains on low 100s, monitor    Depressive disorder :  Continue PTA Wellbutrin, clonazepam, Zoloft     Diet: Combination Diet Regular Diet Adult    DVT Prophylaxis: Enoxaparin (Lovenox) SQ  High Catheter: not present  Code Status: Full Code      Disposition Plan   Expected discharge: Tomorrow, recommended to prior living arrangement once adequate pain management/ tolerating PO medications.  Entered: Ellie Pascual MD 08/18/2019, 8:26 AM       The patient's care was discussed with the Bedside Nurse and Patient.    Ellie Pascual MD  Hospitalist Service  [facility]    ______________________________________________________________________    Interval History   Patient reports no new complaints.  Her cough and back pain seems to be slightly better and wants to try oral Dilaudid instead of IV.  No new nursing concerns    Data reviewed today: I reviewed all medications, new labs and imaging results over the last 24 hours. I personally reviewed no images or EKG's today.    Physical Exam   Vital Signs: Temp: 95.3  F (35.2  C) Temp src: Oral BP: (!) 147/88   Heart Rate: 118 Resp: 19 SpO2: 91 % O2 Device: None (Room air)    Weight: 268 lbs 6.4 oz  Exam:  Constitutional: Awake, alert and no distress. Appears comfortable  Head: Normocephalic. No masses, lesions, tenderness or abnormalities  ENT: ENT exam normal, no neck nodes or sinus tenderness  Cardiovascular: RRR.  No murmurs, no rubs or JVD  Respiratory: Normal WOB,b/l equal air entry, no wheezes or crackles   Gastrointestinal: Abdomen soft, non-tender. BS normal. No masses, organomegaly  : Deferred  Extremities : Minimal upper extremity lymph edema present on the left, no clubbing or cyanosis      Data   Recent Labs   Lab 08/17/19  0715 08/16/19  2236 08/16/19  0834 08/14/19  1021 08/14/19  0609   WBC 9.7  --  5.5  --   6.2   HGB 10.5*  --  11.7  --  11.3*   MCV 81  --  79  --  79     --  264  --  236   INR  --   --   --  0.97  --      --  137  --  139   POTASSIUM 4.1 4.2 3.3*  --  3.3*   CHLORIDE 108  --  102  --  104   CO2 26  --  27  --  27   BUN 7  --  6*  --  7   CR 0.60  --  0.70  --  0.67   ANIONGAP 5  --  8  --  8   PABLO 8.5  --  8.4*  --  8.2*   GLC 97  --  97  --  103*   ALBUMIN  --   --   --   --  2.6*   PROTTOTAL  --   --   --   --  7.3   BILITOTAL  --   --   --   --  0.3   ALKPHOS  --   --   --   --  111   ALT  --   --   --   --  15   AST  --   --   --   --  15     No results found for this or any previous visit (from the past 24 hour(s)).  Medications       azithromycin  250 mg Oral Daily     buPROPion  150 mg Oral QAM     cefTRIAXone  2 g Intravenous Q24H     clonazePAM  2 mg Oral At Bedtime     enoxaparin  40 mg Subcutaneous Q24H     lidocaine  2 patch Transdermal Q24h    And     lidocaine   Transdermal Q24H    And     lidocaine   Transdermal Q8H     senna-docusate  1 tablet Oral BID    Or     senna-docusate  2 tablet Oral BID     sertraline  150 mg Oral Daily     sodium chloride (PF)  3 mL Intracatheter Q8H

## 2019-08-18 NOTE — PROVIDER NOTIFICATION
MD Notification    Notified Person: MD    Notified Person Name: Dr Pascual    Notification Date/Time: 8/18/19 6040    Notification Interaction: Spoke via phone    Purpose of Notification: MD called to modify order    Orders Received: TORB to change xoponex prn neb order frequency from q6h prn to q8h prn    Comments:     54y/o female presents for preop eval for scheduled left breast lumpectomy with JADA  localization 5/23/2019. 56y/o female presents for preop eval for scheduled left breast lumpectomy with JADA  localization 5/23/2019.  Per pt abnormal left breast finding on mammogram, sonogram and MRI in October 2018.  Per pt biopsy negative but was told need to remove.

## 2019-08-18 NOTE — PROGRESS NOTES
Patient given her nebulizer treatments as orded by Dr. REBOLLEDO are diminished with expiratory wheezes. SpO2 is 97% on room air.

## 2019-08-18 NOTE — PLAN OF CARE
A&Ox4. VSS on RA. C/o back pain lidocaine patches in place on mid and lower back, and prn IV dilaudid given as needed. Requested prn neb and inahler for shortness of breathe. Regular diet. Continent of urine. Independent in room.

## 2019-08-19 NOTE — PROVIDER NOTIFICATION
MD Notification    Notified Person: MD    Notified Person Name: Dr. Pascual    Notification Date/Time: 8/19/19 at 1649     Notification Interaction: pager    Purpose of Notification: Pt stating she wants to drive herself home. She had Ativan 0.5 mg at 13:54(half life is 12 hrs ). Please advise  about discharge plans/transportation ride.    Orders Received:  per MD and pharmacy, best if pt does not drive herself home, however, per MD statement, if pt  insisting pt okay to drive herself, given that pt take ativan 1 mg at home and is able to drive afterwards.      Comments:.Pt informed that we prefer her not to drive home and call a friend or family instead. Pt stating that she has no one availbal, and insists that she drive herself as she does it all the time. Per Dr. Pascual,  if she insists she can go. Pt  A and O X 4 and ble to ambulate and communicated without difficulties at time of discharge. Denies pain, lightheadedness, diziness, or SOB at time of discharge.

## 2019-08-19 NOTE — PROVIDER NOTIFICATION
MD Notification    Notified Person: MD    Notified Person Name: Dr. Pascual    Notification Date/Time: 8/19/19 at 1543    Notification Interaction: pager    Purpose of Notification: Pt does not have any supply at home for inhalers. She does not have Zopenex or Albuterol. She will need something in order to discharge, as pt gets short of breath at times. Thank you!    Orders Received:Meds ordered and refilled before discharge    Comments:

## 2019-08-19 NOTE — PROGRESS NOTES
"[facility]    Oncology Progress Note    Date of Service (when I saw the patient): 08/19/2019     Assessment & Plan   Megha Campbell is a 54 year old female who was admitted on 8/16/2019.       1. Stage III ( T3 N2a M0) triple negative left sided breast cancer  -diagnosis 11/2018  -[id number] stage III with 8 cm tumor and 5 positive lymph nodes, extranodal extension, LVI  -s/p lumpectomy with re-excision 12/2018   -s/p radiation 2/4/19-4/1/19  -declined chemotherapy (both neoadjuvant or adjuvant)  -CT 8/8/19 revealed a large left pleural effusion  -thoracentesis 8/9/19 confirmed metastatic adenocarcinoma, ER/VA negative, consistent with breast primary  -CT thoracic and lumbar spine shows no evidence of metastatic disease  -once pneumonia resolved plan outpatient PET/CT  -pathology to check tumor for androgen receptors and PDL1 (ordered and pending) to determine if androgen blockade or immunotherapy may be treatment options  -prognosis and that her cancer is now metastatic and no longer curable discussed  -discussed use of chemotherapy. She relates that she wants to visit with a \"holisitic\" doctor first before making a final decision. I encouraged her to consider the various options and to follow up with me as an outpatient     2. Pleural Effusion  -malignant  -would consider VATS with pleurodesis if symptomatic recurrence     3. ID  -CT 8/16/19 most suggestive of pneumonia  -agree with antibiotics per hospitalist     4. Hematology  -h/o B12 and iron deficiency post gastrectomy  -[employer]      5. Pain  -due to pneumonia and possible malignancy  -continue dilaudid as needed  -consider palliative care consultation if needed      DVT Prophylaxis: Enoxaparin (Lovenox) SQ  Code Status: Full Code    Jamaal Winston    Interval History   Back pain without change    Physical Exam   Temp: 96.4  F (35.8  C) Temp src: Oral BP: 125/64   Heart Rate: 119(received neb) Resp: 20 SpO2: 93 % O2 Device: None (Room air) "    Vitals:    08/17/19 0700 08/18/19 0230 08/19/19 0412   Weight: 121.1 kg (266 lb 14.4 oz) 121.7 kg (268 lb 6.4 oz) 120.7 kg (266 lb 3.2 oz)     Vital Signs with Ranges  Temp:  [96.4  F (35.8  C)] 96.4  F (35.8  C)  Heart Rate:  [119] 119  Resp:  [18-24] 20  BP: (125)/(64) 125/64  SpO2:  [93 %-94 %] 93 %  I/O last 3 completed shifts:  In: 240 [P.O.:240]  Out: 2550 [Urine:2550]    Constitutional: awake, cooperative, no acute distress  GI: soft, non-distended, non-tender, no masses palpated  Musculoskeletal: no pedal edema    Medications       azithromycin  250 mg Oral Daily     buPROPion  150 mg Oral QAM     cefTRIAXone  2 g Intravenous Q24H     clonazePAM  2 mg Oral At Bedtime     enoxaparin  40 mg Subcutaneous Q24H     lidocaine  2 patch Transdermal Q24h    And     lidocaine   Transdermal Q24H    And     lidocaine   Transdermal Q8H     senna-docusate  1 tablet Oral BID    Or     senna-docusate  2 tablet Oral BID     sertraline  150 mg Oral Daily     sodium chloride (PF)  3 mL Intracatheter Q8H       Data   Results for orders placed or performed during the hospital encounter of 08/16/19 (from the past 24 hour(s))   Lactic acid level STAT for sepsis protocol   Result Value Ref Range    Lactate for Sepsis Protocol 0.8 0.7 - 2.0 mmol/L   Creatinine   Result Value Ref Range    Creatinine 0.69 0.52 - 1.04 mg/dL    GFR Estimate >90 >60 mL/min/[1.73_m2]    GFR Estimate If Black >90 >60 mL/min/[1.73_m2]   Platelet count   Result Value Ref Range    Platelet Count 271 150 - 450 10e9/L

## 2019-08-19 NOTE — DISCHARGE SUMMARY
[facility]  Hospitalist Discharge Summary       Date of Admission:  8/16/2019  Date of Discharge:  8/19/2019  Discharging Provider: Ellie Pascual MD      Discharge Diagnoses   Community-acquired pneumonia of the right lung  H/O breast cancer with suspected metastasis to pleura   Malignant pleural effusion  Uncomplicated asthma  History of metastatic breast cancer  Lymphedema related to above  Obesity with a BMI of 40  Persistent sinus tachycardia  Anxiety/depression    Follow-ups Needed After Discharge   Follow-up Appointments     Follow-up and recommended labs and tests       Follow up with primary care provider, Laure Zapata MD, within 7 days   for hospital follow- up.  The following labs/tests are recommended:   CBC/CMP.  Repeat imaging in 3 to 4 weeks through PCP or oncologist to document   clearance of pneumonia  Follow-up with your primary oncologist next available             Unresulted Labs Ordered in the Past 30 Days of this Admission     Date and Time Order Name Status Description    8/16/2019 1451 Blood culture Preliminary     8/16/2019 1032 Surgical pathology exam In process     8/16/2019 0745 Blood culture Preliminary     8/9/2019 1401 INR point of care In process       These results will be followed up by PCP/Dr Winston    Discharge Disposition   Discharged to home  Condition at discharge: Stable    Hospital Course        Megha Campbell is a 54 year old female with past medical history significant for recent diagnosis of left breast cancer status post left breast partial mastectomy and axillary node dissection in December 2018 by Dr. Hirsch, status post radiation finished in April 2019, history of asthma and recent diagnosis of malignant pleural effusion last week who was admitted from the emergency room due to the concern for possible pneumonia associated with worsening productive cough, shortness of breath and new back pain.    Pneumonia of right lung due to infectious  organism (8/16/2019) :  Malignant pleural effusion (8/16/2019)  Uncomplicated asthma:    Patient denied any fever or chills however presented with productive coughand worsening dyspnea with back pain, she was seen twice in the emergency room prior to admission, she did not have any leukocytosis.  Her lactic acid was in normal range and procalcitonin less than 0.05.  CT scan of the chest done as outpatient on August 8 was negative for PE but had shown  large left pleural effusion with associated atelectasis with mediastinal and bilateral hilar adenopathy. Patient underwent thoracentesis 8/14/2019 and cytology is concerning for malignant cells in the effusion concerning for metastatic breast cancer.  As patient had frequent ER visits subsequently due to shortness of breath and new back pain, CT scan of the chest was repeated, which showed increasing patchy infiltrative changes.  Due to interval progression in a short period of time it was likely infectious etiology  -Started on ceftriaxone and azithromycin, patient improved as regards with her cough  -Pain management was done with oral/IV Dilaudid as needed and lidocaine patch  -Patient was offered palliative care consultation for symptom management/pain control which she refused and wanted to go home on limited pain prescription and wanted to follow-up with PCP for further management of her pain  -Continue with DuoNeb nebulizer treatment 4 times daily and every 2 hours as needed    Malignant neoplasm of lower-outer quadrant of left breast of female, estrogen receptor negative:  Stage III triple negative([id number]) left-sided breast cancer diagnosed in November 2011   -status post left breast partial mastectomy and axillary node dissection and reexcision of inferior margin in December 2018 by , status post radiation , finished in April 2019  -Patient had initially declined chemotherapy both neoadjuvant/adjuvant  -CT done on 8/8/2019 had revealed large left  pleural effusion, thoracentesis was done which confirmed metastatic adenocarcinoma consistent with breast primary  -Admission CT scan did not show reaccumulation of effusion  - lumber and thoracic spine Ct without evidence of metastatic disease   - [facility] followed while in-house   -Patient now kind of leaning towards treatment of her breast cancer, follow-up with Dr. Winston as outpatient      Obesity  Lymphedema:   Patient has undergone gastric bypass surgery in the past, she thinks that she might have again some weight back.  She was interested in outpatient follow-up with gastric bypass surgery and having a follow-up EGD as an outpatient to evaluate if her bypass is a still functional  Outpatient lymphedema referral made at the time of discharge  -- Patient will benefit from getting established with outpatient PCP as she is requesting to be getting established with new PCP, she will also benefit from outpatient gastric bypass surgery follow-up.     Tachycardia, mild  -Likely from pain and anxiety.  EKG done on admission shows sinus tachycardia and she persisted to be on sinus tachycardia during her hospital stay  -Remained asymptomatic, TSH unremarkable.  -Her anxiety/pain might be contributing to her tachycardia  -Requested that she be discharged and would want to follow-up with her primary    Depressive disorder :  Continue PTA Wellbutrin, clonazepam, Zoloft     Consultations This Hospital Stay   SOCIAL WORK IP CONSULT  PHYSICAL THERAPY ADULT IP CONSULT  OCCUPATIONAL THERAPY ADULT IP CONSULT  HEMATOLOGY & ONCOLOGY IP CONSULT  LYMPHEDEMA THERAPY IP CONSULT    Code Status   Full Code    Time Spent on this Encounter   IEllie, personally saw the patient today and spent greater than 30 minutes discharging this patient.       Ellie Pascual MD  [facility]  ______________________________________________________________________    Physical Exam   Vital Signs: Temp: 96.5  F (35.8  C) Temp src:  Oral BP: 125/81   Heart Rate: 120 Resp: 18 SpO2: 97 % O2 Device: Nasal cannula Oxygen Delivery: 2 LPM  Weight: 266 lbs 3.2 oz  Constitutional: Awake, alert and no distress. Appears comfortable  Head: Normocephalic. No masses, lesions, tenderness or abnormalities  ENT: ENT exam normal, no neck nodes or sinus tenderness  Cardiovascular: Tachycardic but regular, no murmurs, no rubs or JVD  Respiratory: Normal WOB,b/l equal air entry, no wheezes or crackles   Gastrointestinal: Abdomen soft, non-tender. BS normal. No masses, organomegaly  : Deferred  Extremities : Minimal upper extremity lymph edema present on the left, no clubbing or cyanosis         Primary Care Physician   Laure Zapata MD    Discharge Orders      Physical Therapy Referral      LYMPHEDEMA THERAPY REFERRAL      Reason for your hospital stay    Pneumonia     Follow-up and recommended labs and tests     Follow up with primary care provider, Laure Zapata MD, within 7 days for hospital follow- up.  The following labs/tests are recommended: CBC/CMP.  Repeat imaging in 3 to 4 weeks through PCP or oncologist to document clearance of pneumonia  Follow-up with your primary oncologist next available     Monitor and record    blood pressure daily and readings to PCP for any medication adjustment  pulse daily     Activity    Your activity upon discharge: activity as tolerated     Full Code     Diet    Follow this diet upon discharge: Orders Placed This Encounter      Combination Diet Regular Diet Adult       Significant Results and Procedures   Most Recent 3 CBC's:  Recent Labs   Lab Test 08/19/19  0659 08/17/19  0715 08/16/19  0834   WBC 7.6 9.7 5.5   HGB 11.3* 10.5* 11.7   MCV 79 81 79    239 264     Most Recent 3 BMP's:  Recent Labs   Lab Test 08/19/19  0006 08/17/19  0715 08/16/19  2236 08/16/19  0834 08/14/19  0609   NA  --  139  --  137 139   POTASSIUM  --  4.1 4.2 3.3* 3.3*   CHLORIDE  --  108  --  102 104   CO2  --  26  --  27 27   BUN  --   7  --  6* 7   CR 0.69 0.60  --  0.70 0.67   ANIONGAP  --  5  --  8 8   PABLO  --  8.5  --  8.4* 8.2*   GLC  --  97  --  97 103*     Most Recent 2 LFT's:  Recent Labs   Lab Test 08/14/19  0609   AST 15   ALT 15   ALKPHOS 111   BILITOTAL 0.3     Most Recent 3 INR's:  Recent Labs   Lab Test 08/14/19  1021   INR 0.97     Most Recent INR's and Anticoagulation Dosing History:  Anticoagulation Dose History     Recent Dosing and Labs Latest Ref Rng & Units 8/14/2019    INR 0.86 - 1.14 0.97      ,   Results for orders placed or performed during the hospital encounter of 08/16/19   Chest XR,  PA & LAT    Narrative    CHEST TWO VIEWS  8/16/2019 8:27 AM     HISTORY:  Left chest pain, shortness of breath and productive cough  status post thoracentesis with metastatic breast cancer; check for  pneumonia or pneumothorax.    COMPARISON: Chest x-ray 8/8/2019.      Impression    IMPRESSION: Two views of the chest are performed. Increasing  infiltrate right upper lobe and right lower lobe is noted.  Retrocardiac opacity appears slightly improved along with the  loculated-appearing left pleural fluid collection. No significant  right pleural fluid. No evidence of pneumothorax. Heart size appears  within normal limits.    SATHYA EASTON MD   CT Chest Pulmonary Embolism w Contrast    Narrative    CT CHEST PULMONARY EMBOLISM WITH CONTRAST 8/16/2019 10:29 AM     HISTORY: Check for pulmonary embolism--chest pain and elevated  D-dimer.     COMPARISON: CT chest 8/8/2019.    TECHNIQUE: Thin section axial images are performed from the thoracic  inlet to the lung bases utilizing 73 mL of Isovue 370 IV contrast  without adverse event. Coronal reformatted images are also generated.  Radiation dose for this scan was reduced using automated exposure  control, adjustment of the mA and/or kV according to patient size, or  iterative reconstruction technique.    FINDINGS:     Chest: Patchy airspace opacities in the lung apices bilaterally, right  greater  than left, are again noted. Area of more masslike  consolidation posterior right upper lobe is new possibly reflecting an  area of pneumonia. This measures approximately 1.9 cm in diameter on  series 9, image 114. Patchy areas of right lung base nodularity is  also noted. These appear slightly more prominent in the short interval  since prior CT. The largest measures 1.6 cm on image 96. Left pleural  effusion is stable if not slightly decreased since prior exam but is  now loculated in appearance. Enlarged mediastinal and left hilar lymph  nodes are suspected. Left hilar kinza mass measures 2.5 x 1.4 cm,  previously 2.2 x 1.3 cm. Esophagus is unremarkable. No enlarged right  axillary lymph nodes. Postsurgical changes left breast and left axilla  are noted. Probable left breast seroma is minimally changed measuring  6.3 cm. Area of postsurgical change left axilla is stable in  appearance on image 53. Esophagus is unremarkable. Heart is normal in  size. Thoracic aorta is unremarkable. Limited images upper abdomen is  unremarkable. Gastric bypass surgical changes are stable. Bone window  examination is within normal limits.      Impression    IMPRESSION:  1. Increasing patchy infiltrative changes with areas of nodularity  predominantly in the right lung. Due to interval progression since  recent prior CT, this is thought to be infectious in nature rather  than metastatic. Metastatic disease cannot be completely ruled out.  2. Prominent mediastinal and left hilar lymph nodes. Minimal interval  change.  3. Postsurgical changes left breast and left axilla, unchanged since  prior study. Skin thickening overlying left chest wall and breast  probably reflect postradiation change.  4. Slight interval decrease in the overall volume of the left pleural  effusion. This currently appears more loculated.    SATHYA EASTON MD   CT Lumbar Spine w/o Contrast    Narrative    CT LUMBAR SPINE WITHOUT CONTRAST  8/17/2019 8:12 AM      HISTORY: History of malignant breast cancer, recently diagnosed to  have malignant pleural effusion, also having thoracic and lumber spine  pain, concern for metastatic disease.     TECHNIQUE: Axial images of the lumbar spine were obtained without  intravenous contrast. Multiplanar reformations were performed.   Radiation dose for this scan was reduced using automated exposure  control, adjustment of the mA and/or kV according to patient size, or  iterative reconstruction technique.    COMPARISON: None.    FINDINGS:  There is no fracture or subluxation.  The distal spinal  cord and conus medullaris appear normal.  The paraspinous soft tissues  appear normal. No destructive bone lesion is seen.    T12-L1: Normal disc, facet joints, spinal canal and neural foramina.      L1-L2: Normal disc, facet joints, spinal canal and neural foramina.      L2-L3: Normal disc, facet joints, spinal canal and neural foramina.      L3-L4: Normal disc. Moderate degeneration of the right facet joint and  mild degeneration of the left facet joint. Normal spinal canal and  neural foramina.    L4-L5: Normal disc, facet joints, spinal canal and neural foramina.      L5-S1: Mild degenerative disc disease, otherwise normal.      Impression    IMPRESSION:    1. No evidence of metastatic disease.  2. Degenerative changes as described above.         ABRAM OROSCO MD   CT Thoracic Spine w/o Contrast    Narrative    CT THORACIC SPINE WITHOUT CONTRAST   8/17/2019 8:12 AM     HISTORY: History of malignant breast cancer, recently diagnosed to  have malignant pleural effusion, also having thoracic and lumber spine  pain, concern for metastatic disease.     TECHNIQUE: Axial images of the thoracic spine were obtained without  intravenous contrast. Multiplanar reformations were performed.   Radiation dose for this scan was reduced using automated exposure  control, adjustment of the mA and/or kV according to patient size, or  iterative reconstruction  technique.    COMPARISON: None.    FINDINGS:  The alignment of the thoracic spine is normal.  There is  mild multilevel degenerative disc disease. There is scoliosis convex  to the right. No destructive lesion or fracture is seen. Large left  pleural effusion and small right pleural effusion are noted.      Impression    IMPRESSION:  1. No evidence of metastatic disease.  2. Degenerative changes.    ABRAM OROSCO MD       Discharge Medications   Current Discharge Medication List      START taking these medications    Details   HYDROmorphone (DILAUDID) 2 MG tablet Take 1 tablet (2 mg) by mouth every 3 hours as needed for moderate to severe pain  Qty: 15 tablet, Refills: 0    Associated Diagnoses: Malignant neoplasm of lower-outer quadrant of left breast of female, estrogen receptor negative (H)      ibuprofen (ADVIL/MOTRIN) 800 MG tablet Take 1 tablet (800 mg) by mouth every 8 hours as needed for moderate pain  Qty: 20 tablet, Refills: 0    Associated Diagnoses: Malignant neoplasm of lower-outer quadrant of left breast of female, estrogen receptor negative (H)      levalbuterol (XOPENEX) 0.31 MG/3ML neb solution Take 3 mLs (0.31 mg) by nebulization every 8 hours as needed for wheezing or shortness of breath / dyspnea  Qty: 30 mL, Refills: 0    Associated Diagnoses: Pneumonia of right lung due to infectious organism, unspecified part of lung      levofloxacin (LEVAQUIN) 500 MG tablet Take 1 tablet (500 mg) by mouth daily for 4 days  Qty: 4 tablet, Refills: 0    Associated Diagnoses: Pneumonia of right lung due to infectious organism, unspecified part of lung      Lidocaine (LIDOCARE) 4 % Patch Place 2 patches onto the skin every 24 hours To prevent lidocaine toxicity, patient should be patch free for 12 hrs daily.  Qty: 30 patch, Refills: 0    Associated Diagnoses: Pneumonia of right lung due to infectious organism, unspecified part of lung         CONTINUE these medications which have CHANGED    Details   LORazepam  (ATIVAN) 0.5 MG tablet Take 1 tablet (0.5 mg) by mouth every 8 hours as needed  Qty: 10 tablet, Refills: 0    Associated Diagnoses: Malignant neoplasm of lower-outer quadrant of left breast of female, estrogen receptor negative (H)         CONTINUE these medications which have NOT CHANGED    Details   albuterol (PROAIR HFA/PROVENTIL HFA/VENTOLIN HFA) 108 (90 Base) MCG/ACT inhaler Inhale 1-2 puffs into the lungs every 6 hours as needed for shortness of breath / dyspnea or wheezing    Comments: Pharmacy may dispense brand covered by insurance (Proair, or proventil or ventolin or generic albuterol inhaler)      albuterol (PROVENTIL) (2.5 MG/3ML) 0.083% neb solution Take 2.5 mg by nebulization every 4 hours as needed for shortness of breath / dyspnea or wheezing      buPROPion (WELLBUTRIN XL) 150 MG 24 hr tablet Take 150 mg by mouth every morning      clonazePAM (KLONOPIN) 1 MG tablet Take 2 mg by mouth At Bedtime      cyclobenzaprine (FLEXERIL) 10 MG tablet Take 1 tablet (10 mg) by mouth 3 times daily as needed for muscle spasms  Qty: 30 tablet, Refills: 0    Associated Diagnoses: Breast pain, left      diphenhydrAMINE (BENADRYL) 25 MG tablet Take 1-2 tablets (25-50 mg) by mouth every 6 hours as needed for itching or allergies  Qty: 60 tablet, Refills: 0    Associated Diagnoses: Malignant neoplasm of lower-outer quadrant of left breast of female, estrogen receptor negative (H)      SERTRALINE HCL PO Take 150 mg by mouth daily       traMADol (ULTRAM) 50 MG tablet Take 1 tablet (50 mg) by mouth every 6 hours as needed for pain  Qty: 5 tablet, Refills: 0           Allergies   Allergies   Allergen Reactions     Hydrocodone-Acetaminophen Itching     Patient states she is able to take it with Benadryl.       Oxycodone Itching and Rash

## 2019-08-19 NOTE — PLAN OF CARE
A/Ox4. VSS on RA except tachycardic in 115s. Tele: ST. C/o of back pain, PRN IV dilaudid given this morning, switched to PRN oral dilaudid.  PRN neb given for shortness of breath before lunch. Early afternoon Pt c/o of tightness in chest, and shortness of breath, PRN ativan given and 2L O2 via NC. Pt reassessed, denies chest tightness and says is more comfortable, O2 removed,. Independent. Regular diet. Plan to discharge this afternoon to home and self care if pain controlled.

## 2019-08-19 NOTE — PLAN OF CARE
A&Ox4. VSS on RA. C/o back pain prn oral dilaudid given, IV needed for breakthrough. ice applied to lower back- effective. Regular diet. Pt had an anxious episode prn one time ativan given -effective. PRN nebs given for shortness of breathe. Up independently, walked the halls x2.

## 2019-08-19 NOTE — PROVIDER NOTIFICATION
Pt insisting on driving herself home and had 0.5mg of PO Ativan at approximately 2pm. Dr. Pascual notified that pt normally takes 1mg of Ativan at home and drives afterwards. MD states if pt insisting on driving, we can let her go. Pt informed that we prefer her not to drive home, but if she insists she can go. Pt A&O, walking and talking normally.

## 2019-08-19 NOTE — PROVIDER NOTIFICATION
MD Notification    Notified Person: MD    Notified Person Name: Cortes     Notification Date/Time: 8/18 2050    Notification Interaction: phone     Purpose of Notification: pt requesting prn anxiety med    Orders Received: one time dose of ativan    Comments:

## 2019-08-20 NOTE — PLAN OF CARE
DATE & TIME: 08/20/19 0759   Cognitive Concerns/ Orientation : A& O x3   BEHAVIOR & AGGRESSION TOOL COLOR: Green  CIWA SCORE: NA   ABNL VS/O2: 2L NC.  MOBILITY:Independent  PAIN MANAGMENT: Denies  DIET: 2G Na.  BOWEL/BLADDER: continent on both.  ABNL LAB/BG: None  DRAIN/DEVICES: NA  TELEMETRY RHYTHM: ST  SKIN: Intact.  TESTS/PROCEDURES: None.  D/C DAY/GOALS/PLACE: In 1 day, once ECHO is done, and pt symptoms improved and well diuresed.  OTHER IMPORTANT INFO: Pt has been diuresing well, states breathing is better, however still weak, and has no appetite.

## 2019-08-20 NOTE — PROVIDER NOTIFICATION
Pt  iss Ox4. VSS on RA. Lungs dim.  O2 sat WDL on Ra.   Denies pain or SOB  at time of discharge.     Regular diet. Indpendent in the room and shay.  Did not assess pt's back and coccyx as pt was already dressed for discharge.  IV removed.   Pt ready for discharge. Discharge education completed and all discharge medication education provided and all meds given at time of discharge, including nebulizers.  Pt also educated about importance to follow  up with her providers as stated per the  discharge instructions.  Pt expressed understanding. Pt   All questions answered.   All belongings with pt at time of discharge.  Pt is stable conditions at time of discharge.Denying shortness of breath, pain , dizziness, and is A and O X 4. No difficulties with communicating or ambulating at time of discharge.  Insisting on driving herself despite being advised to not do it by staff, MD, and pharmacist. Ultimately,  MD okayed discharge ( and pt driving herself to home) given the fact that pt take 1 mg at home and drives without difficulties, and pt has gotten only 0.5 mg at about 1400.  Pt is stable conditions at time of discharge. Transported by transport NA, in stable conditions, via the wheelchair to ER entrance ( as pt's car has been parked there).  Pt driving to home.

## 2019-08-20 NOTE — PROGRESS NOTES
Admission    Patient arrives to room 605-1 via wheel chair from ED.  Care plan note: chief complaint of SOB and leg swelling, was discharge from hospital 08/19 after treatment for pneumonia.     Inpatient nursing criteria listed below were met:    PCD's Documented: Yes  Skin issues/needs documented :No  Isolation education started/completed NA  Patient allergies verified with patient: Yes  Verified completion of [place] Risk Assessment Tool:  Yes  Verified completion of Guardianship screening tool: Yes  Fall Prevention: Care plan updated, Education given and documented NA  Care Plan initiated: Yes  Home medications documented in belongings flowsheet: NA  Patient belongings documented in belongings flowsheet: NA  Reminder note (belongings/ medications) placed in discharge instructions:NA  Admission profile/ required documentation complete: Yes  Bedside Report Letter given and explained to patient NA

## 2019-08-20 NOTE — ED TRIAGE NOTES
Pt had a thoracentesis 2 weeks ago Thursday. Pt got out of the hospital yesterday. Having increased SOB

## 2019-08-20 NOTE — CONSULTS
Care Transition Initial Assessment - RN  Met with: Patient.  DATA   Active Problems:    * No active hospital problems. *     Cognitive Status: alert and oriented.   Contact information and PCP information verified: Yes  Lives With: spouse    Insurance concerns: No Insurance issues identified  ASSESSMENT  Patient currently receives the following services:   None      Identified issues/concerns regarding health management:  Met with patient to discuss discharge plans. Patient stating she lives alone was discharged yesterday however indicates that she feels run down and fatigued.  She indicates that she is significantly winded with minimal activity, just walking several steps.  She describes orthopnea and PND to the point where she stated she could not lie flat and even using pillows to prop herself up was not helping her breathing and she needed to be seated at all times. She was discharged with nebulizers treatment. She returned to the hospital due to these sx. Patient stating she will return home when symptoms decrease. Confirms she does not have family around to assist if needed. States she is independent Unsure if patient will need oxygen at discharge. Bed side RN to assess sats on RA with and without activity.  PLAN  Financial costs for the patient include None .  Patient given options and choices for discharge yes.  Patient/family is agreeable to the plan?  Yes:   Patient anticipates discharging to home.   Patient anticipates needs for home equipment: Yes  Transportation  Will need to be set up.  Plan/Disposition: Home   Appointments:  8/28 at 2: 30 with DR Hayde Tran [facility]      Care  (CTS) will continue to follow as needed.

## 2019-08-20 NOTE — PHARMACY-ADMISSION MEDICATION HISTORY
Admission medication history interview status for the 8/20/2019  admission is complete. See EPIC admission navigator for prior to admission medications     Medication history source reliability:Moderate    Actions taken by pharmacist (provider contacted, etc):  Spoke w/ patient.  Reviewed discharge summary from yesterday.       Additional medication history information not noted on PTA med list :   - Patient prescribed Levofloxacin at discharge and has not started yet.  She received Azithromycin and Ceftriaxone yesterday while hospitalized.      Medication reconciliation/reorder completed by provider prior to medication history? No    Time spent in this activity: 15 minutes    Prior to Admission medications    Medication Sig Last Dose Taking? Auth Provider   albuterol (PROAIR HFA/PROVENTIL HFA/VENTOLIN HFA) 108 (90 Base) MCG/ACT inhaler Inhale 1-2 puffs into the lungs every 6 hours as needed for shortness of breath / dyspnea or wheezing  at prn Yes Ellie Pascual MD   albuterol (PROVENTIL) (2.5 MG/3ML) 0.083% neb solution Take 2.5 mg by nebulization every 4 hours as needed for shortness of breath / dyspnea or wheezing  at prn Yes Unknown, Entered By History   buPROPion (WELLBUTRIN XL) 150 MG 24 hr tablet Take 150 mg by mouth every morning 8/19/2019 at am Yes Unknown, Entered By History   clonazePAM (KLONOPIN) 1 MG tablet Take 2 mg by mouth At Bedtime 8/19/2019 at hs Yes Unknown, Entered By History   cyclobenzaprine (FLEXERIL) 10 MG tablet Take 1 tablet (10 mg) by mouth 3 times daily as needed for muscle spasms  at prn Yes Lesly Aponte MD   diphenhydrAMINE (BENADRYL) 25 MG tablet Take 1-2 tablets (25-50 mg) by mouth every 6 hours as needed for itching or allergies  at prn Yes Lesly Aponte MD   HYDROmorphone (DILAUDID) 2 MG tablet Take 1 tablet (2 mg) by mouth every 3 hours as needed for moderate to severe pain  at prn Yes Ellie Pascual MD   ibuprofen (ADVIL/MOTRIN) 800 MG tablet Take 1 tablet (800 mg) by  mouth every 8 hours as needed for moderate pain  at prn Yes Ellie Pascual MD   levalbuterol (XOPENEX) 0.31 MG/3ML neb solution Take 3 mLs (0.31 mg) by nebulization every 8 hours as needed for wheezing or shortness of breath / dyspnea  at prn Yes Ellie Pascual MD   levofloxacin (LEVAQUIN) 500 MG tablet Take 1 tablet (500 mg) by mouth daily for 4 days  at not started yet Yes Ellie Pascual MD   Lidocaine (LIDOCARE) 4 % Patch Place 2 patches onto the skin every 24 hours To prevent lidocaine toxicity, patient should be patch free for 12 hrs daily.  at Not in place Yes Ellie Pascual MD   LORazepam (ATIVAN) 0.5 MG tablet Take 1 tablet (0.5 mg) by mouth every 8 hours as needed  at prn Yes Ellie Pascual MD   sertraline (ZOLOFT) 100 MG tablet Take 150 mg by mouth daily 8/19/2019 at am Yes Unknown, Entered By History   traMADol (ULTRAM) 50 MG tablet Take 1 tablet (50 mg) by mouth every 6 hours as needed for pain  at prn Yes Karon Elliott MD Carolyn Dahlman, PharmD, BCPS

## 2019-08-20 NOTE — ED NOTES
Bed: ED07  Expected date:   Expected time:   Means of arrival:   Comments:  516  54F  SOB/Asthma history  0627

## 2019-08-20 NOTE — PROGRESS NOTES
RECEIVING UNIT ED HANDOFF REVIEW    ED Nurse Handoff Report was reviewed by: Janis Farr on August 20, 2019 at 8:33 AM

## 2019-08-20 NOTE — ED PROVIDER NOTES
"  History     Chief Complaint:  Shortness of Breath    The history is provided by the patient.      Megha Campbell is a 54 year old female with a history of asthma who presents via EMS with shortness of breath. The patient reports she was discharged from the hospital last night for pneumonia and she was feeling fine at the time of discharge. She is supposed to start antibiotic today. She reports that when she got home she developed shortness of breath again. The patient has tried breathing treatment at home and she was given a duoneb via EMS in route. She denies any fever. The patient reports some leg swelling.    Allergies:  Hydrocodone-acetaminophen  Oxycodone     Medications:    Albuterol  Wellbutrin XL  Klonopin  Flexeril  Benadryl   Dilaudid  Levalbuterol  Levaquin  Ativan  Sertraline  Ultram     Past Medical History:    Breast cancer in female  Depressive disorder  Obese  Asthma  Pneumonia of right lung  Malignant pleural effusion    Past Surgical History:    Biopsy breast  Gastric bypass  Lumpectomy breast left    Family History:    Cancer    Social History:  Patient is single  Tobacco Use: No  Alcohol Use: Yes  PCP: Laure Zapata MD     Review of Systems   Constitutional: Negative for fever.   Respiratory: Positive for shortness of breath.    Cardiovascular: Positive for leg swelling.   All other systems reviewed and are negative.    Physical Exam   First Vitals:  Patient Vitals for the past 24 hrs:   BP Temp Temp src Heart Rate Resp SpO2 Height Weight   08/20/19 0642 (!) 157/73 98.7  F (37.1  C) Oral 121 28 99 % 1.6 m (5' 3\") 90.7 kg (200 lb)     Physical Exam  General: Patient is alert and short of breath appearing.  HEENT: Head atraumatic    Eyes: pupils equal and reactive. Conjunctiva clear   Nares: patent   Oropharynx: no lesions, uvula midline, no palatal draping, normal voice, no trismus  Neck: Supple without lymphadenopathy, no meningismus  Chest: Tachycardic but regular  Lungs: Tachypnea can " conversational dyspnea.  Decreased breath sounds bilateral bases to midlung.  Remainder equal to auscultation  Abdomen: Soft, non tender, nondistended, normal bowel sounds  Back: No costovertebral angle tenderness, no midline C, T or L spine tenderness  Neuro: Grossly nonfocal, normal speech, strength equal bilaterally, CN 2-12 intact  Extremities: No deformities, equal radial and DP pulses. No clubbing, cyanosis.  Bilateral lower extremity edema  Skin: Warm and dry with no rash.       Emergency Department Course   ECG:  @ 0641  Indication: SOB  Vent. Rate 120 bpm. WY interval 138 ms. QRS duration 66 ms. QT/QTc 334/472 ms. P-R-T axis 47 41 38.   Sinus tachycardia. Low voltage QRS. Cannot rule out anterior infarct, age undetermined. Abnormal ECG.  No significant change when compared to previous ECG from 8/8/19   Read @ 0650 by Dr. Greco.    Imaging:  Radiographic findings were communicated with the patient who voiced understanding of the findings.  XR chest 2 views:  Redemonstrated loculated-appearing moderately large left  pleural effusion not significantly changed. Dense retrocardiac opacity  consistent with infiltrate or atelectasis appears slightly worse.  Patchy opacity throughout the right lung is increased. There is  widening of the right mediastinum and paratracheal region suggesting  adenopathy unchanged. Overall progression since 8/16/2019. Per radiology read.    Laboratory:  ISTAT Gases Lactate Vein POCT (0656): pH: 7.41, PCO2: 43, PO2: 36, Bicarbonate: 28, O2 Sat: 69, Lactic Acid: 1.0  CBC:  WBC 9.0, HGB 11.2 low,   BMP: Glucose 100 high, o/w WNL. (Creatinine 0.60)  BNP: 3,584 high  Blood Culture x2: Pending    Interventions:  0754: Lasix 40mg IV    Emergency Department Course:  6:41 AM Nursing notes and vitals reviewed.  I performed an exam of the patient as documented above.     7:52 AM I rechecked on and updated the patient.    Findings and plan explained to the patient who consents to  admission.   8:01 AM I discussed the patient with Dr. Bermeo of the hospitalist service, who will admit the patient to a medical bed for further monitoring, evaluation, and treatment.    Impression & Plan      Medical Decision Making:  Patient is a 54-year-old female discharged from the hospital yesterday where she was admitted for pneumonia as well as a malignant pleural effusion that she had thoracentesis on who presents the hospital today with shortness of breath.  Patient states she did not do well at home and tried all night but had continued worsening symptoms.  She tried multiple nebulizer treatments as well as a DuoNeb in route by EMS without much improvement in her shortness of breath.  Here she is found to be tachypneic and have conversational dyspnea.  She does have edema noted to her lower extremities.  Patient appears to be volume overloaded with an elevated BNP as well as fluid overload on her chest x-ray.  She is Lasix naïve and was given 40 mg of Lasix here given her appropriate creatinine.  There is some concern for loculated effusion on the left which may need further thoracentesis as well if diuresis does not improve her respiratory status.  Do not suspect pulmonary embolism at this time.  Her edema is bilateral and her BNP is quite elevated compared to her previous BNP.  No evidence to suggest acute coronary syndrome.  She is tachycardic but has been noted to have continued sinus tachycardia.  Do not feel this is consistent with sepsis or infection at this time given that she is afebrile and her white blood cell counts within normal limits and lactic acid is normal.  Patient is agreeable with admission to the hospital.  Discussed with the hospitalist who kindly agrees to accept the patient for admission.    Diagnosis:    ICD-10-CM    1. Shortness of breath R06.02    2. Hypoxia R09.02    3. Acute pulmonary edema (H) J81.0    4. Hypervolemia, unspecified hypervolemia type E87.70         Disposition:  Admitted to the hospitalist    I, Bradley Aasen, am serving as a scribe on 8/20/2019 at 6:41 AM to personally document services performed by Penny Greco MD based on my observations and the provider's statements to me.          Penny Greco MD  08/20/19 0859

## 2019-08-20 NOTE — H&P
Admitted:     08/20/2019      PRIMARY CARE PROVIDER:  Dr. Laure Zapata      CHIEF COMPLAINT:  Shortness of breath.      HISTORY OF PRESENT ILLNESS:  Megha Campbell is a 54-year-old female with a past medical history significant for uncomplicated asthma, a history of metastatic breast cancer on the left side, status post surgery and radiation therapy with recurrent malignant pleural effusion, chronic lymphedema, obesity, major depressive disorder with anxiety, and recently diagnosed right-sided pneumonia, who presented to [facility] Emergency Department on 08/20/2019 due to shortness of breath.      Of note, the patient was recently admitted here at [facility] from 08/16/2019 through 08/19/2019 and diagnosed with right-sided pneumonia and treated with IV antibiotics and discharged with a course of Levaquin.  At time of discharge, the patient indicated that she was feeling fine and breathing much better; however, as the night progressed, she developed significant shortness of breath and presented to [facility] Emergency Department.      The patient was evaluated by Dr. Greco in the Emergency Department.  Evaluation included a basic metabolic panel revealing a creatinine of 0.6, GFR of greater than 90, glucose of 100, otherwise within normal limits.  ProBNP was significantly elevated at 3584.  Venous blood gas was unremarkable with a pH of 7.41, pCO2 of 43 and pO2 of 36.  A CBC with differential revealed a white count of 9, hemoglobin of 11.2, hematocrit of 34.7, platelet count of 284, MCH of 25.5, and absolute lymphocytes 0.6.  Blood cultures were taken x 2 and are currently in process.  A 2-view chest x-ray was performed that demonstrated a loculated-appearing moderately large left pleural effusion, not significantly changed, and a dense retrocardiac opacity consistent with infiltrate or atelectasis appears slightly worse.  Patchy opacity throughout the right lung is increased.   There is widening of the right mediastinum and peritracheal region suggesting adenopathy unchanged and overall progression since a chest x-ray performed on 08/16/2019.  The patient received 40 mg of IV Lasix and was then registered to observation under the Hospitalist Service.      I evaluated the patient in her hospital room.  She was lying in bed upon arrival.  The patient does appear to be breathing heavily.  I reviewed recent hospitalization with the patient and noted a right-sided pneumonia with treatment with levofloxacin.  The patient indicates that she feels run down and fatigued.  Is experiencing some tightness in the left side of her chest, but it is not painful and it correlates with her shortness of breath.  She indicates that she is significantly winded with minimal activity, just walking several steps.  She describes orthopnea and PND to the point where she stated she could not lie flat and even using pillows to prop herself up was not helping her breathing and she needed to be seated at all times.  She continues to have a cough that is less productive than it was a week ago, and she notes some increased swelling in her ankles and legs that she only noticed this morning.      PAST MEDICAL HISTORY:   1.  Uncomplicated asthma.   2.  History of metastatic breast cancer, status post partial left-sided mastectomy, axillary node dissection, and radiation therapy.   3.  Malignant pleural effusion.   4.  Lymphedema, chronic.   5.  Obesity.   6.  Major depressive disorder with anxiety.   7.  Recent right lung pneumonia.      PAST SURGICAL HISTORY:   1.  Left breast partial mastectomy with axillary node dissection.   2.  Gastric bypass surgery.      PRIOR TO ADMIT MEDICATIONS:    Prior to Admission Medications   Prescriptions Last Dose Informant Patient Reported? Taking?   HYDROmorphone (DILAUDID) 2 MG tablet  at prn  No Yes   Sig: Take 1 tablet (2 mg) by mouth every 3 hours as needed for moderate to severe pain    LORazepam (ATIVAN) 0.5 MG tablet  at prn  No Yes   Sig: Take 1 tablet (0.5 mg) by mouth every 8 hours as needed   Lidocaine (LIDOCARE) 4 % Patch  at Not in place  No Yes   Sig: Place 2 patches onto the skin every 24 hours To prevent lidocaine toxicity, patient should be patch free for 12 hrs daily.   albuterol (PROAIR HFA/PROVENTIL HFA/VENTOLIN HFA) 108 (90 Base) MCG/ACT inhaler  at prn  No Yes   Sig: Inhale 1-2 puffs into the lungs every 6 hours as needed for shortness of breath / dyspnea or wheezing   albuterol (PROVENTIL) (2.5 MG/3ML) 0.083% neb solution  at prn Self Yes Yes   Sig: Take 2.5 mg by nebulization every 4 hours as needed for shortness of breath / dyspnea or wheezing   buPROPion (WELLBUTRIN XL) 150 MG 24 hr tablet 8/19/2019 at am Self Yes Yes   Sig: Take 150 mg by mouth every morning   clonazePAM (KLONOPIN) 1 MG tablet 8/19/2019 at hs Self Yes Yes   Sig: Take 2 mg by mouth At Bedtime   cyclobenzaprine (FLEXERIL) 10 MG tablet  at prn Self No Yes   Sig: Take 1 tablet (10 mg) by mouth 3 times daily as needed for muscle spasms   diphenhydrAMINE (BENADRYL) 25 MG tablet  at prn Self No Yes   Sig: Take 1-2 tablets (25-50 mg) by mouth every 6 hours as needed for itching or allergies   ibuprofen (ADVIL/MOTRIN) 800 MG tablet  at prn  No Yes   Sig: Take 1 tablet (800 mg) by mouth every 8 hours as needed for moderate pain   levalbuterol (XOPENEX) 0.31 MG/3ML neb solution  at prn  No Yes   Sig: Take 3 mLs (0.31 mg) by nebulization every 8 hours as needed for wheezing or shortness of breath / dyspnea   levofloxacin (LEVAQUIN) 500 MG tablet  at not started yet  No Yes   Sig: Take 1 tablet (500 mg) by mouth daily for 4 days   sertraline (ZOLOFT) 100 MG tablet 8/19/2019 at am  Yes Yes   Sig: Take 150 mg by mouth daily   traMADol (ULTRAM) 50 MG tablet  at prn Self No Yes   Sig: Take 1 tablet (50 mg) by mouth every 6 hours as needed for pain      Facility-Administered Medications: None        ALLERGIES:   Allergies    Allergen Reactions     Hydrocodone-Acetaminophen Itching     Patient states she is able to take it with Benadryl.       Oxycodone Itching and Rash       SOCIAL HISTORY:  The patient resides in an apartment in [facility].  She is a lifelong nonsmoker.  Denies alcohol consumption or illicit drug use.  She does not currently utilize a cane, walker, or supplemental oxygen at home.      FAMILY MEDICAL HISTORY:  Mother passed away and had breast cancer.      REVIEW OF SYSTEMS:  A 10-point review of systems was performed.  All pertinent positives are listed in the history of present illness, otherwise is negative.      PHYSICAL EXAMINATION:   VITAL SIGNS:  Temperature is 98.2 degrees Fahrenheit with a blood pressure of 145/82, heart rate of 117 beats per minute, respiratory rate of 22, O2 saturation was noted to be 99% on room air when last checked.  The patient is denying pain.   GENERAL:  The patient is awake, alert and cooperative, though does appear to be breathing heavily.  Otherwise, alert and oriented x 3.   HEENT:  Normocephalic, atraumatic.  Moist mucous membranes present.  No exudates noted in the posterior pharynx.  Uvula is midline.  Eyes:  Pupils are equal, round, reactive to light.  Extraocular movements are intact.  Normal sclerae.   NECK:  Supple, normal range of motion.  No tracheal deviation.  No cervical lymphadenopathy present.   CARDIOVASCULAR:  Tachycardic, otherwise regular rate and rhythm.  No rubs, murmurs or gallops appreciated.   PULMONARY:  Decreased breath sounds at the lung bases.  Difficult to fully assess due to body habitus.   GASTROINTESTINAL:  Bowel sounds are present in all 4 quadrants.  Soft, nontender, nondistended.   NEUROLOGIC:  Cranial nerves II-XII are grossly intact.  The patient demonstrates equal sensation, coordination and strength in the upper and lower extremities bilaterally.   EXTREMITIES:  1+ lower extremity edema, particularly around the ankles.  Calves  are nontender to palpation.      ASSESSMENT AND PLAN:  Megha Campbell is a 54-year-old female with a past medical history significant for uncomplicated asthma, history of metastatic breast cancer, status post surgery and radiation therapy, malignant pleural effusion, chronic lymphedema, obesity, major depressive disorder with anxiety and recently diagnosed right-sided pneumonia, who is being registered to observation due to suspected exacerbation of congestive heart failure or fluid overload.      1.  Suspected congestive heart failure versus fluid overload:  Patient has elevated proBNP of 3584.  When previously checked earlier this month, this was noted to be 86, within normal limits.  The patient is describing orthopnea and PND and has had increased swelling of her lower extremities and indicates that she was on IV fluids during her hospitalization in the last 3 days.  The patient received 40 mg of IV Lasix in the Emergency Department.  We will order for her additional 20 mg IV Lasix dose to be given now and then order for 60 mg IV Lasix 3 times daily.  We will monitor daily weights and strict I's and O's.  We will order for a complete echocardiogram and monitor fluid status.  The patient will be on a low-salt, 2 gram max diet. Plan to repeat BMP in the morning.   2.  Right lung pneumonia:  The patient was hospitalized from 08/16/2019 through 08/19/2019 due to shortness of breath and diagnosed pneumonia of the right lung.  She received IV antibiotics, including ceftriaxone and azithromycin, and was discharged yesterday with oral levofloxacin.  We will continue with levofloxacin at this point.  A 2-view chest x-ray was performed that demonstrated a loculated-appearing moderately large left pleural effusion, not significantly changed, and a dense retrocardiac opacity consistent with infiltrate or atelectasis appears slightly worse.  Patchy opacity throughout the right lung is increased.  Blood cultures were taken in  the Emergency Department and will be monitored, previous cultures from 8/16/2019 with no growth to date.  We will order for incentive spirometer and Acapella valve and start Mucinex 1200 mg 2 times daily.  No leukocytosis and patient is afebrile.    3.  Malignant neoplasm of the lower outer quadrant of the left breast, estrogen receptor negative with associated malignant pleural effusion:  The patient is status post partial left mastectomy and axillary node dissection.  The patient was followed by [facility] while hospitalized recently.  She was noted to be stage III, triple-negative left-sided breast cancer diagnosed in 11/2011.  Initially, the patient declined chemotherapy, both neoadjuvant and adjuvant.  A chest CT was done on 08/08/2019 and revealed large pleural effusion of the left side.  A thoracentesis was done which confirmed metastatic adenocarcinoma consistent with breast primary.  Repeat CT scan was performed on 08/16/2019 that showed reaccumulation of effusion.  Lumbar and thoracic spine CTs were performed and were negative for metastatic disease.  Unclear whether or not patient would benefit from repeat thoracentesis, but will currently plan for IV Lasix as stated above, and patient already has outpatient followup established with Dr. Winston of [facility]. Will resume previously prescribed analgesic management including oral Dilaudid PRN and lidocaine patches.    4.  Uncomplicated asthma:  This appears to be stable, though the patient is complaining of shortness of breath.  We will continue with as-needed albuterol, Xopenex nebulizing therapies, and monitor O2 saturations.   5.  Tachycardia:  During hospitalization, the patient was noted to have persistent tachycardia that has been mild.  EKG was performed that did reveal sinus tachycardia.  TSH was checked and was unremarkable.  We will monitor on telemetry.   6.  Obesity:  Patient has undergone gastric bypass in the past.  Currently, BMI is noted to be  35.43.  The patient plans to follow up with primary care provider.   7.  Chronic lymphedema:  At time of discharge yesterday, patient had outpatient referral sent for lymphedema wrapping and this should be continued.  We will monitor during this stay.   8.  Depression/anxiety:  Patient's prior to admit Wellbutrin, clonazepam and Zoloft will be continued.   9.  Deep venous thrombosis prophylaxis:  Would encourage ambulation.      CODE STATUS:  Discussed with the patient, she elected to be FULL CODE.      DISPOSITION:  The patient is being registered under observation status.  The patient indicates that she does not want to stay overnight.  Patient was advised to wait and see how her breathing does throughout the day before making any decisions.      The patient was discussed with Dr. Abilio Anderson, who agrees with the assessment and plan as stated above.  Dr. Anderson will evaluate the patient independently.         ABILIO ANDERSON MD       As dictated by PAUL BORDEN PA-C     Patient seen and examined.  Agree with impression and plan.     Abilio Anderson MD  Pager: [phone]  Cell Phone:  [phone]             D: 2019   T: 2019   MT: ELLIE      Name:     JONATHAN RIGGINS   MRN:      [id number]        Account:      [id number]   :      1965        Admitted:     2019                   Document: [id number]       cc: Laure Zapata MD

## 2019-08-20 NOTE — ED NOTES
"[facility]  ED Nurse Handoff Report    ED Chief complaint: Shortness of Breath      ED Diagnosis:   Final diagnoses:   Shortness of breath   Hypoxia   Acute pulmonary edema (H)   Hypervolemia, unspecified hypervolemia type       Code Status: Full Code    Allergies:   Allergies   Allergen Reactions     Hydrocodone-Acetaminophen Itching     Patient states she is able to take it with Benadryl.       Oxycodone Itching and Rash       Activity level - Baseline/Home:  Independent  Activity Level - Current:   Independent    Patient's Preferred language: english   Needed?: No    Isolation: No  Infection: Not Applicable  Bariatric?: No    Vital Signs:   Vitals:    08/20/19 0642   BP: (!) 157/73   Resp: 28   Temp: 98.7  F (37.1  C)   TempSrc: Oral   SpO2: 99%   Weight: 90.7 kg (200 lb)   Height: 1.6 m (5' 3\")       Cardiac Rhythm: ,        Pain level:      Is this patient confused?: No   Does this patient have a guardian?  No         If yes, is there guardianship documents in the Epic \"Code/ACP\" activity?  N/A         Guardian Notified?  N/A  [place] - Suicide Severity Rating Scale Completed?  Yes  If yes, what color did the patient score?  White    Patient Report: Initial Complaint: Pt presents w/increased bilateral leg edema and SOB. BNP elevated. Hx asthma and recently discharged w/pneumonia.  Focused Assessment: Lung sounds diminished, wheezy. Increased SOB w/exertion. 2+ pitting edema to bilateral lower extremities. Afebrile. Sinus tach.   Tests Performed: labs, chest xray  Abnormal Results:   Results for orders placed or performed during the hospital encounter of 08/20/19   XR Chest 2 Views    Narrative    CHEST TWO VIEWS  8/20/2019 7:22 AM     HISTORY: shortness of breath    COMPARISON: 8/16/2019 chest x-ray      Impression    IMPRESSION: Redemonstrated loculated-appearing moderately large left  pleural effusion not significantly changed. Dense retrocardiac opacity  consistent with " infiltrate or atelectasis appears slightly worse.  Patchy opacity throughout the right lung is increased. There is  widening of the right mediastinum and paratracheal region suggesting  adenopathy unchanged. Overall progression since 8/16/2019.    SULY AMANDA MD   CBC with platelets differential   Result Value Ref Range    WBC 9.0 4.0 - 11.0 10e9/L    RBC Count 4.40 3.8 - 5.2 10e12/L    Hemoglobin 11.2 (L) 11.7 - 15.7 g/dL    Hematocrit 34.7 (L) 35.0 - 47.0 %    MCV 79 78 - 100 fl    MCH 25.5 (L) 26.5 - 33.0 pg    MCHC 32.3 31.5 - 36.5 g/dL    RDW 14.9 10.0 - 15.0 %    Platelet Count 284 150 - 450 10e9/L    Diff Method Automated Method     % Neutrophils 84.6 %    % Lymphocytes 6.3 %    % Monocytes 8.0 %    % Eosinophils 0.6 %    % Basophils 0.1 %    % Immature Granulocytes 0.4 %    Nucleated RBCs 0 0 /100    Absolute Neutrophil 7.6 1.6 - 8.3 10e9/L    Absolute Lymphocytes 0.6 (L) 0.8 - 5.3 10e9/L    Absolute Monocytes 0.7 0.0 - 1.3 10e9/L    Absolute Eosinophils 0.1 0.0 - 0.7 10e9/L    Absolute Basophils 0.0 0.0 - 0.2 10e9/L    Abs Immature Granulocytes 0.0 0 - 0.4 10e9/L    Absolute Nucleated RBC 0.0    Basic metabolic panel   Result Value Ref Range    Sodium 136 133 - 144 mmol/L    Potassium 3.5 3.4 - 5.3 mmol/L    Chloride 100 94 - 109 mmol/L    Carbon Dioxide 28 20 - 32 mmol/L    Anion Gap 8 3 - 14 mmol/L    Glucose 100 (H) 70 - 99 mg/dL    Urea Nitrogen 7 7 - 30 mg/dL    Creatinine 0.60 0.52 - 1.04 mg/dL    GFR Estimate >90 >60 mL/min/[1.73_m2]    GFR Estimate If Black >90 >60 mL/min/[1.73_m2]    Calcium 8.6 8.5 - 10.1 mg/dL   Nt probnp inpatient (BNP)   Result Value Ref Range    N-Terminal Pro BNP Inpatient 3,584 (H) 0 - 900 pg/mL   ISTAT gases lactate rosie POCT   Result Value Ref Range    Ph Venous 7.41 7.32 - 7.43 pH    PCO2 Venous 43 40 - 50 mm Hg    PO2 Venous 36 25 - 47 mm Hg    Bicarbonate Venous 28 21 - 28 mmol/L    O2 Sat Venous 69 %    Lactic Acid 1.0 0.7 - 2.1 mmol/L       Treatments provided:  lasix    Family Comments: not present    OBS brochure/video discussed/provided to patient/family: N/A              Name of person given brochure if not patient: NA              Relationship to patient: NA    ED Medications:   Medications   furosemide (LASIX) injection 40 mg (40 mg Intravenous Given 8/20/19 0754)       Drips infusing?:  No    For the majority of the shift this patient was Green.   Interventions performed were NA.    Severe Sepsis OR Septic Shock Diagnosis Present: No    To be done/followed up on inpatient unit:  lab to collect second set of blood cultures as IV does not draw. See inpatient orders    ED NURSE PHONE NUMBER: [phone]

## 2019-08-21 PROBLEM — I50.9 CHF (CONGESTIVE HEART FAILURE) (H): Status: ACTIVE | Noted: 2019-01-01

## 2019-08-21 NOTE — PROGRESS NOTES
Care Suites Procedure Nursing Note    Procedure: thoracentesis  Procedure started time: 1345  Procedure completed time: 1406  Concerns/abnormal assessment: US guided thoracentesis. 700cc of bloody fluid drained from LLL. Dsg placed to site D+I. Pt had some coughing after completion and stated she felt like she was burning up. BP 97/63.  Plan: monitor.  1414 Report called to RN on floor. Pt taken back to room per transport.

## 2019-08-21 NOTE — TELEPHONE ENCOUNTER
ED / Discharge Outreach Protocol    Patient Contact    Attempt # 1    Was call answered?  No.  Left message on voicemail with information to call me back.

## 2019-08-21 NOTE — PLAN OF CARE
Patient down having thoracentesis and is unavailable in the room.  Will come back to see her.    Dr. Radah GARIBAY

## 2019-08-21 NOTE — PLAN OF CARE
DATE & TIME: 8/21/19 3870-3599   Cognitive Concerns/ Orientation : A&O x4   BEHAVIOR & AGGRESSION TOOL COLOR: Green  CIWA SCORE: NA   ABNL VS/O2: VSS except HTN and tachy on room air;  wears oxygen via nasal cannula at times for comfort  MOBILITY: Independent  PAIN MANAGMENT: PRN Dilaudid given x2 for lower back pain  DIET: 2 g Na+, no caffeine  BOWEL/BLADDER: Continent  ABNL LAB/BG: ProBNP 3584  DRAIN/DEVICES: PIV R arm saline locked  TELEMETRY RHYTHM: Sinus tach  SKIN: Intact; BLE edema 2+  TESTS/PROCEDURES: ECHO today  D/C DAY/GOALS/PLACE: Discharge pending  OTHER IMPORTANT INFO: LIND but improving, infrequent cough, lung sounds diminished.  Continues on PO Levaquin and IV Lasix.

## 2019-08-21 NOTE — PROGRESS NOTES
[facility] RCAT Note    Date: 8/21/2019    Admission Diagnosis: CHF, Pneumonia of right lung, malignant pleural effusion, breast cancer, asthma  Pulmonary History: asthma  Home Nebulizer/ MDI Use: prn/rescue albuterol use  Home Oxygen Use: none  Acuity Level (from RT Assessment flow sheet): 4     Aerosol Therapy Initiated: Atrovent QID and Xopenex TID. These were already ordered and kept the same for now.       Pulmonary Hygiene Initiated: Aerobika was given instructions how to use. Will start to give nebs inline with this to help with pulmonary hygiene      Volume Expansion Therapy Initiated: IS given with instructions. Encourage use.       Current Oxygen Requirement: 1 L NC  Current SpO2: 98%    Re-evaluation date: 8/24/19      See 'RT Assessments' flow sheet for patient assessment scoring and Acuity Level Details.    Adrianne Ingram, RT  8/21/2019

## 2019-08-21 NOTE — CONSULTS
REASON FOR ASSESSMENT:  CHF Consult for 2 gm NA Diet Education --> cardiology is not following, ECHO results pending.     NUTRITION HISTORY:  - Deferred    CURRENT DIET:  2g Na    INTERVENTIONS:    Nutrition Prescription:  Diet per MD as appropriate     Implementation:    Assessed learning needs, learning preferences, and willingness to learn    Nutrition Education (Content):  a) Provided handouts:  1) Tips for Low Na Diet  2) Label Reading  3) Low Na Foods/Drinks  4) Seasoning Your Food Without Salt  5) Low Na Recipe Booklet    Discussed rational for limiting Na in general, and stressed importance of following 2 gm Na guidelines      Henrietta Salinas, RD, LD

## 2019-08-21 NOTE — PLAN OF CARE
DATE & TIME: 8/21 Day       Cognitive Concerns/ Orientation : A&Ox4   BEHAVIOR & AGGRESSION TOOL COLOR: Green  CIWA SCORE: NA  ABNL VS/O2: BP was 172/90, did come down to 133/77 , tachy at 112, on 2L NC (for comfort)  MOBILITY: Independent  PAIN MANAGMENT: Dialudid available for back pain, and Lidocaine patches in place.  DIET: Low sodium  BOWEL/BLADDER: Continent, urinary frequency d/t lasix  ABNL LAB/BG: K 3.3, PO replacement given, Lactate Dehydrogenase 377 BNP 3584 yesterday  DRAIN/DEVICES: PIV SL  TELEMETRY RHYTHM: ST  SKIN: WNL, BLE edema  TESTS/PROCEDURES: U/S LLE. ECHO, and Thoracentesis done- 700cc fluid removed.  D/C DAY/GOALS/PLACE: Pending.  OTHER IMPORTANT INFO: SOB at times and LIND, 1 PRN neb. Hem/Onc consulted. Refused second dose of IV Lasix.

## 2019-08-21 NOTE — PROVIDER NOTIFICATION
MD Notification    Notified Person: MD    Notified Person Name: Dr. Laguna    Notification Date/Time: 8/21/19 6106    Notification Interaction: Phone conversation    Purpose of Notification: Requesting IV Dilaudid; HTN, PRN meds?    Orders Received: PRN IV Dilaudid and PRN IV labetalol    Comments:

## 2019-08-21 NOTE — PLAN OF CARE
DATE & TIME: 8/20 eves      Cognitive Concerns/ Orientation : A&Ox4   BEHAVIOR & AGGRESSION TOOL COLOR: green  CIWA SCORE: n/a     ABNL VS/O2:  BP slightly elevated 125/95. Tachy at rest. 2L O2 NC  MOBILITY: Ind  PAIN MANAGMENT: denies pain  DIET: 2gNa- poor appetite  BOWEL/BLADDER: Continent  ABNL LAB/BG: ProBNP 3584, HgB 11.2, lactate 1.1  DRAIN/DEVICES: none  TELEMETRY RHYTHM: ST  SKIN: WDL  TESTS/PROCEDURES: Echo in the AM  D/C DAY/GOALS/PLACE: possibly tomorrow  OTHER IMPORTANT INFO: none

## 2019-08-21 NOTE — PROGRESS NOTES
"[facility]    Medicine Progress Note - Hospitalist Service       Date of Admission:  8/20/2019  Assessment & Plan        Megha Campbell is a 54-year-old female with a past medical history significant for uncomplicated asthma, history of metastatic breast cancer, status post surgery and radiation therapy, malignant pleural effusion, chronic lymphedema, obesity, major depressive disorder with anxiety and recently diagnosed right-sided pneumonia, who is being registered to observation due to suspected exacerbation of congestive heart failure or fluid overload.     Metastatic breast cancer lower quadrant L breast Initially stage III, triple-negative left-sided breast cancer diagnosed in 11/2011. S/p partial left mastectomy and axillary node dissection and radiation. Initially, the patient declined chemotherapy, both neoadjuvant and adjuvant.    Malignant pleural effusion - loculated left pleural effusion on CT 8/8 and rpt CT on 8/16. Prominent mediastinal lymph nodes. S/p thoracentesis on 8/9 1000 ml, confirmed metastatic adenocarcinoma consistent with breast primary.  Only \"trace fluid\" with attempt at U/S thoracentesis on 8/16.  New pulmonary infiltrate, possible CAP on 8/16 CT - Treated for CAP at last admission, but no fever, leukocytosis, pro calcitonin was less than 0.05. Had cough and treated with ceftriaxone and azithromycin.   Suspected fluid overload with HF with unknown systolic fxn - elevated proBNP of 3584. (86 earlier this month) , Presents with orthopnea and PND, swelling of BLE, weights wildly variable.   * CXR  at admission shows loculated appearing L pleural effusion, unchanged  with unchanged mediastinal lymphadenopathy. Also not fluid in the R fissure with subtle interstitial infiltrates. Patient remains afebrile, nl WBC    - Continue 60 mg IV Lasix 3 times daily x 2 more doses, excellent UOP and Cr stable on 08/21/19.   - Start potassium 10 meq BID + protocol   - Echo pending.   - " Daily wts: up today, widely varible  - 2 gram sodium diet.   - Treated with ceftriaxone + azithromycin 8/16 to 8/19 and continues on Levaquin, s/p 6 days of antibiotics. PNA remains questionable at this point. Starting steroids for Asthma, so hold further dose of levaquin. Will review procal and determine whether to continue abx tomorrow.   - Follow BCx NGTD  - Attempt U/S thoracentesis as this is likely big contributor to ongoing sx.   - [facility]I consulted and I appreciate their input. If thoracentesis is unsuccessful, should we ask thoracic surgery to see? Any further recommendation regarding mgt of current symptoms from cancer.     Asthma, with acute exacerbation:  - Patient has audible diffuse wheezes and increased cough for several days, similar to prior exacerbations of asthma.   - Start atrovent nebs QID + xopenex TID on 08/21/19, albuterol PRN  - Start prednisone 40 mg on 08/21/19 x 5 doses. This has been very helpful in the past.   - Avoid scheduled albuterol due to tachycardia.   - RCAT  - Incentive spirometer and Acapella valve and start Mucinex 1200 mg 2 times daily.  We will continue with as-needed albuterol, Xopenex nebulizing therapies, and monitor O2 saturations.     HTN  Persistent Sinus Tachycardia:  Persistent tachycardia in the 110s noted during last hospitalization and discharged at 120. EKG shows sinus tachycardia with low voltage.  TSH nl, O2 97% on RA, BP elevated, Hgb ~ 11   - Echo as above.   - Start coreg 3.125 mg BID.     Back Pain, related to cancer - Lumbar and thoracic spine CTs were performed and were negative for metastatic disease. Has outpatient followup established with Dr. Winston of [facility].   - Continues on PTA oral Dilaudid PRN and lidocaine patches.    - IV dilaudid available BID PRN, but can have more frequent doses today 08/21/19 after paracentesis if needed.     Morbid Obesity:  S/p gastric bypass in the past.  Currently, BMI is noted to be 35.43.  The patient plans to follow  up with primary care provider.     Chronic lymphedema:  At time of discharge yesterday, patient had outpatient referral sent for lymphedema wrapping and this should be continued.  We will monitor during this stay.   L > R LE swelling; No associated pain or redness.   - Check U/S.     Depression/anxiety:    - Continue PTA Wellbutrin, clonazepam and Zoloft will be continued.  - Stopped PRN ativan.         Diet: 2 Gram Sodium Diet No Caffeine for 24 hours (once tests completed, may have caffeine)    DVT Prophylaxis: Enoxaparin (Lovenox) SQ  High Catheter: not present  Code Status: Full Code      Disposition Plan   Expected discharge: 2 - 3 days, recommended to prior living arrangement once we have improvement in symptoms and completed evaluation for sinus tachycardia, and effusion. .  Entered: Cally Stallworth MD 08/21/2019, 11:38 AM       The patient's care was discussed with the Patient.    Cally Stallworth MD  Hospitalist Service  [facility]    ______________________________________________________________________    Interval History   Patient continues to have significant SOB, excellent diuresis on lasix. Continues with cough and notes wheezing.     Data reviewed today: I reviewed all medications, new labs and imaging results over the last 24 hours. I personally reviewed the EKG tracing showing as above and the chest x-ray image(s) showing as above.    Physical Exam   Vital Signs: Temp: 97.6  F (36.4  C) Temp src: Oral BP: 100/70   Heart Rate: 117 Resp: 20 SpO2: 97 % O2 Device: None (Room air) Oxygen Delivery: 2 LPM  Weight: 246 lbs 9.6 oz  Constitutional:  NAD,   Neuropsyche:  alert and oriented, answers questions appropriately.   Respiratory:  Appears mild dyspnea, marked decreased air exchange, scattered wheezes,.   Cardiovascular: Regular rate and rhythm, tachy, 1-2+ edema, L > R  GI: soft, NT/ND, BS normal  Skin/Integumen:  No acute rash, bruising or sign of bleeding.       Data   Recent Labs    Lab 08/21/19  0739 08/20/19  0654 08/19/19  0659 08/19/19  0006 08/17/19  0715   WBC  --  9.0 7.6  --  9.7   HGB  --  11.2* 11.3*  --  10.5*   MCV  --  79 79  --  81   PLT  --  284 271  --  239    136  --   --  139   POTASSIUM 3.3* 3.5  --   --  4.1   CHLORIDE 96 100  --   --  108   CO2 28 28  --   --  26   BUN 10 7  --   --  7   CR 0.70 0.60  --  0.69 0.60   ANIONGAP 9 8  --   --  5   PABLO 9.0 8.6  --   --  8.5   GLC 99 100*  --   --  97   PROTTOTAL 8.0  --   --   --   --      No results found for this or any previous visit (from the past 24 hour(s)).  Medications       buPROPion  150 mg Oral QAM     carvedilol  3.125 mg Oral BID w/meals     clonazePAM  2 mg Oral At Bedtime     enoxaparin  40 mg Subcutaneous Q24H     furosemide  60 mg Intravenous TID     guaiFENesin  1,200 mg Oral BID     ipratropium  0.5 mg Nebulization 4x daily     levalbuterol  0.63 mg Nebulization TID     Lidocaine  2 patch Transdermal Q24H     lidocaine   Transdermal Q8H     lidocaine   Transdermal Q24h     potassium chloride ER  10 mEq Oral BID     predniSONE  40 mg Oral Daily     sertraline  150 mg Oral Daily     sodium chloride (PF)  3 mL Intracatheter Q8H

## 2019-08-21 NOTE — CONSULTS
[facility] Consultation      Megha Campbell MRN# [id number]   YOB: 1965 Age: 54 year old   Date of Admission: 8/20/2019  Requesting physician:  Dr. Stallworth   Reason for consult: Recurring admissions for symptoms related to cancer      Primary Oncologist: Dr. Winston         Assessment and Plan:   Megha Campbell is a 54 year old female who was admitted on 8/20/2019.      1. Stage III ( T3 N2a M0) triple negative left sided breast cancer  -diagnosis 11/2018  -[id number] stage III with 8 cm tumor and 5 positive lymph nodes, extranodal extension, LVI  -s/p lumpectomy with re-excision 12/2018   -s/p radiation 2/4/19-4/1/19  -declined chemotherapy (both neoadjuvant or adjuvant)  -CT 8/8/19 revealed a large left pleural effusion  -thoracentesis 8/9/19 confirmed metastatic adenocarcinoma, ER/MA negative, consistent with breast primary  -CT thoracic and lumbar spine shows no evidence of metastatic disease  -outpatient PET/CT being scheduled  -follow up with Dr. Winston with PET/CT results for discussion about systemic therapies     2. Recurrent pleural Effusion  -malignant cytology positive on 8/9/2019  -CXR 8/20 showing loculated left pleural effusion  -proBNP elevated and echo results pending   -suspect more likely recurrent malignancy related effusion than related to heart failure  -if exudative consult Dr. Rosado for consideration for VATS with pleurodesis      3. SOB  -related to pleural effusion/COPD exacerbation/possible heart failure  -reports improvement following thoracentesis  -continue O2, nebs, steroids, incentive spirometry     4. Hematology  -h/o B12 and iron deficiency post gastrectomy  -[employer]      5.  Back pain  -CT thoracic and lumbar spine showing no metastatic disease  -lidocaine patch  -could consider adding long acting preparation if needed  -dilaudid PRN     DVT Prophylaxis: Enoxaparin (Lovenox) SQ  Code Status: Full Code             Chief Complaint:   Shortness of  "Breath           History of Present Illness:   Megha Campbell is a 54 year old lady who was re-admitted for worsening shortness of breat.  She was discharged with similar symptoms on 2019.          Physical Exam:   Vitals were reviewed  Blood pressure 118/67, pulse 117, temperature 98  F (36.7  C), temperature source Oral, resp. rate 18, height 1.6 m (5' 3\"), weight 111.9 kg (246 lb 9.6 oz), last menstrual period 2017, SpO2 96 %, not currently breastfeeding.  Temperatures:  Current - Temp: 98  F (36.7  C); Max - Temp  Av  F (36.7  C)  Min: 97.6  F (36.4  C)  Max: 98.8  F (37.1  C)  Respiration range: Resp  Av.8  Min: 18  Max: 22  Pulse range: No data recorded  Blood pressure range: Systolic (24hrs), Av , Min:97 , Max:182   ; Diastolic (24hrs), Av, Min:63, Max:100    Pulse oximetry range: SpO2  Av.9 %  Min: 92 %  Max: 99 %    Intake/Output Summary (Last 24 hours) at 2019 1545  Last data filed at 2019 1500  Gross per 24 hour   Intake 1406 ml   Output 2050 ml   Net -644 ml       GENERAL: No acute distress.  SKIN: No rashes or jaundice.  HEENT: Normocephalic, atraumatic. Eyes anicteric. Oropharynx is clear.  LYMPH: No palpable lymphadenopathy in the cervical, supraclavicular, axillary, or inguinal regions.  HEART: Regular rate and rhythm with no murmurs.  LUNGS: diminished breath sounds left side.  Extensive rhonchi  ABDOMEN: Soft, nontender, nondistended with no palpable hepatosplenomegaly.  EXTREMITIES: bilateral nonpitting leg edema (L>R)  MUSCULOSKELETAL: No pain to percussion over entire spine.  MENTAL: Alert and oriented to person, place, and time.  NEURO: no focal motor or sensory deficits.            Past Medical History:   I have reviewed this patient's past medical history  Past Medical History:   Diagnosis Date     Breast cancer in female (H)     breast ca     Depressive disorder      Obese      Uncomplicated asthma              Past Surgical History:   I have " reviewed this patient's past surgical history  Past Surgical History:   Procedure Laterality Date     BIOPSY BREAST Left 12/7/2018    Procedure: RE-EXCISION LEFT BREAST INFERIOR MARGIN, ASPIRATION OF AXILLARY SEROMA;  Surgeon: Lesly Aponte MD;  Location: [facility] OR     GASTRIC BYPASS  2004    abdominal plasty, and scar tissue removal     LUMPECTOMY BREAST WITH SEED LOCALIZATION Left 11/21/2018    Procedure: SEED LOCALIZED LEFT BREAST LUMPECTOMY WITH LEFT AXILLARY LYMPH NODE DISSECTION;  Surgeon: Lesly Aponte MD;  Location:  OR               Social History:   I have reviewed this patient's social history  Social History     Tobacco Use     Smoking status: Never Smoker     Smokeless tobacco: Never Used   Substance Use Topics     Alcohol use: Yes     Comment: occ             Family History:   I have reviewed this patient's family history  Family History   Problem Relation Age of Onset     Breast Cancer Mother              Allergies:     Allergies   Allergen Reactions     Hydrocodone-Acetaminophen Itching     Patient states she is able to take it with Benadryl.       Oxycodone Itching and Rash             Medications:   I have reviewed this patient's current medications  Medications Prior to Admission   Medication Sig Dispense Refill Last Dose     albuterol (PROAIR HFA/PROVENTIL HFA/VENTOLIN HFA) 108 (90 Base) MCG/ACT inhaler Inhale 1-2 puffs into the lungs every 6 hours as needed for shortness of breath / dyspnea or wheezing 1 Inhaler 0  at prn     albuterol (PROVENTIL) (2.5 MG/3ML) 0.083% neb solution Take 2.5 mg by nebulization every 4 hours as needed for shortness of breath / dyspnea or wheezing    at prn     buPROPion (WELLBUTRIN XL) 150 MG 24 hr tablet Take 150 mg by mouth every morning   8/19/2019 at am     clonazePAM (KLONOPIN) 1 MG tablet Take 2 mg by mouth At Bedtime   8/19/2019 at hs     cyclobenzaprine (FLEXERIL) 10 MG tablet Take 1 tablet (10 mg) by mouth 3 times daily as needed for muscle spasms 30  tablet 0  at prn     diphenhydrAMINE (BENADRYL) 25 MG tablet Take 1-2 tablets (25-50 mg) by mouth every 6 hours as needed for itching or allergies 60 tablet 0  at prn     HYDROmorphone (DILAUDID) 2 MG tablet Take 1 tablet (2 mg) by mouth every 3 hours as needed for moderate to severe pain 15 tablet 0  at prn     ibuprofen (ADVIL/MOTRIN) 800 MG tablet Take 1 tablet (800 mg) by mouth every 8 hours as needed for moderate pain 20 tablet 0  at prn     levalbuterol (XOPENEX) 0.31 MG/3ML neb solution Take 3 mLs (0.31 mg) by nebulization every 8 hours as needed for wheezing or shortness of breath / dyspnea 30 mL 0  at prn     levofloxacin (LEVAQUIN) 500 MG tablet Take 1 tablet (500 mg) by mouth daily for 4 days 4 tablet 0  at not started yet     Lidocaine (LIDOCARE) 4 % Patch Place 2 patches onto the skin every 24 hours To prevent lidocaine toxicity, patient should be patch free for 12 hrs daily. 30 patch 0  at Not in place     LORazepam (ATIVAN) 0.5 MG tablet Take 1 tablet (0.5 mg) by mouth every 8 hours as needed 10 tablet 0  at prn     sertraline (ZOLOFT) 100 MG tablet Take 150 mg by mouth daily   8/19/2019 at am     traMADol (ULTRAM) 50 MG tablet Take 1 tablet (50 mg) by mouth every 6 hours as needed for pain 5 tablet 0  at prn     Current Facility-Administered Medications Ordered in Epic   Medication Dose Route Frequency Last Rate Last Dose     albuterol (PROVENTIL) neb solution 2.5 mg  2.5 mg Nebulization Q2H PRN         buPROPion (WELLBUTRIN XL) 24 hr tablet 150 mg  150 mg Oral QAM   150 mg at 08/21/19 0928     carvedilol (COREG) tablet 3.125 mg  3.125 mg Oral BID w/meals   3.125 mg at 08/21/19 1452     clonazePAM (klonoPIN) tablet 2 mg  2 mg Oral At Bedtime   2 mg at 08/20/19 2043     cyclobenzaprine (FLEXERIL) tablet 10 mg  10 mg Oral TID PRN         diphenhydrAMINE (BENADRYL) capsule 25-50 mg  25-50 mg Oral Q6H PRN         enoxaparin (LOVENOX) injection 40 mg  40 mg Subcutaneous Q12H         furosemide (LASIX)  injection 60 mg  60 mg Intravenous TID         guaiFENesin (MUCINEX) 12 hr tablet 1,200 mg  1,200 mg Oral BID   1,200 mg at 08/21/19 0926     hydrALAZINE (APRESOLINE) tablet 10 mg  10 mg Oral 4x Daily PRN         HYDROmorphone (DILAUDID) tablet 2 mg  2 mg Oral Q3H PRN   2 mg at 08/20/19 2251     HYDROmorphone (PF) (DILAUDID) injection 0.3 mg  0.3 mg Intravenous BID PRN         ibuprofen (ADVIL/MOTRIN) tablet 800 mg  800 mg Oral Q8H PRN         ipratropium (ATROVENT) 0.02 % neb solution 0.5 mg  0.5 mg Nebulization 4x daily   0.5 mg at 08/21/19 1531     labetalol (NORMODYNE/TRANDATE) injection 10 mg  10 mg Intravenous Q6H PRN         levalbuterol (XOPENEX CONC) neb solution 0.63 mg  0.63 mg Nebulization TID   0.63 mg at 08/21/19 1531     Lidocaine (LIDOCARE) 4 % Patch 2 patch  2 patch Transdermal Q24H   2 patch at 08/21/19 0928     lidocaine (LMX4) cream   Topical Q1H PRN         lidocaine 1 % 0.1-1 mL  0.1-1 mL Other Q1H PRN         lidocaine patch in PLACE   Transdermal Q8H         lidocaine patch REMOVAL   Transdermal Q24h   2 each at 08/20/19 2045     May take regular AM medications except those listed below.   Does not apply Continuous PRN         naloxone (NARCAN) injection 0.1-0.4 mg  0.1-0.4 mg Intravenous Q2 Min PRN         potassium chloride (KLOR-CON) Packet 20-40 mEq  20-40 mEq Oral or Feeding Tube Q2H PRN         potassium chloride 10 mEq in 100 mL intermittent infusion with 10 mg lidocaine  10 mEq Intravenous Q1H PRN         potassium chloride 10 mEq in 100 mL sterile water intermittent infusion (premix)  10 mEq Intravenous Q1H PRN         potassium chloride 20 mEq in 50 mL intermittent infusion  20 mEq Intravenous Q1H PRN         potassium chloride ER (K-DUR/KLOR-CON M) CR tablet 10 mEq  10 mEq Oral BID   10 mEq at 08/21/19 1452     potassium chloride ER (K-DUR/KLOR-CON M) CR tablet 20-40 mEq  20-40 mEq Oral Q2H PRN   20 mEq at 08/21/19 1453     predniSONE (DELTASONE) tablet 40 mg  40 mg Oral Daily   40  mg at 19 1452     sertraline (ZOLOFT) tablet 150 mg  150 mg Oral Daily   150 mg at 19 0926     sodium chloride (PF) 0.9% PF flush 3 mL  3 mL Intracatheter q1 min prn         sodium chloride (PF) 0.9% PF flush 3 mL  3 mL Intracatheter Q8H   3 mL at 19 0929     traMADol (ULTRAM) tablet 50 mg  50 mg Oral Q6H PRN         No current [facility]-ordered outpatient medications on file.             Review of Systems:   The 10 point Review of Systems is negative other than noted in the HPI.            Data:   All laboratory data reviewed  Results for orders placed or performed during the hospital encounter of 19 (from the past 24 hour(s))   Lactic acid level STAT for sepsis protocol   Result Value Ref Range    Lactate for Sepsis Protocol 1.1 0.7 - 2.0 mmol/L   Basic metabolic panel   Result Value Ref Range    Sodium 133 133 - 144 mmol/L    Potassium 3.3 (L) 3.4 - 5.3 mmol/L    Chloride 96 94 - 109 mmol/L    Carbon Dioxide 28 20 - 32 mmol/L    Anion Gap 9 3 - 14 mmol/L    Glucose 99 70 - 99 mg/dL    Urea Nitrogen 10 7 - 30 mg/dL    Creatinine 0.70 0.52 - 1.04 mg/dL    GFR Estimate >90 >60 mL/min/[1.73_m2]    GFR Estimate If Black >90 >60 mL/min/[1.73_m2]    Calcium 9.0 8.5 - 10.1 mg/dL   Lactate Dehydrogenase   Result Value Ref Range    Lactate Dehydrogenase 377 (H) 81 - 234 U/L   Protein total   Result Value Ref Range    Protein Total 8.0 6.8 - 8.8 g/dL   Echocardiogram Complete    Narrative    [id number]  [id number]  [id number]  [id number]^PAUL^JAMIL           [facility]  Echocardiography Laboratory  [address] [place] [address]        Name: JONATHAN RIGGINS  MRN: [id number]  : 1965  Study Date: 2019 08:18 AM  Age: 54 yrs  Gender: Female  Patient Location: [facility]  Reason For Study: CHF  Ordering Physician: PAUL BORDEN  Referring Physician: Briseyda Lock MD  Performed By: VAL Fofana     BSA: 2.1 m2  Height: 63 in  Weight: 246 lb  HR: 118  BP:  145/82 mmHg  _____________________________________________________________________________  __        Procedure  Complete Portable Echo Adult. Optison (NDC #5146-8802) given intravenously.  _____________________________________________________________________________  __        Interpretation Summary     1. Technically very difficult study despite the use of contrast to enhance  endocardial definition.  2. Hyperdynamic left ventricular function. LV ventricular chamber small in  size and appears underfilled. The visual ejection fraction is estimated at  >70%.  3. No regional wall motion abnormalities noted.  4. Valves are poorly visualized; no gross valvular pathology identified.  5. Small pericardial effusion  6. Large left pleural effusion.  7. No prior studies available for comparison.  _____________________________________________________________________________  __        Left Ventricle  Hyperdynamic left ventricular function. The visual ejection fraction is  estimated at >70%. Left ventricular diastolic function is not assessable. No  regional wall motion abnormalities noted.     Right Ventricle  The right ventricle is normal in structure, function and size.     Atria  Normal left atrial size. Right atrial size is normal.     Mitral Valve  The mitral valve is not well visualized. Grossly normal in appearance and  function.        Tricuspid Valve  The tricuspid valve is not well visualized. Grossly normal in appearance and  function.     Aortic Valve  The aortic valve is not well visualized. Grossly normal in appearance and  function.     Pulmonic Valve  The pulmonic valve is not well seen, but is grossly normal.     Vessels  The aortic root is normal size. IVC diameter <2.1 cm collapsing >50% with  sniff suggests a normal RA pressure of 3 mmHg.     Pericardium  Small pericardial effusion. Large left pleural effusion.        Rhythm  The rhythm was sinus  tachycardia.  _____________________________________________________________________________  __  MMode/2D Measurements & Calculations  IVSd: 0.92 cm     LVIDd: 2.9 cm  LVIDs: 2.0 cm  LVPWd: 0.82 cm  FS: 31.8 %  LV mass(C)d: 63.5 grams  LV mass(C)dI: 30.1 grams/m2  Ao root diam: 3.0 cm  LA dimension: 3.4 cm  asc Aorta Diam: 3.4 cm  LA/Ao: 1.1  LVOT diam: 2.0 cm  LVOT area: 3.1 cm2  LA Volume (BP): 25.2 ml  LA Volume Index (BP): 11.9 ml/m2  RWT: 0.57           Doppler Measurements & Calculations  MV E max roz: 69.8 cm/sec  MV A max roz: 99.0 cm/sec  MV E/A: 0.71  MV dec time: 0.14 sec  E/E' av.7  Lateral E/e': 11.7  Medial E/e': 7.6           _____________________________________________________________________________  __           Report approved by: Dee Dee Cerrato 2019 02:48 PM      Cell count with differential fluid   Result Value Ref Range    Body Fluid Analysis Source Pleural fluid     Color Fluid Brown     Appearance Fluid Cloudy     WBC Fluid Fluid contains clot(s), count is invalid /uL   Glucose fluid   Result Value Ref Range    Glucose Fluid Source Pleural fluid     Glucose Fluid 101 mg/dL   Lactate dehydrogenase fluid   Result Value Ref Range    LD Fluid Source Pleural fluid     Lactate Dehydrogenase Fluid 518 U/L   Protein fluid   Result Value Ref Range    Protein Total Fluid Source Pleural fluid     Protein Total Fluid 4.9 g/dL   US Lower Extremity Venous Duplex Left    Narrative    VENOUS ULTRASOUND LEFT LEG  2019 2:14 PM     HISTORY: L > R lower extremity swelling, breast cancer.    COMPARISON: None.    FINDINGS:  Examination of the deep veins with graded compression and  color flow Doppler with spectral wave form analysis was performed.  No  evidence for DVT in the left lower extremity.      Impression    IMPRESSION: No evidence of deep venous thrombosis.    LINDA CRAVEN MD   US Thoracentesis    Narrative    THORACENTESIS 2019 2:14 PM    HISTORY: Patient with history of  pleural effusion.    PROCEDURE/FINDINGS:  After obtaining informed consent, the patient was  positioned appropriately. The back was prepped and draped in the usual  sterile manner. Under ultrasound guidance, a Yueh needle was used to  access the pleural space. An prominent ultrasound image was saved to  document needle position. Thoracentesis was performed. Approximately  700 mL yellow serous fluid was aspirated out.     The patient tolerated the procedure well. There were no immediate  postprocedure complications. The patient's vital signs were monitored  by radiology nursing staff under my supervision and remained stable  throughout the study.      Impression    IMPRESSION:  Thoracentesis performed.    LINDA CRAVEN MD   Procalcitonin   Result Value Ref Range    Procalcitonin 0.10 ng/ml

## 2019-08-21 NOTE — TELEPHONE ENCOUNTER
Chief Complaint: Mild Intermittent Asthma Without Complication, Lymphedema,MON 19-AUG-2019,ed/ip  2 / 1    [phone] (home)

## 2019-08-21 NOTE — PROGRESS NOTES
Lenny(Flutter valve) QID ordered: Pt was able to understand and perform both flutter valve and incentive spirometer.    ml. Flutter valve 10 repetitions at level 5 done.   Jh  RT

## 2019-08-22 NOTE — PLAN OF CARE
DATE & TIME: 8/22 Day       Cognitive Concerns/ Orientation : A&Ox4   BEHAVIOR & AGGRESSION TOOL COLOR: Green  CIWA SCORE: NA  ABNL VS/O2: VSS on RA except tachy in low 100s.  MOBILITY: Independent  PAIN MANAGMENT: Lidocaine patches in place, Ibuprofen X1 for cramping pain.  DIET: Low sodium, no caffeine.  BOWEL/BLADDER: Continent, uses BR.  ABNL LAB/BG: K 3.2, PO replacement given.  DRAIN/DEVICES: PIV SL  TELEMETRY RHYTHM: ST  SKIN: WNL, BLE edema  TESTS/PROCEDURES: Order for IR port placement, still to be done.  D/C DAY/GOALS/PLACE: Pending.  OTHER IMPORTANT INFO: SOB and BLE edema improved. Plan for pt to start chemo. Thoracic surgery consult in to see pt shortly.

## 2019-08-22 NOTE — PLAN OF CARE
DATE & TIME: 8/22/19 [id number]    Cognitive Concerns/ Orientation : A&O x4   BEHAVIOR & AGGRESSION TOOL COLOR: Green  CIWA SCORE: N/A   ABNL VS/O2: VSS on RA-intermittent tachycardia  MOBILITY: Independent  PAIN MANAGMENT: PRN Tramadol  DIET: Regular diet.Tolerating well.   BOWEL/BLADDER: Continent. Menses  ABNL LAB/BG: K: 3.2. Replaced.  DRAIN/DEVICES: PIV SL.  TELEMETRY RHYTHM: NSR.  SKIN: Puncture site to left upper back covered with bandage that is CDI.  TESTS/PROCEDURES: VATS tomorrow morning. Port placement tomorrow afternoon. NPO at midnight for both.  D/C DAY/GOALS/PLACE: TBD  OTHER IMPORTANT INFO: A&Ox4. VSS on RA. C/o moderate pain to back and abdomen. Managed with PRN Tramadol. Lidocaine patch to back in place. 1 episode of nausea this shift. PRN Zofran administered. Effective. K+: 3.2. Replaced this shift. Recheck 2100 and with AM draw ordered. Regular diet. Tolerating well. PIV SL. LS clear. Puncture site to left upper back from thoracentesis covered with bandage that remains CDI. Tele NSR. BS active, + Flatus. BM this shift. Voiding adequately in Br. Progressing toward plan of care. VATS to be done and port to be placed tomorrow. NPO at midnight.

## 2019-08-22 NOTE — PROGRESS NOTES
MD Notification    Notified Person: MD    Notified Person Name: Basim    Notification Date/Time: 8/22/19 1640    Notification Interaction: Paged    Purpose of Notification: Pt is c/o nausea. Wondering if she could have a PRN ordered for relief.    Orders Received: PRNs ordered.    Comments:

## 2019-08-22 NOTE — CONSULTS
Thoracic Surgery Consult Note:    Megha Campbell  1965   [id number]    Date of Admission: 8/20/2019  6:37 AM  Date of Consult: 8/22/2019     CC: Shortness of breath [R06.02]    Referring Provider: Dr. Cally Stallworth    Consulting Thoracic Surgeon: Dr. Zackary Rosado    ASSESSMENT/PLAN:   1) Recurrent left malignant pleural effusion due to Stage III triple negative left-sided breast cancer. Exudative by special diagnostics on 8/21/19 pleural fluid. Echo on 8/21 showed EF of 70%, small pericardial effusion and large left pleural effusion. Cytology of left pleural fluid on 8/09/19 positive for metastatic adenocarcinoma. Has undergone two left thoracenteses with 1000 ml and 700 ml drained and good symptomatic relief. Reviewed CT scan and CXR imaging two separate times with Dr. Rosado. Patient very much in favor of VATS talc pleurodesis. Plan for left video-assisted thoracic surgery and talc pleurodesis tomorrow morning with Dr. Rosado around 11:00am.   2) Need for implanted port-a-cath for chemo infusion for Stage III breast CA: needs to be performed in IR (sounds like it will be tomorrow after VATS)    History of the Present Illness: Pt is a 54 year old female with past medical history significant for asthma, depression and anxiety, past hx PNA, Stage III left-sided breast cancer, s/p lumpectomy and radiation, admitted this hospitalization for the treatment of Shortness of breath [R06.02]. Megha has not received any chemotherapy thus far in her cancer treatment course. She has undergone two left thoracenteses that have significantly improved her dyspnea. 8/09 thoracentesis yielded 1000cc cholo fluid with positive cytology for metastatic adenocarcinoma and 8/21 thoracentesis yielded 700 ml yellow fluid. Imaging demonstrates a partially loculated large left pleural effusion. No hemoptysis, recent fevers. Does have some recent wheezing for which she takes inhalers and nebulization treatment.     Thoracic Surgery Consult  "was requested for consideration of left VATS talc pleurodesis and possible port-a-cath placement.  Discussed surgical intervention versus placement of PleurX catheter to address patient's recurrent effusions and dyspnea. Patient in favor of surgical approach. Reviewed all risks, benefits, alternatives and expected post-op course of VATS as well as possible diagnostic biopsies. Discussed how goal of surgery is not curative but rather palliative. Patient would like to proceed so will be scheduled with Dr. Rosado tomorrow morning for a left VATS, talc pleurodesis. Lovenox will be discontinued and patient needs to be NPO after midnight.     ROS: A 12-point review of systems was performed and negative except as noted in the HPI above.     Past Medical History:  Past Medical History:   Diagnosis Date     Breast cancer in female (H)     breast ca     Depressive disorder      Obese      Uncomplicated asthma         Past Surgical History:  Past Surgical History:   Procedure Laterality Date     BIOPSY BREAST Left 12/7/2018    Procedure: RE-EXCISION LEFT BREAST INFERIOR MARGIN, ASPIRATION OF AXILLARY SEROMA;  Surgeon: Lesly Aponte MD;  Location:  OR     GASTRIC BYPASS  2004    abdominal plasty, and scar tissue removal     LUMPECTOMY BREAST WITH SEED LOCALIZATION Left 11/21/2018    Procedure: SEED LOCALIZED LEFT BREAST LUMPECTOMY WITH LEFT AXILLARY LYMPH NODE DISSECTION;  Surgeon: Lesly Aponte MD;  Location:  OR       Family History:  Family History   Problem Relation Age of Onset     Breast Cancer Mother        Medications:  No current outpatient medications on file.       S: Lying comfortably in bed on room air. Clear historian. A bit teary due to her cancer diagnosis and recently losing her brother to prostate cancer.     O: /88 (BP Location: Right arm)   Pulse 103   Temp 97.6  F (36.4  C) (Oral)   Resp 16   Ht 1.6 m (5' 3\")   Wt 113.4 kg (250 lb)   LMP 12/07/2017   SpO2 95%   BMI 44.29 kg/m   on " room air    Imaging:  Recent Results (from the past 48 hour(s))   US Lower Extremity Venous Duplex Left    Narrative    VENOUS ULTRASOUND LEFT LEG  8/21/2019 2:14 PM     HISTORY: L > R lower extremity swelling, breast cancer.    COMPARISON: None.    FINDINGS:  Examination of the deep veins with graded compression and  color flow Doppler with spectral wave form analysis was performed.  No  evidence for DVT in the left lower extremity.      Impression    IMPRESSION: No evidence of deep venous thrombosis.    LINDA CRAVEN MD   US Thoracentesis    Narrative    THORACENTESIS 8/21/2019 2:14 PM    HISTORY: Patient with history of pleural effusion.    PROCEDURE/FINDINGS:  After obtaining informed consent, the patient was  positioned appropriately. The back was prepped and draped in the usual  sterile manner. Under ultrasound guidance, a [employer] needle was used to  access the pleural space. An prominent ultrasound image was saved to  document needle position. Thoracentesis was performed. Approximately  700 mL yellow serous fluid was aspirated out.     The patient tolerated the procedure well. There were no immediate  postprocedure complications. The patient's vital signs were monitored  by radiology nursing staff under my supervision and remained stable  throughout the study.      Impression    IMPRESSION:  Thoracentesis performed.    LINDA CRAVEN MD     CT scans 8/8/19 and 8/16/19: both scans demonstrate large left pleural effusion with associated atelectasis, mediastinal and hilar adenopathy, patchy nodular densities RUL and RLL (increased from 8/8 scan to 8/16 scan), no PE.     All recent imaging personally reviewed     Labs: lactate for sepsis 1.1, procalcitonin 0.10, WBC 9.0, INR 0.97 on 8/14/19, special diagnostics on left pleural fluid from 8/21 show exudative fluid, cytology from 8/9 left pleural fluid is positive for metastatic adenocarcinoma, ER/AK negative consistent with breast primary    Discussed the above  plan of care with pt, family and RN today. Orders left.  We will continue to follow with you.  Thank you for allowing us to participate in the care of this patient and please call if there are further questions or concerns.    Time dedicated to Consultation: 50 minutes were spent at bedside, floor and unit with greater than 50% of the time spent on patient counseling and education, radiology review and coordination of care with personal discussion with Dr. Stallworth, Dr. Garcia and Dr. Rosado as well as patient and RN.    Lourdes Fuentes PA-C with Dr. Zackary Rosado  [facility]  Cell [phone]

## 2019-08-22 NOTE — PROGRESS NOTES
Thoracic Surgery:    Consult received and notes-- will see patient this early afternoon    Briefly reviewed thoracenteses, labs, imaging-- looks like a recurrent exudative (malignant) left pleural effusion that is now partially loculated. Question whether her dyspnea is primarily due to this or more likely multifactorial in nature. Discussed briefly with Dr. Rosado and reviewed CT scan-- no indication for VATS talc pleurodesis due to multifactorial dyspnea, loculated/partially trapped lung... Will see patient and discuss with her later today    Lourdes Fuentes PA-C with Dr. Zackary Rosado  [facility]  Cell [phone]

## 2019-08-22 NOTE — PROGRESS NOTES
"[facility]    Medicine Progress Note - Hospitalist Service       Date of Admission:  8/20/2019  Assessment & Plan        Megha Campbell is a 54-year-old female with a past medical history significant for uncomplicated asthma, history of metastatic breast cancer, status post surgery and radiation therapy, malignant pleural effusion, chronic lymphedema, obesity, major depressive disorder with anxiety and recently diagnosed right-sided pneumonia, who is being registered to observation due to suspected exacerbation of congestive heart failure or fluid overload.     Metastatic breast cancer lower quadrant L breast Initially stage III, triple-negative left-sided breast cancer diagnosed in 11/2011. S/p partial left mastectomy and axillary node dissection and radiation. Initially, the patient declined chemotherapy, both neoadjuvant and adjuvant.    Malignant pleural effusion - loculated left pleural effusion on CT 8/8 and rpt CT on 8/16. Prominent mediastinal lymph nodes. S/p thoracentesis on 8/9 1000 ml, confirmed metastatic adenocarcinoma consistent with breast primary.  Only \"trace fluid\" with attempt at U/S thoracentesis on 8/16.  * CXR  at admission shows loculated appearing L pleural effusion, unchanged  with unchanged mediastinal lymphadenopathy. Also not fluid in the R fissure with subtle interstitial infiltrates. Patient remains afebrile, nl WBC  * S/p Thoracentesis on 08/21/19, 700 ml of exudative fluid with significant improvement in symptoms.     - On 08/22/19 feeling much better, breathing is now \"normal again.\"   - Appreciate input from [facility]. Plan for chemotherapy to start next week. IR to place port.  - Appreciate thoracic surgery consult, plan for VATS with pleurodesis.     Suspected fluid overload, with hyperdynamic heart on Echo- elevated proBNP of 3584. (86 earlier this month) , Presents with orthopnea and PND, swelling of BLE, weights wildly variable.   * Echo shows hyperdynamic LV, " technically difficult study. Small pericardial effusion, Large left pleural effusion.   - S/p IV diuresis with lasix with excellent output, Cr stable. Wt actually up.   - Given echo findings hold further doses. - Start potassium 10 meq BID + protocol   - Follow BCx NGTD    New pulmonary infiltrate, possible CAP on 8/16 CT - Treated for CAP at last admission, but no fever, leukocytosis, pro calcitonin was less than 0.05. Had cough and treated with ceftriaxone and azithromycin.   * Treated with ceftriaxone + azithromycin 8/16 to 8/19 and continued on Levaquin, s/p 6 days of antibiotics. PNA remains questionable. Procal 0.1 on 8/21 > stopped further antibiotics on 8/22.     Asthma, with acute exacerbation:  - Patient has audible diffuse wheezes and increased cough for several days, similar to prior exacerbations of asthma.   - Start atrovent nebs QID + xopenex TID on 08/21/19, albuterol PRN  - Start prednisone 40 mg on 08/21/19. Start taper post-op.   - Wheezing resolved on 08/22/19.    - Avoid scheduled albuterol due to tachycardia.   - RCAT  - Incentive spirometer and Acapella valve and start Mucinex 1200 mg 2 times daily.  We will continue with as-needed albuterol, Xopenex nebulizing therapies, and monitor O2 saturations.     HTN  Persistent Sinus Tachycardia, RESOLVED:  Persistent tachycardia in the 110s noted during last hospitalization and discharged at 120. EKG shows sinus tachycardia with low voltage.  TSH nl, O2 97% on RA, BP elevated, Hgb ~ 11   - Echo as above.   - Started coreg 3.125 mg BID on 8/22 for BP.      Back Pain, related to cancer - Lumbar and thoracic spine CTs were performed and were negative for metastatic disease. Has outpatient followup established with Dr. Winston of [facility].   - Continues on PTA oral Dilaudid PRN and lidocaine patches.    - IV dilaudid available prn.   - Ibuprofen and tylenol available for pain.   - Discussed non-medical means of pain control, meditation.     Morbid Obesity:   S/p gastric bypass in the past.  Currently, BMI is noted to be 35.43.  The patient plans to follow up with primary care provider.     Chronic lymphedema:  At prior discharge on 8/19, patient had outpatient referral sent for lymphedema wrapping and this should be continued.  We will monitor during this stay.     L > R LE swelling; No associated pain or redness. U/S negative for DVT.     Depression/anxiety with insomnia.   - Continue PTA Wellbutrin, clonazepam and Zoloft will be continued.  - Stopped PRN ativan.   - Discussed insomnia on 08/22/19. Discussed relaxation techniques bedtime routine. Non-medical means of treating insomnia.     Diet: 2 Gram Sodium Diet No Caffeine for 24 hours (once tests completed, may have caffeine)  NPO per Anesthesia Guidelines for Procedure/Surgery Except for: Meds  NPO for Medical/Clinical Reasons Except for: Meds    DVT Prophylaxis: Enoxaparin (Lovenox) SQ  High Catheter: not present  Code Status: Full Code      Disposition Plan   Expected discharge: 2 - 3 days, recommended to prior living arrangement once we have improvement in symptoms and completed evaluation for sinus tachycardia, and effusion. .  Entered: Cally Stallworth MD 08/22/2019, 5:24 PM       The patient's care was discussed with the Patient.    Cally Stallworth MD  Hospitalist Service  [facility]    ______________________________________________________________________    Interval History   Patient continues to have significant SOB, excellent diuresis on lasix. Continues with cough and notes wheezing.     Data reviewed today: I reviewed all medications, new labs and imaging results over the last 24 hours. I personally reviewed the EKG tracing showing as above and the chest x-ray image(s) showing as above.    Physical Exam   Vital Signs: Temp: 97.8  F (36.6  C) Temp src: Oral BP: 115/42 Pulse: 103 Heart Rate: 98 Resp: 16 SpO2: 95 % O2 Device: None (Room air) Oxygen Delivery: 2 LPM  Weight: 250 lbs .03  oz  Constitutional:  NAD,   Neuropsyche:  alert and oriented, answers questions appropriately.   Respiratory:  Appears mild dyspnea, marked decreased air exchange, scattered wheezes,.   Cardiovascular: Regular rate and rhythm, tachy, 1-2+ edema, L > R  GI: soft, NT/ND, BS normal  Skin/Integumen:  No acute rash, bruising or sign of bleeding.       Data   Recent Labs   Lab 08/22/19  1155 08/21/19 2009 08/21/19  0739 08/20/19  0654 08/19/19  0659  08/17/19  0715   WBC  --   --   --  9.0 7.6  --  9.7   HGB  --   --   --  11.2* 11.3*  --  10.5*   MCV  --   --   --  79 79  --  81   PLT  --   --   --  284 271  --  239     --  133 136  --   --  139   POTASSIUM 3.2* 3.8 3.3* 3.5  --   --  4.1   CHLORIDE 97  --  96 100  --   --  108   CO2 32  --  28 28  --   --  26   BUN 14  --  10 7  --   --  7   CR 0.66  --  0.70 0.60  --    < > 0.60   ANIONGAP 5  --  9 8  --   --  5   PABLO 9.3  --  9.0 8.6  --   --  8.5   *  --  99 100*  --   --  97   PROTTOTAL  --   --  8.0  --   --   --   --     < > = values in this interval not displayed.     No results found for this or any previous visit (from the past 24 hour(s)).  Medications     - MEDICATION INSTRUCTIONS -         buPROPion  150 mg Oral QAM     carvedilol  3.125 mg Oral BID w/meals     clonazePAM  2 mg Oral At Bedtime     guaiFENesin  1,200 mg Oral BID     ipratropium  0.5 mg Nebulization 4x daily     levalbuterol  0.63 mg Nebulization TID     Lidocaine  2 patch Transdermal Q24H     lidocaine   Transdermal Q8H     lidocaine   Transdermal Q24h     potassium chloride ER  10 mEq Oral BID     predniSONE  40 mg Oral Daily     sertraline  150 mg Oral Daily     sodium chloride (PF)  3 mL Intracatheter Q8H

## 2019-08-22 NOTE — PLAN OF CARE
Cognitive Concerns/ Orientation : A&Ox4- educated on new BP medication this shift  BEHAVIOR & AGGRESSION TOOL COLOR: Green  CIWA SCORE: NA  ABNL VS/O2: HTN at times, on coreg scheduled, tachy 105, on 2L PRN NC (for comfort)  MOBILITY: Independent, encouraging ambulation and IS use.  PAIN MANAGMENT: IV Dialudid available for back pain, lidocaine patches during the day, pt refusing oral dilaudid.  DIET: Low sodium, no caffeine.  BOWEL/BLADDER: Continent, menses, urinary frequency d/t lasix  ABNL LAB/BG: potassium recheck 3.8, Lactate Dehydrogenase 377 BNP 3584 yesterday  DRAIN/DEVICES: new PIV is  SL  TELEMETRY RHYTHM: Sinus tachy  SKIN: WNL, BLE edema, small incision from thoracentesis this AM on back.  TESTS/PROCEDURES: U/S LLE. Complete without evidence of DVT, ECHO, and Thoracentesis done on AM shift- 700cc fluid removed.  D/C DAY/GOALS/PLACE: Pending.  OTHER IMPORTANT INFO: SOB at times and LIND, on scheduled nebs, Hem/Onc consulted.

## 2019-08-22 NOTE — PROGRESS NOTES
[facility]    Oncology Progress Note    Date of Service (when I saw the patient): 08/22/2019     Assessment & Plan   Megha Campbell is a 54 year old female who was admitted on 8/20/2019.         1. Stage III ( T3 N2a M0) triple negative left sided breast cancer  -diagnosis 11/2018  -[id number] stage III with 8 cm tumor and 5 positive lymph nodes, extranodal extension, LVI  -s/p lumpectomy with re-excision 12/2018   -s/p radiation 2/4/19-4/1/19  -declined chemotherapy (both neoadjuvant or adjuvant)  -CT 8/8/19 revealed a large left pleural effusion  -thoracentesis 8/9/19 confirmed metastatic adenocarcinoma, ER/NV negative, consistent with breast primary  -CT thoracic and lumbar spine shows no evidence of metastatic disease  -outpatient PET/CT being scheduled  -pathology to check tumor for androgen receptors and PDL1 (ordered and pending) to determine if androgen blockade or immunotherapy may be treatment options  -prognosis and that her cancer is now metastatic and no longer curable again discussed  -discussed use of chemotherapy versus palliative cares and hospice (no other viable options)  -she has been very hesitant to consider chemotherapy but now states she will try it  -plan abraxane plus or minus immunotherapy based on pending PDL1  -follow up with Dr. Winston with PET/CT results for discussion initiation of systemic therapies after discharge     2. Recurrent pleural Effusion  -malignant cytology positive on 8/9/2019  -CXR 8/20 showing loculated left pleural effusion  -suspect  recurrent malignancy related effusion than related to heart failure  -consult Dr. Rosado for consideration for VATS with pleurodesis (and for port for IV access)     3. SOB  -related to pleural effusion/COPD exacerbation/possible heart failure  -reports improvement following thoracentesis  -continue O2, nebs, steroids, incentive spirometry  -completed antibiotics with ceftriaxone and azithromycin with further antibiotics  per hospitalist     4. Hematology  -h/o B12 and iron deficiency post gastrectomy  -Jehovah Witness       5.  Back pain  -CT thoracic and lumbar spine showing no metastatic disease  -lidocaine patch  -could consider adding long acting preparation if needed  -dilaudid PRN  -consider palliative care consult      DVT Prophylaxis: Enoxaparin (Lovenox) SQ  Code Status: Full Code    Jamaal Winston    Interval History   No change    Physical Exam   Temp: 97.6  F (36.4  C) Temp src: Oral BP: 137/71 Pulse: 103 Heart Rate: 102 Resp: 16 SpO2: 96 % O2 Device: None (Room air) Oxygen Delivery: 2 LPM  Vitals:    08/20/19 0642 08/21/19 0642 08/22/19 0635   Weight: 90.7 kg (200 lb) 111.9 kg (246 lb 9.6 oz) 113.4 kg (250 lb)     Vital Signs with Ranges  Temp:  [97.6  F (36.4  C)-98  F (36.7  C)] 97.6  F (36.4  C)  Pulse:  [100-112] 103  Heart Rate:  [102-125] 102  Resp:  [16-18] 16  BP: ()/(63-88) 137/71  SpO2:  [92 %-97 %] 96 %  I/O last 3 completed shifts:  In: 1886 [P.O.:1880; I.V.:6]  Out: 1250 [Urine:1250]    Constitutional: awake, cooperative, no acute distress  GI: soft, non-distended, non-tender, no masses palpated  Musculoskeletal: no pedal edema    Medications     - MEDICATION INSTRUCTIONS -         buPROPion  150 mg Oral QAM     carvedilol  3.125 mg Oral BID w/meals     clonazePAM  2 mg Oral At Bedtime     enoxaparin  40 mg Subcutaneous Q12H     guaiFENesin  1,200 mg Oral BID     ipratropium  0.5 mg Nebulization 4x daily     levalbuterol  0.63 mg Nebulization TID     Lidocaine  2 patch Transdermal Q24H     lidocaine   Transdermal Q8H     lidocaine   Transdermal Q24h     potassium chloride ER  10 mEq Oral BID     predniSONE  40 mg Oral Daily     sertraline  150 mg Oral Daily     sodium chloride (PF)  3 mL Intracatheter Q8H       Data   Results for orders placed or performed during the hospital encounter of 08/20/19 (from the past 24 hour(s))   Hematology & Oncology IP Consult: recurring admissions for symptoms related  to cancer.; Consultant may enter orders: Yes; Patient to be seen: Routine - within 24 hours; Requested Clinic/Group: [facility]; Requesting provider? Hospitalist ([email or web address] Sena MD     8/21/2019  4:01 PM  [place] [facility]      Megha Campbell MRN# [id number]   YOB: 1965 Age: 54 year old   Date of Admission: 8/20/2019  Requesting physician:  Dr. Stallworth   Reason for consult: Recurring admissions for symptoms related to   cancer      Primary Oncologist: Dr. Winston         Assessment and Plan:   Megha Campbell is a 54 year old female who was admitted on   8/20/2019.      1. Stage III ( T3 N2a M0) triple negative left sided breast   cancer  -diagnosis 11/2018  -[id number] stage III with 8 cm tumor and 5 positive lymph nodes,   extranodal extension, LVI  -s/p lumpectomy with re-excision 12/2018   -s/p radiation 2/4/19-4/1/19  -declined chemotherapy (both neoadjuvant or adjuvant)  -CT 8/8/19 revealed a large left pleural effusion  -thoracentesis 8/9/19 confirmed metastatic adenocarcinoma, ER/TN   negative, consistent with breast primary  -CT thoracic and lumbar spine shows no evidence of metastatic   disease  -outpatient PET/CT being scheduled  -follow up with Dr. Winston with PET/CT results for discussion   about systemic therapies     2. Recurrent pleural Effusion  -malignant cytology positive on 8/9/2019  -CXR 8/20 showing loculated left pleural effusion  -proBNP elevated and echo results pending   -suspect more likely recurrent malignancy related effusion than   related to heart failure  -if exudative consult Dr. Rosado for consideration for VATS with   pleurodesis      3. SOB  -related to pleural effusion/COPD exacerbation/possible heart   failure  -reports improvement following thoracentesis  -continue O2, nebs, steroids, incentive spirometry     4. Hematology  -h/o B12 and iron deficiency post gastrectomy  -Jehovah Witness      5.  Back pain  -CT thoracic and  "lumbar spine showing no metastatic disease  -lidocaine patch  -could consider adding long acting preparation if needed  -dilaudid PRN     DVT Prophylaxis: Enoxaparin (Lovenox) SQ  Code Status: Full Code             Chief Complaint:   Shortness of Breath           History of Present Illness:   Megha Campbell is a 54 year old lady who was re-admitted for   worsening shortness of breat.  She was discharged with similar   symptoms on 2019.          Physical Exam:   Vitals were reviewed  Blood pressure 118/67, pulse 117, temperature 98  F (36.7  C),   temperature source Oral, resp. rate 18, height 1.6 m (5' 3\"),   weight 111.9 kg (246 lb 9.6 oz), last menstrual period   2017, SpO2 96 %, not currently breastfeeding.  Temperatures:  Current - Temp: 98  F (36.7  C); Max - Temp  Av  F (36.7  C)  Min: 97.6  F (36.4  C)  Max: 98.8  F (37.1  C)  Respiration range: Resp  Av.8  Min: 18  Max: 22  Pulse range: No data recorded  Blood pressure range: Systolic (24hrs), Av , Min:97 ,   Max:182   ; Diastolic (24hrs), Av, Min:63, Max:100    Pulse oximetry range: SpO2  Av.9 %  Min: 92 %  Max: 99 %    Intake/Output Summary (Last 24 hours) at 2019 1545  Last data filed at 2019 1500  Gross per 24 hour   Intake 1406 ml   Output 2050 ml   Net -644 ml       GENERAL: No acute distress.  SKIN: No rashes or jaundice.  HEENT: Normocephalic, atraumatic. Eyes anicteric. Oropharynx is   clear.  LYMPH: No palpable lymphadenopathy in the cervical,   supraclavicular, axillary, or inguinal regions.  HEART: Regular rate and rhythm with no murmurs.  LUNGS: diminished breath sounds left side.  Extensive rhonchi  ABDOMEN: Soft, nontender, nondistended with no palpable   hepatosplenomegaly.  EXTREMITIES: bilateral nonpitting leg edema (L>R)  MUSCULOSKELETAL: No pain to percussion over entire spine.  MENTAL: Alert and oriented to person, place, and time.  NEURO: no focal motor or sensory deficits.            Past " Medical History:   I have reviewed this patient's past medical history  Past Medical History:   Diagnosis Date     Breast cancer in female (H)     breast ca     Depressive disorder      Obese      Uncomplicated asthma              Past Surgical History:   I have reviewed this patient's past surgical history  Past Surgical History:   Procedure Laterality Date     BIOPSY BREAST Left 12/7/2018    Procedure: RE-EXCISION LEFT BREAST INFERIOR MARGIN, ASPIRATION   OF AXILLARY SEROMA;  Surgeon: Lesly Aponte MD;  Location: [facility]   OR     GASTRIC BYPASS  2004    abdominal plasty, and scar tissue removal     LUMPECTOMY BREAST WITH SEED LOCALIZATION Left 11/21/2018    Procedure: SEED LOCALIZED LEFT BREAST LUMPECTOMY WITH LEFT   AXILLARY LYMPH NODE DISSECTION;  Surgeon: Lesly Aponte MD;    Location:  OR               Social History:   I have reviewed this patient's social history  Social History     Tobacco Use     Smoking status: Never Smoker     Smokeless tobacco: Never Used   Substance Use Topics     Alcohol use: Yes     Comment: occ             Family History:   I have reviewed this patient's family history  Family History   Problem Relation Age of Onset     Breast Cancer Mother              Allergies:     Allergies   Allergen Reactions     Hydrocodone-Acetaminophen Itching     Patient states she is able to take it with Benadryl.       Oxycodone Itching and Rash             Medications:   I have reviewed this patient's current medications  Medications Prior to Admission   Medication Sig Dispense Refill Last Dose     albuterol (PROAIR HFA/PROVENTIL HFA/VENTOLIN HFA) 108 (90 Base)   MCG/ACT inhaler Inhale 1-2 puffs into the lungs every 6 hours as   needed for shortness of breath / dyspnea or wheezing 1 Inhaler 0    at prn     albuterol (PROVENTIL) (2.5 MG/3ML) 0.083% neb solution Take 2.5   mg by nebulization every 4 hours as needed for shortness of   breath / dyspnea or wheezing    at prn     buPROPion (WELLBUTRIN  XL) 150 MG 24 hr tablet Take 150 mg by   mouth every morning   8/19/2019 at am     clonazePAM (KLONOPIN) 1 MG tablet Take 2 mg by mouth At Bedtime     8/19/2019 at hs     cyclobenzaprine (FLEXERIL) 10 MG tablet Take 1 tablet (10 mg)   by mouth 3 times daily as needed for muscle spasms 30 tablet 0    at prn     diphenhydrAMINE (BENADRYL) 25 MG tablet Take 1-2 tablets (25-50   mg) by mouth every 6 hours as needed for itching or allergies 60   tablet 0  at prn     HYDROmorphone (DILAUDID) 2 MG tablet Take 1 tablet (2 mg) by   mouth every 3 hours as needed for moderate to severe pain 15   tablet 0  at prn     ibuprofen (ADVIL/MOTRIN) 800 MG tablet Take 1 tablet (800 mg)   by mouth every 8 hours as needed for moderate pain 20 tablet 0    at prn     levalbuterol (XOPENEX) 0.31 MG/3ML neb solution Take 3 mLs   (0.31 mg) by nebulization every 8 hours as needed for wheezing or   shortness of breath / dyspnea 30 mL 0  at prn     levofloxacin (LEVAQUIN) 500 MG tablet Take 1 tablet (500 mg) by   mouth daily for 4 days 4 tablet 0  at not started yet     Lidocaine (LIDOCARE) 4 % Patch Place 2 patches onto the skin   every 24 hours To prevent lidocaine toxicity, patient should be   patch free for 12 hrs daily. 30 patch 0  at Not in place     LORazepam (ATIVAN) 0.5 MG tablet Take 1 tablet (0.5 mg) by   mouth every 8 hours as needed 10 tablet 0  at prn     sertraline (ZOLOFT) 100 MG tablet Take 150 mg by mouth daily     8/19/2019 at am     traMADol (ULTRAM) 50 MG tablet Take 1 tablet (50 mg) by mouth   every 6 hours as needed for pain 5 tablet 0  at prn     Current Facility-Administered Medications Ordered in Epic   Medication Dose Route Frequency Last Rate Last Dose     albuterol (PROVENTIL) neb solution 2.5 mg  2.5 mg Nebulization   Q2H PRN         buPROPion (WELLBUTRIN XL) 24 hr tablet 150 mg  150 mg Oral QAM     150 mg at 08/21/19 0928     carvedilol (COREG) tablet 3.125 mg  3.125 mg Oral BID w/meals     3.125 mg at 08/21/19  1452     clonazePAM (klonoPIN) tablet 2 mg  2 mg Oral At Bedtime   2 mg   at 08/20/19 2043     cyclobenzaprine (FLEXERIL) tablet 10 mg  10 mg Oral TID PRN           diphenhydrAMINE (BENADRYL) capsule 25-50 mg  25-50 mg Oral Q6H   PRN         enoxaparin (LOVENOX) injection 40 mg  40 mg Subcutaneous Q12H           furosemide (LASIX) injection 60 mg  60 mg Intravenous TID         guaiFENesin (MUCINEX) 12 hr tablet 1,200 mg  1,200 mg Oral BID     1,200 mg at 08/21/19 0926     hydrALAZINE (APRESOLINE) tablet 10 mg  10 mg Oral 4x Daily PRN           HYDROmorphone (DILAUDID) tablet 2 mg  2 mg Oral Q3H PRN   2 mg   at 08/20/19 2251     HYDROmorphone (PF) (DILAUDID) injection 0.3 mg  0.3 mg   Intravenous BID PRN         ibuprofen (ADVIL/MOTRIN) tablet 800 mg  800 mg Oral Q8H PRN           ipratropium (ATROVENT) 0.02 % neb solution 0.5 mg  0.5 mg   Nebulization 4x daily   0.5 mg at 08/21/19 1531     labetalol (NORMODYNE/TRANDATE) injection 10 mg  10 mg   Intravenous Q6H PRN         levalbuterol (XOPENEX CONC) neb solution 0.63 mg  0.63 mg   Nebulization TID   0.63 mg at 08/21/19 1531     Lidocaine (LIDOCARE) 4 % Patch 2 patch  2 patch Transdermal   Q24H   2 patch at 08/21/19 0928     lidocaine (LMX4) cream   Topical Q1H PRN         lidocaine 1 % 0.1-1 mL  0.1-1 mL Other Q1H PRN         lidocaine patch in PLACE   Transdermal Q8H         lidocaine patch REMOVAL   Transdermal Q24h   2 each at 08/20/19 2045     May take regular AM medications except those listed below.     Does not apply Continuous PRN         naloxone (NARCAN) injection 0.1-0.4 mg  0.1-0.4 mg Intravenous   Q2 Min PRN         potassium chloride (KLOR-CON) Packet 20-40 mEq  20-40 mEq Oral   or Feeding Tube Q2H PRN         potassium chloride 10 mEq in 100 mL intermittent infusion with   10 mg lidocaine  10 mEq Intravenous Q1H PRN         potassium chloride 10 mEq in 100 mL sterile water intermittent   infusion (premix)  10 mEq Intravenous Q1H PRN          potassium chloride 20 mEq in 50 mL intermittent infusion  20   mEq Intravenous Q1H PRN         potassium chloride ER (K-DUR/KLOR-CON M) CR tablet 10 mEq  10   mEq Oral BID   10 mEq at 08/21/19 1452     potassium chloride ER (K-DUR/KLOR-CON M) CR tablet 20-40 mEq    20-40 mEq Oral Q2H PRN   20 mEq at 08/21/19 1453     predniSONE (DELTASONE) tablet 40 mg  40 mg Oral Daily   40 mg   at 08/21/19 1452     sertraline (ZOLOFT) tablet 150 mg  150 mg Oral Daily   150 mg   at 08/21/19 0926     sodium chloride (PF) 0.9% PF flush 3 mL  3 mL Intracatheter q1   min prn         sodium chloride (PF) 0.9% PF flush 3 mL  3 mL Intracatheter Q8H     3 mL at 08/21/19 0929     traMADol (ULTRAM) tablet 50 mg  50 mg Oral Q6H PRN         No current Epic-ordered outpatient medications on file.             Review of Systems:   The 10 point Review of Systems is negative other than noted in   the HPI.            Data:   All laboratory data reviewed  Results for orders placed or performed during the hospital   encounter of 08/20/19 (from the past 24 hour(s))   Lactic acid level STAT for sepsis protocol   Result Value Ref Range    Lactate for Sepsis Protocol 1.1 0.7 - 2.0 mmol/L   Basic metabolic panel   Result Value Ref Range    Sodium 133 133 - 144 mmol/L    Potassium 3.3 (L) 3.4 - 5.3 mmol/L    Chloride 96 94 - 109 mmol/L    Carbon Dioxide 28 20 - 32 mmol/L    Anion Gap 9 3 - 14 mmol/L    Glucose 99 70 - 99 mg/dL    Urea Nitrogen 10 7 - 30 mg/dL    Creatinine 0.70 0.52 - 1.04 mg/dL    GFR Estimate >90 >60 mL/min/[1.73_m2]    GFR Estimate If Black >90 >60 mL/min/[1.73_m2]    Calcium 9.0 8.5 - 10.1 mg/dL   Lactate Dehydrogenase   Result Value Ref Range    Lactate Dehydrogenase 377 (H) 81 - 234 U/L   Protein total   Result Value Ref Range    Protein Total 8.0 6.8 - 8.8 g/dL   Echocardiogram Complete    Narrative    [id number]  [id number]  [id number]  [id number]^SUHA^PAUL^JAMIL           [facility]  Echocardiography Laboratory  [id number]  [place] [address]        Name: JONATHAN RIGGINS  MRN: [id number]  : 1965  Study Date: 2019 08:18 AM  Age: 54 yrs  Gender: Female  Patient Location: [facility]  Reason For Study: CHF  Ordering Physician: PAUL BORDEN  Referring Physician: Briseyda Lock MD  Performed By: [facility] Laure Fofana     BSA: 2.1 m2  Height: 63 in  Weight: 246 lb  HR: 118  BP: 145/82 mmHg  __________________________________________________________________  ___________  __        Procedure  Complete Portable Echo Adult. Optison (NDC #7605-0631) given   intravenously.  __________________________________________________________________  ___________  __        Interpretation Summary     1. Technically very difficult study despite the use of contrast   to enhance  endocardial definition.  2. Hyperdynamic left ventricular function. LV ventricular chamber   small in  size and appears underfilled. The visual ejection fraction is   estimated at  >70%.  3. No regional wall motion abnormalities noted.  4. Valves are poorly visualized; no gross valvular pathology   identified.  5. Small pericardial effusion  6. Large left pleural effusion.  7. No prior studies available for comparison.  __________________________________________________________________  ___________  __        Left Ventricle  Hyperdynamic left ventricular function. The visual ejection   fraction is  estimated at >70%. Left ventricular diastolic function is not   assessable. No  regional wall motion abnormalities noted.     Right Ventricle  The right ventricle is normal in structure, function and size.     Atria  Normal left atrial size. Right atrial size is normal.     Mitral Valve  The mitral valve is not well visualized. Grossly normal in   appearance and  function.        Tricuspid Valve  The tricuspid valve is not well visualized. Grossly normal in   appearance and  function.     Aortic Valve  The aortic valve is not well visualized. Grossly normal in    appearance and  function.     Pulmonic Valve  The pulmonic valve is not well seen, but is grossly normal.     Vessels  The aortic root is normal size. IVC diameter <2.1 cm collapsing   >50% with  sniff suggests a normal RA pressure of 3 mmHg.     Pericardium  Small pericardial effusion. Large left pleural effusion.        Rhythm  The rhythm was sinus tachycardia.  __________________________________________________________________  ___________  __  MMode/2D Measurements & Calculations  IVSd: 0.92 cm     LVIDd: 2.9 cm  LVIDs: 2.0 cm  LVPWd: 0.82 cm  FS: 31.8 %  LV mass(C)d: 63.5 grams  LV mass(C)dI: 30.1 grams/m2  Ao root diam: 3.0 cm  LA dimension: 3.4 cm  asc Aorta Diam: 3.4 cm  LA/Ao: 1.1  LVOT diam: 2.0 cm  LVOT area: 3.1 cm2  LA Volume (BP): 25.2 ml  LA Volume Index (BP): 11.9 ml/m2  RWT: 0.57           Doppler Measurements & Calculations  MV E max roz: 69.8 cm/sec  MV A max roz: 99.0 cm/sec  MV E/A: 0.71  MV dec time: 0.14 sec  E/E' av.7  Lateral E/e': 11.7  Medial E/e': 7.6           __________________________________________________________________  ___________  __           Report approved by: Dee Dee Cerrato 2019 02:48 PM      Cell count with differential fluid   Result Value Ref Range    Body Fluid Analysis Source Pleural fluid     Color Fluid Brown     Appearance Fluid Cloudy     WBC Fluid Fluid contains clot(s), count is invalid /uL   Glucose fluid   Result Value Ref Range    Glucose Fluid Source Pleural fluid     Glucose Fluid 101 mg/dL   Lactate dehydrogenase fluid   Result Value Ref Range    LD Fluid Source Pleural fluid     Lactate Dehydrogenase Fluid 518 U/L   Protein fluid   Result Value Ref Range    Protein Total Fluid Source Pleural fluid     Protein Total Fluid 4.9 g/dL   US Lower Extremity Venous Duplex Left    Narrative    VENOUS ULTRASOUND LEFT LEG  2019 2:14 PM     HISTORY: L > R lower extremity swelling, breast cancer.    COMPARISON: None.    FINDINGS:  Examination of  the deep veins with graded compression   and  color flow Doppler with spectral wave form analysis was   performed.  No  evidence for DVT in the left lower extremity.      Impression    IMPRESSION: No evidence of deep venous thrombosis.    LINDA CRAVEN MD   US Thoracentesis    Narrative    THORACENTESIS 8/21/2019 2:14 PM    HISTORY: Patient with history of pleural effusion.    PROCEDURE/FINDINGS:  After obtaining informed consent, the   patient was  positioned appropriately. The back was prepped and draped in the   usual  sterile manner. Under ultrasound guidance, a Yueh needle was used   to  access the pleural space. An prominent ultrasound image was saved   to  document needle position. Thoracentesis was performed.   Approximately  700 mL yellow serous fluid was aspirated out.     The patient tolerated the procedure well. There were no immediate  postprocedure complications. The patient's vital signs were   monitored  by radiology nursing staff under my supervision and remained   stable  throughout the study.      Impression    IMPRESSION:  Thoracentesis performed.    LINDA CRAVEN MD   Procalcitonin   Result Value Ref Range    Procalcitonin 0.10 ng/ml          Cell count with differential fluid   Result Value Ref Range    Body Fluid Analysis Source Pleural fluid     Color Fluid Brown     Appearance Fluid Cloudy     WBC Fluid Fluid contains clot(s), count is invalid /uL   Glucose fluid   Result Value Ref Range    Glucose Fluid Source Pleural fluid     Glucose Fluid 101 mg/dL   Gram stain   Result Value Ref Range    Specimen Description Pleural fluid Left     Gram Stain No organisms seen     Gram Stain Few  WBC'S seen  Rare  PMNs seen      Lactate dehydrogenase fluid   Result Value Ref Range    LD Fluid Source Pleural fluid     Lactate Dehydrogenase Fluid 518 U/L   Protein fluid   Result Value Ref Range    Protein Total Fluid Source Pleural fluid     Protein Total Fluid 4.9 g/dL   US Lower Extremity Venous  Duplex Left    Narrative    VENOUS ULTRASOUND LEFT LEG  8/21/2019 2:14 PM     HISTORY: L > R lower extremity swelling, breast cancer.    COMPARISON: None.    FINDINGS:  Examination of the deep veins with graded compression and  color flow Doppler with spectral wave form analysis was performed.  No  evidence for DVT in the left lower extremity.      Impression    IMPRESSION: No evidence of deep venous thrombosis.    LINDA CRAVEN MD   US Thoracentesis    Narrative    THORACENTESIS 8/21/2019 2:14 PM    HISTORY: Patient with history of pleural effusion.    PROCEDURE/FINDINGS:  After obtaining informed consent, the patient was  positioned appropriately. The back was prepped and draped in the usual  sterile manner. Under ultrasound guidance, a Yueh needle was used to  access the pleural space. An prominent ultrasound image was saved to  document needle position. Thoracentesis was performed. Approximately  700 mL yellow serous fluid was aspirated out.     The patient tolerated the procedure well. There were no immediate  postprocedure complications. The patient's vital signs were monitored  by radiology nursing staff under my supervision and remained stable  throughout the study.      Impression    IMPRESSION:  Thoracentesis performed.    LINDA CRAVEN MD   Procalcitonin   Result Value Ref Range    Procalcitonin 0.10 ng/ml   Potassium   Result Value Ref Range    Potassium 3.8 3.4 - 5.3 mmol/L

## 2019-08-22 NOTE — PLAN OF CARE
DATE & TIME:                  Cognitive Concerns/ Orientation : A&Ox4   BEHAVIOR & AGGRESSION TOOL COLOR: green  CIWA SCORE: n/a     ABNL VS/O2: VS on 2L (for sleep), tachy   MOBILITY: Ind  PAIN MANAGMENT: Given PRN IV Dilaudid x1 for 6/10 cramping pain  DIET: 2gm Na  BOWEL/BLADDER: WDL, voiding frequently d/t lasix  ABNL LAB/B/20 BNP 3584  DRAIN/DEVICES: PIV SL  TELEMETRY RHYTHM: ST  SKIN: Scars, BLE edema, small incision from thoracentesis  on back  TESTS/PROCEDURES:  Echo pending  D/C DAY/GOALS/PLACE: Pending resp status, and completed all evals  OTHER IMPORTANT INFO: Oncology following. L arm limb alert d/t s/p lumpectomy. On IV lasix, PO steroids, sched nebs

## 2019-08-23 NOTE — ANESTHESIA PREPROCEDURE EVALUATION
Anesthesia Pre-Procedure Evaluation    Patient: Megha Campbell   MRN: [id number] : 1965          Preoperative Diagnosis: RECURRENT LEFT PLEURAL EFFUSION    Procedure(s):  LEFT VIDEO-ASSISTED THORACIC SURGERY  TALC PLEURODESIS    Past Medical History:   Diagnosis Date     Breast cancer in female (H)     breast ca     Depressive disorder      Obese      Uncomplicated asthma      Past Surgical History:   Procedure Laterality Date     BIOPSY BREAST Left 2018    Procedure: RE-EXCISION LEFT BREAST INFERIOR MARGIN, ASPIRATION OF AXILLARY SEROMA;  Surgeon: Lesly Aponte MD;  Location:  OR     GASTRIC BYPASS  2004    abdominal plasty, and scar tissue removal     LUMPECTOMY BREAST WITH SEED LOCALIZATION Left 2018    Procedure: SEED LOCALIZED LEFT BREAST LUMPECTOMY WITH LEFT AXILLARY LYMPH NODE DISSECTION;  Surgeon: Lesly Aponte MD;  Location:  OR       Anesthesia Evaluation     .             ROS/MED HX    ENT/Pulmonary:     (+)asthma , recent URI Treated for pneumonia: . Other pulmonary disease malignant pleural effusion.    Neurologic:       Cardiovascular:     (+) ----. : . CHF (elevated BNP 3 days ago on admission) Last EF: 70 date: 2019 . . :. .       METS/Exercise Tolerance:     Hematologic:         Musculoskeletal:         GI/Hepatic:         Renal/Genitourinary:         Endo:     (+) Obesity, .      Psychiatric:     (+) psychiatric history anxiety      Infectious Disease:         Malignancy:   (+) Malignancy History of Breast          Other:                                 Reading Physician Reading Date Result Priority   Valarie Patricia MD 2019       Narrative     [id number]  [id number]  [id number]  [id number]^SUHA^PAUL^JAMIL           [facility]  Echocardiography Laboratory  [address] [place] [address]        Name: MEGHA CAMPBELL  MRN: [id number]  : 1965  Study Date: 2019 08:18 AM  Age: 54 yrs  Gender: Female  Patient Location:  [facility]  Reason For Study: CHF  Ordering Physician: PAUL BORDEN  Referring Physician: Briseyda Lock MD  Performed By: CAMILLA Fofana     BSA: 2.1 m2  Height: 63 in  Weight: 246 lb  HR: 118  BP: 145/82 mmHg  _____________________________________________________________________________  __        Procedure  Complete Portable Echo Adult. Optison (NDC #[id number]) given intravenously.  _____________________________________________________________________________  __        Interpretation Summary     1. Technically very difficult study despite the use of contrast to enhance  endocardial definition.  2. Hyperdynamic left ventricular function. LV ventricular chamber small in  size and appears underfilled. The visual ejection fraction is estimated at  >70%.  3. No regional wall motion abnormalities noted.  4. Valves are poorly visualized; no gross valvular pathology identified.  5. Small pericardial effusion  6. Large left pleural effusion.  7. No prior studies available for comparison.         Lab Results   Component Value Date    WBC 7.6 08/23/2019    HGB 11.8 08/23/2019    HCT 36.6 08/23/2019     08/23/2019     08/23/2019    POTASSIUM 3.8 08/23/2019    CHLORIDE 98 08/23/2019    CO2 28 08/23/2019    BUN 14 08/23/2019    CR 0.57 08/23/2019     (H) 08/23/2019    PABLO 9.2 08/23/2019    MAG 2.1 08/16/2019    ALBUMIN 2.6 (L) 08/14/2019    PROTTOTAL 8.0 08/21/2019    ALT 15 08/14/2019    AST 15 08/14/2019    ALKPHOS 111 08/14/2019    BILITOTAL 0.3 08/14/2019    INR 0.97 08/23/2019    TSH 0.70 08/19/2019    HCG Negative 11/21/2018       Preop Vitals  BP Readings from Last 3 Encounters:   08/23/19 (!) 142/90   08/19/19 (!) 143/67   08/14/19 139/82    Pulse Readings from Last 3 Encounters:   08/23/19 96   08/16/19 108   08/14/19 121      Resp Readings from Last 3 Encounters:   08/23/19 20   08/19/19 20   08/14/19 21    SpO2 Readings from Last 3 Encounters:   08/23/19 99%   08/19/19 95%   08/14/19 98%  "     Temp Readings from Last 1 Encounters:   08/23/19 36.7  C (98  F) (Oral)    Ht Readings from Last 1 Encounters:   08/20/19 1.6 m (5' 3\")      Wt Readings from Last 1 Encounters:   08/22/19 113.4 kg (250 lb)    Estimated body mass index is 44.29 kg/m  as calculated from the following:    Height as of this encounter: 1.6 m (5' 3\").    Weight as of this encounter: 113.4 kg (250 lb).       Anesthesia Plan      History & Physical Review  History and physical reviewed and following examination; no interval change.    ASA Status:  3 .    NPO Status:  > 6 hours    Plan for General and ETT with Intravenous induction. Maintenance will be Balanced.    PONV prophylaxis:  Ondansetron (or other 5HT-3) and Dexamethasone or Solumedrol  Additional equipment: Double Lumen ETT and Fiberoptic bronchoscope     35 FR DL ETT    Preop duoneb      Postoperative Care  Postoperative pain management:  Oral pain medications and IV analgesics.      Consents  Anesthetic plan, risks, benefits and alternatives discussed with:  Patient.  Use of blood products discussed: Yes.   Use of blood products discussed with Patient.  Blood product refusal consent signed.   Reason for refusal: Zoroastrian.  Blood Products consented to:  Albumin.               Reynaldo Bronson MD  "

## 2019-08-23 NOTE — PRE-PROCEDURE
GENERAL PRE-PROCEDURE:   Procedure:  Chest port    Written consent obtained?: Yes    Risks and benefits: Risks, benefits and alternatives were discussed    Consent given by:  Patient  Patient states understanding of procedure being performed: Yes    Patient's understanding of procedure matches consent: Yes    Procedure consent matches procedure scheduled: Yes    Expected level of sedation:  Moderate  Appropriately NPO:  Yes  ASA Class:  Class 3- Severe systemic disease, definite functional limitations  Mallampati  :  Grade 3- soft palate visible, posterior pharyngeal wall not visible  Lungs:  Lungs clear with good breath sounds bilaterally  Heart:  Normal heart sounds and rate  History & Physical reviewed:  History and physical reviewed and no updates needed  Statement of review:  I have reviewed the lab findings, diagnostic data, medications, and the plan for sedation

## 2019-08-23 NOTE — TELEPHONE ENCOUNTER
ED / Discharge Outreach Protocol    Patient Contact    Attempt # 2    Was call answered?  No.  Left message on voicemail with information to call JOSEPH PEREZ RN

## 2019-08-23 NOTE — PROGRESS NOTES
[facility]    Medicine Progress Note - Hospitalist Service       Date of Admission:  8/20/2019  Assessment & Plan        Megha Campbell is a 54-year-old female with a past medical history significant for uncomplicated asthma, history of metastatic breast cancer, status post surgery and radiation therapy, malignant pleural effusion, chronic lymphedema, obesity, major depressive disorder with anxiety and recently diagnosed right-sided pneumonia, who is being registered to observation due to suspected exacerbation of congestive heart failure or fluid overload.     Metastatic breast cancer lower quadrant L breast Initially stage III, triple-negative left-sided breast cancer diagnosed in 11/2011. S/p partial left mastectomy and axillary node dissection and radiation. Patient declined chemotherapy, both neoadjuvant and adjuvant.    Malignant pleural effusion - loculated left pleural effusion on CT 8/8 and rpt CT on 8/16. Prominent mediastinal lymph nodes. S/p thoracentesis on 8/9 1000 ml, confirmed metastatic adenocarcinoma consistent with breast primary.   * CXR at admission shows loculated appearing L pleural effusion, unchanged  with unchanged mediastinal lymphadenopathy. Also not fluid in the R fissure with subtle interstitial infiltrates. Patient remains afebrile, nl WBC  * S/p Thoracentesis on 08/21/19, 700 ml of exudative fluid with significant improvement in symptoms.     - Appreciate input from [facility]. Plan for chemotherapy to start next week. S/p RIJ chest port for chemo per IR on 08/23/19. PET/CT as outpatient at f/u with oncology.   - Appreciate thoracic surgery consult, plan for VATS with pleurodesis on 08/23/19.     Suspected fluid overload, with hyperdynamic heart on Echo - elevated proBNP of 3584 (86 earlier this month).  Presented with orthopnea and PND, swelling of BLE, weights wildly variable.   * Echo shows hyperdynamic LV, technically difficult study. Small pericardial effusion, Large  left pleural effusion.   - S/p IV diuresis with lasix with excellent output and marked decrease in swelling, Cr stable. Wt actually up.   - Given echo findings stopped further doses on 08/21/19    New pulmonary infiltrate, possible CAP on 8/16 CT - Treated for CAP at last admission, but no fever, leukocytosis, pro calcitonin was less than 0.05. Had cough and treated with ceftriaxone and azithromycin.   * Treated with ceftriaxone + azithromycin 8/16 to 8/19 and continued on Levaquin, s/p 6 days of antibiotics. PNA remains questionable. Procal 0.1 on 8/21. - Follow BCx NGTD. > stopped further antibiotics on 8/22.     Asthma, with acute exacerbation:  - Patient has audible diffuse wheezes and increased cough for several days, similar to prior exacerbations of asthma.   - Start atrovent nebs QID + xopenex TID on 08/21/19, albuterol PRN  - Start prednisone 40 mg on 08/21/19. Start taper post-op.   - Wheezing resolved on 08/22/19.    - Avoid scheduled albuterol due to tachycardia.   - RCAT  - Incentive spirometer and Acapella valve and start Mucinex 1200 mg 2 times daily.      HTN  Persistent Sinus Tachycardia, IMPROVED:  Persistent tachycardia in the 110s noted during last hospitalization and discharged at 120. Prior clinic visits show nl HR. EKG shows sinus tachycardia with low voltage.  TSH nl, O2 97% on RA, BP elevated, Hgb ~ 11   - Echo as above.   - Started coreg 3.125 mg BID on 8/22 for BP.   - If BP remains elevated post-op would start on thiazide diuretic, then consider CCB.     Back Pain, related to cancer - Lumbar and thoracic spine CTs were performed and were negative for metastatic disease. Has outpatient followup established with Dr. Winston of [facility].   - Continues on PTA oral Tramadol PRN and lidocaine patches.    - IV dilaudid available prn around procedures, using rarely.   - Ibuprofen and tylenol available for pain.   - Discussed non-medical means of pain control, meditation.     Morbid Obesity:  S/p  gastric bypass in the past.  Currently, BMI is noted to be 35.43.  The patient plans to follow up with primary care provider.     Chronic lymphedema:  At prior discharge on 8/19, patient had outpatient referral sent for lymphedema wrapping and this should be continued.  We will monitor during this stay.     L > R LE swelling; No associated pain or redness. U/S negative for DVT.     Depression/anxiety with insomnia.   - Continue PTA Wellbutrin, clonazepam and Zoloft will be continued.  - Stopped PRN ativan.   - Discussed insomnia on 08/22/19. Discussed relaxation techniques bedtime routine. Non-medical means of treating insomnia.     Diet: NPO for Medical/Clinical Reasons Except for: Other; Specify: 4 hours prior to procedure.    DVT Prophylaxis: Enoxaparin (Lovenox) subcutaneous for DVT prophylaxis, stopped on 8/22 prior to surgery. Resume post-op per thoracic surgery.   High Catheter: not present  Code Status: Full Code      Disposition Plan   Expected discharge: 2 - 3 days, recommended to prior living arrangement once recovery from VATS.  Entered: Cally Stallworth MD 08/23/2019, 10:39 AM       The patient's care was discussed with the Patient.    Cally Stallworth MD  Hospitalist Service  [facility]    ______________________________________________________________________    Interval History   Resting comfortably when I came in. States she is not in pain. Breathing comfortably. No concerns. No CP, abdominal pain, N/V.  History as above.     Data reviewed today: I reviewed all medications, new labs and imaging results over the last 24 hours. I personally reviewed no images or EKG's today.    Physical Exam   Vital Signs: Temp: 98  F (36.7  C) Temp src: Oral BP: (!) 151/90 Pulse: 96 Heart Rate: 99 Resp: 18 SpO2: 98 % O2 Device: None (Room air) Oxygen Delivery: 2 LPM  Weight: 250 lbs .03 oz  Constitutional:  NAD,   Neuropsyche:  alert and oriented, answers questions appropriately, smiles and then  closes her eyes to rest.   Respiratory:  Appears mild dyspnea, marked decreased air exchange, wheezes resolved   Cardiovascular: Regular rate and rhythm, tachy, trace edema.   GI: soft, NT/ND, BS normal  Skin/Integumen:  No acute rash, bruising or sign of bleeding.       Data   Recent Labs   Lab 08/23/19  0743 08/22/19  2146 08/22/19  1155  08/21/19  0739 08/20/19  0654 08/19/19  0659   WBC 7.6  --   --   --   --  9.0 7.6   HGB 11.8  --   --   --   --  11.2* 11.3*   MCV 79  --   --   --   --  79 79     --   --   --   --  284 271   INR 0.97  --   --   --   --   --   --      --  134  --  133 136  --    POTASSIUM 3.8 3.8 3.2*   < > 3.3* 3.5  --    CHLORIDE 98  --  97  --  96 100  --    CO2 28  --  32  --  28 28  --    BUN 14  --  14  --  10 7  --    CR 0.57  --  0.66  --  0.70 0.60  --    ANIONGAP 9  --  5  --  9 8  --    PABLO 9.2  --  9.3  --  9.0 8.6  --    *  --  124*  --  99 100*  --    PROTTOTAL  --   --   --   --  8.0  --   --     < > = values in this interval not displayed.     No results found for this or any previous visit (from the past 24 hour(s)).  Medications     sodium chloride 0.9 %       heparin 10,000 units in 1000 mL NS       - MEDICATION INSTRUCTIONS -       - MEDICATION INSTRUCTIONS -         buPROPion  150 mg Oral QAM     carvedilol  3.125 mg Oral BID w/meals     ceFAZolin  1 g Intravenous See Admin Instructions     ceFAZolin         clonazePAM  2 mg Oral At Bedtime     fentaNYL (PF)         guaiFENesin  1,200 mg Oral BID     heparin  5 mL Intravenous Q28 Days     heparin  5 mL Intravenous Q28 Days     heparin lock flush  5 mL Intravenous Q24H     heparin lock flush  5 mL Intravenous Q24H     ipratropium  0.5 mg Nebulization 4x daily     levalbuterol  0.63 mg Nebulization TID     Lidocaine  2 patch Transdermal Q24H     lidocaine         lidocaine   Transdermal Q8H     lidocaine   Transdermal Q24h     melatonin  1 mg Oral At Bedtime     midazolam         ondansetron          predniSONE  40 mg Oral Daily     sertraline  150 mg Oral Daily     sodium chloride (PF)  3 mL Intracatheter Q8H

## 2019-08-23 NOTE — PLAN OF CARE
DATE & TIME: 8/22/19 [id number]                Cognitive Concerns/ Orientation : A&Ox4   BEHAVIOR & AGGRESSION TOOL COLOR: Highly anxious, crying for the first couple hours of the shift due to nausea with dry heaving, no real emesis production, and pain.  Improved with treatment of nausea, scheduled Klonopin, and PRN Dilaudid.  CIWA SCORE: N/A   ABNL VS/O2: VSS on RA  MOBILITY: Independent  PAIN MANAGMENT: PRN IV Dilaudid x1 for 9/10 back and abdominal pain, improved.  DIET: Regular diet, NPO at midnight  BOWEL/BLADDER: Continent  ABNL LAB/BG: Potassium 3.2, replaced, 3.8  DRAIN/DEVICES: PIV saline locked  TELEMETRY RHYTHM: ST  SKIN: Intact  TESTS/PROCEDURES: VATS at 11am, then chest port placement at 1:30pm  D/C DAY/GOALS/PLACE: Pending  OTHER IMPORTANT INFO: Expiratory wheezes.  2+ edema BLE's.  On menses.  Lb Villalta RN

## 2019-08-23 NOTE — PROCEDURES
[facility]    Procedure: Chest port placement   Date/Time: 8/23/2019 9:10 AM  Performed by: Jamie Dyer MD  Authorized by: Jamie Dyer MD     UNIVERSAL PROTOCOL   Site Marked: Yes  Prior Images Obtained and Reviewed:  Yes  Required items: Required blood products, implants, devices and special equipment available    Patient identity confirmed:  Verbally with patient  Patient was reevaluated immediately before administering moderate or deep sedation or anesthesia  Confirmation Checklist:  Patient's identity using two indicators, relevant allergies, procedure was appropriate and matched the consent or emergent situation and correct equipment/implants were available  Time out: Immediately prior to the procedure a time out was called    Preparation: Patient was prepped and draped in usual sterile fashion       ANESTHESIA    Local Anesthetic: Lidocaine 1% with epinephrine and lidocaine 1% without epinephrine      SEDATION    Patient Sedated: Yes    Sedation:  See MAR for details  Vital signs: Vital signs monitored during sedation    See dictated procedure note for full details.  Findings: RIJ chest port    PROCEDURE   Patient Tolerance:  Patient tolerated the procedure well with no immediate complications    Time of Sedation in Minutes by Physician:  15

## 2019-08-23 NOTE — PROGRESS NOTES
[facility]    Oncology Progress Note    Date of Service (when I saw the patient): 08/23/2019     Assessment & Plan   Megha Campbell is a 54 year old female who was admitted on 8/20/2019.         1. Stage III ( T3 N2a M0) triple negative left sided breast cancer  -diagnosis 11/2018  -[id number] stage III with 8 cm tumor and 5 positive lymph nodes, extranodal extension, LVI  -s/p lumpectomy with re-excision 12/2018   -s/p radiation 2/4/19-4/1/19  -declined chemotherapy (both neoadjuvant or adjuvant)  -CT 8/8/19 revealed a large left pleural effusion  -thoracentesis 8/9/19 confirmed metastatic adenocarcinoma, ER/NE negative, consistent with breast primary  -CT thoracic and lumbar spine shows no evidence of metastatic disease  -outpatient PET/CT being scheduled  -pathology to check tumor for androgen receptors and PDL1 (ordered and pending) to determine if androgen blockade or immunotherapy may be treatment options  -prognosis and that her cancer is now metastatic and no longer curable again discussed  -discussed use of chemotherapy versus palliative cares and hospice (no other viable options)  -she has been very hesitant to consider chemotherapy but now states she will try it  -plan abraxane plus or minus immunotherapy based on pending PDL1  -follow up with PET/CT results for discussion and initiation of systemic therapies after discharge     2. Recurrent pleural Effusion  -malignant cytology positive on 8/9/2019  -CXR 8/20 showing loculated left pleural effusion  -suspect  recurrent malignancy related effusion than related to heart failure  -plan VATS with pleurodesis this PM     3. SOB  -related to pleural effusion/COPD exacerbation/possible heart failure  -reports improvement following thoracentesis  -continue O2, nebs, steroids, incentive spirometry  -completed antibiotics with ceftriaxone and azithromycin with further antibiotics per hospitalist as needed     4. Hematology  -h/o B12 and iron  deficiency post gastrectomy  -Jehov[employer]       5.  Back pain  -CT thoracic and lumbar spine showing no metastatic disease  -lidocaine patch  -could consider adding long acting preparation if needed  -dilaudid PRN  -consider palliative care consult      DVT Prophylaxis: Enoxaparin (Lovenox) subcutaneous on hold for surgery  Code Status: Full Code    Jamaal Montaño History   Tolerated port placement this AM    Physical Exam   Temp: 98  F (36.7  C) Temp src: Oral BP: (!) 151/90 Pulse: 96 Heart Rate: 99 Resp: 18 SpO2: 98 % O2 Device: None (Room air) Oxygen Delivery: 2 LPM  Vitals:    08/20/19 0642 08/21/19 0642 08/22/19 0635   Weight: 90.7 kg (200 lb) 111.9 kg (246 lb 9.6 oz) 113.4 kg (250 lb)     Vital Signs with Ranges  Temp:  [97.5  F (36.4  C)-98  F (36.7  C)] 98  F (36.7  C)  Pulse:  [] 96  Heart Rate:  [] 99  Resp:  [14-36] 18  BP: (115-155)/(42-95) 151/90  SpO2:  [92 %-99 %] 98 %  I/O last 3 completed shifts:  In: 603 [P.O.:600; I.V.:3]  Out: -     Constitutional: awake, cooperative, no acute distress  GI: soft, non-distended, non-tender, no masses palpated  Musculoskeletal: no pedal edema    Medications     sodium chloride 0.9 %       heparin 10,000 units in 1000 mL NS       - MEDICATION INSTRUCTIONS -       - MEDICATION INSTRUCTIONS -         buPROPion  150 mg Oral QAM     carvedilol  3.125 mg Oral BID w/meals     ceFAZolin  1 g Intravenous See Admin Instructions     ceFAZolin         clonazePAM  2 mg Oral At Bedtime     fentaNYL (PF)         guaiFENesin  1,200 mg Oral BID     ipratropium  0.5 mg Nebulization 4x daily     levalbuterol  0.63 mg Nebulization TID     Lidocaine  2 patch Transdermal Q24H     lidocaine         lidocaine   Transdermal Q8H     lidocaine   Transdermal Q24h     melatonin  1 mg Oral At Bedtime     midazolam         ondansetron         predniSONE  40 mg Oral Daily     sertraline  150 mg Oral Daily     sodium chloride (PF)  3 mL Intracatheter Q8H       Data    Results for orders placed or performed during the hospital encounter of 08/20/19 (from the past 24 hour(s))   Basic metabolic panel   Result Value Ref Range    Sodium 134 133 - 144 mmol/L    Potassium 3.2 (L) 3.4 - 5.3 mmol/L    Chloride 97 94 - 109 mmol/L    Carbon Dioxide 32 20 - 32 mmol/L    Anion Gap 5 3 - 14 mmol/L    Glucose 124 (H) 70 - 99 mg/dL    Urea Nitrogen 14 7 - 30 mg/dL    Creatinine 0.66 0.52 - 1.04 mg/dL    GFR Estimate >90 >60 mL/min/[1.73_m2]    GFR Estimate If Black >90 >60 mL/min/[1.73_m2]    Calcium 9.3 8.5 - 10.1 mg/dL   Potassium   Result Value Ref Range    Potassium 3.8 3.4 - 5.3 mmol/L   CBC with platelets   Result Value Ref Range    WBC 7.6 4.0 - 11.0 10e9/L    RBC Count 4.63 3.8 - 5.2 10e12/L    Hemoglobin 11.8 11.7 - 15.7 g/dL    Hematocrit 36.6 35.0 - 47.0 %    MCV 79 78 - 100 fl    MCH 25.5 (L) 26.5 - 33.0 pg    MCHC 32.2 31.5 - 36.5 g/dL    RDW 14.6 10.0 - 15.0 %    Platelet Count 343 150 - 450 10e9/L   Basic metabolic panel   Result Value Ref Range    Sodium 135 133 - 144 mmol/L    Potassium 3.8 3.4 - 5.3 mmol/L    Chloride 98 94 - 109 mmol/L    Carbon Dioxide 28 20 - 32 mmol/L    Anion Gap 9 3 - 14 mmol/L    Glucose 113 (H) 70 - 99 mg/dL    Urea Nitrogen 14 7 - 30 mg/dL    Creatinine 0.57 0.52 - 1.04 mg/dL    GFR Estimate >90 >60 mL/min/[1.73_m2]    GFR Estimate If Black >90 >60 mL/min/[1.73_m2]    Calcium 9.2 8.5 - 10.1 mg/dL   INR   Result Value Ref Range    INR 0.97 0.86 - 1.14   ABO/Rh type and screen   Result Value Ref Range    ABO O     RH(D) Pos     Antibody Screen Neg     Test Valid Only At [facility]        Specimen Expires 08/26/2019    chest port placement     Narrative    Jamie Dyer MD     8/23/2019  9:10 AM  [facility]    Procedure: Chest port placement   Date/Time: 8/23/2019 9:10 AM  Performed by: Jamie Dyer MD  Authorized by: Jamie Dyer MD     UNIVERSAL PROTOCOL   Site Marked: Yes  Prior Images  Obtained and Reviewed:  Yes  Required items: Required blood products, implants, devices and special   equipment available    Patient identity confirmed:  Verbally with patient  Patient was reevaluated immediately before administering moderate or deep   sedation or anesthesia  Confirmation Checklist:  Patient's identity using two indicators, relevant   allergies, procedure was appropriate and matched the consent or emergent   situation and correct equipment/implants were available  Time out: Immediately prior to the procedure a time out was called    Preparation: Patient was prepped and draped in usual sterile fashion       ANESTHESIA    Local Anesthetic: Lidocaine 1% with epinephrine and lidocaine 1% without   epinephrine      SEDATION    Patient Sedated: Yes    Sedation:  See MAR for details  Vital signs: Vital signs monitored during sedation    See dictated procedure note for full details.  Findings: RIJ chest port    PROCEDURE   Patient Tolerance:  Patient tolerated the procedure well with no immediate   complications    Time of Sedation in Minutes by Physician:  15

## 2019-08-23 NOTE — PROGRESS NOTES
A&OX4, coop. Up ind in room. NPO. Port a cath placed in IR this AM, drsg CDI. Plan for VATS and transfer off [address]. Belongings were with pt at time of transfer to Pre-Op.

## 2019-08-23 NOTE — BRIEF OP NOTE
[facility]    Brief Operative Note    Pre-operative diagnosis: RECURRENT LEFT PLEURAL EFFUSION  Post-operative diagnosis recurrent left malignant pleural effusion  Procedure: Procedure(s):  LEFT VIDEO-ASSISTED THORACIC SURGERY, DRAINAGE OF PLEURAL EFFUSION, BIOPSY OF PARIETAL PLEURA,  TALC PLEURODESIS  Surgeon: Surgeon(s) and Role:     * Zackary Rosado MD - Primary     * Lourdes Fuentes PA-C - Assisting  Anesthesia: General   Estimated blood loss: 5 cc  Drains: Chest tubes: one 28 straight, one 28 angled  Specimens:   ID Type Source Tests Collected by Time Destination   A : Parital Pleural Biopsy, PDL-1, and ADROGEN RECEPTOR Tissue Pleural/Thoracic Cavity SURGICAL PATHOLOGY EXAM Zackary Rosado MD 8/23/2019  1:04 PM      Findings:   Minimal bloody effusion drained, lung with some adhesions and loculations in pleural space, bulky nodularity to parietal pleura-- biopsies sent for frozen section, Androgen receptor blocker and PDL-1( as per Med Onc request)  Complications: None.  Implants:  * No implants in log *

## 2019-08-23 NOTE — OP NOTE
Procedure Date: 08/23/2019      SURGEON:  Zackary Rosado MD.      FIRST ASSISTANT:  Lourdes Fuentes PA-C.      PREOPERATIVE DIAGNOSES:   1.  Recurrent symptomatic malignant left pleural effusion.   2.  Breast cancer.      PROCEDURES:   1.  Left video-assisted thoracoscopy.   2.  Drainage of pleural effusion.   3.  Parietal pleural biopsies.   4.  Talc pleurodesis.      ANESTHESIA:  General with double endotracheal tube.      INDICATIONS:  A 54-year-old woman with metastatic breast cancer.  She presented with some shortness of breath.  There was a large recurrent symptomatic pleural effusion.  Based on the findings, options of palliation were discussed and she elected to proceed with a left video-assisted thoracoscopy, biopsy of parietal pleura and talc pleurodesis.      DESCRIPTION OF PROCEDURE:  The patient was brought into the operating room and placed in supine position.  Under general anesthesia with double endotracheal tube, the patient was placed in a right lateral decubitus position.  Left chest was prepared and draped in the usual fashion using ChloraPrep.  Ventilation of the left lung was discontinued.  A 1 cm thoracoscopic incision was made over the seventh intercostal space, midaxillary line.  Pleural space was entered.  There was some sanguinous fluid which was drained.  There was already some pleurodesis of the upper lobe anterolaterally and on the mediastinal side.  Some of the adhesions of the superior segment of the left lower lobe were taken down to allow better access to the loculation of fluid.  All fluid was drained.  Extensive parietal pleural biopsies were obtained.  Frozen section of a portion of the specimen showed metastatic breast cancer.  Request for PD-L1 and androgen receptors was requested.  Then, 8 grams of talc was sprayed over the entire surface of the lung and surface of the diaphragm.  Through a separate stab wound, a 28 angled chest tube was placed over the surface of the diaphragm,  and a 28 straight chest tube was placed toward the apex posteriorly.  The drain was sutured to the skin with a #2 silk suture.  Thirty mL of Marcaine 0.5% without epinephrine was injected as costal blocks, and thoracoscopic incision was closed in the usual fashion.  Estimated blood loss minimal.  Needle and sponge count was correct.      Lourdes Fuentes PA-C, was the first assistant during the procedure.  Her role as first assistant was essential and necessary in accomplishing the steps of the procedure, as described above, providing exposure, retraction, and handling the scope.         GREGORIO SOTO MD             D: 2019   T: 2019   MT: KELLIE      Name:     JONATHAN RIGGINS   MRN:      [id number]        Account:        [id number]   :      1965           Procedure Date: 2019      Document: [id number]

## 2019-08-23 NOTE — ANESTHESIA CARE TRANSFER NOTE
Patient: Megha Campbell    Procedure(s):  LEFT VIDEO-ASSISTED THORACIC SURGERY  TALC PLEURODESIS, PARITAL PLEURAL BIOPSY.    Diagnosis: RECURRENT LEFT PLEURAL EFFUSION  Diagnosis Additional Information: No value filed.    Anesthesia Type:   General, ETT     Note:  Airway :Face Mask  Patient transferred to:PACU  Comments: Pt exhibits spont resps, all monitors and alarms on in pacu, report given to RN, vss.Handoff Report: Identifed the Patient, Identified the Reponsible Provider, Reviewed the pertinent medical history, Discussed the surgical course, Reviewed Intra-OP anesthesia mangement and issues during anesthesia, Set expectations for post-procedure period and Allowed opportunity for questions and acknowledgement of understanding      Vitals: (Last set prior to Anesthesia Care Transfer)    CRNA VITALS  8/23/2019 1349 - 8/23/2019 1428      8/23/2019             Pulse:  101    Ht Rate:  100    SpO2:  100 %                Electronically Signed By: ELLEN Alcantar CRNA  August 23, 2019  2:28 PM

## 2019-08-23 NOTE — PROGRESS NOTES
Lab notified that patient is refusing potassium recheck post potassium replacement.  Lb Villalta, RN    Addendum: Patient allowed potassium draw.

## 2019-08-24 NOTE — PLAN OF CARE
A/Ox4. VSS. On Room Air. LS clear/diminished. CTx2 to suction, patent with serosanguinous output. Dressing to CT cdi. Dressing to incision CDI. No crepitus, No air leak. Encouraging use of IS. BS hypo, no flatus. Voiding. Managing pain with prn dilaudid, and scheduled tylenol. Ambulating with SBA. Up to chair for meals.

## 2019-08-24 NOTE — PROGRESS NOTES
MD Notification    Notified Person: MD    Notified Person Name: Merritt    Notification Date/Time: August 24, 2019 @ 1122    Notification Interaction: text page    Purpose of Notification: Scolopomine patch for cont'd nausea    Orders Received:    Comments:

## 2019-08-24 NOTE — PROGRESS NOTES
Oncology chart check:    Patient with recurrent metastatic triple negative breast cancer, now s/p VATS with pleurodesis by Dr. Rosado on 8/23/19.  Currently hospitalized for postoperative management, fluid overload and asthma exacerbation.    - Plan for outpatient PET/CT to complete staging  - Dr. Winston has spoken to the patient about starting chemotherapy.  Tentatively planned for next week, after discharge.    Adal Carrasco M.D.  [facility]  [phone]

## 2019-08-24 NOTE — PLAN OF CARE
Dx:SOB, pleural effusion Hx:Breast cx, asthma Surg:Left video assisted thoracic surgery. Partial pleural biopsy. Vs:stable on room air. A/O:x4. Incisions:CT site CDI 2 chest tubes to -H2O No crep or airleaks. CWMS:WDL. Pain level/controlled with:IV dilaudid. Up with:SBA. Tolerating regular diet. No N/V. No gas. Voiding small amounts. Is:using at bedside. GI:hypoactive. Resp:inspiratory wheezes. Plan: Will continue monitor.

## 2019-08-24 NOTE — PLAN OF CARE
VSS on room air. A/Ox4. Incisions on left chest with dressing CDI. CT's to -20 suction, CT #1 with serosanguinous output, CT #2 with serous output. To remain to suction overnight. Pain controlled with PRN dilaudid IV due to continuous nausea throughout day. Scop patch in place with relief, however, patient refuses many PO meds. Up with SBA, ambulated several times to bathroom, but refused several attempts for further activity due to pain and nausea.  Regular diet, up in chair for meals, poor appetite. BS faint, Flatus +. Voiding adequately to bathroom, patient on menses. LS dim with fine crackles in bases. IS to 1000, pulmonary toilet encouraged.

## 2019-08-24 NOTE — PROGRESS NOTES
[facility]    Medicine Progress Note - Hospitalist Service       Date of Admission:  8/20/2019  Assessment & Plan      Megha Campbell is a 54-year-old female with a past medical history significant for uncomplicated asthma, history of metastatic breast cancer, status post surgery and radiation therapy, malignant pleural effusion, chronic lymphedema, obesity, major depressive disorder with anxiety and recently diagnosed right-sided pneumonia, who was admitted with suspected exacerbation of congestive heart failure or fluid overload. She was found to have a loculated malignant left pleural effusion on CT and underwent VATS and pleurodesis on 8/23.     Metastatic breast cancer lower quadrant L breast   Malignant pleural effusion  Postoperative day # 1 VATS and pleurodesis- chest tube in place   Postoperative day # 1 port placement for chemotherapy  Postoperative management per surgeon    Initially stage III, triple-negative left-sided breast cancer diagnosed in 11/2011. S/p partial left mastectomy and axillary node dissection and radiation. Patient declined chemotherapy, both neoadjuvant and adjuvant.    Loculated left pleural effusion on CT 8/8 and rpt CT on 8/16. Prominent mediastinal lymph nodes. S/p thoracentesis on 8/9 1000 ml, confirmed metastatic adenocarcinoma consistent with breast primary.     Plan is to start chemotherapy to start next week    PET/CT as outpatient at f/u with oncology    Suspected fluid overload, with hyperdynamic heart on transthoracic echocardiogram   Elevated proBNP of 3584 (86 earlier this month).  Presented with orthopnea and PND, swelling of BLE, weights wildly variable.   Echo shows hyperdynamic LV, technically difficult study. Small pericardial effusion, Large left pleural effusion.   Status post  IV diuresis with lasix with excellent output and marked decrease in swelling.  Given echo findings stopped further doses on 08/21/19.     New pulmonary infiltrate, possible  CAP on 8/16 CT   Treated for CAP at last admission, but no fever, leukocytosis, pro calcitonin was less than 0.05. Had cough and treated with ceftriaxone and azithromycin.   Treated with ceftriaxone + azithromycin 8/16 to 8/19 and continued on Levaquin, s/p 6 days of antibiotics. PNA remains questionable. Procal 0.1 on 8/21. - Follow BCx NGTD. > stopped further antibiotics on 8/22.      Asthma, with acute exacerbation  Patient had audible diffuse wheezes and increased cough for several days, similar to prior exacerbations of asthma.   Wheezing resolved on 8/22    Continue nebs QID + xopenex TID on 08/21/19, albuterol PRN    Continue prednisone 40 mg for 5 days total     Avoid scheduled albuterol due to tachycardia    RCAT    Incentive spirometer and Acapella valve     Mucinex 1200 mg 2 times daily     HTN  Persistent Sinus Tachycardia, IMPROVED  Persistent tachycardia in the 110s noted during last hospitalization and discharged at 120. Prior clinic visits show nl HR. EKG shows sinus tachycardia with low voltage.  TSH nl, O2 97% on RA, BP elevated, Hgb ~ 11     Started carvedilol 3.125 mg BID on 8/22      Back Pain, related to cancer  Lumbar and thoracic spine CTs were performed and were negative for metastatic disease. Has outpatient followup established with Dr. Winston of [facility].     Continue PTA oral Tramadol PRN and lidocaine patches    IV dilaudid available prn around procedures    Ibuprofen and tylenol available for pain    Morbid Obesity  S/p gastric bypass   Currently, BMI is noted to be 35.43.    The patient plans to follow up with primary care provider.      Chronic lymphedema  At prior discharge on 8/19, patient had outpatient referral sent for lymphedema wrapping and this should be continued.  We will monitor during this stay.      L > R LE swelling  No associated pain or redness  U/S negative for DVT     Depression/anxiety with insomnia.     Continue PTA Wellbutrin, clonazepam and Zoloft    Stopped PRN  Ativan    Nausea     Antiemetics ordered     Diet: Advance Diet as Tolerated: Regular Diet Adult    DVT Prophylaxis: Heparin SQ  High Catheter: not present  Code Status: Full Code      Disposition Plan   Expected discharge: 2 - 3 days, recommended to prior living arrangement once stable postop.  Entered: Shane Bang MD 08/24/2019, 2:05 PM       The patient's care was discussed with the Bedside Nurse and Patient.    Shane Bang MD  Hospitalist Service  [facility]    ______________________________________________________________________    Interval History   Having pain from the chest tube and also nauseous     Data reviewed today: I reviewed all medications, new labs and imaging results over the last 24 hours. I personally reviewed no images or EKG's today.    Physical Exam   Vital Signs: Temp: 97.8  F (36.6  C) Temp src: Oral BP: 120/78 Pulse: 103 Heart Rate: 105 Resp: 16 SpO2: 95 % O2 Device: None (Room air) Oxygen Delivery: 2 LPM  Weight: 249 lbs 9.6 oz  Constitutional: awake, alert, cooperative, no apparent distress, and appears stated age  Respiratory: No increased work of breathing, good air exchange, clear to auscultation bilaterally, no crackles or wheezing  Cardiovascular: regular rate and rhythm, normal S1 and S2, no S3 or S4, and no murmur noted  GI: No scars, normal bowel sounds, soft, non-distended, non-tender  Neurologic: Awake, alert, oriented to name, place and time.  Cranial nerves II-XII are grossly intact.  Motor is 5 out of 5 bilaterally  Neuropsychiatric: General: normal, calm and normal eye contact  Chest tube on the left     Data   Recent Labs   Lab 08/24/19  0702 08/23/19  0743 08/22/19  2146 08/22/19  1155  08/21/19  0739 08/20/19  0654  08/19/19  0659   WBC  --  7.6  --   --   --   --  9.0  --  7.6   HGB  --  11.8  --   --   --   --  11.2*  --  11.3*   MCV  --  79  --   --   --   --  79  --  79    343  --   --   --   --  284  --  271   INR  --   0.97  --   --   --   --   --   --   --    NA  --  135  --  134  --  133 136   < >  --    POTASSIUM  --  3.8 3.8 3.2*   < > 3.3* 3.5   < >  --    CHLORIDE  --  98  --  97  --  96 100   < >  --    CO2  --  28  --  32  --  28 28   < >  --    BUN  --  14  --  14  --  10 7   < >  --    CR 0.64 0.57  --  0.66  --  0.70 0.60  --   --    ANIONGAP  --  9  --  5  --  9 8   < >  --    PABLO  --  9.2  --  9.3  --  9.0 8.6   < >  --    GLC  --  113*  --  124*  --  99 100*   < >  --    PROTTOTAL  --   --   --   --   --  8.0  --   --   --     < > = values in this interval not displayed.     Recent Results (from the past 24 hour(s))   XR Chest Port 1 View    Narrative    CHEST ONE VIEW PORTABLE   8/24/2019 5:30 AM     HISTORY: s/p left VATS, talc pleurodesis    COMPARISON: 8/23/2019 4048 hours      Impression    IMPRESSION: Redemonstrated left chest tube, no evidence for  pneumothorax. Minimal increased opacity at the lower left chest  consistent with pleural effusion and left lower lobe infiltrate or  atelectasis. Right lung clear.    Stable prominent cardiomediastinal silhouette with right chest  Port-A-Cath projecting over distal SVC. Mild pulmonary vascular  congestion.    SULY AMANDA MD     Medications     - MEDICATION INSTRUCTIONS -       sodium chloride Stopped (08/24/19 3592)       acetaminophen  975 mg Oral Q8H     bacitracin   Topical Daily     buPROPion  150 mg Oral QAM     carvedilol  3.125 mg Oral BID w/meals     clonazePAM  2 mg Oral At Bedtime     enoxaparin  40 mg Subcutaneous Q24H     ranitidine  150 mg Oral Q12H    Or     famotidine  20 mg Intravenous Q12H     guaiFENesin  1,200 mg Oral BID     heparin  5 mL Intravenous Q28 Days     heparin lock flush  5 mL Intravenous Q24H     ibuprofen  600 mg Oral 4x Daily     ipratropium  0.5 mg Nebulization TID     levalbuterol  0.63 mg Nebulization TID     Lidocaine  2 patch Transdermal Q24H     lidocaine   Transdermal Q8H     lidocaine   Transdermal Q24h     melatonin  1  mg Oral At Bedtime     ondansetron  4 mg Oral Q8H     predniSONE  40 mg Oral Daily     prochlorperazine  10 mg Oral 4x Daily     scopolamine   Transdermal Q8H    And     [START ON 8/27/2019] scopolamine   Transdermal Q72H     senna-docusate  1 tablet Oral BID    Or     senna-docusate  2 tablet Oral BID     sertraline  150 mg Oral Daily     sodium chloride (PF)  3 mL Intracatheter Q8H

## 2019-08-24 NOTE — PROGRESS NOTES
MD Notification    Notified Person: MD    Notified Person Name: MD Merritt    Notification Date/Time: August 24, 2019 @ 0903    Notification Interaction: text page    Purpose of Notification: antinausea med order requested    Orders Received: Zofran ordered PRN    Comments:

## 2019-08-24 NOTE — ANESTHESIA POSTPROCEDURE EVALUATION
Patient: Megha Campbell    Procedure(s):  LEFT VIDEO-ASSISTED THORACIC SURGERY  TALC PLEURODESIS, PARITAL PLEURAL BIOPSY.    Diagnosis:RECURRENT LEFT PLEURAL EFFUSION  Diagnosis Additional Information: No value filed.    Anesthesia Type:  General, ETT    Note:  Anesthesia Post Evaluation    Patient location during evaluation: bedside  Patient participation: Able to fully participate in evaluation  Level of consciousness: awake  Pain management: adequate  Airway patency: patent  Cardiovascular status: acceptable  Respiratory status: acceptable  Hydration status: acceptable  PONV: none     Anesthetic complications: None    Comments: No anesthetic complications noted.         Last vitals:  Vitals:    08/23/19 1730 08/23/19 1800 08/23/19 1900   BP: 138/86 136/89    Pulse:      Resp: 17     Temp:      SpO2: 98%  92%         Electronically Signed By: Kahlil Gray DO, DO  August 23, 2019  7:56 PM

## 2019-08-25 NOTE — PROGRESS NOTES
Oncology chart check:     Patient with recurrent metastatic triple negative breast cancer, now s/p VATS with pleurodesis by Dr. Rosado on 8/23/19.  Currently hospitalized for postoperative management, fluid overload and asthma exacerbation.    - No new recommendations from an oncology standpoint  - Plan for outpatient PET/CT to complete staging  - Dr. Winston has spoken to the patient about starting chemotherapy.  Tentatively planned for next week, after discharge.     Adal Carrasco M.D.  [facility]  [phone]

## 2019-08-25 NOTE — PLAN OF CARE
AVSS on RA. Pain controlled with IV dilaudid; Pt educated on importance of transition to PO pain medications, but would prefer to wait until after chest tubes are pulled. Incisions CDI; CTx2 to suction with no output overnight. LS Dim throughout. Diet Regular with poor appt; taking scheduled antiemetics with denied nausea overnight. Up with assist of 1. BS active and audible; passing gas. Active menses.

## 2019-08-25 NOTE — PLAN OF CARE
AVSS on RA. Pain controlled with IV dilaudid. Incisions CDI; CTx2 to suction with no output this shift. LS Dim throughout. Diet Regular with poor appt; taking scheduled antiemetics with denies nausea. Up with assist of 1. BS active and audible; passing gas. Active menses. Pt up in chair for several hours.

## 2019-08-25 NOTE — PROGRESS NOTES
[facility] RCAT Note    Date: 8/25/19  Admission Diagnosis:  Pneumonia, pleural effusion  Pulmonary History:  Asthma  Home Nebulizer/ MDI Use:  Neb at home  Home Oxygen Use:   None  Acuity Level (from RT Assessment flow sheet):   4    Aerosol Therapy Initiated:  Continue Xopenex and Atrovent TID      Pulmonary Hygiene Initiated:  Continue [place]       Volume Expansion Therapy Initiated:  Continue IS      Current Oxygen Requirement:  RA  Current SpO2: 97%     Re-evaluation date:  8/28/19    See 'RT Assessments' flow sheet for patient assessment scoring and Acuity Level Details.

## 2019-08-25 NOTE — PLAN OF CARE
A&O. CMS intact. Bowel sounds hypoactive, regular diet, intermittent nausea (scheduled antiemetics), poor appetite. VSS/mild tachycardia at times. Dressing CDI. Very diminished lung sounds. Chest tubes x2 patent with no air leak - atrium changed on system 1. Up with SBA. Voiding adequately. Refused to ambulate in shay. C/o moderate to severe pain, decreased with dilaudid. Port WDL. Pt scoring green on the Aggression Stop Light Tool. Plan pending.

## 2019-08-25 NOTE — PROGRESS NOTES
[facility]    Medicine Progress Note - Hospitalist Service       Date of Admission:  8/20/2019  Assessment & Plan      Megha Campbell is a 54-year-old female with a past medical history significant for uncomplicated asthma, history of metastatic breast cancer, status post surgery and radiation therapy, malignant pleural effusion, chronic lymphedema, obesity, major depressive disorder with anxiety and recently diagnosed right-sided pneumonia, who was admitted with suspected exacerbation of congestive heart failure or fluid overload. She was found to have a loculated malignant left pleural effusion on CT and underwent VATS and pleurodesis on 8/23.     Metastatic breast cancer lower quadrant L breast   Malignant pleural effusion  Postoperative day # 2 VATS and pleurodesis- chest tube in place   Postoperative day # 2 port placement for chemotherapy  Postoperative management per surgeon- chest tube may be removed tomorrow per Dr. Rosado.    Initially stage III, triple-negative left-sided breast cancer diagnosed in 11/2011. S/p partial left mastectomy and axillary node dissection and radiation. Patient declined chemotherapy, both neoadjuvant and adjuvant.    Loculated left pleural effusion on CT 8/8 and rpt CT on 8/16. Prominent mediastinal lymph nodes. S/p thoracentesis on 8/9 1000 ml, confirmed metastatic adenocarcinoma consistent with breast primary.     Plan is to start chemotherapy to start next week    PET/CT as outpatient at f/u with oncology    Suspected fluid overload, with hyperdynamic heart on transthoracic echocardiogram   Elevated proBNP of 3584 (86 earlier this month).  Presented with orthopnea and PND, swelling of BLE, weights wildly variable.   Echo shows hyperdynamic LV, technically difficult study. Small pericardial effusion, Large left pleural effusion.   Status post  IV diuresis with lasix with excellent output and marked decrease in swelling.  Given echo findings stopped further doses  on 08/21/19.     New pulmonary infiltrate, possible CAP on 8/16 CT   Treated for CAP at last admission, but no fever, leukocytosis, pro calcitonin was less than 0.05. Had cough and treated with ceftriaxone and azithromycin.   Treated with ceftriaxone + azithromycin 8/16 to 8/19 and continued on Levaquin, s/p 6 days of antibiotics. PNA remains questionable. Procal 0.1 on 8/21. - Follow BCx NGTD. > stopped further antibiotics on 8/22.      Asthma, with acute exacerbation  Patient had audible diffuse wheezes and increased cough for several days, similar to prior exacerbations of asthma.   Wheezing resolved on 8/22    Continue nebs QID + xopenex TID on 08/21/19, albuterol PRN    Continue prednisone 40 mg for 5 days total     Avoid scheduled albuterol due to tachycardia    RCAT    Incentive spirometer and Acapella valve     Mucinex 1200 mg 2 times daily     HTN  Persistent Sinus Tachycardia, IMPROVED  Persistent tachycardia in the 110s noted during last hospitalization and discharged at 120. Prior clinic visits show nl HR. EKG shows sinus tachycardia with low voltage.  TSH nl, O2 97% on RA, BP elevated, Hgb ~ 11     Started carvedilol 3.125 mg BID on 8/22      Back Pain, related to cancer  Lumbar and thoracic spine CTs were performed and were negative for metastatic disease. Has outpatient followup established with Dr. Winston of [facility].     Continue PTA oral Tramadol PRN and lidocaine patches    IV dilaudid available prn around procedures    Ibuprofen and tylenol available for pain    Morbid Obesity  S/p gastric bypass   Currently, BMI is noted to be 35.43.    The patient plans to follow up with primary care provider.      Chronic lymphedema  At prior discharge on 8/19, patient had outpatient referral sent for lymphedema wrapping and this should be continued.  We will monitor during this stay.      Left > right lower extremity swelling  No associated pain or redness  U/S negative for DVT     Depression/anxiety with  insomnia.     Continue PTA Wellbutrin, clonazepam and Zoloft    Stopped PRN Ativan    Nausea   Improved    Continue antiemetics as ordered     Diet: Advance Diet as Tolerated: Regular Diet Adult    DVT Prophylaxis: Heparin SQ  High Catheter: not present  Code Status: Full Code      Disposition Plan   Expected discharge: 2 - 3 days, recommended to prior living arrangement once stable postop.  Entered: Shane Bang MD 08/25/2019, 1:35 PM       The patient's care was discussed with the Bedside Nurse and Patient.    Shane Bang MD  Hospitalist Service  [facility]    ______________________________________________________________________    Interval History   Chest tube is still uncomfortable. Nausea is better.    Data reviewed today: I reviewed all medications, new labs and imaging results over the last 24 hours. I personally reviewed no images or EKG's today.    Physical Exam   Vital Signs: Temp: 98.3  F (36.8  C) Temp src: Oral BP: 124/75 Pulse: 116 Heart Rate: 111 Resp: 16 SpO2: 94 % O2 Device: None (Room air)    Weight: 249 lbs 9.6 oz  Constitutional: awake, alert, cooperative, no apparent distress, and appears stated age  Respiratory: No increased work of breathing, good air exchange, clear to auscultation bilaterally, no crackles or wheezing  Cardiovascular: regular rate and rhythm, normal S1 and S2, no S3 or S4, and no murmur noted  GI: No scars, normal bowel sounds, soft, non-distended, non-tender  Neurologic: Awake, alert, oriented to name, place and time.  Cranial nerves II-XII are grossly intact.  Motor is 5 out of 5 bilaterally  Neuropsychiatric: General: normal, calm and normal eye contact  Chest tube on the left     Data   Recent Labs   Lab 08/25/19  0530 08/24/19  0702 08/23/19  0743 08/22/19  2146 08/22/19  1155  08/21/19  0739 08/20/19  0654  08/19/19  0659   WBC  --   --  7.6  --   --   --   --  9.0  --  7.6   HGB  --   --  11.8  --   --   --   --  11.2*  --  11.3*    MCV  --   --  79  --   --   --   --  79  --  79   PLT  --  336 343  --   --   --   --  284  --  271   INR  --   --  0.97  --   --   --   --   --   --   --      --  135  --  134  --  133 136   < >  --    POTASSIUM 3.8  --  3.8 3.8 3.2*   < > 3.3* 3.5   < >  --    CHLORIDE 97  --  98  --  97  --  96 100   < >  --    CO2 29  --  28  --  32  --  28 28   < >  --    BUN 13  --  14  --  14  --  10 7   < >  --    CR 0.74 0.64 0.57  --  0.66  --  0.70 0.60  --   --    ANIONGAP 8  --  9  --  5  --  9 8   < >  --    PABLO 8.8  --  9.2  --  9.3  --  9.0 8.6   < >  --    *  --  113*  --  124*  --  99 100*   < >  --    PROTTOTAL  --   --   --   --   --   --  8.0  --   --   --     < > = values in this interval not displayed.     Recent Results (from the past 24 hour(s))   XR Chest Port 1 View    Narrative    CHEST ONE VIEW PORTABLE   8/25/2019 4:59 AM     HISTORY: s/p left VATS, talc pleurodesis    COMPARISON: 8/24/2019 0525 hours and 8/23/2019 chest x-ray.      Impression    IMPRESSION: Stable left chest tube and right Port-A-Cath. No  pneumothorax. No significant change in left basilar opacity consistent  with effusion and infiltrate or atelectasis. Pleural-based 2 cm  nodular density lateral left mid chest at the level of the posterior  fifth-sixth ribs is new since 8/23/2019. Possibly loculated fluid  collection. Attention to this area on follow-up exams.  Stable  prominent cardiomediastinal silhouette.    SULY AMANDA MD     Medications     - MEDICATION INSTRUCTIONS -       sodium chloride Stopped (08/24/19 5685)       acetaminophen  975 mg Oral Q8H     bacitracin   Topical Daily     buPROPion  150 mg Oral QAM     carvedilol  3.125 mg Oral BID w/meals     clonazePAM  2 mg Oral At Bedtime     enoxaparin  40 mg Subcutaneous Q24H     ranitidine  150 mg Oral Q12H    Or     famotidine  20 mg Intravenous Q12H     guaiFENesin  1,200 mg Oral BID     heparin  5 mL Intravenous Q28 Days     heparin lock flush  5 mL  Intravenous Q24H     ibuprofen  600 mg Oral 4x Daily     ipratropium  0.5 mg Nebulization TID     levalbuterol  0.63 mg Nebulization TID     Lidocaine  2 patch Transdermal Q24H     lidocaine   Transdermal Q8H     lidocaine   Transdermal Q24h     melatonin  1 mg Oral At Bedtime     ondansetron  4 mg Oral Q8H     prochlorperazine  10 mg Oral 4x Daily     scopolamine   Transdermal Q8H    And     [START ON 8/27/2019] scopolamine   Transdermal Q72H     senna-docusate  1 tablet Oral BID    Or     senna-docusate  2 tablet Oral BID     sertraline  150 mg Oral Daily     sodium chloride (PF)  3 mL Intracatheter Q8H

## 2019-08-26 NOTE — PROGRESS NOTES
[facility]    Oncology Progress Note    Date of Service (when I saw the patient): 08/26/2019     Assessment & Plan   Megha Campbell is a 54 year old female who was admitted on 8/20/2019.     1. Stage III ( T3 N2a M0) triple negative left sided breast cancer  -diagnosis 11/2018  -[id number] stage III with 8 cm tumor and 5 positive lymph nodes, extranodal extension, LVI  -s/p lumpectomy with re-excision 12/2018   -s/p radiation 2/4/19-4/1/19  -declined chemotherapy (both neoadjuvant or adjuvant)  -CT 8/8/19 revealed a large left pleural effusion  -thoracentesis 8/9/19 confirmed metastatic adenocarcinoma, ER/[place] negative, consistent with breast primary  -CT thoracic and lumbar spine shows no evidence of metastatic disease  -PD-L1 negative and as such not a candidate for immunotherapy  -anticipate treating her with single agent Abraxane  -follow up next week in clinic with PET/CT for further discussion     2. Recurrent pleural Effusion  -malignant cytology positive on 8/9/2019  -CXR 8/20 showing loculated left pleural effusion  -underwent left VATS, pleural biopsy and talc pleurodesis on 8/23 under the care of Dr. Rosado  -chest tube discontinued today     3. Hematology  -h/o B12 and iron deficiency post gastrectomy  -[employer]       4.  Back pain  -CT thoracic and lumbar spine showing no metastatic disease  -lidocaine patch  -currently on tylenol/advil  -dilaudid PRN    DVT Prophylaxis: Enoxaparin (Lovenox) subcutaneous   Code Status: Full Code    Adele Garcia    Interval History   Reported SOB better.  Denied fevers.    Physical Exam   Temp: 98.3  F (36.8  C) Temp src: Oral BP: 125/80 Pulse: 107 Heart Rate: 109 Resp: 14 SpO2: 94 % O2 Device: None (Room air)    Vitals:    08/21/19 0642 08/22/19 0635 08/24/19 0659   Weight: 111.9 kg (246 lb 9.6 oz) 113.4 kg (250 lb) 113.2 kg (249 lb 9.6 oz)     Vital Signs with Ranges  Temp:  [98.2  F (36.8  C)-98.5  F (36.9  C)] 98.3  F (36.8  C)  Pulse:  []  107  Heart Rate:  [] 109  Resp:  [14-18] 14  BP: (100-125)/(48-80) 125/80  SpO2:  [91 %-96 %] 94 %  I/O last 3 completed shifts:  In: 543 [P.O.:540; I.V.:3]  Out: 500 [Urine:500]    Constitutional: awake, cooperative, no acute distress  GI: soft, non-distended, non-tender, no masses palpated  Musculoskeletal: no pedal edema    Medications     - MEDICATION INSTRUCTIONS -         acetaminophen  975 mg Oral Q8H     bacitracin   Topical Daily     buPROPion  150 mg Oral QAM     carvedilol  3.125 mg Oral BID w/meals     clonazePAM  2 mg Oral At Bedtime     enoxaparin  40 mg Subcutaneous Q24H     ranitidine  150 mg Oral Q12H    Or     famotidine  20 mg Intravenous Q12H     guaiFENesin  1,200 mg Oral BID     heparin  5 mL Intravenous Q28 Days     heparin lock flush  5 mL Intravenous Q24H     ibuprofen  600 mg Oral 4x Daily     ipratropium  0.5 mg Nebulization TID     levalbuterol  0.63 mg Nebulization TID     Lidocaine  2 patch Transdermal Q24H     lidocaine   Transdermal Q8H     lidocaine   Transdermal Q24h     melatonin  1 mg Oral At Bedtime     ondansetron  4 mg Oral Q8H     prochlorperazine  10 mg Oral 4x Daily     scopolamine   Transdermal Q8H    And     [START ON 8/27/2019] scopolamine   Transdermal Q72H     senna-docusate  1 tablet Oral BID    Or     senna-docusate  2 tablet Oral BID     sertraline  150 mg Oral Daily     sodium chloride (PF)  3 mL Intracatheter Q8H       Data   Results for orders placed or performed during the hospital encounter of 08/20/19 (from the past 24 hour(s))   XR Chest Port 1 View    Narrative    CHEST ONE VIEW PORTABLE   8/26/2019 5:16 AM     HISTORY:  Status post left video-assisted thoracoscopic surgery, talc  pleurodesis.    COMPARISON: 8/25/2019.      Impression    IMPRESSION: Right-sided port and left-sided chest tube are unchanged.  Left basilar airspace opacity and possible small left pleural effusion  are stable. Right lung remains clear. Heart size unchanged.    KARIN GARCIA  MD YESY   Platelet count   Result Value Ref Range    Platelet Count 344 150 - 450 10e9/L

## 2019-08-26 NOTE — PROGRESS NOTES
Pt on room air sat 97%. Lung sounds diminished with a productive cough from upper air way. moderate amount of yell / white thick secretions.  Cj  RT

## 2019-08-26 NOTE — DISCHARGE INSTRUCTIONS
[facility]  Discharge Orders & Follow-up Care  Video-Assisted Thoracoscopy or Thoracotomy    Call Kelly at Dr. Rosado  office at [phone] to make a return appointment with a chest x-ray in a week or two.  Your appointment will be with either Lourdes Fuentes PA-C or Dr. Rosado.  Ideally, your post-op appointment could be scheduled for the same day as your Medical Oncology appointment with Dr. Garcia or on the same day you start your chemotherapy treatment. Kelly can help arrange this.      A. Patient Care:  Call Dr Rosado  office @ [phone] if you experience:  *Severe chills or a fever or 101 F or higher on two occasions  *Increased incisional pain that cannot be relieved with rest or pain medications  *Presence of unusual incisional or chest tube site drainage that is odorous, green or yellow in color, or if your incision is warm, red or swollen  *Coughing up bright red blood or greenish-yellow secretions  *Chest pain that gets worse with deep breathing or a significant increase in shortness of breath  *Inability to urinate or have a bowel movement  *New pain or swelling in your legs    In an emergency, call 911 or have someone drive you to the nearest Emergency Department      Activity:  _XXXX__ No activity restrictions for a scope procedure/thoracoscopy      Wound Care:  *You should look at your incision each day and keep it clean while it heals  *Do not apply any creams, salves such as Bacitracin, or ointments on the incision while it is healing  * Steri strips (thin white paper strips) will be present on the incision(s) and they will peel off as your incision heals-- otherwise, they will be removed at your post-op appointment.    *Remove the dressings covering your chest tube site the morning of Wednesday, August 28. You may then shower.  Wash the incision and chest tube site(s) daily with soap and water. No bathing or immersing incision underwater for approximately 2 weeks or until  the chest tube sites are completely healed.     Place a dry gauze dressing (and tape) over the chest tube site because it is normal to have some drainage for a few days after the chest tube is removed.  Do not be alarmed if a large amount of fluid drains (should be pink or yellow) either spontaneously or with coughing or exertion. If this happens, just place a larger dry gauze dressing over the chest tube site-- it will stop and scab over in about a week or so. Once the drainage stops, you can stop covering the chest tube site with a dressing. Call our office if the drainage is milky or green in color or foul-smelling.    B. Respiratory:  __XXX_ Utilize Incentive spirometer and flutter valve/acapella (if you received one) 10 times in a row every few hours while awake for a few weeks after discharging home from the hospital    C. Activity:  It may take a few weeks to regain your normal energy level/stamina.  Chemotherapy treatment will also impact your energy level.  It is important during your recovery to get regular physical activity:  *walk each day at a comfortable pace  *climb stairs as tolerated  *take some rest periods each day but try not to take too many long naps, as this can affect your sleep at night             Revised November 2018

## 2019-08-26 NOTE — PLAN OF CARE
Dx:LVATS due to recurrent pleural effusions Hx:Breast Cx  Vs:stable on room air. A/O:x4. Incisions:2 CT sites to suction with dressing over CDI. CWMS:WDL. Pain level/controlled with:IV dilaudid. Up with:SBA. Tolerating regular diet. No N/V. Passing gas. Voiding. Is:using at bedside. Resp:diminished lung sounds. Plan: Will continue monitor. Possible removal of chest tubes tomorrow. Pt refused night time medications except for klonopin.

## 2019-08-26 NOTE — PROGRESS NOTES
"Thoracic Surgery POD #3:  /71 (BP Location: Right arm)   Pulse 84   Temp 98.4  F (36.9  C) (Oral)   Resp 14   Ht 1.6 m (5' 3\")   Wt 113.2 kg (249 lb 9.6 oz)   LMP 12/07/2017   SpO2 91%   BMI 44.21 kg/m    CXR: stable, no PTX, some basilar opacification left base  CT: minimal output  Final path: pending    S: No complaints- says her breathing is much improved. Minimal surgical pain. Discussed wound care and follow up with CXR and see me in about 7-14 days.  O: Inc.: dry, no erythema, steris intact  CTs: both DCed without complication.  Occlusive dressing applied.  P: discharge as per Hospitalist  CXR tomorrow if still in-house  Follow up with Thoracic Surgery with CXR in about 1-2 weeks, ideally in coordination with first chemo or OP visit with Med Onc    Lourdes Fuentes PA-C with Dr. Zackary [facility]ARGAS Oncology  Cell [phone]      "

## 2019-08-26 NOTE — PROGRESS NOTES
[facility]    Medicine Progress Note - Hospitalist Service       Date of Admission:  8/20/2019  Assessment & Plan      Megha Campbell is a 54-year-old female with a past medical history significant for uncomplicated asthma, history of metastatic breast cancer, status post surgery and radiation therapy, malignant pleural effusion, chronic lymphedema, obesity, major depressive disorder with anxiety and recently diagnosed right-sided pneumonia, who was admitted with suspected exacerbation of congestive heart failure or fluid overload. She was found to have a loculated malignant left pleural effusion on CT and underwent VATS and pleurodesis on 8/23. Chest tube will be removed on 8/26.     Metastatic breast cancer lower quadrant L breast   Malignant pleural effusion  Postoperative day # 3 VATS and pleurodesis- chest tube in place   Postoperative day # 3 port placement for chemotherapy  Postoperative management per surgeon- chest tube will be removed today per Dr. Rosado.    Initially stage III, triple-negative left-sided breast cancer diagnosed in 11/2011. S/p partial left mastectomy and axillary node dissection and radiation. Patient declined chemotherapy, both neoadjuvant and adjuvant.    Loculated left pleural effusion on CT 8/8 and rpt CT on 8/16. Prominent mediastinal lymph nodes. S/p thoracentesis on 8/9 1000 ml, confirmed metastatic adenocarcinoma consistent with breast primary.     Plan is to start chemotherapy to start next week    PET/CT as outpatient at f/u with oncology    Suspected fluid overload, with hyperdynamic heart on transthoracic echocardiogram   Elevated proBNP of 3584 (86 earlier this month).  Presented with orthopnea and PND, swelling of BLE, weights wildly variable.   Echo shows hyperdynamic LV, technically difficult study. Small pericardial effusion, Large left pleural effusion.   Status post  IV diuresis with lasix with excellent output and marked decrease in swelling.  Given  echo findings stopped further doses on 08/21/19.     New pulmonary infiltrate, possible CAP on 8/16 CT   Treated for CAP at last admission, but no fever, leukocytosis, pro calcitonin was less than 0.05. Had cough and treated with ceftriaxone and azithromycin.   Treated with ceftriaxone + azithromycin 8/16 to 8/19 and continued on Levaquin, s/p 6 days of antibiotics. PNA remains questionable. Procal 0.1 on 8/21. - Follow BCx NGTD. > stopped further antibiotics on 8/22.      Asthma, with acute exacerbation  Patient had audible diffuse wheezes and increased cough for several days, similar to prior exacerbations of asthma.   Wheezing resolved on 8/22    Continue nebs QID + xopenex TID on 08/21/19, albuterol PRN    Continue prednisone 40 mg for 5 days total     Avoid scheduled albuterol due to tachycardia    RCAT    Incentive spirometer and Acapella valve     Mucinex 1200 mg 2 times daily     HTN  Persistent Sinus Tachycardia, improved  Persistent tachycardia in the 110s noted during last hospitalization and discharged at 120. Prior clinic visits show nl HR. EKG shows sinus tachycardia with low voltage.  TSH nl, O2 97% on RA, BP elevated, Hgb ~ 11     Started carvedilol 3.125 mg BID on 8/22      Back Pain, related to cancer  Lumbar and thoracic spine CTs were performed and were negative for metastatic disease. Has outpatient followup established with Dr. Winston of [facility].     Continue PTA oral Tramadol PRN and lidocaine patches    IV dilaudid available prn around procedures    Ibuprofen and tylenol available for pain    Morbid Obesity  S/p gastric bypass   Currently, BMI is noted to be 35.43.    The patient plans to follow up with primary care provider.      Chronic lymphedema  At prior discharge on 8/19, patient had outpatient referral sent for lymphedema wrapping and this should be continued.  We will monitor during this stay.      Left > right lower extremity swelling  No associated pain or redness  U/S negative for  DVT     Depression/anxiety with insomnia.     Continue PTA Wellbutrin, clonazepam and Zoloft    Stopped PRN Ativan    Nausea   Improved    Change antiemetics to prn    Diet: Advance Diet as Tolerated: Regular Diet Adult    DVT Prophylaxis: Heparin SQ  High Catheter: not present  Code Status: Full Code      Disposition Plan   Expected discharge: 1-2 days, recommended to prior living arrangement once stable postop.  Entered: Shane Bang MD 08/26/2019, 1:14 PM       The patient's care was discussed with the Bedside Nurse and Patient.    Shane Bang MD  Hospitalist Service  [facility]    ______________________________________________________________________    Interval History   Chest tube is still uncomfortable. No nausea.    Data reviewed today: I reviewed all medications, new labs and imaging results over the last 24 hours. I personally reviewed no images or EKG's today.    Physical Exam   Vital Signs: Temp: 98.2  F (36.8  C) Temp src: Oral BP: 126/78 Pulse: 114 Heart Rate: 116 Resp: 14 SpO2: 97 % O2 Device: None (Room air)    Weight: 249 lbs 9.6 oz  Constitutional: awake, alert, cooperative, no apparent distress, and appears stated age  Respiratory: No increased work of breathing, good air exchange, clear to auscultation bilaterally, no crackles or wheezing  Cardiovascular: regular rate and rhythm, normal S1 and S2, no S3 or S4, and no murmur noted  GI: No scars, normal bowel sounds, soft, non-distended, non-tender  Neurologic: Awake, alert, oriented to name, place and time.  Cranial nerves II-XII are grossly intact.  Motor is 5 out of 5 bilaterally  Neuropsychiatric: General: normal, calm and normal eye contact  Chest tube on the left     Data   Recent Labs   Lab 08/26/19  0716 08/25/19  0530 08/24/19  0702 08/23/19  0743 08/22/19  2146 08/22/19  1155  08/21/19  0739 08/20/19  0654   WBC  --   --   --  7.6  --   --   --   --  9.0   HGB  --   --   --  11.8  --   --   --   --   11.2*   MCV  --   --   --  79  --   --   --   --  79     --  336 343  --   --   --   --  284   INR  --   --   --  0.97  --   --   --   --   --    NA  --  134  --  135  --  134  --  133 136   POTASSIUM  --  3.8  --  3.8 3.8 3.2*   < > 3.3* 3.5   CHLORIDE  --  97  --  98  --  97  --  96 100   CO2  --  29  --  28  --  32  --  28 28   BUN  --  13  --  14  --  14  --  10 7   CR  --  0.74 0.64 0.57  --  0.66  --  0.70 0.60   ANIONGAP  --  8  --  9  --  5  --  9 8   PABLO  --  8.8  --  9.2  --  9.3  --  9.0 8.6   GLC  --  117*  --  113*  --  124*  --  99 100*   PROTTOTAL  --   --   --   --   --   --   --  8.0  --     < > = values in this interval not displayed.     Recent Results (from the past 24 hour(s))   XR Chest Port 1 View    Narrative    CHEST ONE VIEW PORTABLE   8/26/2019 5:16 AM     HISTORY:  Status post left video-assisted thoracoscopic surgery, talc  pleurodesis.    COMPARISON: 8/25/2019.      Impression    IMPRESSION: Right-sided port and left-sided chest tube are unchanged.  Left basilar airspace opacity and possible small left pleural effusion  are stable. Right lung remains clear. Heart size unchanged.    KARIN HAYWARD MD     Medications     - MEDICATION INSTRUCTIONS -         acetaminophen  975 mg Oral Q8H     bacitracin   Topical Daily     buPROPion  150 mg Oral QAM     carvedilol  3.125 mg Oral BID w/meals     clonazePAM  2 mg Oral At Bedtime     enoxaparin  40 mg Subcutaneous Q24H     ranitidine  150 mg Oral Q12H    Or     famotidine  20 mg Intravenous Q12H     guaiFENesin  1,200 mg Oral BID     heparin  5 mL Intravenous Q28 Days     heparin lock flush  5 mL Intravenous Q24H     ibuprofen  600 mg Oral 4x Daily     ipratropium  0.5 mg Nebulization TID     levalbuterol  0.63 mg Nebulization TID     Lidocaine  2 patch Transdermal Q24H     lidocaine   Transdermal Q8H     lidocaine   Transdermal Q24h     melatonin  1 mg Oral At Bedtime     ondansetron  4 mg Oral Q8H     prochlorperazine  10 mg Oral 4x  Daily     scopolamine   Transdermal Q8H    And     [START ON 8/27/2019] scopolamine   Transdermal Q72H     senna-docusate  1 tablet Oral BID    Or     senna-docusate  2 tablet Oral BID     sertraline  150 mg Oral Daily     sodium chloride (PF)  3 mL Intracatheter Q8H

## 2019-08-26 NOTE — PLAN OF CARE
Pt is A/Ox4. Incisions: chest tubes removed this AM with CDI covering the insertion site. CWMS:WDL. Pain level/controlled with: PO Dilaudid. Up with: SBA. Tolerating regular diet with minimal appetite. Occasional nausea. Passing gas. Voiding. IS:using at bedside. Resp: diminished lung sounds. Pt refused all morning time medications except PRN Zofran and Dilaudid. Refused to ambulate all morning. Up in chair for all meals.

## 2019-08-27 NOTE — PROGRESS NOTES
"BRIEF NUTRITION ASSESSMENT      REASON FOR ASSESSMENT:  Megha Campbell is a 54 year old female seen by Registered Dietitian for LOS      CURRENT DIET AND INTAKE:  Diet:  Regular              Chart reviewed  Met Breast CA - malignant left pleural effusion  8/23: VATS and pleurodesis  8/26: CT removed  Pt had been eating well prior to surgery (100%)  Intake the past few days has been 25-50%  Has had nausea, pain and decreased appetite    Visited briefly with pt this morning  \"I just got a pain injection and want to rest\"  Reports decreased appetite  She is receptive to a chocolate Boost with meals for added nutrition      ANTHROPOMETRICS:  Height: 5' 3\"  Weight:(8/27) 112 kg /  246 lbs 14.64 oz  Body mass index is 43.74 kg/m .   Weight Status: Obesity Grade III BMI >40  IBW:  52.3 kg  %IBW: 214%  Weight History: Pt with chronic lymphedema  Vitals:    08/20/19 0642 08/21/19 0642 08/22/19 0635 08/24/19 0659   Weight: 90.7 kg (200 lb) 111.9 kg (246 lb 9.6 oz) 113.4 kg (250 lb) 113.2 kg (249 lb 9.6 oz)    08/27/19 0600   Weight: 112 kg (246 lb 14.6 oz)     Wt Readings from Last 10 Encounters:   08/27/19 112 kg (246 lb 14.6 oz)   08/19/19 120.7 kg (266 lb 3.2 oz)   08/14/19 100.3 kg (221 lb 3.2 oz)   08/09/19 90.7 kg (200 lb)   08/08/19 90.7 kg (200 lb)   03/08/19 95.3 kg (210 lb)   12/07/18 114.5 kg (252 lb 7 oz)   11/27/18 104.3 kg (230 lb)   11/21/18 104.3 kg (230 lb)   11/16/18 110.7 kg (244 lb)         LABS:  Labs noted    MALNUTRITION:  Patient does not meet two of the following criteria necessary for diagnosing malnutrition: significant weight loss, reduced intake, subcutaneous fat loss, muscle loss or fluid retention. Nutrition Focused Physical Assessment (NFPA) not appropriate at this time.    NUTRITION INTERVENTION:  Nutrition Diagnosis:  No nutrition diagnosis at this time.    Implementation:  Nutrition Education ---> Pt received CHF (low Na) diet teaching 8/21  Will send a chocolate Boost Glucose Control with " meals for added nutrition    FOLLOW UP/MONITORING:   Will re-evaluate in 7 - 10 days, or sooner, if re-consulted.

## 2019-08-27 NOTE — PROGRESS NOTES
[facility]    Medicine Progress Note - Hospitalist Service       Date of Admission:  8/20/2019  Assessment & Plan      Megha Campbell is a 54-year-old female with a past medical history significant for uncomplicated asthma, history of metastatic breast cancer, status post surgery and radiation therapy, malignant pleural effusion, chronic lymphedema, obesity, major depressive disorder with anxiety and recently diagnosed right-sided pneumonia, who was admitted with suspected exacerbation of congestive heart failure or fluid overload. She was found to have a loculated malignant left pleural effusion on CT and underwent VATS and pleurodesis on 8/23. Chest tube will be removed on 8/26.     Metastatic breast cancer lower quadrant L breast   Malignant pleural effusion  Postoperative day # 3 VATS and pleurodesis- chest tube in place   Postoperative day # 3 port placement for chemotherapy  Postoperative management per surgeon- chest tube will be removed today per Dr. Rosado.    Initially stage III, triple-negative left-sided breast cancer diagnosed in 11/2011. S/p partial left mastectomy and axillary node dissection and radiation. Patient declined chemotherapy, both neoadjuvant and adjuvant.    Loculated left pleural effusion on CT 8/8 and rpt CT on 8/16. Prominent mediastinal lymph nodes. S/p thoracentesis on 8/9 1000 ml, confirmed metastatic adenocarcinoma consistent with breast primary.    Plan is to start chemotherapy to start next week    PET/CT as outpatient at f/u with oncology    S/P VATS with pleurodesis on 8/23    Follow-up with CXR in 1-2 weeks with Thoracic surgery    [facility] follow-up next week with PET/CT for further planning, consideration of abraxane    Lightheadedness  Unclear etiology, sinus tachycardia, chronic  ?related to BB withdrawal  Plan  - cautious resumption of the coreg  - check orthostatics  - PT consult  - increase her activities  - likely discharge in the AM. Needs Rx for  albuterol    Suspected fluid overload, with hyperdynamic heart on transthoracic echocardiogram   Elevated proBNP of 3584 (86 earlier this month).  Presented with orthopnea and PND, swelling of BLE, weights wildly variable.   Echo shows hyperdynamic LV, technically difficult study. Small pericardial effusion, Large left pleural effusion.   Status post  IV diuresis with lasix with excellent output and marked decrease in swelling.  Given echo findings stopped further doses on 08/21/19.     New pulmonary infiltrate, possible CAP on 8/16 CT   Treated for CAP at last admission, but no fever, leukocytosis, pro calcitonin was less than 0.05. Had cough and treated with ceftriaxone and azithromycin.   Treated with ceftriaxone + azithromycin 8/16 to 8/19 and continued on Levaquin, s/p 6 days of antibiotics. PNA remains questionable. Procal 0.1 on 8/21. - Follow BCx NGTD. > stopped further antibiotics on 8/22.      Asthma, with acute exacerbation  Patient had audible diffuse wheezes and increased cough for several days, similar to prior exacerbations of asthma.   Wheezing resolved on 8/22    Continue nebs QID + xopenex TID on 08/21/19, albuterol PRN    Continue prednisone 40 mg for 5 days total     Avoid scheduled albuterol due to tachycardia    RCAT    Incentive spirometer and Acapella valve     Mucinex 1200 mg 2 times daily     HTN  Persistent Sinus Tachycardia, improved  Persistent tachycardia in the 110s noted during last hospitalization and discharged at 120. Prior clinic visits show nl HR. EKG shows sinus tachycardia with low voltage.  TSH nl, O2 97% on RA, BP elevated, Hgb ~ 11     Started carvedilol 3.125 mg BID on 8/22      Back Pain, related to cancer  Lumbar and thoracic spine CTs were performed and were negative for metastatic disease. Has outpatient followup established with Dr. Winston of [facility].     Continue PTA oral Tramadol PRN and lidocaine patches    IV dilaudid available prn around procedures    Ibuprofen  and tylenol available for pain    Morbid Obesity  S/p gastric bypass   Currently, BMI is noted to be 35.43.    The patient plans to follow up with primary care provider.      Chronic lymphedema  At prior discharge on 8/19, patient had outpatient referral sent for lymphedema wrapping and this should be continued.  We will monitor during this stay.      Left > right lower extremity swelling  No associated pain or redness  U/S negative for DVT     Depression/anxiety with insomnia.     Continue PTA Wellbutrin, clonazepam and Zoloft    Stopped PRN Ativan    Nausea   Improved    Change antiemetics to prn    Diet: Advance Diet as Tolerated: Regular Diet Adult  Snacks/Supplements Adult: Other; Chocolate Boost Glucose Control with meals  (RD); With Meals    DVT Prophylaxis: Heparin SQ  High Catheter: not present  Code Status: Full Code      Disposition Plan   Expected discharge: 1-2 days, recommended to prior living arrangement once stable postop.  Entered: Jamaal Dunn DO 08/27/2019, 2:02 PM       The patient's care was discussed with the Bedside Nurse and Patient.    Jamaal Dunn DO  Hospitalist Service  [facility]    ______________________________________________________________________    Interval History   Chest tube out, some dizziness today. Minimal tolerating diet. Passing gas but no stool.     Data reviewed today: I reviewed all medications, new labs and imaging results over the last 24 hours. I personally reviewed no images or EKG's today.    Physical Exam   Vital Signs: Temp: 97.2  F (36.2  C) Temp src: Oral BP: 113/65 Pulse: 91 Heart Rate: 108 Resp: 16 SpO2: 95 % O2 Device: None (Room air)    Weight: 246 lbs 14.64 oz  Constitutional: awake, alert, cooperative, no apparent distress, and appears stated age  Respiratory: No increased work of breathing, good air exchange, clear to auscultation bilaterally, no crackles or wheezing  Cardiovascular: regular rate and rhythm, normal S1  and S2, no S3 or S4, and no murmur noted  GI: No scars, normal bowel sounds, soft, non-distended, non-tender  Neurologic: Awake, alert, oriented to name, place and time.  Cranial nerves II-XII are grossly intact.  Motor is 5 out of 5 bilaterally  Neuropsychiatric: General: normal, calm and normal eye contact    Data   Recent Labs   Lab 08/27/19  0555 08/27/19  0045 08/26/19  0716 08/25/19  0530 08/24/19  0702 08/23/19  0743  08/22/19  2146 08/22/19  1155  08/21/19  0739   WBC  --   --   --   --   --  7.6  --   --   --   --   --    HGB  --   --   --   --   --  11.8  --   --   --   --   --    MCV  --   --   --   --   --  79  --   --   --   --   --      --  344  --  336 343   < >  --   --   --   --    INR  --   --   --   --   --  0.97  --   --   --   --   --    NA  --   --   --  134  --  135  --   --  134  --  133   POTASSIUM  --   --   --  3.8  --  3.8  --  3.8 3.2*   < > 3.3*   CHLORIDE  --   --   --  97  --  98  --   --  97  --  96   CO2  --   --   --  29  --  28  --   --  32  --  28   BUN  --   --   --  13  --  14  --   --  14  --  10   CR  --  0.56  --  0.74 0.64 0.57  --   --  0.66  --  0.70   ANIONGAP  --   --   --  8  --  9  --   --  5  --  9   PABLO  --   --   --  8.8  --  9.2  --   --  9.3  --  9.0   GLC  --   --   --  117*  --  113*  --   --  124*  --  99   PROTTOTAL  --   --   --   --   --   --   --   --   --   --  8.0    < > = values in this interval not displayed.     Recent Results (from the past 24 hour(s))   XR Chest 2 Views    Narrative    CHEST TWO VIEWS   8/27/2019 7:30 AM     HISTORY: Status post left  video-assisted thoracoscopic surgery.    COMPARISON: Chest x-rays dated 8/26/2019.    FINDINGS:  Opacity in the left lower lung lobe likely represents  infiltrate, atelectasis and/or pleural fluid collection. Tiny right  pleural fluid collection is _______. There is pulmonary edema. No  pneumothorax is identified. There is mild pleural thickening along the  left lateral chest wall which is  similar to the prior study. The left  chest tube has been removed. Right-sided chest port is stable in  appearance.    Cardiac silhouette appears enlarged. No fracture or pneumothorax.      Impression    IMPRESSION:  1. Interval removal of left chest tube. No pneumothorax.  2. Left basilar opacity is again noted. Widened mediastinum is again  seen.  3. There is slightly more prominent vascular congestion/pulmonary  edema since the prior study.  4. No other changes.     Medications     - MEDICATION INSTRUCTIONS -         buPROPion  150 mg Oral QAM     carvedilol  3.125 mg Oral BID w/meals     clonazePAM  2 mg Oral At Bedtime     enoxaparin  40 mg Subcutaneous Q24H     ranitidine  150 mg Oral Q12H    Or     famotidine  20 mg Intravenous Q12H     guaiFENesin  1,200 mg Oral BID     heparin  5 mL Intravenous Q28 Days     heparin lock flush  5 mL Intravenous Q24H     ibuprofen  600 mg Oral 4x Daily     ipratropium  0.5 mg Nebulization TID     levalbuterol  0.63 mg Nebulization TID     Lidocaine  2 patch Transdermal Q24H     lidocaine   Transdermal Q8H     lidocaine   Transdermal Q24h     melatonin  1 mg Oral At Bedtime     scopolamine   Transdermal Q8H    And     scopolamine   Transdermal Q72H     senna-docusate  1 tablet Oral BID    Or     senna-docusate  2 tablet Oral BID     sertraline  150 mg Oral Daily     sodium chloride (PF)  3 mL Intracatheter Q8H

## 2019-08-27 NOTE — PLAN OF CARE
"A&O. VSS ex tachycardic.  LS diminished. BS hypoactive, flatus+, voids spontaneously.  CT site with occlusive dressing, CDI.  Lidocaine patch applied to back.  Refused several meds including carvedilol, ibuprofen, senna, melatonin  Flexeril given x1, denies pain, usually describes as \"discomfort\".  Ambulation encouraged, importance of this explained. Pt refused, once stated \"I have been walking in the room\" and later c/o dizzyness when she is up, BP checked, stable. CNA frequently attempted walks with pt as well  Refused eating stating everything tastes bad but had a couple bites of banana. Fluids encouraged.  "

## 2019-08-27 NOTE — PROGRESS NOTES
Progress Note     Primary Oncologist/Hematologist:  Dr. Garcia          Assessment and Plan:     Megha Campbell is a 54 year old female who was admitted on 8/20/2019.      1. Stage III ( T3 N2a M0) triple negative left sided breast cancer  -diagnosis 11/2018  -[id number] stage III with 8 cm tumor and 5 positive lymph nodes, extranodal extension, LVI  -s/p lumpectomy with re-excision 12/2018   -s/p radiation 2/4/19-4/1/19  -declined chemotherapy (both neoadjuvant or adjuvant)  -CT 8/8/19 revealed a large left pleural effusion  -thoracentesis 8/9/19 confirmed metastatic adenocarcinoma, ER/IL negative, consistent with breast primary  -CT thoracic and lumbar spine shows no evidence of metastatic disease  -PD-L1 negative and as such not a candidate for immunotherapy  -anticipate treating her with single agent Abraxane  -follow up next week in clinic with PET/CT for further discussion     2. Recurrent pleural Effusion  -malignant cytology positive on 8/9/2019  -CXR 8/20 showing loculated left pleural effusion  -underwent left VATS, pleural biopsy and talc pleurodesis on 8/23 under the care of Dr. Rosado  -chest tube discontinued       3. Hematology  -h/o B12 and iron deficiency post gastrectomy  -[employer]       4.  Back pain  -CT thoracic and lumbar spine showing no metastatic disease  -lidocaine patch  -currently on tylenol/advil  -dilaudid PRN     DVT Prophylaxis: Enoxaparin (Lovenox) subcutaneous   Code Status: Full Code    Lei Suzie APRN, CNP  [facility]  [phone]        Interval History:   Feeling okay. Apprehensive about going home. Breathing is improved.              Review of Systems:   The 5 point Review of Systems is negative other than noted in the HPI             Medications:   Scheduled Medications    buPROPion  150 mg Oral QAM     carvedilol  3.125 mg Oral BID w/meals     clonazePAM  2 mg Oral At Bedtime     enoxaparin  40 mg Subcutaneous Q24H     ranitidine  150 mg Oral Q12H    Or      "famotidine  20 mg Intravenous Q12H     guaiFENesin  1,200 mg Oral BID     heparin  5 mL Intravenous Q28 Days     heparin lock flush  5 mL Intravenous Q24H     ibuprofen  600 mg Oral 4x Daily     ipratropium  0.5 mg Nebulization TID     levalbuterol  0.63 mg Nebulization TID     Lidocaine  2 patch Transdermal Q24H     lidocaine   Transdermal Q8H     lidocaine   Transdermal Q24h     melatonin  1 mg Oral At Bedtime     scopolamine   Transdermal Q8H    And     scopolamine   Transdermal Q72H     senna-docusate  1 tablet Oral BID    Or     senna-docusate  2 tablet Oral BID     sertraline  150 mg Oral Daily     sodium chloride (PF)  3 mL Intracatheter Q8H     PRN Medications  acetaminophen, albuterol, bisacodyl, calcium carbonate, cyclobenzaprine, glucose **OR** dextrose **OR** glucagon, diphenhydrAMINE **OR** diphenhydrAMINE, heparin lock flush, hydrALAZINE, HYDROmorphone, HYDROmorphone, labetalol, lidocaine 4%, lidocaine (buffered or not buffered), - MEDICATION INSTRUCTIONS -, metoclopramide, ondansetron, polyethylene glycol, potassium chloride, potassium chloride with lidocaine, potassium chloride, potassium chloride, potassium chloride, prochlorperazine, scopolamine **AND** scopolamine **AND** scopolamine, senna-docusate **OR** senna-docusate, sodium chloride (PF), sodium chloride (PF)               Physical Exam:   Vitals were reviewed  Blood pressure 131/80, pulse 114, temperature 98.3  F (36.8  C), temperature source Oral, resp. rate 16, height 1.6 m (5' 3\"), weight 112 kg (246 lb 14.6 oz), last menstrual period 12/07/2017, SpO2 95 %, not currently breastfeeding.  Wt Readings from Last 4 Encounters:   08/27/19 112 kg (246 lb 14.6 oz)   08/19/19 120.7 kg (266 lb 3.2 oz)   08/14/19 100.3 kg (221 lb 3.2 oz)   08/09/19 90.7 kg (200 lb)       I/O last 3 completed shifts:  In: 120 [P.O.:120]  Out: 180 [Urine:180]      Constitutional: Awake, alert, cooperative, no apparent distress   Lungs: Left CT site covered. Breathing " nonlabored. Diminished.    Cardiovascular: Regular rate and rhythm, normal S1 and S2, and no murmur noted   Abdomen: Normal bowel sounds, soft, non-distended, non-tender   Skin: Scattered bruising   Other: Right anterior chest port accessed              Data:   All laboratory data and imaging studies reviewed.    CMP  Recent Labs   Lab 08/27/19  0045 08/25/19  0530 08/24/19  0702 08/23/19  0743 08/22/19  2146 08/22/19  1155  08/21/19  0739   NA  --  134  --  135  --  134  --  133   POTASSIUM  --  3.8  --  3.8 3.8 3.2*   < > 3.3*   CHLORIDE  --  97  --  98  --  97  --  96   CO2  --  29  --  28  --  32  --  28   ANIONGAP  --  8  --  9  --  5  --  9   GLC  --  117*  --  113*  --  124*  --  99   BUN  --  13  --  14  --  14  --  10   CR 0.56 0.74 0.64 0.57  --  0.66  --  0.70   GFRESTIMATED >90 >90 >90 >90  --  >90  --  >90   GFRESTBLACK >90 >90 >90 >90  --  >90  --  >90   PABLO  --  8.8  --  9.2  --  9.3  --  9.0   PROTTOTAL  --   --   --   --   --   --   --  8.0    < > = values in this interval not displayed.     CBC  Recent Labs   Lab 08/27/19  0555 08/26/19  0716 08/24/19  0702 08/23/19  0743   WBC  --   --   --  7.6   RBC  --   --   --  4.63   HGB  --   --   --  11.8   HCT  --   --   --  36.6   MCV  --   --   --  79   MCH  --   --   --  25.5*   MCHC  --   --   --  32.2   RDW  --   --   --  14.6    344 336 343     INR  Recent Labs   Lab 08/23/19  0743   INR 0.97     Data   Results for orders placed or performed during the hospital encounter of 08/20/19 (from the past 24 hour(s))   Creatinine   Result Value Ref Range    Creatinine 0.56 0.52 - 1.04 mg/dL    GFR Estimate >90 >60 mL/min/[1.73_m2]    GFR Estimate If Black >90 >60 mL/min/[1.73_m2]   Platelet count   Result Value Ref Range    Platelet Count 341 150 - 450 10e9/L   XR Chest 2 Views    Narrative    CHEST TWO VIEWS   8/27/2019 7:30 AM     HISTORY: Status post left  video-assisted thoracoscopic surgery.    COMPARISON: Chest x-rays dated  8/26/2019.    FINDINGS:  Opacity in the left lower lung lobe likely represents  infiltrate, atelectasis and/or pleural fluid collection. Tiny right  pleural fluid collection is _______. There is pulmonary edema. No  pneumothorax is identified. There is mild pleural thickening along the  left lateral chest wall which is similar to the prior study. The left  chest tube has been removed. Right-sided chest port is stable in  appearance.    Cardiac silhouette appears enlarged. No fracture or pneumothorax.      Impression    IMPRESSION:  1. Interval removal of left chest tube. No pneumothorax.  2. Left basilar opacity is again noted. Widened mediastinum is again  seen.  3. There is slightly more prominent vascular congestion/pulmonary  edema since the prior study.  4. No other changes.

## 2019-08-27 NOTE — PROGRESS NOTES
"Thoracic Surgery:  /80 (BP Location: Right arm)   Pulse 114   Temp 98.3  F (36.8  C) (Oral)   Resp 16   Ht 1.6 m (5' 3\")   Wt 112 kg (246 lb 14.6 oz)   LMP 12/07/2017   SpO2 95%   BMI 43.74 kg/m    CXR: OK, some stable loculated right pleural effusoin at base-- some thickening as expected  S: OK on  Room air. Still c/o dizziness so PT today and pending discharge.   O: CT site: bandage intact  P: See me with CXR in 1-2 weeks, incoordination with appointment with Dr. Garcia  If right effusion increases and patient becomes dyspnea as outpatient, we can discuss PleurX again-- patient aware.    Lourdes Fuentes PA-C with Dr. Zackary Rosado  [facility]  Cell [phone]      "

## 2019-08-27 NOTE — PLAN OF CARE
Pt is A&Ox4, VSS, on RA, PRN Dilaudid for pain management. CT site dsg is CDI. LS diminished, BS+ hypoactive, flatus+, voiding adequately. Port to R chest, blood return noted. Pt is up independently in room, on regular diet-denies N/V but does report bad taste in mouth, has small appetite.

## 2019-08-27 NOTE — PLAN OF CARE
VSS on ra. AxO x4. SBA. Pain controlled prn ibuprofen and tylenol. Tolerating regular diet, poor appetite. BS hypoactive. LS diminished. Passing gas. Right PIV Saline locked. Port heparin locked, must be de-accessed prior to discharge. Incision CDI. Pt. Is [employer]. Pt on menses. PT will be seeing pt to determine level of care post discharge. Will continue to monitor.

## 2019-08-27 NOTE — PLAN OF CARE
Discharge Planner PT   Patient plan for discharge: home with oP PT   Current status:     Eval complete, treatment indicated. Edu PT role and POC. Time spent monitoring vital signs. Pre activity /65 ; post activity /48 , O2 88%.     Pt performed STS with SBA no AD, reported light headedness upon standing. Engaged in marching in place, slightly unsteady, sats low 90's. Pt required seated rest break after standing for one min. Engaged in short distance ambulation in room, pt requesting to use BR, noted pt reaching for objects for stability in room, minor lateral path deviation. Pt is IND with doffing/donning casie, sridhar, SBA STS from toilet, pt able to wash hands in sink without LOB. Noted pt with inc SOB when ambulating from BR to chair, noted O2 sats 88%, . Pt declining further ambulation. Pt engaged in BLE seated exercises, LUE exercises within tolerance.  Pt left seated in chair with needs in reach   Barriers to return to prior living situation: Pt reports she lives alone, impaired activity tolerance, impaired high level balance    Recommendations for discharge: home with OP lymphedema  Rationale for recommendations: Pt overall mobilizing well, limited by SOB, fatigue, light headedness. Pending further gait assessment, predict pt will progress to IND with all mobility. Eval limited by impaired tolerance for activity this PM. WIll cont. To follow during IP stay. Recommend OP lymph to address LUE swelling          Entered by: Elsy Espinoza 08/27/2019 3:36 PM

## 2019-08-27 NOTE — PLAN OF CARE
AxO x4 VSS on RA, HR tachy at times, SBA. Pain controlled prn ibuprofen and tylenol. Tolerating regular diet, +BM, +flatus, LS diminished, Port heparin locked, must be de-accessed prior to discharge. Incision CDI. Pt. Is Yazdan[employer]. Pt on menses. PT will be seeing pt to determine level of care post discharge. Will continue to monitor.

## 2019-08-27 NOTE — PROGRESS NOTES
08/27/19 1408   Quick Adds   Type of Visit Initial PT Evaluation   Living Environment   Lives With alone   Living Arrangements apartment   Home Accessibility no concerns   Living Environment Comment Elevator access, pt reports she is alone however previous chart from 8/17 pt lives with spouse    Self-Care   Usual Activity Tolerance good   Current Activity Tolerance poor   Regular Exercise No   Equipment Currently Used at Home none   Activity/Exercise/Self-Care Comment Pt is IND at baseline, inc back pain but not debilitating   Functional Level Prior   Ambulation 0-->independent   Transferring 0-->independent   Toileting 0-->independent   Bathing 0-->independent   Communication 0-->understands/communicates without difficulty   Fall history within last six months no   Prior Functional Level Comment IND at baseline    General Information   Onset of Illness/Injury or Date of Surgery - Date 08/20/19   Referring Physician Jamaal Dunn, DO   Patient/Family Goals Statement To return home    Pertinent History of Current Problem (include personal factors and/or comorbidities that impact the POC) Megha Campbell is a 54-year-old female with a past medical history significant for uncomplicated asthma, history of metastatic breast cancer, status post surgery and radiation therapy, malignant pleural effusion, chronic lymphedema, obesity, major depressive disorder with anxiety and recently diagnosed right-sided pneumonia, who was admitted with suspected exacerbation of congestive heart failure or fluid overload. She was found to have a loculated malignant left pleural effusion on CT and underwent VATS and pleurodesis on 8/23. Chest tube will be removed on 8/26.   Precautions/Limitations fall precautions   Cognitive Status Examination   Orientation orientation to person, place and time   Integumentary/Edema   Integumentary/Edema Comments BLE Edema, per pt inc swelling in LUE was supposed to go to OP lymphedema to have  "addressed    Posture    Posture Forward head position   Range of Motion (ROM)   ROM Comment BLE WFL   Strength   Strength Comments BLE > 3/5 strength based on functional mobility    Bed Mobility   Bed Mobility Comments Not assessed    Transfer Skills   Transfer Comments STS with SBA no AD, BUE push off    Gait   Gait Comments Pt ambulates with no AD, reaches for furniture, inc LIND noted, no overt LOB however pt reports \"light headedness\" minor lateral path deviation ambulating to BR noted    Balance   Balance Comments Impaired high level dynamic balance   Sensory Examination   Sensory Perception Comments intact to light touch    General Therapy Interventions   Planned Therapy Interventions balance training;bed mobility training;gait training;neuromuscular re-education;strengthening;transfer training   Clinical Impression   Criteria for Skilled Therapeutic Intervention yes, treatment indicated   PT Diagnosis impaired IND with functional mobility from baseline    Influenced by the following impairments SOB, impaired endurance, impaired high level balance   Functional limitations due to impairments impaired IND with functional mobility, impaired tolerance for functional activities as pt fatigues quickly    Clinical Presentation Evolving/Changing   Clinical Presentation Rationale clinical judgement    Clinical Decision Making (Complexity) Moderate complexity   Therapy Frequency Daily   Predicted Duration of Therapy Intervention (days/wks) 3 days   Anticipated Discharge Disposition Home with Outpatient Therapy   Risk & Benefits of therapy have been explained Yes   Patient, Family & other staff in agreement with plan of care Yes   [facility] AM-PAC  \"6 Clicks\" V.2 Basic Mobility Inpatient Short Form   1. Turning from your back to your side while in a flat bed without using bedrails? 4 - None   2. Moving from lying on your back to sitting on the side of a flat bed without using bedrails? 4 - None   3. Moving to and " from a bed to a chair (including a wheelchair)? 3 - A Little   4. Standing up from a chair using your arms (e.g., wheelchair, or bedside chair)? 3 - A Little   5. To walk in hospital room? 3 - A Little   6. Climbing 3-5 steps with a railing? 3 - A Little   Basic Mobility Raw Score (Score out of 24.Lower scores equate to lower levels of function) 20   Total Evaluation Time   Total Evaluation Time (Minutes) 15

## 2019-08-28 NOTE — DISCHARGE SUMMARY
[facility]  Hospitalist Discharge Summary       Date of Admission:  8/20/2019  Date of Discharge:  8/28/2019  Discharging Provider: Jamaal Dunn,       Discharge Diagnoses   Metastatic breast cancer lower quadrant L breast   Malignant pleural effusion  Chronic sinus tachycardia  Asthma exacerbation    Follow-ups Needed After Discharge   Follow-up Appointments     Follow-up and recommended labs and tests       Follow up with primary care provider, Briseyda Lock, within 7 days for   hospital follow- up.  No follow up labs or test are needed.    Follow up with thoracic surgery in 1 week    [facility]             Unresulted Labs Ordered in the Past 30 Days of this Admission     Date and Time Order Name Status Description    8/27/2019 1048 HER 2 RUDY FISH In process       These results will be followed up by none    Discharge Disposition   Discharged to home  Condition at discharge: Stable    Hospital Course   Metastatic breast cancer lower quadrant L breast   Malignant pleural effusion  Postoperative day # 3 VATS and pleurodesis- chest tube in place   Postoperative day # 3 port placement for chemotherapy  Postoperative management per surgeon- chest tube will be removed today per Dr. Rosado.     Initially stage III, triple-negative left-sided breast cancer diagnosed in 11/2011. S/p partial left mastectomy and axillary node dissection and radiation. Patient declined chemotherapy, both neoadjuvant and adjuvant.    Loculated left pleural effusion on CT 8/8 and rpt CT on 8/16. Prominent mediastinal lymph nodes. S/p thoracentesis on 8/9 1000 ml, confirmed metastatic adenocarcinoma consistent with breast primary.    Plan is to start chemotherapy to start next week    PET/CT as outpatient at f/u with oncology    S/P VATS with pleurodesis on 8/23    Follow-up with CXR in 1-2 weeks with Thoracic surgery    [facility] follow-up next week with PET/CT for further planning,  consideration of abraxane     Lightheadedness  Unclear etiology, sinus tachycardia, chronic  She reports worsening dizziness after coreg  Orthostatics negative, nonfocal examination  Cleared for home by PT  Plan  - patient has declined walking in the halls on numerous occasions.  - reports dizziness improved after stopping coreg  - desires going home to go home today. She was somewhat drowsy and not willing to do much activity, but this is volitional. This afternoon her drowsiness was improved. Her biggest concern was lack of appetite after surgery, but is having good bowel movements. She just doesn't like the food in the hospital. We discussed possible depression contributing, which she denied.   - giving lack of focal deficits, no urgent indication for MRI of the brain, could do as outpatient, discussed with Le[facility] from [facility]     Suspected fluid overload, with hyperdynamic heart on transthoracic echocardiogram   Elevated proBNP of 3584 (86 earlier this month).  Presented with orthopnea and PND, swelling of BLE, weights wildly variable.   Echo shows hyperdynamic LV, technically difficult study. Small pericardial effusion, Large left pleural effusion.   Status post  IV diuresis with lasix with excellent output and marked decrease in swelling.  Given echo findings stopped further doses on 08/21/19.     New pulmonary infiltrate, possible CAP on 8/16 CT   Treated for CAP at last admission, but no fever, leukocytosis, pro calcitonin was less than 0.05. Had cough and treated with ceftriaxone and azithromycin.   Treated with ceftriaxone + azithromycin 8/16 to 8/19 and continued on Levaquin, s/p 6 days of antibiotics. PNA remains questionable. Procal 0.1 on 8/21. - Follow BCx NGTD. > stopped further antibiotics on 8/22.      Asthma, with acute exacerbation  Patient had audible diffuse wheezes and increased cough for several days, similar to prior exacerbations of asthma.   Wheezing resolved on 8/22    albuterol  PRN    prednisone 40 mg for 5 days total      Avoid scheduled albuterol due to tachycardia     Mucinex 1200 mg 2 times daily     HTN  Persistent Sinus Tachycardia, improved  Persistent tachycardia in the 110s noted during last hospitalization and discharged at 120. Prior clinic visits show nl HR. EKG shows sinus tachycardia with low voltage.  TSH nl, O2 97% on RA, BP elevated, Hgb ~ 11     Stop coreg     Back Pain, related to cancer  Lumbar and thoracic spine CTs were performed and were negative for metastatic disease. Has outpatient followup established with Dr. Winston of [facility].       dilaudid available prn     Ibuprofen and tylenol available for pain     Morbid Obesity  S/p gastric bypass   Currently, BMI is noted to be 35.43.    The patient plans to follow up with primary care provider.      Chronic lymphedema  At prior discharge on 8/19, patient had outpatient referral sent for lymphedema wrapping and this should be continued.  We will monitor during this stay.      Left > right lower extremity swelling  No associated pain or redness  U/S negative for DVT     Depression/anxiety with insomnia.     Continue PTA Wellbutrin, clonazepam and Zoloft    Stopped PRN Ativan     Nausea   Improved    Change antiemetics to prn    Consultations This Hospital Stay   CARE COORDINATOR IP CONSULT  NUTRITION SERVICES ADULT IP CONSULT  HEMATOLOGY & ONCOLOGY IP CONSULT  PHARMACY IP CONSULT  THORACIC SURGERY IP CONSULT  PHYSICAL THERAPY ADULT IP CONSULT    Code Status   Full Code    Time Spent on this Encounter   I, Jamaal Dunn DO, personally saw the patient today and spent greater than 30 minutes discharging this patient.       Jamaal Dunn DO  [facility]  ______________________________________________________________________    Physical Exam   Vital Signs: Temp: 98.7  F (37.1  C) Temp src: Oral BP: 130/70 Pulse: 96 Heart Rate: 82 Resp: 18 SpO2: 98 % O2 Device: None (Room air)    Weight: 245 lbs  11.2 oz  Constitutional: awake, alert, cooperative, no apparent distress, and appears stated age  Respiratory: No increased work of breathing, good air exchange, clear to auscultation bilaterally, no crackles or wheezing  Cardiovascular: regular rate and rhythm, normal S1 and S2, no S3 or S4, and no murmur noted  GI: No scars, normal bowel sounds, soft, non-distended, non-tender  Neurologic: Awake, alert, oriented to name, place and time.  Cranial nerves II-XII are grossly intact.  Motor is 5 out of 5 bilaterally  Neuropsychiatric: General: normal, calm and normal eye contact       Primary Care Physician   Briseyda Lock    Discharge Orders      Physical Therapy Referral      Reason for your hospital stay    Pleural effusion     Follow-up and recommended labs and tests     Follow up with primary care provider, Briseyda Lock, within 7 days for hospital follow- up.  No follow up labs or test are needed.    Follow up with thoracic surgery in 1 week    Follow up with [facility] in 1-2 weeks for PET CT     Activity    Your activity upon discharge: activity as tolerated     Full Code     Diet    Follow this diet upon discharge: Orders Placed This Encounter      Snacks/Supplements Adult: Other; Chocolate Boost Glucose Control with meals  (RD); With Meals      Advance Diet as Tolerated: Regular Diet Adult       Significant Results and Procedures   Most Recent 3 CBC's:  Recent Labs   Lab Test 08/27/19  0555 08/26/19  0716 08/24/19  0702 08/23/19  0743 08/20/19  0654 08/19/19  0659   WBC  --   --   --  7.6 9.0 7.6   HGB  --   --   --  11.8 11.2* 11.3*   MCV  --   --   --  79 79 79    344 336 343 284 271     Most Recent 3 BMP's:  Recent Labs   Lab Test 08/27/19  0045 08/25/19  0530 08/24/19  0702 08/23/19  0743 08/22/19  2146 08/22/19  1155   NA  --  134  --  135  --  134   POTASSIUM  --  3.8  --  3.8 3.8 3.2*   CHLORIDE  --  97  --  98  --  97   CO2  --  29  --  28  --  32   BUN  --  13  --  14  --  14   CR 0.56 0.74 0.64  0.57  --  0.66   ANIONGAP  --  8  --  9  --  5   PABLO  --  8.8  --  9.2  --  9.3   GLC  --  117*  --  113*  --  124*     Most Recent 2 LFT's:  Recent Labs   Lab Test 08/14/19  0609   AST 15   ALT 15   ALKPHOS 111   BILITOTAL 0.3     Most Recent 3 INR's:  Recent Labs   Lab Test 08/23/19  0743 08/14/19  1021 08/09/19  1412   INR 0.97 0.97 1.0   ,   Results for orders placed or performed during the hospital encounter of 08/20/19   XR Chest 2 Views    Narrative    CHEST TWO VIEWS  8/20/2019 7:22 AM     HISTORY: shortness of breath    COMPARISON: 8/16/2019 chest x-ray      Impression    IMPRESSION: Redemonstrated loculated-appearing moderately large left  pleural effusion not significantly changed. Dense retrocardiac opacity  consistent with infiltrate or atelectasis appears slightly worse.  Patchy opacity throughout the right lung is increased. There is  widening of the right mediastinum and paratracheal region suggesting  adenopathy unchanged. Overall progression since 8/16/2019.    SULY AMANDA MD   US Thoracentesis    Narrative    THORACENTESIS 8/21/2019 2:14 PM    HISTORY: Patient with history of pleural effusion.    PROCEDURE/FINDINGS:  After obtaining informed consent, the patient was  positioned appropriately. The back was prepped and draped in the usual  sterile manner. Under ultrasound guidance, a Yueh needle was used to  access the pleural space. An prominent ultrasound image was saved to  document needle position. Thoracentesis was performed. Approximately  700 mL yellow serous fluid was aspirated out.     The patient tolerated the procedure well. There were no immediate  postprocedure complications. The patient's vital signs were monitored  by radiology nursing staff under my supervision and remained stable  throughout the study.      Impression    IMPRESSION:  Thoracentesis performed.    LINDA CRAVEN MD   US Lower Extremity Venous Duplex Left    Narrative    VENOUS ULTRASOUND LEFT LEG  8/21/2019 2:14  PM     HISTORY: L > R lower extremity swelling, breast cancer.    COMPARISON: None.    FINDINGS:  Examination of the deep veins with graded compression and  color flow Doppler with spectral wave form analysis was performed.  No  evidence for DVT in the left lower extremity.      Impression    IMPRESSION: No evidence of deep venous thrombosis.    LINDA CRAVEN MD   IR Chest Port Placement > 5 Yrs of Age    Narrative    PROCEDURE:   Placement of a chest port.    DATE OF PROCEDURE:  8/23/2019 9:06 AM    OPERATORS:    Jamie Dyer MD    MEDICATIONS:  1% and 1% lidocaine with epinephrine SQ, Versed 1.5 mg IV, Fentanyl 75  mcg IV, Ancef 2 G IV    CONTRAST:    None    REFERENCED AIR KERMA: 4 mGy  FLUOROSCOPY TIME: 1 minutes    ESTIMATED BLOOD LOSS:  Minimal    COMPLICATIONS:  None    PRE-PROCEDURE DIAGNOSIS: Cancer  POST-PROCEDURE DIAGNOSIS: Same    CLINICAL HISTORY/INDICATION:  54-year-old female with need for long-term central venous access for  medication infusion presents for chest port placement.    PROCEDURE AND FINDINGS:   Following a discussion of the risks, benefits, indications and  alternatives to treatment, appropriate informed consent was obtained.   The patient was brought to the interventional radiology suite and  placed supine on the table. The patient's right neck and anterior  chest region were prepped and draped using maximum sterile barrier  technique for tunneled line placement including: cap AND mask AND  sterile gown AND sterile gloves AND sterile full body drape AND hand  hygiene AND skin preparation 2% chlorhexidine for cutaneous antisepsis  (or acceptable alternative antiseptics) AND if ultrasound was used,  sterile gel and sterile probe covers.  A timeout was performed per  hospital universal protocol policy to ensure correct patient, site,  and procedure to be performed.    Ultrasound was utilized to evaluate the veins of the right neck and a  permanent record of the image was obtained which  demonstrated the  internal jugular vein to be patent. Under direct ultrasound guidance,  1% Lidocaine was infiltrated and access was gained easily into the low  lateral aspect of the internal jugular vein utilizing micropuncture  technique. The wire was advanced easily under fluoroscopic guidance  and the tract was serially dilated and a peel away sheath placed.     Attention was then directed to the creation of the subcutaneous  pocket.  Next, 1% Lidocaine with Epinephrine was used to anesthetize  the skin and a 4 cm incision was made along the anterior chest wall  and the pocket was created using sharp and blunt dissection. The  catheter was brought through a subcutaneous tunnel from the pocket to  the venous access site. The catheter was then advanced through the  peel away sheath under fluoroscopic guidance. The excess external  catheter was trimmed to length and then connected to the port. The  port was then tested for leaks. When no leaks were identified, the  port was flushed thoroughly with heparinized saline and placed within  the subcutaneous pocket. A final placement film demonstrates the  catheter tip at the cavoatrial junction with the patient supine.  The  wound was closed with 3-0 interrupted Monocryl deep layer. Dermabond  was then applied over the incision site as were steri-strips.  The  small incision at the base of the neck was also closed uneventfully.      Throughout the procedure, the patient was monitored by a radiology  nurse for cardiac rhythm, blood pressure and oxygen saturation which  remained stable. Total moderate sedation time was 23 minutes. The  patient tolerated the procedure well and left the interventional  radiology suite in stable condition.      Impression    IMPRESSION:   1. Uneventful placement of an implanted right internal jugular vein 6  [place]i 23 cm single lumen power injectable chest port, as described  using ultrasound and fluoroscopic guidance.     VENKATESH PINO MD    XR Chest Port 1 View    Narrative    CHEST ONE VIEW UPRIGHT 8/23/2019 2:44 PM     HISTORY: Status post left VATS, talc pleurodesis.    COMPARISON: August 20, 2019      Impression    IMPRESSION: Chest tube noted on the left, no pneumothorax. Decrease in  left pleural effusion since the comparison study. Mild left base  atelectasis and/or infiltrate. Minimal patchy densities on the right  are improved compared to previous.    NAVYA OBREGON MD   XR Chest Port 1 View    Narrative    CHEST ONE VIEW PORTABLE   8/24/2019 5:30 AM     HISTORY: s/p left VATS, talc pleurodesis    COMPARISON: 8/23/2019 4048 hours      Impression    IMPRESSION: Redemonstrated left chest tube, no evidence for  pneumothorax. Minimal increased opacity at the lower left chest  consistent with pleural effusion and left lower lobe infiltrate or  atelectasis. Right lung clear.    Stable prominent cardiomediastinal silhouette with right chest  Port-A-Cath projecting over distal SVC. Mild pulmonary vascular  congestion.    SULY AMANDA MD   XR Chest Port 1 View    Narrative    CHEST ONE VIEW PORTABLE   8/25/2019 4:59 AM     HISTORY: s/p left VATS, talc pleurodesis    COMPARISON: 8/24/2019 0525 hours and 8/23/2019 chest x-ray.      Impression    IMPRESSION: Stable left chest tube and right Port-A-Cath. No  pneumothorax. No significant change in left basilar opacity consistent  with effusion and infiltrate or atelectasis. Pleural-based 2 cm  nodular density lateral left mid chest at the level of the posterior  fifth-sixth ribs is new since 8/23/2019. Possibly loculated fluid  collection. Attention to this area on follow-up exams.  Stable  prominent cardiomediastinal silhouette.    SULY AMANDA MD   XR Chest Port 1 View    Narrative    CHEST ONE VIEW PORTABLE   8/26/2019 5:16 AM     HISTORY:  Status post left video-assisted thoracoscopic surgery, talc  pleurodesis.    COMPARISON: 8/25/2019.      Impression    IMPRESSION: Right-sided port and  left-sided chest tube are unchanged.  Left basilar airspace opacity and possible small left pleural effusion  are stable. Right lung remains clear. Heart size unchanged.    KARIN HAYWARD MD   XR Chest 2 Views    Narrative    CHEST TWO VIEWS   8/27/2019 7:30 AM     HISTORY: Status post left  video-assisted thoracoscopic surgery.    COMPARISON: Chest x-rays dated 8/26/2019.    FINDINGS:  Opacity in the left lower lung lobe likely represents  infiltrate, atelectasis and/or pleural fluid collection. Tiny right  pleural fluid collection is unchanged. There is pulmonary edema. No  pneumothorax is identified. There is mild pleural thickening along the  left lateral chest wall which is similar to the prior study. The left  chest tube has been removed. Right-sided chest port is stable in  appearance.    Cardiac silhouette appears enlarged. No fracture or pneumothorax.      Impression    IMPRESSION:  1. Interval removal of left chest tube. No pneumothorax.  2. Left basilar opacity is again noted. Widened mediastinum is again  seen.  3. There is slightly more prominent vascular congestion/pulmonary  edema since the prior study. This could represent worsening congestive  heart failure.  4. No other changes.    VENKATESH ROGERS MD       Discharge Medications   Current Discharge Medication List      START taking these medications    Details   acetaminophen (TYLENOL) 325 MG tablet Take 2 tablets (650 mg) by mouth every 4 hours as needed for other (multimodal surgical pain management along with NSAIDS and opioid medication as indicated based on pain control and physical function.)  Qty: 100 tablet, Refills: 0    Associated Diagnoses: Acute post-operative pain; Malignant pleural effusion      ondansetron (ZOFRAN-ODT) 4 MG ODT tab Take 1 tablet (4 mg) by mouth every 6 hours as needed for nausea or vomiting  Qty: 20 tablet, Refills: 0    Associated Diagnoses: Malignant pleural effusion      ranitidine (ZANTAC) 150 MG tablet Take 1  tablet (150 mg) by mouth every 12 hours  Qty: 60 tablet, Refills: 0    Associated Diagnoses: Malignant pleural effusion         CONTINUE these medications which have CHANGED    Details   HYDROmorphone (DILAUDID) 2 MG tablet Take 1 tablet (2 mg) by mouth every 3 hours as needed for moderate to severe pain  Qty: 20 tablet, Refills: 0    Associated Diagnoses: Malignant neoplasm of lower-outer quadrant of left breast of female, estrogen receptor negative (H)      ibuprofen (ADVIL/MOTRIN) 800 MG tablet Take 1 tablet (800 mg) by mouth every 8 hours as needed for moderate pain  Qty: 20 tablet, Refills: 0    Associated Diagnoses: Malignant neoplasm of lower-outer quadrant of left breast of female, estrogen receptor negative (H)      levalbuterol (XOPENEX) 0.31 MG/3ML neb solution Take 3 mLs (0.31 mg) by nebulization every 8 hours as needed for wheezing or shortness of breath / dyspnea  Qty: 30 mL, Refills: 0    Associated Diagnoses: Pneumonia of right lung due to infectious organism, unspecified part of lung         CONTINUE these medications which have NOT CHANGED    Details   albuterol (PROAIR HFA/PROVENTIL HFA/VENTOLIN HFA) 108 (90 Base) MCG/ACT inhaler Inhale 1-2 puffs into the lungs every 6 hours as needed for shortness of breath / dyspnea or wheezing  Qty: 1 Inhaler, Refills: 0    Comments: Pharmacy may dispense brand covered by insurance (Proair, or proventil or ventolin or generic albuterol inhaler)  Associated Diagnoses: Pneumonia of right lung due to infectious organism, unspecified part of lung      albuterol (PROVENTIL) (2.5 MG/3ML) 0.083% neb solution Take 2.5 mg by nebulization every 4 hours as needed for shortness of breath / dyspnea or wheezing      buPROPion (WELLBUTRIN XL) 150 MG 24 hr tablet Take 150 mg by mouth every morning      clonazePAM (KLONOPIN) 1 MG tablet Take 2 mg by mouth At Bedtime      cyclobenzaprine (FLEXERIL) 10 MG tablet Take 1 tablet (10 mg) by mouth 3 times daily as needed for muscle  spasms  Qty: 30 tablet, Refills: 0    Associated Diagnoses: Breast pain, left      diphenhydrAMINE (BENADRYL) 25 MG tablet Take 1-2 tablets (25-50 mg) by mouth every 6 hours as needed for itching or allergies  Qty: 60 tablet, Refills: 0    Associated Diagnoses: Malignant neoplasm of lower-outer quadrant of left breast of female, estrogen receptor negative (H)      Lidocaine (LIDOCARE) 4 % Patch Place 2 patches onto the skin every 24 hours To prevent lidocaine toxicity, patient should be patch free for 12 hrs daily.  Qty: 30 patch, Refills: 0    Associated Diagnoses: Pneumonia of right lung due to infectious organism, unspecified part of lung      LORazepam (ATIVAN) 0.5 MG tablet Take 1 tablet (0.5 mg) by mouth every 8 hours as needed  Qty: 10 tablet, Refills: 0    Associated Diagnoses: Malignant neoplasm of lower-outer quadrant of left breast of female, estrogen receptor negative (H)      sertraline (ZOLOFT) 100 MG tablet Take 150 mg by mouth daily      traMADol (ULTRAM) 50 MG tablet Take 1 tablet (50 mg) by mouth every 6 hours as needed for pain  Qty: 5 tablet, Refills: 0         STOP taking these medications       levofloxacin (LEVAQUIN) 500 MG tablet Comments:   Reason for Stopping:             Allergies   Allergies   Allergen Reactions     Hydrocodone-Acetaminophen Itching     Patient states she is able to take it with Benadryl.       Oxycodone Itching and Rash

## 2019-08-28 NOTE — PROVIDER NOTIFICATION
Text page to Dr. Dunn    Wanting to verify that pt is still okay to discharge.   Lei from oncology mentioned concern about it d/t lethargy/potential need for scan, although note mentions possible outpatient MRI brain.    -Response:  MD will come see the pt.  Hold off on de-accessing port.

## 2019-08-28 NOTE — PLAN OF CARE
"Discharge Planner PT   Patient plan for discharge: home today \"I'm going to take a cab.\"  Current status: Pt declines ambulation in the shay, states \"I'm so tired, and light headed, I'm going home today, I can't walk in the shay.\" Pt tearful stating she didn't realize \"how hard this surgery was going to be.\" Sit>stand from toilet SBA. SBA with ambulation in room toilet to bed. Sit>supine independent. Edu on importance of activity and ambulation, pt states \"I can do that when I get home.\" Requested a walker for trial with nursing. States \"I'll rest and get up later.\" O2 97%  post activity; supine  Barriers to return to prior living situation: None based on mobility.  Recommendations for discharge: Home with OP Lymph follow up as pt repots was initiated prior to admit.  Rationale for recommendations: Anticipate with ambulation in shay and more activity, strength will improve and pt will be safe for discharge home. Pt has been provided with a walker to trial and extensive education on the importance of mobility and activity for recovery.        Entered by: Mohini Oconnor 08/28/2019 10:29 AM     Physical Therapy Discharge Summary    Reason for therapy discharge:    Discharged to home with outpatient therapy.    Progress towards therapy goal(s). See goals on Care Plan in [facility] electronic health record for goal details.  Goals not met.  Barriers to achieving goals:   limited tolerance for therapy and discharge from facility.    Therapy recommendation(s):    Edu on mobility andimprotance of activity, will benefit from frequent walks. Discussed goal of up to 30 min a day outside of nomral mobility.  Pt should follow up with OP Lymphedema as she had intended to do prior to this admit.           "

## 2019-08-28 NOTE — PROGRESS NOTES
Cross Cover:    Request for patient to be taken off telemetry. Patient has had ongoing issues with sinus tachycardia in this admission, had been started on coreg, but refused tonight's dose.  Will keep telemetry on overnight so that patient may be presented with data regarding her heart rate off of coreg in the AM by rounding hospitalist.

## 2019-08-28 NOTE — PLAN OF CARE
Oriented, lethargic. VSS ex tachycardic at times.  LS diminished. Bs+, flatus+, void+. Denies pain, denies nausea. Refused eating. MD aware.    Occlusive dressing removed as noted. Steri strips at incision, scant drainage from CT sites. Gauze dressing placed.    AVS reviewed, medications with pt except albuterol neb solution which was out of stock, per discharge pharmacy RX sent to [facility] in [place] as preferred by pt.  All belongings with pt, questions answered. Pt escorted to door 6 with cab transport set up by TAMELA.

## 2019-08-28 NOTE — PROGRESS NOTES
Progress Note     Primary Oncologist/Hematologist:  Dr. Garcia          Assessment and Plan:     Megha Campbell is a 54 year old female who was admitted on 8/20/2019.      1. Stage III ( T3 N2a M0) triple negative left sided breast cancer  -diagnosis 11/2018  -[id number] stage III with 8 cm tumor and 5 positive lymph nodes, extranodal extension, LVI  -s/p lumpectomy with re-excision 12/2018   -s/p radiation 2/4/19-4/1/19  -declined chemotherapy (both neoadjuvant or adjuvant)  -CT 8/8/19 revealed a large left pleural effusion  -thoracentesis 8/9/19 confirmed metastatic adenocarcinoma, ER negative, DC weakly positive and androgen receptor weakly positive, consistent with breast primary  -CT thoracic and lumbar spine shows no evidence of metastatic disease  -PD-L1 negative and as such not a candidate for immunotherapy  -anticipate treating her with single agent Abraxane  -follow up next week in clinic with PET/CT for further discussion  -scheduling will call her to coordinate visits     2. Recurrent pleural Effusion  -malignant cytology positive on 8/9/2019  -CXR 8/20 showing loculated left pleural effusion  -underwent left VATS, pleural biopsy and talc pleurodesis on 8/23 under the care of Dr. Rosado  -chest tube discontinued       3. Hematology  -h/o B12 and iron deficiency post gastrectomy  -[employer]       4.  Back pain  -CT thoracic and lumbar spine showing no metastatic disease  -lidocaine patch  -currently on tylenol/advil  -dilaudid PRN    5. Dizziness  - Not orthostatic  - Could be from poor intake and deconditioning  - No localizing CNS symptoms  - If symptoms persist, we can get outpatient MRI brain    Lei Larsen APRN, CNP  [facility]  [phone]        Interval History:   Sleepy today.              Review of Systems:   The 5 point Review of Systems is negative other than noted in the HPI             Medications:   Scheduled Medications    buPROPion  150 mg Oral QAM     clonazePAM  2 mg  "Oral At Bedtime     enoxaparin  40 mg Subcutaneous Q24H     guaiFENesin  1,200 mg Oral BID     heparin  5 mL Intravenous Q28 Days     heparin lock flush  5 mL Intravenous Q24H     ibuprofen  600 mg Oral 4x Daily     ipratropium  0.5 mg Nebulization TID     levalbuterol  0.63 mg Nebulization TID     Lidocaine  2 patch Transdermal Q24H     lidocaine   Transdermal Q8H     lidocaine   Transdermal Q24h     melatonin  1 mg Oral At Bedtime     ranitidine  150 mg Oral Q12H     scopolamine   Transdermal Q8H    And     scopolamine   Transdermal Q72H     senna-docusate  1 tablet Oral BID    Or     senna-docusate  2 tablet Oral BID     sertraline  150 mg Oral Daily     sodium chloride (PF)  3 mL Intracatheter Q8H     PRN Medications  acetaminophen, albuterol, bisacodyl, calcium carbonate, cyclobenzaprine, glucose **OR** dextrose **OR** glucagon, diphenhydrAMINE **OR** diphenhydrAMINE, heparin lock flush, hydrALAZINE, HYDROmorphone, HYDROmorphone, labetalol, lidocaine 4%, lidocaine (buffered or not buffered), - MEDICATION INSTRUCTIONS -, metoclopramide, ondansetron, polyethylene glycol, potassium chloride, potassium chloride with lidocaine, potassium chloride, potassium chloride, potassium chloride, prochlorperazine, scopolamine **AND** scopolamine **AND** scopolamine, senna-docusate **OR** senna-docusate, sodium chloride (PF), sodium chloride (PF)               Physical Exam:   Vitals were reviewed  Blood pressure 123/66, pulse 96, temperature 98.1  F (36.7  C), temperature source Oral, resp. rate 18, height 1.6 m (5' 3\"), weight 111.4 kg (245 lb 11.2 oz), last menstrual period 12/07/2017, SpO2 98 %, not currently breastfeeding.  Wt Readings from Last 4 Encounters:   08/28/19 111.4 kg (245 lb 11.2 oz)   08/19/19 120.7 kg (266 lb 3.2 oz)   08/14/19 100.3 kg (221 lb 3.2 oz)   08/09/19 90.7 kg (200 lb)       I/O last 3 completed shifts:  In: 243 [P.O.:240; I.V.:3]  Out: 680 [Urine:680]      Constitutional: Sleepy, but conversant. " Cooperative, no apparent distress   Lungs: Clear to auscultation bilaterally, no crackles or wheezing. CT site on left.   Cardiovascular: Regular rate and rhythm, normal S1 and S2, and no murmur noted   Abdomen: Normal bowel sounds, soft, non-distended, non-tender   Skin: No rashes, no cyanosis. Nonpitting edema   Other:               Data:   All laboratory data and imaging studies reviewed.    CMP  Recent Labs   Lab 08/27/19  0045 08/25/19  0530 08/24/19  0702 08/23/19  0743 08/22/19  2146 08/22/19  1155   NA  --  134  --  135  --  134   POTASSIUM  --  3.8  --  3.8 3.8 3.2*   CHLORIDE  --  97  --  98  --  97   CO2  --  29  --  28  --  32   ANIONGAP  --  8  --  9  --  5   GLC  --  117*  --  113*  --  124*   BUN  --  13  --  14  --  14   CR 0.56 0.74 0.64 0.57  --  0.66   GFRESTIMATED >90 >90 >90 >90  --  >90   GFRESTBLACK >90 >90 >90 >90  --  >90   PABLO  --  8.8  --  9.2  --  9.3     CBC  Recent Labs   Lab 08/27/19  0555 08/26/19  0716 08/24/19  0702 08/23/19  0743   WBC  --   --   --  7.6   RBC  --   --   --  4.63   HGB  --   --   --  11.8   HCT  --   --   --  36.6   MCV  --   --   --  79   MCH  --   --   --  25.5*   MCHC  --   --   --  32.2   RDW  --   --   --  14.6    [phone]     INR  Recent Labs   Lab 08/23/19  0743   INR 0.97     Data   No results found for this or any previous visit (from the past 24 hour(s)).

## 2019-08-28 NOTE — PROVIDER NOTIFICATION
"Text Page to Dr. Dunn:    Orthostatic BP taken, minimal differences.  FYI: pt refused ALL meds this morning.    Situation: pt stated \"I can't take those\" when writer asked about this she said she already told us, then said she can't take medications because she hasn't eaten. When asked if she has had breakfast she refused it, describing feeling sick if she smells food and that she does not want to order it and waste it.   She presently denies nausea.  Offered antiemetic prior to attempting to eat and this was also declined.  "

## 2019-08-28 NOTE — PROVIDER NOTIFICATION
"Page out to Dr Esteves \"Pt has tele orders from admission on 8/20.  It appears patient has not been on tele since surgery on 8/23.  Plan is for patient to discharge tomorrow.  Is it ok to discontinue tele?\"  "

## 2019-08-28 NOTE — PLAN OF CARE
Pt is A+Ox4, VSS. Pt had fall at 2300 - assessment showed no injuries sustained. Pt has been emotional regarding her health. Pain has increased overnight - controlled with po dilaudid and flexeril. Pt voiding adequately and having bowel movements. No drainage from incision site. Pt refused many evening medications - education provided and pt verbalized understanding but still refused. Pt placed back on fall risk - wristband applied, pt up standby assist. Pt dizziness controlled with ODT zofran.

## 2019-08-29 NOTE — ED NOTES
Bed: ED19  Expected date:   Expected time:   Means of arrival:   Comments:  roge - 514 - 54 F SOB eta 0044

## 2019-08-29 NOTE — ED PROVIDER NOTES
"History     Chief Complaint:  Shortness of Breath    HPI  Megha Campbell is a 54 year old female with a history including a recent diagnosis of stage III breast cancer, asthma, left-sided port in place, and congestive heart failure who presents to the emergency department today for evaluation of shortness of breath. The patient was admitted to the hospital on 08/20/2019 for malignant pleural effusion, chronic sinus tachycardia, metastatic breast cancer, and asthma exacerbation and, during her hospitalization, a left video-assisted thoracoscopy procedure and pleurodesis were performed. The patient reports that, after being discharged last night and getting home, she began feeling short of breath. She does note that she lives by herself in an apartment which is currently being painted and has a significant paint odor. She also reports that she has not been able to eat or sleep in three days. She endorses lower back pain, abdominal pain, leg swelling, and \"feeling wheezy.\" She denies any chest pain, fevers, history of blood clots, Tylenol or ibuprofen use, or current use of anticoagulant medications. Of note, the patient has completed radiation therapy and a lumpectomy for her breast cancer and will be starting chemotherapy next week.     Allergies:  Hydrocodone-Acetaminophen  Oxycodone    Medications:    Tylenol  Albuterol inhaler  Proventil  Wellbutrin XL  Klonopin  Flexeril  Benadryl  Dilaudid  Ibuprofen  Xopenex  Lidocaine patch  Ativan   Zofran-ODT  Zantac  Zoloft  Ultram    Past Medical History:    Breast cancer in female   Depressive disorder   Obese Uncomplicated asthma   Malignant neoplasm of lower-outer quadrant of left breast of female, estrogen receptor negative  Pneumonia of right lung due to infectious organism  Malignant pleural effusion  Depressive disorder  Congestive heart failure    Past Surgical History:    Re-excision left breast inferior margin, aspiration of axillary seroma  Gastric bypass  IR " "chest port placement > 6 yrs of age   Lumpectomy breast with seed localization, left  Lymph node dissection  Left video-assisted thoracic surgery, talc pleurodesis, partial pleural biopsy    Family History:    The patient's family history includes Breast Cancer in her mother.    Social History:  The patient reports that she has never smoked. She has never used smokeless tobacco. She reports that she drinks alcohol. She reports that she does not use drugs.   PCP: Briseyda Lock  Marital Status:      Review of Systems   Constitutional: Negative for fever.   Respiratory: Positive for shortness of breath.         Positive for \"feeling wheezy.\"   Cardiovascular: Positive for leg swelling. Negative for chest pain.   Musculoskeletal: Positive for back pain (lower).   All other systems reviewed and are negative.      Physical Exam     Patient Vitals for the past 24 hrs:   BP Temp Temp src Pulse Heart Rate Resp SpO2 Height Weight   08/29/19 1754 -- -- -- -- -- -- 96 % -- --   08/29/19 1737 -- -- -- 102 -- 20 96 % -- --   08/29/19 1700 (!) 135/93 -- -- 106 -- -- 97 % -- --   08/29/19 1645 (!) 141/89 -- -- -- -- -- 98 % -- --   08/29/19 1402 129/80 97.5  F (36.4  C) Oral -- 110 22 98 % 1.727 m (5' 8\") 110.2 kg (243 lb)     Physical Exam  General: Alert and cooperative with exam. Patient in Moderate distress. Anxious in appearance. Normal mentation.  Head:  Scalp is NC/AT  Eyes:  No scleral icterus, PERRL  ENT:  The external nose and ears are normal. The oropharynx is normal and without erythema; mucus membranes are moist. Uvula midline, no evidence of deep space infection.  Neck:  Normal range of motion without rigidity.  CV:  Regular rate and rhythm    No pathologic murmur     Chest port present.  Resp:  Breath sounds are clear bilaterally.    Nasal cannula in place, mild tachypnea.   GI:  Abdomen is soft, no distension, no tenderness. No peritoneal signs. Overweight.  MS:  +1 pitting edema to her lower extremities " bilaterally.  Skin:  Warm and dry, No rash or lesions noted. Left lateral chest surgical site is clear, dry, and intact.  Neuro: Oriented x 3. No gross motor deficits.    Emergency Department Course     Imaging:  Radiology findings were communicated with the patient who voiced understanding of the findings.    Chest XR,  PA & LAT  Continued dense consolidation at the left mid to inferior  chest worrisome for ongoing airspace disease. There is a stable  potential left pleural fluid collection. Bilateral additional patchy  pulmonary opacities again noted that appear just slightly less  prominent. Right chest port venous catheter is stable. Stable  cardiomegaly.    Report(s) per radiology.     Laboratory:  Laboratory findings were communicated with the patient who voiced understanding of the findings.    ISTAT gases lactate rosie POCT: PH 7.44 (H), Bicarbonate 29 (H), otherwise within normal limits.  CBC: WBC: 6.9, HGB 10.7 (L), .  BMP: Potassium 3.2 (L), Urea Nitrogen 4 (L), Creatinine 0.46 (L), otherwise within normal limits.  Nt probnp inpatient: 1,803 (H)  Troponin I: <0.015     Interventions:  1530 Ativan 0.5 mg IV  1700  mL IV   1747 Heparin 500 units Intracatheter    Emergency Department Course:  1417 Nursing notes and vitals reviewed.  1422 I performed a physical examination of the patient as documented above.  1453 IV was inserted and blood was drawn for laboratory testing, results above.  1509 The patient was sent for a Chest XR while in the emergency department, results above.   1554 I rechecked the patient.   1755 I personally reviewed the laboratory and imaging results with the patient and answered all related questions prior to discharge.    Impression & Plan      Medical Decision Making:  Patient is a 54-year-old female who presents with shortness of breath, decreased appetite and reported nausea; discharged from hospital yesterday following VATS procedure and pleurodesis. Patient's medical  history and records were reviewed. Initial consideration for, but not limited to, complication from recent VATS procedure, arrhythmia, electrolyte abnormality, COPD/asthma exacerbation, anemia, infectious process, among others. Labs and imaging were obtained. Patient noted to be mildly tachycardic on initial arrival but in sinus rhythm on monitor. No complaints of new chest pain and she notes that her shortness of breath has improved since hospital discharge yesterday. Labs demonstrate improving BNP and patient does not appear fluid overloaded but rather mildly dry as she notes that she has not been eating or drinking over the last 24 hours. She was provided IV fluids with improvement in mild tachycardia. She also received nebulizer treatment prior to ED arrival which is also contributing to mild tachycardia. Chest X-ray without significant change from previous; see results above. Patient notes no significant increase in cough and does not have history of fever or other infectious symptoms. She is very anxious and was provided Ativan with significant improvement; it is likely that her anxiety is a contributing factor to her presentation today. Labs also demonstrated mild hypokalemia, chronic anemia, and VBG without evidence of CO2 retention.  Patient's lungs clear to auscultation without wheezing. At this time, no emergent cause to patient's symptoms could be determined. Patient requested discharge home as she notes that she is feeling much improved. Did recommend close follow-up with PCP. Return precautions were discussed. Patient to continue previously prescribed medications. At the time of discharge, patient was hemodynamically stable, neurologically intact, afebrile, and showing no evidence of respiratory distress.    Diagnosis:    ICD-10-CM    1. SOB (shortness of breath) R06.02    2. Anxiety F41.9      Disposition:   The patient is discharged to home.    Scribe Disclosure:  Reymundo CARDOSO, am serving as a  scribe at 2:17 PM on 8/29/2019 to document services personally performed by Shane Barrera DO based on my observations and the provider's statements to me.     EMERGENCY DEPARTMENT       Shane Barrera DO  08/30/19 1424

## 2019-08-29 NOTE — TELEPHONE ENCOUNTER
ED / Discharge Outreach Protocol    Patient Contact    Attempt # 1    Was call answered?  No.  Left message on voicemail with information to call triage back.    Shazia SANCHEZ RN

## 2019-08-29 NOTE — ED TRIAGE NOTES
Pt arrives to ED via EMS for SOB. Pt was recently released from hospital yesterday have having a pleural effusion and surgery. Pt reports not feeling well and having SOB for the last 12-14 hours. Pt is having brown colored phlegm. EMS reports pts building is being painted and the fumes were very strong upon their arrival.   Pt received one duoneb from EMS and reports feeling better. Pt was satting low 90s for EMS and was given 4L via NC. Now satting mid 90s.

## 2019-08-29 NOTE — TELEPHONE ENCOUNTER
Chief Complaint: Shortness Of Breath, Acute Post-Operative Pain,WED 28-AUG-2019,ed/ip  2 / 2    [phone] (home)

## 2019-08-30 NOTE — TELEPHONE ENCOUNTER
ED / Discharge Outreach Protocol    Patient Contact    Attempt # 2    Was call answered?  No.  Left message on voicemail with information to call me back.    KESHA NicholasN, RN  [employer] [facility]

## 2019-08-31 PROBLEM — R53.1 WEAKNESS: Status: ACTIVE | Noted: 2019-01-01

## 2019-08-31 PROBLEM — M62.81 GENERALIZED MUSCLE WEAKNESS: Status: ACTIVE | Noted: 2019-01-01

## 2019-08-31 PROBLEM — K59.00 CONSTIPATION: Status: ACTIVE | Noted: 2019-01-01

## 2019-08-31 PROBLEM — E87.6 HYPOKALEMIA: Status: ACTIVE | Noted: 2019-01-01

## 2019-08-31 NOTE — H&P
[facility]    History and Physical  Hospitalist       Date of Admission:  8/31/2019    Assessment & Plan   54 year old female with Breast cancer and malignant right pleural effusion, S/P pleurodesis on 8/23/19 -- presents with no BM for 1 week, nausea, vomiting, and weakness with potassium 3.0:     Summary:    Principal Problem:    Generalized muscle weakness -- multifactorial       Hypokalemia -- Potassium 3.0   -- replace      Constipation   -- move bowels      Severe Hypoalbuminemia -- Albumin 2.2    -- supplements prn    Active Problems:    Malignant left pleural effusion -- S/P Pleurodesis 8/23/19   -- this is the main source of her pain         Plan:  Pain meds, antiemtics, IV fluids, bowel meds    DVT Prophylaxis: Pneumatic Compression Devices  Code Status: Full Code    Disposition: Expected discharge in 2 days.    Abilio Herrera MD  Pager: [phone]  Cell Phone:  [phone]     Primary Care Physician   [facility]    Chief Complaint   Weakness    History is obtained from Patient    History of Present Illness   54 year old female with Breast cancer diagnosed in Nov 2018, left mastectomy Dec 2018 with left chest radiation therapy, and recent malignant right pleural effusion, S/P pleurodesis on 8/23/19 and discharged home on 8/26 - presents with no BM for 1 week, nausea, vomiting, and weakness with potassium 3.0, and reports right back and chest pain 10 out of 10.  She is using Ibuprofen and oral Dilaudid.  She is followed by [facility], and is to call the clinic this next week to set up a time for chemotherapy.      Currently taking oral Dilaudid at home, and takes Benadryl with it because has itching with any narcotics.        PAST MEDICAL HISTORY    Past Medical History:   Diagnosis Date     Breast cancer in female (H) 11/21/2018    breast cancer, left, s/p mastectomy and radiation tx      Depressive disorder      Obese      Uncomplicated asthma          PAST SURGICAL HISTORY    Past Surgical History:   Procedure Laterality Date     BIOPSY BREAST Left 12/7/2018    Procedure: RE-EXCISION LEFT BREAST INFERIOR MARGIN, ASPIRATION OF AXILLARY SEROMA;  Surgeon: Lesly Aponte MD;  Location:  OR     GASTRIC BYPASS  2004    abdominal plasty, and scar tissue removal     IR CHEST PORT PLACEMENT > 5 YRS OF AGE  8/23/2019     LUMPECTOMY BREAST WITH SEED LOCALIZATION Left 11/21/2018    Procedure: SEED LOCALIZED LEFT BREAST LUMPECTOMY WITH LEFT AXILLARY LYMPH NODE DISSECTION;  Surgeon: Lesly Aponte MD;  Location:  OR     THORACOSCOPIC PLEURODESIS Left 8/23/2019    Procedure: LEFT VIDEO-ASSISTED THORACIC SURGERY  TALC PLEURODESIS, PARITAL PLEURAL BIOPSY.;  Surgeon: Zackary Rosado MD;  Location:  OR        Prior to Admission Medications   Prior to Admission Medications   Prescriptions Last Dose Informant Patient Reported? Taking?   HYDROmorphone (DILAUDID) 2 MG tablet 8/31/2019 at AM Self No Yes   Sig: Take 1 tablet (2 mg) by mouth every 3 hours as needed for moderate to severe pain   LORazepam (ATIVAN) 0.5 MG tablet 8/30/2019 at AM Self No Yes   Sig: Take 1 tablet (0.5 mg) by mouth every 8 hours as needed   Lidocaine (LIDOCARE) 4 % Patch 8/31/2019 at AM Self No Yes   Sig: Place 2 patches onto the skin every 24 hours To prevent lidocaine toxicity, patient should be patch free for 12 hrs daily.   acetaminophen (TYLENOL) 325 MG tablet 8/29/2019 at AM Self No Yes   Sig: Take 2 tablets (650 mg) by mouth every 4 hours as needed for other (multimodal surgical pain management along with NSAIDS and opioid medication as indicated based on pain control and physical function.)   albuterol (PROAIR HFA/PROVENTIL HFA/VENTOLIN HFA) 108 (90 Base) MCG/ACT inhaler 8/31/2019 at AM Self No Yes   Sig: Inhale 1-2 puffs into the lungs every 6 hours as needed for shortness of breath / dyspnea or wheezing   albuterol (PROVENTIL) (2.5 MG/3ML) 0.083% neb solution PRN Self Yes Yes    Sig: Take 2.5 mg by nebulization every 4 hours as needed for shortness of breath / dyspnea or wheezing   buPROPion (WELLBUTRIN XL) 150 MG 24 hr tablet 8/31/2019 at AM Self Yes Yes   Sig: Take 150 mg by mouth every morning   clonazePAM (KLONOPIN) 1 MG tablet 8/30/2019 at PM Self Yes Yes   Sig: Take 2 mg by mouth At Bedtime   cyclobenzaprine (FLEXERIL) 10 MG tablet 8/27/2019 at AM Self No Yes   Sig: Take 1 tablet (10 mg) by mouth 3 times daily as needed for muscle spasms   diphenhydrAMINE (BENADRYL) 25 MG tablet 8/31/2019 at AM Self No Yes   Sig: Take 1-2 tablets (25-50 mg) by mouth every 6 hours as needed for itching or allergies   ibuprofen (ADVIL/MOTRIN) 800 MG tablet 8/29/2019 at AM Self No Yes   Sig: Take 1 tablet (800 mg) by mouth every 8 hours as needed for moderate pain   levalbuterol (XOPENEX) 0.31 MG/3ML neb solution 8/30/2019 at PM Self No Yes   Sig: Take 3 mLs (0.31 mg) by nebulization every 8 hours as needed for wheezing or shortness of breath / dyspnea   levofloxacin (LEVAQUIN) 500 MG tablet   No No   Sig: Take 1 tablet (500 mg) by mouth daily for 4 days   ondansetron (ZOFRAN-ODT) 4 MG ODT tab 8/30/2019 at AM Self No Yes   Sig: Take 1 tablet (4 mg) by mouth every 6 hours as needed for nausea or vomiting   ranitidine (ZANTAC) 150 MG tablet 8/30/2019 at AM Self No Yes   Sig: Take 1 tablet (150 mg) by mouth every 12 hours   Patient taking differently: Take 150 mg by mouth daily    sertraline (ZOLOFT) 100 MG tablet 8/30/2019 at AM Self Yes Yes   Sig: Take 150 mg by mouth daily   traMADol (ULTRAM) 50 MG tablet 8/28/2019 at AM Self No Yes   Sig: Take 1 tablet (50 mg) by mouth every 6 hours as needed for pain      Facility-Administered Medications: None     Allergies   Allergies   Allergen Reactions     Hydrocodone-Acetaminophen Itching     Patient states she is able to take it with Benadryl.       Oxycodone Itching and Rash       SOCIAL HISTORY    Social History     Social History Narrative    , has  2 children, lives in an apartment by herself, and works as an .  Desires to be full code.  (last updated 8/31/2019)       Social History     Tobacco Use     Smoking status: Never Smoker     Smokeless tobacco: Never Used   Substance Use Topics     Alcohol use: Yes     Comment: < 1 drink a month     Drug use: No        FAMILY HISTORY    Family History   Problem Relation Age of Onset     Breast Cancer Mother         Review of Systems   The 10 point Review of Systems is negative other than noted in the HPI or here.       PHYSICAL EXAM     Temp: 96.5  F (35.8  C) Temp src: Axillary BP: 129/79 Pulse: 97 Heart Rate: 116 Resp: 18 SpO2: 100 % O2 Device: Nasal cannula Oxygen Delivery: 2 LPM  Vital Signs with Ranges  Temp:  [96.5  F (35.8  C)-98.3  F (36.8  C)] 96.5  F (35.8  C)  Pulse:  [] 97  Heart Rate:  [116] 116  Resp:  [18-20] 18  BP: (126-131)/() 129/79  SpO2:  [94 %-100 %] 100 %  243 lbs 12.8 oz    Constitutional: Awake, alert, cooperative, no apparent distress.  Eyes: Conjunctiva and pupils examined and normal.  HEENT: Moist mucous membranes, normal dentition.  Respiratory: Clear to auscultation bilaterally, no crackles or wheezing.  Cardiovascular: Regular rate and rhythm, normal S1 and S2, and no murmur noted, no carotid bruits.  Trace bilateral ankle edema.   GI: Soft, non-distended, non-tender, normal bowel sounds.  Lymph/Hematologic: No anterior cervical, supraclavicular or axillary adenopathy.  Skin: No rashes, no cyanosis.   Musculoskeletal: No joint swelling, erythema or tenderness.  Neurologic: Alert, Ox3, Cranial nerves 2-12 intact, no focal weakness or numbness  Psychiatric:  No obvious anxiety or depression.    Data   Data reviewed today:  I personally reviewed no EKG's today.    Recent Labs   Lab 08/31/19  0528 08/29/19  1453 08/27/19  0555 08/27/19  0045  08/25/19  0530   WBC 6.8 6.9  --   --   --   --    HGB 11.1* 10.7*  --   --   --   --    MCV 79 79  --   --   --   --      323 341  --    < >  --     137  --   --   --  134   POTASSIUM 3.0* 3.2*  --   --   --  3.8   CHLORIDE 99 99  --   --   --  97   CO2 28 31  --   --   --  29   BUN 6* 4*  --   --   --  13   CR 0.59 0.46*  --  0.56  --  0.74   ANIONGAP 9 7  --   --   --  8   PABLO 8.3* 8.7  --   --   --  8.8   GLC 93 93  --   --   --  117*   ALBUMIN 2.2*  --   --   --   --   --    PROTTOTAL 7.1  --   --   --   --   --    BILITOTAL 0.4  --   --   --   --   --    ALKPHOS 100  --   --   --   --   --    ALT 17  --   --   --   --   --    AST 19  --   --   --   --   --    TROPI  --  <0.015  --   --   --   --     < > = values in this interval not displayed.       Imaging:  Recent Results (from the past 24 hour(s))   Chest XR,  PA & LAT    Narrative    XR CHEST 2 VIEWS   8/31/2019 6:36 AM     INDICATION: Left low thoracic, upper abdominal pain, recent  pleurodesis.    COMPARISON: 8/29/2019 at 1527 hours.      Impression    IMPRESSION: Patchy infiltrates bilaterally as previously seen with a  more dense area of infiltrate or atelectasis in the left lower lung.  Probable small left pleural effusion. Small area of focal pleural  thickening or fluid left upper chest laterally. Stable heart size.  Right chest wall port with catheter tip in the SVC. No significant  change.    TONY BLAKE MD   Abdomen XR, 2 vw, flat and upright    Narrative    XR ABDOMEN 2 VW   8/31/2019 6:37 AM     INDICATION: Left upper/mid abdominal and back pain, constipation.    COMPARISON: None.      Impression    IMPRESSION: Nonspecific bowel gas pattern. No evidence of bowel  obstruction. Fairly dense infiltrate in left lower lung.    TONY BLAKE MD

## 2019-08-31 NOTE — PROGRESS NOTES
RECEIVING UNIT ED HANDOFF REVIEW    ED Nurse Handoff Report was reviewed by: Nurys Bueno RN on August 31, 2019 at 10:43 AM

## 2019-08-31 NOTE — PLAN OF CARE
1500 - 1900 - A/O x 4. VSS on RA. Rates L. Back pain 8/10, norco given x 1.  3 incisions on L. Flank, bottom 2 are open, WOCN consult placed.  Potassium 3.0 - replaced per 1-time orders.  Continue to monitor pain.

## 2019-08-31 NOTE — ED NOTES
"[facility]  ED Nurse Handoff Report    ED Chief complaint: Shortness of Breath and Back Pain      ED Diagnosis:   Final diagnoses:   None       Code Status: Full Code    Allergies:   Allergies   Allergen Reactions     Hydrocodone-Acetaminophen Itching     Patient states she is able to take it with Benadryl.       Oxycodone Itching and Rash       Activity level - Baseline/Home:  Independent  Activity Level - Current:   Independent    Patient's Preferred language: english   Needed?: No    Isolation: Yes  Infection: Not Applicable  Bariatric?: No    Vital Signs:   Vitals:    08/31/19 0505 08/31/19 0603 08/31/19 0657   BP: (!) 129/101     Resp: 20     Temp: 98.3  F (36.8  C)     TempSrc: Temporal     SpO2: 95% 94% 94%   Weight: 111.1 kg (245 lb)     Height: 1.676 m (5' 6\")         Cardiac Rhythm: ,        Pain level: 0-10 Pain Scale: 10    Is this patient confused?: No   Does this patient have a guardian?  No         If yes, is there guardianship documents in the Epic \"Code/ACP\" activity?  N/A         Guardian Notified?  N/A  Hopkins - Suicide Severity Rating Scale Completed?  Yes  If yes, what color did the patient score?  White    Patient Report: Initial Complaint: Pt here 8/29/19 for same issue, pt discharged from hospital a few days prior.8/23/19 pt had pleurodesis with biopsy. Pt reports since the surgery she has been having severe back pain with little to no sleep. Patient reports worsening SOB this morning. Pt coughing and wheezing in triage  Focused Assessment: shortness  Tests Performed: lab, cxr  Abnormal Results:   Results for orders placed or performed during the hospital encounter of 08/31/19   Chest XR,  PA & LAT    Narrative    XR CHEST 2 VIEWS   8/31/2019 6:36 AM     INDICATION: Left low thoracic, upper abdominal pain, recent  pleurodesis.    COMPARISON: 8/29/2019 at 1527 hours.      Impression    IMPRESSION: Patchy infiltrates bilaterally as previously seen with a  more " dense area of infiltrate or atelectasis in the left lower lung.  Probable small left pleural effusion. Small area of focal pleural  thickening or fluid left upper chest laterally. Stable heart size.  Right chest wall port with catheter tip in the SVC. No significant  change.    TONY BLAKE MD   Abdomen XR, 2 vw, flat and upright    Narrative    XR ABDOMEN 2 VW   8/31/2019 6:37 AM     INDICATION: Left upper/mid abdominal and back pain, constipation.    COMPARISON: None.      Impression    IMPRESSION: Nonspecific bowel gas pattern. No evidence of bowel  obstruction. Fairly dense infiltrate in left lower lung.    TONY BLAKE MD   CBC with platelets + differential   Result Value Ref Range    WBC 6.8 4.0 - 11.0 10e9/L    RBC Count 4.46 3.8 - 5.2 10e12/L    Hemoglobin 11.1 (L) 11.7 - 15.7 g/dL    Hematocrit 35.1 35.0 - 47.0 %    MCV 79 78 - 100 fl    MCH 24.9 (L) 26.5 - 33.0 pg    MCHC 31.6 31.5 - 36.5 g/dL    RDW 15.0 10.0 - 15.0 %    Platelet Count 338 150 - 450 10e9/L    Diff Method Automated Method     % Neutrophils 79.7 %    % Lymphocytes 7.8 %    % Monocytes 11.6 %    % Eosinophils 0.4 %    % Basophils 0.1 %    % Immature Granulocytes 0.4 %    Nucleated RBCs 0 0 /100    Absolute Neutrophil 5.4 1.6 - 8.3 10e9/L    Absolute Lymphocytes 0.5 (L) 0.8 - 5.3 10e9/L    Absolute Monocytes 0.8 0.0 - 1.3 10e9/L    Absolute Eosinophils 0.0 0.0 - 0.7 10e9/L    Absolute Basophils 0.0 0.0 - 0.2 10e9/L    Abs Immature Granulocytes 0.0 0 - 0.4 10e9/L    Absolute Nucleated RBC 0.0    Comprehensive metabolic panel   Result Value Ref Range    Sodium 136 133 - 144 mmol/L    Potassium 3.0 (L) 3.4 - 5.3 mmol/L    Chloride 99 94 - 109 mmol/L    Carbon Dioxide 28 20 - 32 mmol/L    Anion Gap 9 3 - 14 mmol/L    Glucose 93 70 - 99 mg/dL    Urea Nitrogen 6 (L) 7 - 30 mg/dL    Creatinine 0.59 0.52 - 1.04 mg/dL    GFR Estimate >90 >60 mL/min/[1.73_m2]    GFR Estimate If Black >90 >60 mL/min/[1.73_m2]    Calcium 8.3 (L) 8.5 - 10.1  mg/dL    Bilirubin Total 0.4 0.2 - 1.3 mg/dL    Albumin 2.2 (L) 3.4 - 5.0 g/dL    Protein Total 7.1 6.8 - 8.8 g/dL    Alkaline Phosphatase 100 40 - 150 U/L    ALT 17 0 - 50 U/L    AST 19 0 - 45 U/L     Treatments provided: dilaudid, zofran, duoneb, O2 at 2liters NC, potassium replacement started, Pink lady given.     Family Comments:     OBS brochure/video discussed/provided to patient/family: Yes              Name of person given brochure if not patient:               Relationship to patient:     ED Medications:   Medications   HYDROmorphone (PF) (DILAUDID) injection 0.5 mg (0.5 mg Intravenous Given 8/31/19 0650)   scopolamine (TRANSDERM) 72 hr patch 1 patch (1 patch Transdermal Given 8/31/19 0546)     And   scopolamine (TRANSDERM-SCOP) Patch in Place (has no administration in time range)     And   scopolamine (TRANSDERM-SCOP) patch REMOVAL (has no administration in time range)   ipratropium - albuterol 0.5 mg/2.5 mg/3 mL (DUONEB) neb solution 3 mL (3 mLs Nebulization Given 8/31/19 0527)   ondansetron (ZOFRAN-ODT) ODT tab 4 mg (4 mg Oral Given 8/31/19 0542)   0.9% sodium chloride BOLUS (500 mLs Intravenous New Bag 8/31/19 0543)       Drips infusing?:  No    For the majority of the shift this patient was Green.   Interventions performed were .    Severe Sepsis OR Septic Shock Diagnosis Present: No    To be done/followed up on inpatient unit:  kiran K+  ED NURSE PHONE NUMBER: [phone]

## 2019-08-31 NOTE — ED TRIAGE NOTES
Pt here 8/29/19 for same issue, pt discharged from hospital a few days prior.8/23/19 pt had pleurodesis with biopsy. Pt reports since the surgery she has been having severe back pain with little to no sleep. Patient reports worsening SOB this morning. Pt coughing and wheezing in triage.

## 2019-08-31 NOTE — ED PROVIDER NOTES
History     Chief Complaint:  Shortness of Breath and Back Pain    The history is provided by the patient.      Megha Campbell is a 54 year old female with recently diagnosed stage III breast cancer who presents with shortness of breath and back pain. The patient was seen and discharged from the hospital recently following a CATS procedure and pleurodesis. The patient reports since this she has have increased back pain, abdominal bloating, and some shortness of breath this morning with a cough and wheezing. The patient states she has not had a bowel movement since 8/23. She has not been eating or sleeping much. The patient states she is feeling weak and worn out.   She has not had fevers.  She has agreed to chemotherapy, which she had previously refused and now notes feeling profound fatigue.    Allergies:  Hydrocodone-Acetaminophen  Oxycodone     Medications:    Albuterol  Wellbutrin xl  Klonopin  Flexeril  Benadryl  Dilaudid  Levalbuterol  Ativan  Zantac  Zoloft  Ultram    Past Medical History:    Breast cancer in female  Depressive disorder  Obese  Asthma  Malignant pleural effusion  Congestive heart failure    Past Surgical History:    Biopsy breast  Gastric bypass  Chest port placement  Lumpectomy breast left  Thoracoscopic pleurodesis     Family History:    Cancer    Social History:  Patient is   Tobacco Use: No  Alcohol Use: Yes  PCP: [facility]     Review of Systems   Constitutional: Positive for appetite change and fatigue. Negative for fever.   Respiratory: Positive for cough, shortness of breath and wheezing.    Gastrointestinal: Positive for abdominal distention, abdominal pain and constipation.   Musculoskeletal: Positive for back pain.   Neurological: Positive for weakness.   10 point review of systems performed and is negative except as above and in HPI.    Physical Exam   First Vitals:  Patient Vitals for the past 24 hrs:   BP Temp Temp src Pulse Heart Rate Resp SpO2 Height  "Weight   08/31/19 0850 (!) 128/100 -- -- 119 -- -- -- -- --   08/31/19 0657 -- -- -- -- -- -- 94 % -- --   08/31/19 0603 -- -- -- -- -- -- 94 % -- --   08/31/19 0505 (!) 129/101 98.3  F (36.8  C) Temporal -- 116 20 95 % 1.676 m (5' 6\") 111.1 kg (245 lb)     Physical Exam  General: Resting on the gurney, appears uncomfortable  Head:  The scalp, face, and head appear normal  Mouth/Throat: Mucus membranes are moist  CV:  Regular rate    Normal S1 and S2  No pathological murmur   Resp:  Diminished breath sounds in bilateral bases.     Non-labored, no retractions or accessory muscle use    No coarseness    No wheezing   GI:  Abdomen is soft, no rigidity    Mild diffuse abdominal tenderness, no focal tenderness.   MS:  Normal motor assessment of all extremities.    Good capillary refill noted.  Skin:  No rash or lesions noted.  Neuro:   Speech is normal and fluent. No apparent deficit.  Psych: Awake. Alert.  Normal affect.      Appropriate interactions.    Emergency Department Course     Imaging:  Radiographic findings were communicated with the patient who voiced understanding of the findings.  XR chest 2 views:  Patchy infiltrates bilaterally as previously seen with a  more dense area of infiltrate or atelectasis in the left lower lung.  Probable small left pleural effusion. Small area of focal pleural  thickening or fluid left upper chest laterally. Stable heart size.  Right chest wall port with catheter tip in the SVC. No significant  Change. Per radiology read.  XR abdomen 2 views:  Nonspecific bowel gas pattern. No evidence of bowel  obstruction. Fairly dense infiltrate in left lower lung. Per radiology read.    Laboratory:  CBC:  WBC 6.8, HGB 11.1 low,   CMP: Potassium 3.0 low, BUN 6 low, Calcium 8.3 low, Albumin 2.2 low, o/w WNL. (Creatinine 0.59)    Interventions:  0527: Duoneb 3mL Nebulization  0542: Zofran 4mg PO  0543: NS 500mL IV  0543: Dilaudid 0.5mg IV  0546: Transderm 1 path transdermal   0650: " Dilaudid 0.5mg IV  0804: Pink Lady enema 286mL Rectal  0905: Potassium Chloride 10mEq IV    Emergency Department Course:  6:00 AM Nursing notes and vitals reviewed.  I performed an exam of the patient as documented above.     Findings and plan explained to the patient who consents to admission.   9:13 AM I discussed the patient with Dr. Bermeo of the hospitalist service, who will admit the patient to a medical bed for further monitoring, evaluation, and treatment.    9:24 AM the patient had a bowel movement. She is feeling better but still feeling weak.     9:40 AM I consulted with Dr. Washington of radiology regarding the patient.    Impression & Plan      Medical Decision Making:  Megha Campbell is a 54 year old female with a recent diagnosis of metastatic cancer who presents to the emergency department with ongoing shortness of breath and fatigue.  She feels too weak to manage at home as she has had no appetite, has not been eating or drinking well, and has not had a bowel movement in over a week.  She was given an enema in the emergency department with improvement in her abdominal symptoms and after long discussion indicates that watermelon is her favorite food and was provided watermelon in the emergency department which she was able to eat.  She continues to feel quite weak and is concerned about her ability to manage at home therefore she will be admitted for weakness associated with her cancer.  Of note I did talk to the on-call radiologist who looked at her multiple images and given her history her consolidation is thought less likely to be pneumonia and more likely to be either related to her effusion or other underlying malignancy.  She has no other infectious symptoms and therefore no antibiotics were ordered.  She will be admitted to the hospitalist service with likely oncology consult.    Diagnosis:    ICD-10-CM    1. Hypokalemia E87.6    2. Generalized muscle weakness M62.81        Disposition:  Admitted to  the hospitalist       I, Bradley Aasen, am serving as a scribe on 8/31/2019 at 7:22 AM to personally document services performed by Karon Elliott MD based on my observations and the provider's statements to me.        Karon Elliott MD  08/31/19 [id number]

## 2019-08-31 NOTE — PHARMACY-ADMISSION MEDICATION HISTORY
Admission medication history interview status for the 8/31/2019  admission is complete. See EPIC admission navigator for prior to admission medications     Medication history source reliability:Good    Actions taken by pharmacist (provider contacted, etc):Interviewed patient and she is a good historian. She states that she is taking most PRN medications daily.     Additional medication history information not noted on PTA med list :  - Change: Ranitidine 150 mg take 1 tablet PO daily    Medication reconciliation/reorder completed by provider prior to medication history? No    Time spent in this activity: 30 minutes    Prior to Admission medications    Medication Sig Last Dose Taking? Auth Provider   acetaminophen (TYLENOL) 325 MG tablet Take 2 tablets (650 mg) by mouth every 4 hours as needed for other (multimodal surgical pain management along with NSAIDS and opioid medication as indicated based on pain control and physical function.) 8/29/2019 at AM Yes Lourdes Fuentes PA-C   albuterol (PROAIR HFA/PROVENTIL HFA/VENTOLIN HFA) 108 (90 Base) MCG/ACT inhaler Inhale 1-2 puffs into the lungs every 6 hours as needed for shortness of breath / dyspnea or wheezing 8/31/2019 at AM Yes Ellie Pascual MD   albuterol (PROVENTIL) (2.5 MG/3ML) 0.083% neb solution Take 2.5 mg by nebulization every 4 hours as needed for shortness of breath / dyspnea or wheezing PRN Yes Unknown, Entered By History   buPROPion (WELLBUTRIN XL) 150 MG 24 hr tablet Take 150 mg by mouth every morning 8/31/2019 at AM Yes Unknown, Entered By History   clonazePAM (KLONOPIN) 1 MG tablet Take 2 mg by mouth At Bedtime 8/30/2019 at PM Yes Unknown, Entered By History   cyclobenzaprine (FLEXERIL) 10 MG tablet Take 1 tablet (10 mg) by mouth 3 times daily as needed for muscle spasms 8/27/2019 at AM Yes Lesly Aponte MD   diphenhydrAMINE (BENADRYL) 25 MG tablet Take 1-2 tablets (25-50 mg) by mouth every 6 hours as needed for itching or allergies 8/31/2019 at AM  Yes Lesly Aponte MD   HYDROmorphone (DILAUDID) 2 MG tablet Take 1 tablet (2 mg) by mouth every 3 hours as needed for moderate to severe pain 8/31/2019 at AM Yes Jamaal Dunn DO   ibuprofen (ADVIL/MOTRIN) 800 MG tablet Take 1 tablet (800 mg) by mouth every 8 hours as needed for moderate pain 8/29/2019 at AM Yes Jamaal Dunn DO   levalbuterol (XOPENEX) 0.31 MG/3ML neb solution Take 3 mLs (0.31 mg) by nebulization every 8 hours as needed for wheezing or shortness of breath / dyspnea 8/30/2019 at PM Yes Jamaal Dunn DO   Lidocaine (LIDOCARE) 4 % Patch Place 2 patches onto the skin every 24 hours To prevent lidocaine toxicity, patient should be patch free for 12 hrs daily. 8/31/2019 at AM Yes Ellie Pascual MD   LORazepam (ATIVAN) 0.5 MG tablet Take 1 tablet (0.5 mg) by mouth every 8 hours as needed 8/30/2019 at AM Yes Ellie Pascual MD   ondansetron (ZOFRAN-ODT) 4 MG ODT tab Take 1 tablet (4 mg) by mouth every 6 hours as needed for nausea or vomiting 8/30/2019 at AM Yes Jamaal Dunn DO   ranitidine (ZANTAC) 150 MG tablet Take 1 tablet (150 mg) by mouth daily 8/30/2019 at AM Yes Jamaal Dunn DO   sertraline (ZOLOFT) 100 MG tablet Take 150 mg by mouth daily 8/30/2019 at AM Yes Unknown, Entered By History   traMADol (ULTRAM) 50 MG tablet Take 1 tablet (50 mg) by mouth every 6 hours as needed for pain 8/28/2019 at AM Yes Karon Elliott MD

## 2019-09-01 PROBLEM — F41.9 ANXIETY: Status: ACTIVE | Noted: 2019-01-01

## 2019-09-01 PROBLEM — R06.02 SOB (SHORTNESS OF BREATH): Status: ACTIVE | Noted: 2019-01-01

## 2019-09-01 NOTE — CODE/RAPID RESPONSE
[facility]    RRT Note  9/1/2019   Time Called: 6: 46 AM    RRT called for: shortness of breath    Assessment & Plan   Shortness of breath, subjective   Tachycardia, 100s  Breast cancer with metastatic pleural effusion  Anxiety, chronic with acute worsening   RRT called for shortness of breath. Upon my arrival patient states she feels like she is taking her last breaths. She appears very anxious, has been taking prescribed anxiolytic. No significant respiratory distress, mild hypoxia. Lung fields very diminished, no accessory muscle use. With known malignancy will rule out PE.     We discussed her anxiety. She takes prescribed Wellbutrin and Zoloft along with PRN clonazepam and lorazepam. Her Psychiatrist is on maternity leave. Ms. Campbell endorses she is not sleeping, not resting largely in part due to anxiety.    She has been receiving frequent albuterol nebulizer treatments per her request. She states they make her feel mildly better. We discussed albuterol can increase anxiety and heart rate and will hold off on nebulizer at this time.     INTERVENTIONS:  - CT PE study  - Psychiatry consult; greatly appreciate their input   - offered Spiritual Health consult and she politely declined   - one time lorazepam dose  - will change albuterol to Xopenex due to tachycardia/racing heart/anxiety  - with diagnosis of breast cancer and multiple recent admissions Palliative Care consult would be appropriate, but will defer to Dr. Bermeo     Discussed with and defer further cares to Dr. Bermeo, Hospitalist.    Code Status: Full Code    ELLEN Carver, CNP  Hospitalist Service, House Officer  [facility]     Text Page  Pager: [phone]    Allergies   Allergies   Allergen Reactions     Hydrocodone-Acetaminophen Itching     Patient states she is able to take it with Benadryl.       Oxycodone Itching and Rash       Physical Exam   Vital Signs with Ranges:  Temp:  [95.9  F  (35.5  C)-96.5  F (35.8  C)] 95.9  F (35.5  C)  Pulse:  [] 97  Heart Rate:  [89] 89  Resp:  [18] 18  BP: (126-140)/() (P) 137/91  SpO2:  [93 %-100 %] (P) 96 %  I/O last 3 completed shifts:  In: 280 [P.O.:280]  Out: 250 [Urine:250]    Constitutional: 54- year old female laying in bed stating she is taking her last breaths.   Pulmonary: Lung fields very diminished. Mild hypoxia.   Cardiovascular: S1, S2 without obvious murmur, rub, or gallop. She appears well perfused.   GI: Soft, non-tender, non-distended.   Skin/Integumen: No obvious rashes or lesions.  Neuro: Awake, alert, oriented x 4. Non-focal.   Psych:  Very anxious.   Extremities: Moves all extremities.     Troponin:    Recent Labs   Lab Test 08/29/19  1453   TROPI <0.015     CBC with Diff:  Recent Labs   Lab Test 08/31/19  0528  08/23/19  0743   WBC 6.8   < > 7.6   HGB 11.1*   < > 11.8   MCV 79   < > 79      < > 343   INR  --   --  0.97    < > = values in this interval not displayed.        Lactic Acid:    Lab Results   Component Value Date    LACT 0.9 08/29/2019           Comprehensive Metabolic Panel:  Recent Labs   Lab 09/01/19  0600 08/31/19  0528    136   POTASSIUM 3.5 3.0*   CHLORIDE 100 99   CO2 28 28   ANIONGAP 6 9   * 93   BUN 6* 6*   CR 0.55 0.59   GFRESTIMATED >90 >90   GFRESTBLACK >90 >90   PABLO 8.6 8.3*   PHOS  --  2.8   PROTTOTAL  --  7.1   ALBUMIN  --  2.2*   BILITOTAL  --  0.4   ALKPHOS  --  100   AST  --  19   ALT  --  17       INR:    Recent Labs   Lab Test 08/23/19  0743   INR 0.97     UA:  Recent Labs   Lab 08/31/19  1425   COLOR Dark Yellow   APPEARANCE Slightly Cloudy   URINEGLC Negative   URINEBILI Small*   URINEKETONE 10*   SG 1.035   UBLD Moderate*   URINEPH 6.0   PROTEIN 30*   NITRITE Negative   LEUKEST Large*   RBCU 1   WBCU 7*       Time Spent on this Encounter   I spent 45 minutes of critical care time on the unit/floor managing the care of Megha Campbell. Upon evaluation, this patient had a high  probability of imminent or life-threatening deterioration due to shortness of breath, which required my direct attention, intervention, and personal management. 100% of my time was spent at the bedside counseling the patient and/or coordinating care regarding services listed in this note.

## 2019-09-01 NOTE — PLAN OF CARE
Pt is A&Ox4. VSS on RA. L back/flank pain. PRN Norco available. Pt c/o increased pain last evening- refused to try to take PO medications stating that only IV pain medications would help. Call placed to MD- order for PRN IV dilaudid. Encouraging pt to stay on top of PO pain medications so pain does not get out of control. Try to avoid IV meds if able. 3 incisions to L flank, bottom 2 more open, top one approximated. WOC consult placed d/t poor wound healing (incisions from pleurodesis on 8/23). K+ replaced yesterday, recheck in AM. LS dim. C/o SOB this AM. Sating 92-95% on RA. Pt placed on 1L O2 for comfort. PRN ativan given for anxiety, effective. PRN nebs q4hr.

## 2019-09-01 NOTE — PROGRESS NOTES
Call for patient having pain not control with oral or refusing oral pain medication.  Restart IV Dilaudid 0.3-0.5 mg Q 4 hrs prn.

## 2019-09-01 NOTE — PLAN OF CARE
"A/Ox 4. Tachypnea, tachycardia, Satting mid-90's on RA, placed on 2L O2 for comfort.  C/O pain up to 10/10 in L. back, severe SOB, \"My throat is closed\" and nausea. Refusing all oral meds,  IV dilaudid given x 2. Encouraging use of Norco instead. PRN seroquel started for anxiety - appears to be helpful, PRN Neb treatment given x 2. Zofran given x 1 with relief. Surgical wounds cleansed with wound cleanser, dressed per MD reccommendation. LS dim, wheezing at times. Up independently. Adequate urine output. Continue to monitor, possible discharge tomorrow.   "

## 2019-09-01 NOTE — PROGRESS NOTES
09/01/19 1200   Visit Information   Type of Visit Initial   SPIRITUAL HEALTH SERVICES  Progress Note  [facility]     Pt request for  support. Pt acknowledges being constantly in prayer, and was receptive to prayers for healing being offered for her. Pt acknowledges she is a [employer] and that her shayne community is aware of her being here and she feels supported by them. Pt acknowledged that  prayer was helpful to her.      provided prayer and brief words of comfort and encouragement.  available for emotional and spiritual care support for pt/loved ones while she remains in hospital. / team will follow per length of stay and as requested.           SUSANNE Lunsford.[facility]  Staff   Pager [phone]

## 2019-09-01 NOTE — PROGRESS NOTES
MD Notification    Notified Person: MD    Notified Person Name: Dr. Bermeo    Notification Date/Time: 1830 9/1/19    Notification Interaction: Spoke on phone    Purpose of Notification: Patient refusing all oral medications, c/o pain 10/10 and anxiety, but declines all interventions other then IV ativan or dilaudid. Increasingly agitated.    Orders Received: No new IV meds ordered. Continue to avoid IV medications.        Comments: Patient continues to refuse oral norco, tylenol and ibuprofin for pain, or oral ativan.  Requesting to speak with respiratory therapist for treatment, respiratory was paged.

## 2019-09-01 NOTE — PROVIDER NOTIFICATION
MD Notification    Notified Person: MD    Notified Person Name: Dr Zhang    Notification Date/Time: 8/31 2140    Notification Interaction: Paged on call    Purpose of Notification: Pt having increased pain. Refused any PO pain medications stating that they do not work for her. Pt requesting IV pain medication.     Orders Received: IV dilaudid q4hr.    Comments:

## 2019-09-01 NOTE — PROGRESS NOTES
RRT called this AM for increased SOB. Pt felt like she couldn't catch her breath or take a deep breath in. Vitals stable. O2 sats % on 2L. Pt sent down for chest CT. Report given to oncoming nurse.

## 2019-09-02 NOTE — PROGRESS NOTES
[facility]    Hospitalist Progress Note    Assessment & Plan   54 year old female who was admitted on 8/31/2019 with weakness, SOB, persistent pain, and now primary problem of panic attacks continuing to SOB, and probably has developed some degree of narcotic tolerance:    Impression:   Principal Problem:   Anxiety with Panic Attacks   -- seen different psychiatrist today, Remeron started last night   -- Wean Clonazepam as able, continue Ativan PRN   (avoid IV dosing or escalating dosing ... As she currently is demanding)   -- Has PRN Seroquel available as well   -- Scopolamine patch discontinue (may have added to confusion)     Malignant left pleural effusion -- S/P Pleurodesis 8/23/19   -- has PRN Norco available (no further IV Dilaudid ... As she continues to request)        Generalized muscle weakness   -- related to poor PO intake, still refusing to eat   -- scheduled Supplement tid (explained she needs to eat to go home)     Asthma, SOB -- more SOB when anxious, O2 sat down to 88%   -- Albuterol Neb PRN   -- Hydroxyzine to help with pain and anxiety     Active Problems:    Malignant left pleural effusion -- S/P Pleurodesis 8/23/19        Hypokalemia -- corrected      Constipation -- narcotic related, better      Breast Cancer, Stage IV   -- has yet to connect with oncology about discussing chemo, will schedule appt tomorrow when clinic open.  Does have Port-a-cath in place         Plan:  Updated patient and nurse -- goal is oral medications, not escalating dosing.  Appreciate Psychiatry input -- patient getting multiple messages with multiple opinions, will try to be consistent with patient and create a situation where she is able to manage at home.  If not able to by tomorrow, may need TCU.  Updated son, Delta, he can not be in town until Sept 9 -- no other family available at present to help her.  PT eval today to help with ambulation.      DVT Prophylaxis: Pneumatic Compression  "Devices  Code Status: Full Code    Disposition: Expected discharge tomorrow -- home with home care, or TCU if unable to manage her medications by her self.      Abilio Herrera MD  Pager [phone]  Cell Phone [phone]  Text Page (7am to 6pm)  35 min total evaluating patient and discussing with patient, son, nurse     Interval History   Reports \"No sleep last night\", feels better with O2 on, continues to report pain and anxiety -- more lucid today and able to have a discussion.  Reports she needs IV anxiety medication because \"can't swallow pills\", but able to swallow her other pills.      Physical Exam   Temp: 98  F (36.7  C) Temp src: Oral BP: (!) 148/59 Pulse: 113 Heart Rate: 103 Resp: 28 SpO2: 95 % O2 Device: None (Room air) Oxygen Delivery: 2 LPM  Vitals:    08/31/19 0505 08/31/19 1149   Weight: 111.1 kg (245 lb) 110.6 kg (243 lb 12.8 oz)     Vital Signs with Ranges  Temp:  [96.6  F (35.9  C)-98  F (36.7  C)] 98  F (36.7  C)  Pulse:  [113] 113  Heart Rate:  [] 103  Resp:  [20-28] 28  BP: (112-148)/(59-76) 148/59  SpO2:  [88 %-96 %] 95 %  I/O last 3 completed shifts:  In: 3861 [I.V.:3861]  Out: 475 [Urine:475]    # Pain Assessment:  Current Pain Score 9/1/2019   Patient currently in pain? denies   Pain score (0-10) -   Pain location -   Pain descriptors -   -Won't rate her pain, but still requesting IV medications    Constitutional: Groggy, more cooperative (able to have conversation -- yesterday was unable to)  Respiratory: Clear to auscultation bilaterally, no crackles or wheezing  Cardiovascular: Regular rate and rhythm, normal S1 and S2, and no murmur noted  GI: Normal bowel sounds, soft, non-distended, non-tender  Extrem: No calf tenderness, no ankle edema  Neuro: drowsy, thought it was Friday (is Monday), generalized weakness    Medications       clonazePAM  0.5 mg Oral BID     docusate sodium  100 mg Oral BID     heparin  5 mL Intracatheter Q28 Days     heparin lock flush  5-10 mL " Intracatheter Q24H     ibuprofen  800 mg Oral TID     mirtazapine  7.5 mg Oral At Bedtime     OLANZapine zydis  5 mg Oral At Bedtime     ranitidine  150 mg Oral BID     scopolamine   Transdermal Once     sertraline  150 mg Oral Daily     sodium chloride (PF)  3 mL Intracatheter Q8H       Data   Recent Labs   Lab 09/01/19  0600 08/31/19  0528 08/29/19  1453 08/27/19  0555   WBC  --  6.8 6.9  --    HGB  --  11.1* 10.7*  --    MCV  --  79 79  --    PLT  --  338 323 341    136 137  --    POTASSIUM 3.5 3.0* 3.2*  --    CHLORIDE 100 99 99  --    CO2 28 28 31  --    BUN 6* 6* 4*  --    CR 0.55 0.59 0.46*  --    ANIONGAP 6 9 7  --    PABLO 8.6 8.3* 8.7  --    * 93 93  --    ALBUMIN  --  2.2*  --   --    PROTTOTAL  --  7.1  --   --    BILITOTAL  --  0.4  --   --    ALKPHOS  --  100  --   --    ALT  --  17  --   --    AST  --  19  --   --    TROPI  --   --  <0.015  --        Imaging:   No results found for this or any previous visit (from the past 24 hour(s)).

## 2019-09-02 NOTE — CONSULTS
Consult Date:  09/01/2019      PSYCHIATRIC CONSULTATION      REQUESTING PHYSICIAN:  ELLEN Braxton, CNP      REASON FOR CONSULTATION:  Overwhelming anxiety.      IDENTIFICATION:  Ms. Campbell is a 54-year-old  -American female who lives in [facility], followed by Dr. Jazz Villalta, psychiatrist, for depression and anxiety.      HISTORY OF PRESENT ILLNESS:  Ms. Campbell has had a longstanding history of depression/anxiety.  Has been on Zoloft 150 mg and Wellbutrin-.  She has been diagnosed with breast cancer, has been receiving treatment.  Has had recurrence and has been rehospitalized again for recent malignant right pleural effusion.  Followed by [facility].  She has been feeling more and more depressed, down, low energy, poor concentration, poor motivation, not able to enjoy.  She is unhappy.  She has difficulty sleeping.  She is extremely anxious, nervous and worried.  Her psych meds do not seem to be helping any more.  She has been complaining of gasping for air, shortness of breath, heart racing, throat choking.  Very panicky and worried.      PAST PSYCHIATRIC HISTORY:  See above history of present illness.      FAMILY HISTORY:  No mental illness or chemical dependency in the family.      SOCIAL HISTORY:  She grew up in [place], sixth of 6 children.  Grew up with both parents.  They were supportive.  Graduated from high school.  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  Ended up working in administration.  She was  for 6 years; he ended up having affairs.  She  him.  She has 2 children; they are grown.  She lives alone.      PAST MEDICAL HISTORY:  Breast cancer, status post mastectomy and radiation treatment; asthma, gastric bypass, lumpectomy, fluoroscopic pleurodesis.      PRIOR TO ADMISSION MEDICATIONS:   1.  Dilaudid 2 mg 1 tablet every 3 hours p.r.n. severe pain.   2.  Lidocaine by mouth every 8 hours.   3.  Tylenol 325 mg 2 tablets every 4 hours.   4.  Albuterol  inhaler p.r.n.   5.  Bupropion  mg once a day.   6.  Klonopin 1 mg, 2 mg at night.   7.  Flexeril 10 mg 3 times a day p.r.n. muscle spasms.   8.  Ibuprofen 1-2 tablets every 6 hours p.r.n.   9.  Levalbuterol nebulizer.   10.  Levaquin 500 mg 4 times a day.   11.  Zofran 4 mg p.r.n.   12.  Zantac 150 mg twice a day.   13.  Zoloft 100 mg once a day.   14.  Ultram 50 mg, take every 6 hours p.r.n.      ALLERGIES:  SHE IS ALLERGIC TO, SHE SAYS, OXYCODONE GIVEN FOR ITCHING AND RASH, AND HYDROCODONE/ACETAMINOPHEN ITCHING.      MEDICAL REVIEW OF SYSTEMS:  A 10-point review of systems is negative, other than noted in HPI by Dr. Abilio Bermeo on 08/31/2019.  Reviewed by Dr. Jasmine on 09/01/2019, no new additions.      VITAL SIGNS:  Temperature of 96.5.  Blood pressure was 129/79, pulse was 97, respirations 18.  Weight was 243 pounds.      MENTAL STATUS EXAM:  Appearance:  Ms. Campbell was sitting in bed, dressed in hospital attire.  Appears stated age.  Attitude was cooperative, oriented x 3.  Eye contact, normal.  Speech regular rate and rhythm, normal volume and tone.  Language normal.  Psychomotor behavior normal.  Mood depressed and anxious; affect the same.  Thought process oriented, intact, no loose associations.  Thought content was negative for suicidal ideation, negative for plan or intent, negative for obsessions, compulsions, or psychosis.  Able to contract no self-harm, identify barriers.  Fund of knowledge intact.  Insight intact.  Judgment intact.  Attention span and concentration normal.  Recent and remote memory normal.  Gait not tested.  Muscle tone intact.      ASSESSMENT:  Ms. Campbell is a 54-year-old -American female with a long history of depression/anxiety, stressed by cancer which is continuing to spread despite aggressive treatment.  She has been more and more anxious, not able to sleep, so will start on the medication Remeron.  I explained the side effects, benefits, complications.   She gave verbal consent.  Start 7.5 mg at night.  We will also start her on the medication Seroquel 25 mg every 2 hours p.r.n.  Encourage her to take it frequently, help her anxiety.  We will need to reconsult and likely increase her medications to help her with her anxiety and depression.      DIAGNOSES:   1.  Major depression, current, severe.   2.  Panic disorder.      PLAN:   1.  Start Remeron 7.5 mg at night.   2.  Seroquel 25 every 2 hours p.r.n.; encourage the patient to take.   3.  Continue Wellbutrin and Zoloft, but consider decreasing.   4.  Reconsult Psychiatry.         JOSE JASMINE MD             D: 2019   T: 2019   MT: VAL      Name:     JONATHAN RIGGINS   MRN:      [id number]        Account:       [id number]   :      1965           Consult Date:  2019      Document: [id number]       cc: [facility]        Jose Jasmine MD

## 2019-09-02 NOTE — CONSULTS
[facility] Psychiatric Consult Progress Note    Interval History:   Pt seen, chart reviewed, case discussed with nursing staff and treating clinicians.  I met with Megha on station 88.  She is highly anxious and agitated, gasping for breath.  She was hardly able to have a conversation with me, was hyperventilating.  We discussed stopping Wellbutrin, she is not sleeping at night, having a hard time taking pills.  Switching her from Seroquel to Zyprexa Zydis to manage agitation makes sense.  I am going to give her a one-time dose of Ativan and her IV to settle her down though I understand the goal is to get her off of IV medications.  She looks so uncomfortable at this point I think we need to at least get her acute symptoms under control.  There are no safety concerns.  It should be noted that she is been on PRN doses of narcotics including tramadol, Dilaudid, and even Klonopin prior to admission so I think we need to take that into consideration.  If she simply stops all of that, she is probably going to experience more anxiety and symptoms of withdrawal.  She has been on Klonopin long-term, I think the dose should be scheduled 0.5 mg twice daily.     Review of systems:   10 point Review of Systems completed by Dr. Lee, and is  is negative other than noted in the HPI     Medications:       docusate sodium  100 mg Oral BID     ibuprofen  800 mg Oral TID     LORazepam  1 mg Intravenous Once     mirtazapine  7.5 mg Oral At Bedtime     OLANZapine zydis  5 mg Oral At Bedtime     ranitidine  150 mg Oral BID     scopolamine   Transdermal Once     sertraline  150 mg Oral Daily     sodium chloride (PF)  3 mL Intracatheter Q8H     acetaminophen, albuterol, clonazePAM, cyclobenzaprine, diphenhydrAMINE, HYDROcodone-acetaminophen, levalbuterol, lidocaine 4%, lidocaine (buffered or not buffered), naloxone, OLANZapine zydis, ondansetron **OR** ondansetron, polyethylene glycol, sodium chloride (PF)    Mental  Status Examination:     Appearance:  awake, alert, adequately groomed, dressed in hospital scrubs, appeared as age stated and severe distress  Eye Contact:  good  Speech:  anxious intonation, gasping for air  Language:Normal  Psychomotor Behavior:  no evidence of tardive dyskinesia, dystonia, or tics  Mood:  anxious  Affect:  intensity is heightened  Thought Process:  logical, linear and goal oriented no loose associations  Thought Content:  no evidence of suicidal ideation or homicidal ideation and no evidence of psychotic thought  Oriented to:  time, person, and place  Attention Span and Concentration:  intact  Recent and Remote Memory:  intact  Fund of Knowledge: appropriate  Muscle Strength and Tone: normal  Gait and Station: Normal  Insight:  fair  Judgment:  intact        Labs/Vitals:     Recent Results (from the past 24 hour(s))   Lactic acid level STAT for sepsis protocol    Collection Time: 09/02/19  8:25 AM   Result Value Ref Range    Lactate for Sepsis Protocol 1.4 0.7 - 2.0 mmol/L     B/P: 148/59, T: 98, P: 113, R: 28    Impression:   Megha presents highly anxious, tachypneic, whether this is subjective and related to panic or exacerbated by underlying medical issues is difficult to say.  She is so uncomfortable at this point we will dose with 1 mg of Ativan IV, switch her to Zyprexa Zydis to assist with administration of pills.  I think we should schedule her Klonopin 0.5 mg twice daily as she is been on that long-term.  Withdrawal off of opiates and benzodiazepines could complicate the picture if abruptly discontinued.  Wellbutrin could insight more anxiety so that will be discontinued      DIagnoses:   1.  Major depressive disorder recurrent, severe  2.  Panic disorder  3.  Malignant pleural effusion with shortness of breath  4.  Rule out anxiety disorder secondary to general medical condition         Plan:   1. Written information given on medications. Side effects, risks, benefits reviewed.  2.   Start Zyprexa Zydis 5 mg nightly, 5 mg 3 times daily as needed for anxiety, discontinue Seroquel as she is having a hard time taking pills  3.  1 mg of IV Ativan now to calm her symptoms  4.  Schedule Klonopin 0.5 mg twice daily  5.  Reconsult psychiatry as warranted      Attestation:  Patient has been seen and evaluated by me,  Contreras Lee MD

## 2019-09-02 NOTE — PROGRESS NOTES
Pt is on room with 02 Sats 93%. LS diminished throughout. PRN Neb tx given X3.  9/2/2019  Alejandro Mckeon, RT

## 2019-09-02 NOTE — PROGRESS NOTES
Patient A/Ox4. VSS - using O2 PRN with SOB. Albuterol neb given x1. Left flank pain, given PRN Norco x1. Overall anxious and fixated on IV medications. Was trying to leave MD ANA discussed situation with patient over phone and patient agreed to stay. Will continue to monitor and support.

## 2019-09-02 NOTE — PLAN OF CARE
Pt can be very anxious at times, which leads to SOB. Taking nebs PRN. Can use oxygen for comfort. Psych came today and changed several of her meds. Pt has issues with swallowing pills. Taking zyprexa ODT PRN, which seems to be effective. Has not taken any pain meds today, requesting IV dilaudid, but it is not ordered and will not be ordered by MD, offered [place] crushed, pt declined. C/o constipation earlier, but pt did have 2 loose BM's today. L flank wound, CDI. Up ind in room. PT consulted.

## 2019-09-02 NOTE — PROGRESS NOTES
"Progress Note    Patient told nurse she is going to leave AMA if she can't get the medications she wants -- right now she wants IV Ativan, \"I don't want to be in pain\".    I mentioned she has Norco for pain, and the Ativan is for anxiety.  Several times she threatened to leave unless we give her what she wants.  Explained she won't have IV medications at home so she needs to demonstrate she can manage without them.      If patient tries to leave will place her on a 72 hour hold.  Son is aware of her situation, he will be in town in 1 week, but currently no one to supervise her at home, and currently she is not demonstrating good judgement, or ability to manage her ADL's    Will reassess in AM.      Abilio Herrera MD   Pager: [phone]  Cell Phone:  [phone]   (feel free to call if further problems -- any time)  "

## 2019-09-02 NOTE — PLAN OF CARE
"Pt is A&Ox4. Up independently. VSS on RA ex tachycardia- pt wearing 2L O2 for comfort. Pt refusing PO meds when offered, then c/o severe pain and adamantly requests IV medications. Not receptive to education when she gets worked up. MD's trying to avoid IV meds. Trying to encourage pt to take PO pain medications; refusing at times. Multiple PRN medications for anxiety given, mildly effective. Started on Remeron at bedtime. PRN nebs for wheezing and SOB. LS dim, wheezing at times. 3 incisions to L flank from previous pleurodesis- cleansed with wound cleanser, covered with gauze. WOC consulted. Pt again this AM complaining of pain, PRN norco was available to give at 0320, but refusing any PO medications. Stating she just wants \"something IV\". Unwilling to try any medications available unless it is IV.   "

## 2019-09-02 NOTE — PROGRESS NOTES
"[facility]    Hospitalist Progress Note    Assessment & Plan   54 year old female who was admitted on 8/31/2019 with weakness, SOB, persistent pain, and now primary problem of panic attacks continuing to SOB, and probably has developed some degree of narcotic tolerance:    Impression:   Principal Problem:   Anxiety with Panic Attacks   -- seen by Psychiatry today, Remeron added to help with sleep and anxiety   -- Wean Clonazepam as able, continue Ativan PRN   (avoid IV dosing or escalating dosing ... As she currently is demanding)   -- Has PRN Seroquel available as well     Malignant left pleural effusion -- S/P Pleurodesis 8/23/19   -- has PRN Norco available (no further IV Dilaudid ... As she is demanding)        Generalized muscle weakness   -- related to poor PO intake      Asthma, SOB -- more SOB when anxious, O2 sat down to 88%   -- Albuterol Neb PRN   -- Hydroxyzine to help with pain and anxiety     Active Problems:    Malignant left pleural effusion -- S/P Pleurodesis 8/23/19        Hypokalemia -- corrected      Constipation -- narcotic related, better        Plan:  Met with patient 3 times today to discuss our goals of helping her manage her pain and anxiety, but is fixated on getting all medications IV \"so they work better\".  Discussed with her son, Delta, he will be staying with her starting Sept 9th, but she needs to manage on her own, or may need a TCU if continues to show poor judgement and excessive medication use.     DVT Prophylaxis: Pneumatic Compression Devices  Code Status: Full Code    Disposition: Expected discharge in 1-2 days once tolerating oral intake and pain and anxiety manageable.     Abilio Herrera MD  Pager [phone]  Cell Phone [phone]  Text Page (7am to 6pm)  35 min total evaluating patient and discussing with patient, son, nurse and consultant     Interval History   Had rapid response this AM because stated she couldn't breath -- chest CT OK and " "able to calm down after that, but has continued to ask for \"only IV Ativan and Dilaudid\" ... Says she needs it IV otherwise it doesn't work ... And she says this as she is dozing off in mid-sentence from just getting IV Dilaudid.      Physical Exam   Temp: 96.6  F (35.9  C) Temp src: Oral BP: 112/76 Pulse: 110 Heart Rate: 95 Resp: 22 SpO2: (!) 88 % O2 Device: None (Room air) Oxygen Delivery: 2 LPM  Vitals:    08/31/19 0505 08/31/19 1149   Weight: 111.1 kg (245 lb) 110.6 kg (243 lb 12.8 oz)     Vital Signs with Ranges  Temp:  [96.6  F (35.9  C)-98.8  F (37.1  C)] 96.6  F (35.9  C)  Pulse:  [110] 110  Heart Rate:  [] 95  Resp:  [16-32] 22  BP: (112-148)/(63-91) 112/76  SpO2:  [88 %-97 %] 88 %  I/O last 3 completed shifts:  In: 180 [P.O.:180]  Out: 625 [Urine:625]    # Pain Assessment:  Current Pain Score 9/1/2019   Patient currently in pain? denies   Pain score (0-10) -   Pain location -   Pain descriptors -   - Megha is experiencing pain due to left chest tube incision 9 days ago. Pain management was discussed and the plan was created in a collaborative fashion.  Megha's response to the current recommendations: resistant  - see orders    Constitutional: Awake, alert, cooperative, no apparent distress  Respiratory: Clear to auscultation bilaterally, no crackles or wheezing  Cardiovascular: Regular rate and rhythm, normal S1 and S2, and no murmur noted  GI: Normal bowel sounds, soft, non-distended, non-tender  Extrem: No calf tenderness, no ankle edema  Neuro: Ox3, no focal motor or sensory deficits    Medications     dextrose 5% and 0.45% NaCl + KCl 20 mEq/L 100 mL/hr at 09/01/19 1835       buPROPion  150 mg Oral QAM     docusate sodium  100 mg Oral BID     ibuprofen  800 mg Oral TID     mirtazapine  7.5 mg Oral At Bedtime     ranitidine  150 mg Oral BID     scopolamine   Transdermal Once     sertraline  150 mg Oral Daily     sodium chloride (PF)  3 mL Intracatheter Q8H       Data   Recent Labs   Lab " 09/01/19  0600 08/31/19  0528 08/29/19  1453 08/27/19  0555   WBC  --  6.8 6.9  --    HGB  --  11.1* 10.7*  --    MCV  --  79 79  --    PLT  --  338 323 341    136 137  --    POTASSIUM 3.5 3.0* 3.2*  --    CHLORIDE 100 99 99  --    CO2 28 28 31  --    BUN 6* 6* 4*  --    CR 0.55 0.59 0.46*  --    ANIONGAP 6 9 7  --    PABLO 8.6 8.3* 8.7  --    * 93 93  --    ALBUMIN  --  2.2*  --   --    PROTTOTAL  --  7.1  --   --    BILITOTAL  --  0.4  --   --    ALKPHOS  --  100  --   --    ALT  --  17  --   --    AST  --  19  --   --    TROPI  --   --  <0.015  --        Imaging:   Recent Results (from the past 24 hour(s))   CT Chest Pulmonary Embolism w Contrast    Narrative    CT CHEST PULMONARY EMBOLISM WITH CONTRAST  9/1/2019 7:42 AM    HISTORY:  Shortness of breath; tachycardia, anxiety, known malignancy.    TECHNIQUE: Scans obtained from the apices through the diaphragm with  IV contrast. 85 mL Isovue-370 IV injected. Radiation dose for this  scan was reduced using automated exposure control, adjustment of the  mA and/or kV according to patient size, or iterative reconstruction  technique.    COMPARISON:  Chest x-ray on 8/31/2019 and chest CT on 8/16/2019 exam.    FINDINGS:    Vascular: No pulmonary emboli.     Chest/mediastinum: Right chest wall Port-A-Cath distal tip is in the  high right atrium. No cardiomegaly. Small pericardial effusion.  Multiple enlarged mediastinal and hilar lymph nodes, for example 1 cm  short axis prevascular lymph node (series 5 image 43) and 2.0 cm short  axis left hilar lymph node (series 5 image 62), not significantly  changed as compared to 8/16/2019 exam. Minimal increase in the left  breast fluid collection, likely post surgical seroma, currently  measures 6.7 cm in largest dimension (series 5 image 83). Interval  increase in the soft tissue density in the left axilla as compared to  8/16/2019 exam currently measures approximately 4.5 x 2.6 cm (series 5  image 39), previously  measured 2.2 x 1.7 cm.    Lungs and pleura: Interval decrease in the mildly loculated left  pleural effusion and increased in the right pleural effusion all as  compared to 8/16/2019 exam. Increase in the patchy predominantly right  upper lobe groundglass pulmonary opacities (series 6 image 41). Slight  interval increase in the scattered consolidative pulmonary opacities  for example in the superior aspect of the right lower lobe (series 6  image 62). Hyperdense material along the posterior aspect of the left  lower lobe pleura, new as compared to 8/16/2019 exam, indeterminate,  could represent pleurodesis material.    Upper abdomen: Limited evaluation of the upper abdomen due to lack of  coverage. Post surgical changes of gastric bypass.    Bones and soft tissue: No suspicious osseous lesion.      Impression    IMPRESSION:   1. No evidence of pulmonary emboli.  2. Increase in the patchy predominantly right upper lobe groundglass  pulmonary opacities, as compared to 8/16/2019 exam, could represent  pulmonary edema versus infection. Similarly slight interval increase  in the scattered consolidative pulmonary opacities, could be  infectious, atelectatic or less likely metastatic.  3. Interval decrease in the mildly loculated left pleural effusion and  slight increase in the right pleural effusion, all as compared to  8/16/2019 exam.  4. Interval increase in the size of the soft tissue density/lesion in  the left axilla currently measures 4.5 x 2.6 cm, previously measured  2.2 x 1.7 cm on 8/16/2019. Minimal increase in the left breast fluid  collection, possibly seroma.    MJ HARDING MD

## 2019-09-03 NOTE — ED AVS SNAPSHOT
Emergency Department  [address] [place] [id number]  Phone:  [phone]  Fax:  [phone]                                    Megha Campbell   MRN: [id number]    Department:   Emergency Department   Date of Visit:  9/3/2019           After Visit Summary Signature Page    I have received my discharge instructions, and my questions have been answered. I have discussed any challenges I see with this plan with the nurse or doctor.    ..........................................................................................................................................  Patient/Patient Representative Signature      ..........................................................................................................................................  Patient Representative Print Name and Relationship to Patient    ..................................................               ................................................  Date                                   Time    ..........................................................................................................................................  Reviewed by Signature/Title    ...................................................              ..............................................  Date                                               Time          22EPIC Rev 08/18
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
## 2019-09-03 NOTE — PLAN OF CARE
Patient A&Ox4, up to the bathroom independent. She is having Prn nebs every 4 hours. Patient is anxious and defers to take Zyprexa prn. She states she can't swallow with her breathing. She also mentions she feels phlegm in her throat she can't cough up. I gave her a younkers for suction as needed. Room air she is 93% and 100 % on 2 L NC. She complains of SOB often so oxygen was applied for comfort. She called her son and wanted to be taken to the ER even though she was in the Hospital.   RRT was called 6:20 am. She was having SOB and hyper ventilations. She wanted to leave , so we reassured her she would see a DR in 10 mins.  Her breathing is better and reminded to breath more slowly. She also expressed anxiety because her Mother  of breathing  problems, which causes her anxiety. Patient VSS.
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
## 2019-09-03 NOTE — PROGRESS NOTES
Patient discharged at 2:36 PM to home. Port was de-accessed. Pain at time of discharge was 0/10. Belongings returned to patient.  Discharge instructions and medications reviewed with patient.  Patient verbalized understanding and all questions were answered.  Prescriptions given to patient, Remeron script at [facility] for .  At time of discharge, patient condition was stable and left the unit in wheelchair escorted by NA.    **Patient confirmed ride picking her up at ED door. NA took patient down for discharge - reportedly dropped off and patient walked to own car and drove off. Dr Bermeo notified of situation and given verbal confirmation patient was A/Ox4 and at baseline when he rounded this AM, he is okay for patient to drive home. No Narcotics given since 9/2 at 1718.

## 2019-09-03 NOTE — PLAN OF CARE
PT: Orders received, chart reviewed. Pt had RRT called this AM due to difficulty breathing and high anxiety. Remains tachy at this time with -120s at rest. Not appropriate for PT eval at this time, chart indicates pt has been independent in room to bathroom. Pt discussed with OT, poor tolerance for OT eval.

## 2019-09-03 NOTE — ED PROVIDER NOTES
"  History     Chief Complaint:  Shortness of Breath     The history is provided by the patient and medical records.      Megha Campbell is a 54 year old female with a history of breast cancer diagnosed November 2018 s/p mastectomy and radiation therapy with malignant pleural effusion s/p pleurodesis 8/23, asthma, anxiety, depression and congestive heart failure who presents to the emergency department for evaluation of shortness of breath. Of note, she is supposed to start chemotherapy next week. Patient was admitted to the hospital 8/31 with constipation, nausea, vomiting, weakness and hypokalemia. Course was complicated by dyspnea. PE was ruled out 9/01 but CT (see below) showed increase in patchy right upper lobe groundglass opacities. She left the hospital today at 1400, but notes that she felt bad all day. While driving home from the hospital today, patient experienced sudden onset of increased shortness of breath. She was initially 83% on RA at arrival to the emergency department and tachycardic. Patient states that she is having worsening shortness of breath and does not know how to slow her breathing down. She feels like she is panicking, and that her asthma is bad right now. She notes that she has asked for steroids, but is currently not on any. Patient adds that she \"cannot take oral medications\" and wantst to receive through IV or her port here. She would also like something for anxiety. Alongside her anxiety and shortness of breath, patient endorses wheezing, chest tightness, and coughing. Denies fever, chest pain, abdominal pain, dysuria, urinary symptoms, and vomiting.      Cardiac/PE/DVT Risk Factors:  History of hypertension - negative   History of hyperlipidemia - negative   History of diabetes - negative   History of smoking - negative   Personal history of PE/DVT - negative   Family history of PE/DVT - negative   Family history of heart complications - negative   Recent travel - negative   Recent " surgery - negative   Other immobilizations - negative   Cancer - positive. Current breast cancer.     CT Chest PE w Contrast 8/31/2019  IMPRESSION:   1. No evidence of pulmonary emboli.  2. Increase in the patchy predominantly right upper lobe groundglass  pulmonary opacities, as compared to 8/16/2019 exam, could represent  pulmonary edema versus infection. Similarly slight interval increase  in the scattered consolidative pulmonary opacities, could be  infectious, atelectatic or less likely metastatic.  3. Interval decrease in the mildly loculated left pleural effusion and  slight increase in the right pleural effusion, all as compared to  8/16/2019 exam.  4. Interval increase in the size of the soft tissue density/lesion in  the left axilla currently measures 4.5 x 2.6 cm, previously measured  2.2 x 1.7 cm on 8/16/2019. Minimal increase in the left breast fluid  collection, possibly seroma.  Report per radiology     Allergies:  Hydrocodone-Acetaminophen  Oxycodone      Medications:    Albuterol inhaler  Clonazepam   Flexeril   Benadryl   Remeron  Zofran  Ranitidine   Sertraline     Past Medical History:    Breast cancer in female  Depressive disorder   Obese  Asthma   Right lower lobe pneumonia   Congestive heart failure   Anxiety  Generalized muscle weakness   Hypokalemia     Past Surgical History:    Biopsy breast  Gastric bypass   IR chest port placement   Lumpectomy breast with Seed localization  Thoracoscopic pleurodesis     Family History:    Mother - breast cancer     Social History:  The patient was alone.  Smoking Status: never  Smokeless Tobacco: never  Alcohol Use: yes   Marital Status:       Review of Systems   Respiratory: Positive for cough, chest tightness, shortness of breath and wheezing.    Cardiovascular: Positive for leg swelling. Negative for chest pain.   Gastrointestinal: Negative for vomiting.   Genitourinary: Negative.  Negative for dysuria.   Psychiatric/Behavioral: The patient is  nervous/anxious.    All other systems reviewed and are negative.    Physical Exam     Patient Vitals for the past 24 hrs:   BP Temp Temp src Pulse Resp SpO2   09/03/19 1752 127/75 -- -- 99 22 100 %   09/03/19 1541 121/78 97.6  F (36.4  C) Oral 115 (!) 34 100 %      Physical Exam  General: Anxious appearing, occasionally tearful.    Skin: Good turgor, no rash, no unusual bruising or prominent lesions.  HEENT: Head: Normocephalic, atraumatic, no visible masses.   Eyes: Conjunctiva clear.  Ears: EACs clear, TMs translucent.   Nose: No external lesions, mucosa non-inflamed, septum and turbinates normal.   Throat/pharynx: Mucous membranes moist, no mucosal lesions.   Neck: Supple, without lymphadenopathy.  Cardiac: Tachycardic, regular rhythm, no murmur or gallop.   Lungs: Mild expiratory wheezing heard in upper lung fields.  Mildly tachypnea.  Abdomen: Abdomen soft, non-tender. No rebound tenderness of guarding.   Musculoskeletal: Normal gait and station. No calf tenderness or swelling.  1+ pitting edema bilateral feet.   Neurologic: Oriented x 3. GCS: 15.  Psychiatric: Intact recent and remote memory, judgment and insight. Anxious appearing.    Emergency Department Course     ECG:  Indication: Shortness of breath   Completed at 1552.  Read at 1603.   Sinus tachycardia with premature supraventricular complexes and with occasional premature ventricular complexes   Left axis deviation  Low voltage QRS   Inferior infarct, age undetermined   Cannot rule out anterior infarct, age undetermined   Abnormal ECG   Rate 120 bpm. MN interval 120. QRS duration 70. QT/QTc 338/477. P-R-T axes 44 -47 22.    Imaging:  Radiology findings were communicated with the patient and family who voiced understanding of the findings.    XR Chest 2 Views  Preliminary Result  IMPRESSION: Increasing patchy consolidation within the right upper and  lower lungs. This is worrisome for acute airspace disease including  pneumonia or other acute airspace  disease. Stable dense consolidation  of the left mid to inferior lung. Right chest port venous catheter is  stable. Stable cardiac silhouette. Suggestion of left pleural fluid  appears stable.  Report per radiology     Laboratory:  Laboratory findings were communicated with the patient and family who voiced understanding of the findings.    CBC: HGB 11.3 (L) o/w WNL. (WBC 7.5, )   BMP: Glucose 110 (H), BUN 3 (L) o/w WNL (Creatinine 0.63)    Troponin (Collected 1635): <0.015   BNP: 5808 (H)      ISTAT gases lactate rosie POCT: pH Venous 7.42, PCO2 Venous 42, PO2 Venous 34, Bicarbonate Venous 27, O2 Sat Venous 67, Lactic Acid 1.2     Procedures:  None     Interventions:  1550 - DuoNeb 3mL Nebulization    1649 - Ativan 1mg PO     Emergency Department Course:  Past medical records, nursing notes, and vitals reviewed.    1604: I performed an exam of the patient as documented above.     EKG obtained in the ED, see results above.   IV was inserted and blood was drawn for laboratory testing, results above.  The patient was sent for a CXR while in the emergency department, results above.     1626: I spoke with Dr. Bermeo of the Hospitalist service regarding patient's presentation, findings, and plan of care. He will come down and evaluate the patient in the emergency department.     1626: I spoke with Pretty, our RN Care Coordinator, concerning patient's presentation and plan of care.     1647: I spoke with Dr. Bermeo of the Hospitalist service regarding patient's presentation, findings, and plan of care. He evaluated the patient in the emergency department, and also called the patient's . The patient does not live alone, and her , Liban, is at home and aware she is supposed to be coming home today. Her son is also flying to [place] this week.      1702: Patient rechecked and updated.  I also spoke with patient's , Liban, on the phone.      Findings and plan explained to the Patient. Patient  discharged home with instructions regarding supportive care, medications, and reasons to return. The importance of close follow-up was reviewed. The patient was prescribed Levaquin.    I personally reviewed the laboratory and imaging results with the Patient and answered all related questions prior to discharge.      Impression & Plan     Medical Decision Making:  Megha Campbell is a 54 year old female who presents for evaluation of shortness of breath and wheezing 1 hour after being discharged from hospitalization.  Details of the patient's history can be noted in the HPI.  Differential diagnosis included asthma exacerbation, PE, panic attack, ACS, pneumonia, pneumothorax, dissection, electrolyte abnormalities, arrhythmia, amongst others.  Upon my exam, patient was anxious, tachypneic, mildly tachycardic.  There is also mild wheezing heard throughout lung fields.  Due to her repeat presentation, basic laboratory analysis were completed.  No significant laboratory abnormalities seen off of her baseline with the exception of mildly increased BNP.  She has recently had a chest CT PE study, I did not feel that this need to be completed.  I discussed the patient's case extensively with Dr. Bermeo who was the hospitalist taking care of the patient over the past few days in the hospital.  He notes to me that although the patient does have significant medical history including breast cancer there with malignant pleural effusion with recent drainage, initiating chemotherapy soon, there have also been suspected drug-seeking and manipulative behavior.  Presentation back to the emergency department here today makes me more suspicious for mild asthma exacerbation along with anxiety.  She improved with interventions as noted above.  She is likely also tachycardic due to her frequent albuterol treatments, is seen also in hospital notes.  She had been adamant that she receive all of her medications IV/to her port.  But this was  discussed that this would not be possible that she should be able to tolerate PO medications, and if she were unable to do so, we should discuss transfer to a care facility rather than home.  Patient was adamantly against this, and greatly wishes to be discharged to be sent home.  I did talk on the phone with her  who notes that he lives at home and will also help take care of her.  Road test was completed here in the emergency department and she remained with normal oxygenation when walking with staff.  Of note, patient did have mildly increased BNP.  She also had increased infiltrate seen on chest x-ray.  Due to her wheezing and these imaging findings, we will initiate antibiotics at this time and treat as early pneumonia.  I strongly advised her to have close follow-up with primary within the next 2 to 3 days for further evaluation.  She will return the emergency department for any changing or worsening symptoms, new concerns.  All questions answered.  Patient comfortable discharge and the treatment plan.    Discharge Diagnosis:    ICD-10-CM    1. Shortness of breath R06.02 Basic metabolic panel     BNP   2. Tachycardia R00.0    3. Anxiety F41.9        Disposition:  Discharged to home.      Discharge Medications:  New Prescriptions    LEVOFLOXACIN (LEVAQUIN) 750 MG TABLET    Take 1 tablet (750 mg) by mouth daily for 5 days       Scribe Disclosure:  I, Melanie Ratliff, am serving as a scribe at 3:54 PM on 9/3/2019 to document services personally performed by Cheryl Miller PA-C based on my observations and the provider's statements to me.     Melanie Ratliff  9/3/2019    EMERGENCY DEPARTMENT    This was created at least in part with a voice recognition software. Mistakes/typos may be present.      Cheryl Miller PA  09/03/19 2135

## 2019-09-03 NOTE — PLAN OF CARE
Discharge Planner OT     Patient plan for discharge: home today    Current status: Orders received, chart reviewed, eval complete, and   treatment initiated. Patient reported increased fatigue and required heavy encouragement to participate in therapy. Patient completed LB dressing IND while seated.Patient sit to stand with SBA, no AE. Patient ambulated to bathroom with SBA. Patient toilet transfer with CGA, completed pericares IND. After activity, patient appeared SOB and O2 sats at 77%. Patient educated about PLB and sats returned to 90% in 3 minutes.     Barriers to return to prior living situation: decreased activity tolerance, level of A    Recommendations for discharge: home with A from friend/family with bathing and dressing    Rationale for recommendations: Patient fatigue leading to decreased activity tolerance. Patient reported friend (room mate) and family would provide A with ADL/IADL when necessary. Patient declined TCU.         Entered by: Natalee Bangura 09/03/2019 9:13 AM     Occupational Therapy Discharge Summary    Reason for therapy discharge:    All goals and outcomes met, no further needs identified.    Progress towards therapy goal(s). See goals on Care Plan in [facility] electronic health record for goal details.  Goals partially met.  Barriers to achieving goals:   discharge from facility.    Therapy recommendation(s):    No further therapy is recommended.

## 2019-09-03 NOTE — DISCHARGE INSTRUCTIONS
Begin taking antibiotics as prescribed.  Continue nebulizer at home.  See her primary within the next few days for recheck.  Return for any changing worsening symptoms, new concerns.      Discharge Instructions  Asthma    Asthma is a condition causing narrowing and inflammation of the airways that can make it hard to breathe.  Asthma can also cause cough, wheezing, noisy breathing and tightness in the chest.  Asthma can be brought on or  triggered  by many things, including dust, mold, pollen, cigarette smoke, exercise, stress and infections, like the common cold.     Return to the Emergency Department if:  Your breathing gets worse.  You need to use your inhaler more often than every 4 hours, or can t get relief from your inhaler.  You are very weak, or feel much more ill.  You develop new symptoms, such as chest pain.  You cough up blood.  You are vomiting enough that you can t keep fluids or your medicine down.    What can I do to help myself?  Fill any prescriptions the doctor gave you and take them right away--especially antibiotics. Be sure to finish the whole antibiotic prescription.  You may be given a prescription for an inhaler, which can help loosen tight air passages.  Use this as needed, but not more often than directed. Inhalers work much better when used with a spacer.   You may be given a prescription for a steroid to reduce inflammation. Used long-term, these can have many serious side effects, but for short courses these do not happen. You may notice restlessness or increased appetite.      You may use non-prescription cough or cold medicines. Cough medicines may help, but don t make the cough go away completely.   Avoid smoke, because this can make your symptoms worse. If you smoke, this may be a good time to quit! Consider using nicotine lozenges, gum, or patches to reduce cravings.   If you have a fever, Tylenol  (acetaminophen), Motrin  (ibuprofen), or Advil  (ibuprofen), may help bring fever  down and may help you feel more comfortable. Be sure to read and follow the package directions, and ask your doctor if you have questions.  Be sure to get your flu shot each year.  The pneumonia shot can help prevent pneumonia.  It is important that you follow up with your regular doctor, to be sure that you are improving from this spell (an acute asthma exacerbation), and also to do what you can to keep from having trouble again. Sometimes you need long-term medicines to keep your asthma under control.   If you were given a prescription for medicine here today, be sure to read all of the information (including the package insert) that comes with your prescription.  This will include important information about the medicine, its side effects, and any warnings that you need to know about.  The pharmacist who fills the prescription can provide more information and answer questions you may have about the medicine.  If you have questions or concerns that the pharmacist cannot address, please call or return to the Emergency Department.   Opioid Medication Information    Pain medications are among the most commonly prescribed medicines, so we are including this information for all our patients. If you did not receive pain medication or get a prescription for pain medicine, you can ignore it.     You may have been given a prescription for an opioid (narcotic) pain medicine and/or have received a pain medicine while here in the Emergency Department. These medicines can make you drowsy or impaired. You must not drive, operate dangerous equipment, or engage in any other dangerous activities while taking these medications. If you drive while taking these medications, you could be arrested for DUI, or driving under the influence. Do not drink any alcohol while you are taking these medications.     Opioid pain medications can cause addiction. If you have a history of chemical dependency of any type, you are at a higher risk of  becoming addicted to pain medications.  Only take these prescribed medications to treat your pain when all other options have been tried. Take it for as short a time and as few doses as possible. Store your pain pills in a secure place, as they are frequently stolen and provide a dangerous opportunity for children or visitors in your house to start abusing these powerful medications. We will not replace any lost or stolen medicine.  As soon as your pain is better, you should flush all your remaining medication.     Many prescription pain medications contain Tylenol  (acetaminophen), including Vicodin , Tylenol #3 , Norco , Lortab , and Percocet .  You should not take any extra pills of Tylenol  if you are using these prescription medications or you can get very sick.  Do not ever take more than 3000 mg of acetaminophen in any 24 hour period.    All opioids tend to cause constipation. Drink plenty of water and eat foods that have a lot of fiber, such as fruits, vegetables, prune juice, apple juice and high fiber cereal.  Take a laxative if you don t move your bowels at least every other day. Miralax , Milk of Magnesia, Colace , or Senna  can be used to keep you regular.      Remember that you can always come back to the Emergency Department if you are not able to see your regular doctor in the amount of time listed above, if you get any new symptoms, or if there is anything that worries you.

## 2019-09-03 NOTE — PROGRESS NOTES
09/03/19 0838   Quick Adds   Type of Visit Initial Occupational Therapy Evaluation   Living Environment   Lives With friend(s)   Living Arrangements apartment   Home Accessibility no concerns   Transportation Anticipated family or friend will provide   Living Environment Comment tub shower with grab bars, regular toilet seat   Functional Level   Ambulation 0-->independent   Transferring 0-->independent   Toileting 0-->independent   Bathing 0-->independent   Dressing 0-->independent   Fall history within last six months no   Prior Functional Level Comment previously IND in all ADL   General Information   Onset of Illness/Injury or Date of Surgery - Date 08/31/19   Referring Physician MD Jean-Claude   Patient/Family Goals Statement going home today   Additional Occupational Profile Info/Pertinent History of Current Problem Patient is 54-yr-old female admitteed with constipation, weakness, and hypokalemia. PMH includes brest cancer s/p pleurodesis on 8/23/19, asthma, anxiety, and depression.    Precautions/Limitations fall precautions   Cognitive Status Examination   Orientation orientation to person, place and time   Level of Consciousness alert;lethargic/somnolent   Follows Commands (Cognition) WNL   Cognitive Comment Patient reported not sleeping last night and was very fatigued today   Visual Perception   Visual Perception Wears glasses   Pain Assessment   Patient Currently in Pain No   Range of Motion (ROM)   ROM Comment BUE WFL   Strength   Strength Comments BUE WFL (4/5 on all)   Hand Strength   Hand Strength Comments moderate  bilateral hands   Transfer Skill: Sit to Stand   Level of [place]: Sit/Stand stand-by assist   Physical Assist/Nonphysical Assist: Sit/Stand 1 person assist   Transfer Skill: Toilet Transfer   Level of [place]: Toilet contact guard   Physical Assist/Nonphysical Assist: Toilet 1 person assist   Lower Body Dressing   Level of [place]: Dress Lower Body independent  "  Toileting   Level of [place]: Toilet independent   Grooming   Level of [place]: Grooming stand-by assist   Instrumental Activities of Daily Living (IADL)   IADL Comments previously driving, managing meds and finances   Activities of Daily Living Analysis   Impairments Contributing to Impaired Activities of Daily Living fear and anxiety;strength decreased   General Therapy Interventions   Planned Therapy Interventions ADL retraining   Clinical Impression   Criteria for Skilled Therapeutic Interventions Met yes, treatment indicated   OT Diagnosis decreased ADL and IADL   Influenced by the following impairments fear and anxiety, decreased activity tolerance, decreased strength   Assessment of Occupational Performance 1-3 Performance Deficits   Identified Performance Deficits dressing, bathing, home management, IADL   Clinical Decision Making (Complexity) Low complexity   Therapy Frequency Daily   Predicted Duration of Therapy Intervention (days/wks) 2 days   Anticipated Discharge Disposition Home with Assist;Home   Risks and Benefits of Treatment have been explained. Yes   Patient, Family & other staff in agreement with plan of care Yes   [facility] TM \"6 Clicks\"   2016, Trustees of [facility], under license to [employer].  All rights reserved.   6 Clicks Short Forms Daily Activity Inpatient Short Form   [facility]  \"6 Clicks\" Daily Activity Inpatient Short Form   1. Putting on and taking off regular lower body clothing? 3 - A Little   2. Bathing (including washing, rinsing, drying)? 2 - A Lot   3. Toileting, which includes using toilet, bedpan or urinal? 3 - A Little   4. Putting on and taking off regular upper body clothing? 4 - None   5. Taking care of personal grooming such as brushing teeth? 4 - None   6. Eating meals? 4 - None   Daily Activity Raw Score (Score out of 24.Lower scores equate to lower levels of function) 20   Total Evaluation Time   Total Evaluation " Time (Minutes) 10

## 2019-09-03 NOTE — CODE/RAPID RESPONSE
[facility]    RRT Note  9/3/2019   Time Called: 0625    RRT called for: SOB    Assessment & Plan   IMPRESSION & PLAN:  55 yo F w/ PMH of breast ca w/ malignant pl effusion s/p pleurodesis on 8/23/19, asthma, anxiety, depression who was admitted on 8/31/19 w/ constipation, n/v, weakness & hypokalemia.  Her course has been complicated by dyspnea.  She ruled out for PE on 9/1/19 but CT chest showed an increase in the patchy predominantly right upper lobe groundglass pulmonary opacities, as compared to 8/16/2019 exam, could represent pulmonary edema versus infection. Similarly slight interval increase in the scattered consolidative pulmonary opacities, could be  infectious, atelectatic or less likely metastatic.     This am pt was on phone w/ son who called nursing station and asked staff to check on pt re: SOB.  She was only ever tachypneic, no hypoxia noted.  RN staff report q4h neb use.    On my arrival HR is 122, SpO2 96% on RA, 2L O2 placed for comfort by RN w/ resulting SpO2 99%.  BP is 152/95, RR is ~50, very shallow respirations, hyperventilating pattern.  She is at times not able to speak in full sentences but does calm down & slow her breathing w/ repeated verbal cuing to breathe in through her nose, out through her mouth.  There is no diaphoresis and lung sounds are clear in bilat upper and lower lobes, no wheezing.    She generally has been on RA since 9/1/19, has been afebrile, no leukocytosis on last CBC on 8/31/19    Differential diagnosis: although she ruled out for PE, there is pathology on her CT chest, infection vs edema vs malignancy    INTERVENTIONS:  -pt & son had pt's rounding hospitalist on the phone during the RRT.    -a short while later I spoke w/ Dr. Bermeo on the phone who will be in to visit pt presently.    -for continuity of care/unified message I defer further mgmt & diagnostics to Dr. Bermeo.  Can consider procal, proBNP, abg, CXR, CBC, BMP, ekg, etc.  -I've offered  pt prn hydralazine but pt declined, citing inability to take anything po  -disposition: pt may remain on this unit    At the end of the RRT: pt continues to have adequate oxygenation on RA or 2L (for comfort only, not actually needed) albeit tachypneic.  She is tachycardic but less so, 122-->111    Discussed with and defer further cares to rounding hospitalist Dr. Bermeo  Interval History     Megha Campbell is a 54 year old female who was admitted on 8/31/2019 for n/v/constipation, weakness & hypokalemia    Medical history significant for:   Past Medical History:   Diagnosis Date     Breast cancer in female (H) 11/21/2018    breast cancer, left, s/p mastectomy and radiation tx      Depressive disorder      Obese      Uncomplicated asthma      Code Status: Full Code    Allergies   Allergies   Allergen Reactions     Hydrocodone-Acetaminophen Itching     Patient states she is able to take it with Benadryl.       Oxycodone Itching and Rash       Physical Exam   Vital Signs with Ranges:  Temp:  [97.3  F (36.3  C)-98  F (36.7  C)] 98  F (36.7  C)  Pulse:  [113] 113  Heart Rate:  [114-120] 115  Resp:  [20-28] 24  BP: (122-152)/(59-95) 152/95  SpO2:  [90 %-98 %] 98 %  I/O last 3 completed shifts:  In: 3861 [I.V.:3861]  Out: 1460 [Urine:1460]    Constitutional:no acute distress  Neuro: +follows commands wiggle toes and show 2 fingers bilat, face symmetric, tongue midline, speech fluent  HEENT: defer  Neck: supple  Heart: S1S2, ST  Lungs: CTAB upper and lower lobes, tachypneic w/ RR ~50, no hypoxia  Abd:normoactive bowel sounds, soft, nontender, nondisteded  Ext: +1-2 bilat edema    Data     Troponin:    Recent Labs   Lab Test 08/29/19  1453   TROPI <0.015     CBC with Diff:  Recent Labs   Lab Test 08/31/19  0528  08/23/19  0743   WBC 6.8   < > 7.6   HGB 11.1*   < > 11.8   MCV 79   < > 79      < > 343   INR  --   --  0.97    < > = values in this interval not displayed.      Comprehensive Metabolic Panel:  Recent Labs    Lab 09/01/19  0600 08/31/19  0528    136   POTASSIUM 3.5 3.0*   CHLORIDE 100 99   CO2 28 28   ANIONGAP 6 9   * 93   BUN 6* 6*   CR 0.55 0.59   GFRESTIMATED >90 >90   GFRESTBLACK >90 >90   PABLO 8.6 8.3*   PHOS  --  2.8   PROTTOTAL  --  7.1   ALBUMIN  --  2.2*   BILITOTAL  --  0.4   ALKPHOS  --  100   AST  --  19   ALT  --  17     UA:  Recent Labs   Lab 08/31/19  1425   COLOR Dark Yellow   APPEARANCE Slightly Cloudy   URINEGLC Negative   URINEBILI Small*   URINEKETONE 10*   SG 1.035   UBLD Moderate*   URINEPH 6.0   PROTEIN 30*   NITRITE Negative   LEUKEST Large*   RBCU 1   WBCU 7*       Time Spent on this Encounter   I spent 20 minutes ([id number]) minutes on the unit/floor managing the care of Megha Campbell. Over 50% of my time was spent counseling the patient and/or coordinating care regarding services listed in this note.    Joanne Evans, [facility]  Hospitalist [facility]  [phone]

## 2019-09-04 PROBLEM — R53.1 WEAKNESS: Status: RESOLVED | Noted: 2019-01-01 | Resolved: 2019-01-01

## 2019-09-04 PROBLEM — E87.6 HYPOKALEMIA: Status: RESOLVED | Noted: 2019-01-01 | Resolved: 2019-01-01

## 2019-09-04 PROBLEM — Z76.5 DRUG-SEEKING BEHAVIOR: Status: ACTIVE | Noted: 2019-01-01

## 2019-09-04 PROBLEM — J18.9 PNEUMONIA: Status: ACTIVE | Noted: 2019-01-01

## 2019-09-04 PROBLEM — J18.9 PNEUMONIA OF RIGHT LUNG DUE TO INFECTIOUS ORGANISM: Status: RESOLVED | Noted: 2019-01-01 | Resolved: 2019-01-01

## 2019-09-04 PROBLEM — I50.9 CHF (CONGESTIVE HEART FAILURE) (H): Status: RESOLVED | Noted: 2019-01-01 | Resolved: 2019-01-01

## 2019-09-04 PROBLEM — R06.02 SOB (SHORTNESS OF BREATH): Status: RESOLVED | Noted: 2019-01-01 | Resolved: 2019-01-01

## 2019-09-04 PROBLEM — F41.9 ANXIETY: Status: RESOLVED | Noted: 2019-01-01 | Resolved: 2019-01-01

## 2019-09-04 PROBLEM — K59.00 CONSTIPATION: Status: RESOLVED | Noted: 2019-01-01 | Resolved: 2019-01-01

## 2019-09-04 PROBLEM — M62.81 GENERALIZED MUSCLE WEAKNESS: Status: RESOLVED | Noted: 2019-01-01 | Resolved: 2019-01-01

## 2019-09-04 PROBLEM — F41.1 GENERALIZED ANXIETY DISORDER: Status: ACTIVE | Noted: 2019-01-01

## 2019-09-04 PROBLEM — J18.9 RIGHT LOWER LOBE PNEUMONIA: Status: RESOLVED | Noted: 2019-01-01 | Resolved: 2019-01-01

## 2019-09-04 PROBLEM — Z98.84 S/P GASTRIC BYPASS: Status: ACTIVE | Noted: 2019-01-01

## 2019-09-04 NOTE — DISCHARGE INSTRUCTIONS
Take the prescribed medications to help with your breathing.  Return here promptly for worsening or re-evaluation at any time.  Please see your scheduled follow up with Dr. Lock on Friday September 9/6.  Please keep this appointment as scheduled to make sure that your symptoms are stable. Please call the clinic at [phone] if you further questions regarding this appointment.     You are scheduled for follow up with Dr. Alonzo PRATT with [facility] on Monday September 9th for a post surgical follow up to include the following   Chest xray at [facility] [address] at 2:30 p.m.  Follow up with [facility] [address] at 3:00 p.m.  Please call [facility] if you have questions regarding these follow up appointments at [phone]    You are scheduled for follow up with [facility] for the following dates/times  Tuesday September 10th PET/CT at [place] [address] check in at 10:15 a.m. (Photo ID and insurance cards needed)  PET/CT at 10:30 a.m.    Instructions for PET/CT Tuesday September 10th (please call [facility] at [phone] or [facility] at [phone] if you have questions regarding this prep)  24 HOURS PRIOR TO YOUR PET/CT EAT A DIET HIGH IN PROTEIN AND NO OR LOW CARBS ONLY  WEAR LOOSE FITTING CLOTHING AND NO JEWELRY   DRINK PLENTY OF FLUIDS INCLUDING LOW CALORIE AND WATER   12 HOURS PRIOR TO YOUR PROCEDURE NO NICOTINE OR CAFFEINE  8 HOURS PRIOR TO YOUR PROCEDURE NO SUPPLEMENTAL SHAKES/DRINKS (ENSURE OR BOOST ARE EXAMPLES)  4 HOURS PRIOR TO YOUR PROCEDURE NO FOOD AND ONLY WATER  NO DIUERETIC'S PRIOR TO PROCEDURE.  YOU CAN TAKE ALL OTHER MEDICATIONS 4 HOURS PRIOR TO YOUR PROCEDURE    You are then scheduled to see Dr. Mendoza at [facility] on Wednesday September 11th to go over your PET/CT results and plan of care at 2:40 p,m.  [facility] [address] Phone#[phone]

## 2019-09-04 NOTE — ED NOTES
"[facility]  ED Nurse Handoff Report    ED Chief complaint: Shortness of Breath      ED Diagnosis:   Final diagnoses:   Acute respiratory failure with hypoxia (H)   Metastatic cancer (H)       Code Status: Full code per Epic chart    Allergies:   Allergies   Allergen Reactions     Hydrocodone-Acetaminophen Itching     Patient states she is able to take it with Benadryl.       Oxycodone Itching and Rash       Activity level - Baseline/Home:  Independent  Activity Level - Current:   Independent    Patient's Preferred language: English   Needed?: No    Isolation: No  Infection: Not Applicable  Bariatric?: No    Vital Signs:   Vitals:    09/04/19 0545 09/04/19 0600 09/04/19 0615 09/04/19 0624   BP:    (!) 141/85   Pulse:    121   Resp: 23 19 24    Temp:       TempSrc:       SpO2: 99% 100% 99% 99%   Weight:           Cardiac Rhythm:  Sinus Tachycardia    Pain level: Back pain 4/10    Is this patient confused?: No   Does this patient have a guardian?  No         If yes, is there guardianship documents in the Epic \"Code/ACP\" activity?  N/A         Guardian Notified?  N/A  [place] - Suicide Severity Rating Scale Completed?  No, secondary to [facility] suicide screening  If yes, what color did the patient score?  N/A    Patient Report: Dyspnea prompted first ED visit on 9/3/2019, patient was discharged from ED with prescriptions for Levaquin (small early pneumonia dx) and Albuterol nebulizer however did not fill prescriptions.    Focused Assessment: Dyspnea requiring new oxygen requirement at 4L NC  Tests Performed: Lab  Abnormal Results: Abnormal Labs Reviewed - No abnormal labs to display: Labs were drawn at previous ED visit.  Istat lactate < 2.0.    Treatments provided: Solumedrol, Klonopin for anxiety, Duoneb treatment    Family Comments: No family present; lives at home with .    OBS brochure/video discussed/provided to patient/family: N/A              Name of person given brochure if not " patient: N/A              Relationship to patient: N/A    ED Medications:   Medications   furosemide (LASIX) injection 20 mg (20 mg Intravenous Given 9/4/19 0627)   levofloxacin (LEVAQUIN) infusion 750 mg (750 mg Intravenous New Bag 9/4/19 0627)   ipratropium - albuterol 0.5 mg/2.5 mg/3 mL (DUONEB) 0.5-2.5 (3) MG/3ML neb solution (  Given 9/4/19 0225)   methylPREDNISolone sodium succinate (solu-MEDROL) injection 125 mg (125 mg Intravenous Given 9/4/19 0518)   clonazePAM (klonoPIN) tablet 0.5 mg (0.5 mg Oral Given 9/4/19 0627)       Drips infusing?:  No    For the majority of the shift this patient was Green.   Interventions performed were Routine cares.    Severe Sepsis OR Septic Shock Diagnosis Present: No    To be done/followed up on inpatient unit:  Routine cares and inpatient orders.    ED NURSE PHONE NUMBER: * [phone]

## 2019-09-04 NOTE — H&P
[facility]    History and Physical  Hospitalist       Date of Admission:  9/4/2019    Assessment & Plan   54 year old female with past medical hx of Stage IV breast cancer and left Pleurodesis on 8/23 for malignant pleural effusion, who was inpatient 8/31 to 9/3 for pain control and anxiety -- complicated by chaotic social situation and suspected drug seeking (IV Dilaudid and IV Ativan), now with RUL pneumonia:    Summary:    Principal Problem:    Pneumonia, RUL   -- Levaquin 750 mg daily    Active Problems:    Malignant left pleural effusion -- S/P Pleurodesis 8/23/19   -- Ibuprofen prn pain, tylenol PRN pain      Breast CA 11/2018, ER Neg -- now stage IV (S/P Left Mast, S/P Rad Tx)   -- is followed by [facility], Dr. Garcia, and are waiting to see her to discuss outpatient chemotherapy (spoke to clinic yesterday -- they preferred to call her with appt time)       Uncomplicated asthma   -- no objective evidence of asthma exacerbation currently (Stop prednisone)      S/P gastric bypass -- 2004      Generalized anxiety disorder   -- Wellbutrin stopped (theoretically might be making anxiety worse   -- Remeron 15 mg at bedtime started   -- does appear to intermittently hyperventilate, best treated by having her deep breath slowly, and avoid PRN IV Ativan (has only made the situation worse)      Drug-seeking behavior   -- she can swallow pills fine (no need for IV medications if able to take orally)   -- Weaning Clonazepam, currently on 0.5 mg bid and will do scheduled taper as her Remeron is being started     Plan: Discussed with patient -- advised TCU as she intermittently needs O2 and does not appear to be able to manage on her own (or with her disable ) at present.  Spoke with son, Aramis, who is in total agreement and he will be here on 8/9 and will be able to help manage/supervise the situation at home at that time.     DVT Prophylaxis: Pneumatic Compression Devices  Code Status: Full  "Code    Disposition: Expected discharge when TCU available, and when patient agreeable.     Abilio Herrera MD  Pager: [phone]  Cell Phone:  [phone]     Primary Care Physician   [facility]    Chief Complaint   SOB    History is obtained from Patient, son, and     History of Present Illness   54 year old female with past medical hx of Stage IV breast cancer and left Pleurodesis on 8/23 for malignant pleural effusion, who was inpatient 8/31 to 9/3 for pain control and anxiety -- complicated by chaotic social situation and suspected drug seeking (IV Dilaudid and IV Ativan), now with RUL pneumonia.    Patient gives conflicting data   -- says she is not  and lives alone ... Yet Liban Abbott says he is her  of 3 years, which son agrees with.    --  says they had a fight \"and she left for 3 days\" (was at the hospital), which the patient denies.   -- Patient was seen driving herself away from the hospital but claims that was not her, but her friend Genevieve.     Repeated asked what is actually going on, and patient said \"we're not going there\", but would not explain further, and when pushed for an explanation she implied she was being discriminated against.     PAST MEDICAL HISTORY    Past Medical History:   Diagnosis Date     Breast cancer in female (H) 11/21/2018    breast cancer, left, s/p mastectomy and radiation tx      Depressive disorder      Malignant pleural effusion 08/2019    Related to breast cancer, S/P Pleurodesis 8/23/19 left side     Obese      Uncomplicated asthma         PAST SURGICAL HISTORY    Past Surgical History:   Procedure Laterality Date     BIOPSY BREAST Left 12/7/2018    Procedure: RE-EXCISION LEFT BREAST INFERIOR MARGIN, ASPIRATION OF AXILLARY SEROMA;  Surgeon: Lesly Aponte MD;  Location: [facility] OR     GASTRIC BYPASS  2004    abdominal plasty, and scar tissue removal     IR CHEST PORT PLACEMENT > 5 YRS OF AGE  8/23/2019     " LUMPECTOMY BREAST WITH SEED LOCALIZATION Left 2018    Procedure: SEED LOCALIZED LEFT BREAST LUMPECTOMY WITH LEFT AXILLARY LYMPH NODE DISSECTION;  Surgeon: Lesly Aponte MD;  Location: [facility] OR     THORACOSCOPIC PLEURODESIS Left 2019    Procedure: LEFT VIDEO-ASSISTED THORACIC SURGERY  TALC PLEURODESIS, PARITAL PLEURAL BIOPSY.;  Surgeon: Zackary Rosado MD;  Location:  OR        Prior to Admission Medications   Prior to Admission Medications   Prescriptions Last Dose Informant Patient Reported? Taking?   acetaminophen (TYLENOL) 325 MG tablet  Self No No   Sig: Take 2 tablets (650 mg) by mouth every 4 hours as needed for other (multimodal surgical pain management along with NSAIDS and opioid medication as indicated based on pain control and physical function.)   albuterol (PROAIR HFA/PROVENTIL HFA/VENTOLIN HFA) 108 (90 Base) MCG/ACT inhaler  Self No No   Sig: Inhale 1-2 puffs into the lungs every 6 hours as needed for shortness of breath / dyspnea or wheezing   albuterol (PROVENTIL) (2.5 MG/3ML) 0.083% neb solution  Self Yes No   Sig: Take 2.5 mg by nebulization every 4 hours as needed for shortness of breath / dyspnea or wheezing   clonazePAM (KLONOPIN) 1 MG tablet   No No   Si.5 mg (1/2 pill) twice a day for 3 days, then 0.5 mg (1/2 pill) at bedtime for 3 days -- then 0.5 mg at bedtime as needed for sleep   cyclobenzaprine (FLEXERIL) 10 MG tablet  Self No No   Sig: Take 1 tablet (10 mg) by mouth 3 times daily as needed for muscle spasms   diphenhydrAMINE (BENADRYL) 25 MG tablet  Self No No   Sig: Take 1-2 tablets (25-50 mg) by mouth every 6 hours as needed for itching or allergies   ibuprofen (ADVIL/MOTRIN) 800 MG tablet   No No   Sig: Take 1 tablet (800 mg) by mouth every 8 hours as needed for moderate pain   levofloxacin (LEVAQUIN) 750 MG tablet   No No   Sig: Take 1 tablet (750 mg) by mouth daily for 5 days   mirtazapine (REMERON) 15 MG tablet   No No   Sig: Take 1 tablet (15 mg) by  mouth At Bedtime   ondansetron (ZOFRAN-ODT) 4 MG ODT tab  Self No No   Sig: Take 1 tablet (4 mg) by mouth every 6 hours as needed for nausea or vomiting   ranitidine (ZANTAC) 150 MG tablet  Self No No   Sig: Take 1 tablet (150 mg) by mouth every 12 hours   Patient taking differently: Take 150 mg by mouth daily    sertraline (ZOLOFT) 100 MG tablet  Self Yes No   Sig: Take 150 mg by mouth daily      Facility-Administered Medications: None     Allergies   Allergies   Allergen Reactions     Hydrocodone-Acetaminophen Itching     Patient states she is able to take it with Benadryl.       Oxycodone Itching and Rash       SOCIAL HISTORY    Social History     Social History Narrative    , has 2 children, lives in an apartment with her new  of 3 years (Liban Mendez), and he is disabled (?spinal cord injury), and she helps care for him.  (last updated 9/4/2019)       Social History     Tobacco Use     Smoking status: Never Smoker     Smokeless tobacco: Never Used   Substance Use Topics     Alcohol use: Yes     Comment: < 1 drink a month     Drug use: No        FAMILY HISTORY    Family History   Problem Relation Age of Onset     Breast Cancer Mother         Review of Systems   The 10 point Review of Systems is negative other than noted in the HPI or here, except says nausea, dizzy, constipation resolved -- denies being , and denies having fighting with her , but her son says otherwise.       PHYSICAL EXAM     Temp: 95.5  F (35.3  C) Temp src: Oral BP: (!) 147/84 Pulse: 121 Heart Rate: 110 Resp: 24 SpO2: (!) 87 % O2 Device: None (Room air) Oxygen Delivery: 4 LPM  Vital Signs with Ranges  Temp:  [95.5  F (35.3  C)-97.7  F (36.5  C)] 95.5  F (35.3  C)  Pulse:  [] 121  Heart Rate:  [] 110  Resp:  [13-40] 24  BP: (121-147)/(69-85) 147/84  SpO2:  [83 %-100 %] 87 %  230 lbs 0 oz    Constitutional: Awake, alert, semi-cooperative (caught lying several times, at which point she changes the story  and blames someone for the misunderstanding)  Eyes: Conjunctiva and pupils examined and normal.  HEENT: Moist mucous membranes, normal dentition.  Respiratory: Clear to auscultation bilaterally, no crackles or Wheezing at present.   Cardiovascular: Regular rate and rhythm, normal S1 and S2, and no murmur noted, no carotid bruits.  ankle edema.   GI: Soft, non-distended, non-tender, normal bowel sounds.  Lymph/Hematologic: No anterior cervical, supraclavicular or axillary adenopathy.  Skin: No rashes, no cyanosis.   Musculoskeletal: No joint swelling, erythema or tenderness.  Neurologic: Alert, Ox3, Cranial nerves 2-12 intact, no focal weakness or numbness  Psychiatric:  At times appears anxious and hyperventilating.     Data   Data reviewed today:  I personally reviewed the EKG tracing showing sinus tach with rate 116 and low voltage with slow R wave progression, and occastional PVC.  Recent Labs   Lab 09/03/19  1635 09/03/19  0745 09/01/19  0600 08/31/19  0528 08/29/19  1453   WBC 7.5 8.4  --  6.8 6.9   HGB 11.3* 11.1*  --  11.1* 10.7*   MCV 79 79  --  79 79    368  --  338 323    139 134 136 137   POTASSIUM 3.6 3.8 3.5 3.0* 3.2*   CHLORIDE 102 105 100 99 99   CO2 28 30 28 28 31   BUN 3* 3* 6* 6* 4*   CR 0.63 0.54 0.55 0.59 0.46*   ANIONGAP 7 4 6 9 7   PABLO 8.6 8.8 8.6 8.3* 8.7   * 110* 124* 93 93   ALBUMIN  --   --   --  2.2*  --    PROTTOTAL  --   --   --  7.1  --    BILITOTAL  --   --   --  0.4  --    ALKPHOS  --   --   --  100  --    ALT  --   --   --  17  --    AST  --   --   --  19  --    TROPI <0.015  --   --   --  <0.015       Imaging:  Recent Results (from the past 24 hour(s))   XR Chest 2 Views    Narrative    CHEST TWO VIEWS    9/3/2019 4:44 PM     HISTORY: Shortness of breath.    COMPARISON: Chest x-ray 8/31/2019.      Impression    IMPRESSION: Increasing patchy consolidation within the right upper and  lower lungs. This is worrisome for acute airspace disease including  pneumonia or  other acute airspace disease. Stable dense consolidation  of the left mid to inferior lung. Right chest port venous catheter is  stable. Stable cardiac silhouette. Suggestion of left pleural fluid  appears stable.    MIGUEL A FAN MD

## 2019-09-04 NOTE — ED NOTES
RN to bedside to verify address on patient's chart is accurate; patient has now decided to stay and allow port to be accessed.  Patient requesting juice at this time and provided.

## 2019-09-04 NOTE — PLAN OF CARE
Patient states she feels better. Has been removing O2 today, however does say she has been short of breath. O2 sats have been upper 80's on RA., encouraged to keep 2liter NC on, continues to remove it. IS given, reviewed by RT. Lungs diminished. States she is going home this evening.

## 2019-09-04 NOTE — ED PROVIDER NOTES
History     Chief Complaint:  Shortness of Breath      HPI history is limited as patient is a poor historian, and supplemented by electronic chart review.    Megha Campbell is a 54 year old female with a history of asthma and breast cancer who presents to the emergency department today for evaluation of shortness of breath. She was seen in the ED yesterday for this and wanted to leave without being admitted. She called EMS tonight when she began feeling short of breath with a cough that began yesterday. She was diagnosed with breast cancer in December, not on chemo yet, which has spread to fluid in her lungs and she recently had a chest port put in. She states this shortness of breath feels more like pneumonia that her usual asthma.  She denies any new pain.  She was hospitalized here from August 31 to September 3 for weakness, hypokalemia, and lung findings.  During her ED visit just hours ago, she was prescribed bronchodilators and Levaquin though she has not filled these.  She feels worse than during the visit.  She is not normally on oxygen, and states she is never been a smoker.  She is expecting to start chemotherapy before long through a local oncologist.      Allergies:  Hydrocodone-Acetaminophen  Oxycodone    Medications:    Albuterol   Clonazepam   Flexeril  Benadryl  Levaquin  Remeron  Zofran  Zantac  Zoloft    Past Medical History:    Malignant neoplasm of lower-outer quadrant of left breast of female, estrogen receptor negative  Breast CA, with Malignant Pleural Effusion  Pneumonia of right lung due to infectious organism  Uncomplicated asthma  Obese  Depressive disorder  CHF  Hypokalemia  Anxiety    Past Surgical History:    gastric bypass  Lumpectomy Breast with Seed Localization  Biopsy breast   IR Chest Port Placement  Thoracoscopic pleurodesis     Family History:    The patient's family history includes Breast Cancer in her mother.    Social History:  The patient reports that she has never smoked.  She has never used smokeless tobacco. She reports that she drinks alcohol. She reports that she does not use drugs.   PCP: [facility]        Review of Systems   All other systems reviewed and are negative.      Physical Exam     Patient Vitals for the past 24 hrs:   BP Temp Temp src Pulse Heart Rate Resp SpO2 Weight   09/04/19 0711 (!) 147/84 95.5  F (35.3  C) Oral -- 110 24 98 % --   09/04/19 0624 (!) 141/85 -- -- 121 -- -- 99 % --   09/04/19 0615 -- -- -- -- -- 24 99 % --   09/04/19 0600 -- -- -- -- -- 19 100 % --   09/04/19 0545 -- -- -- -- -- 23 99 % --   09/04/19 0515 127/69 -- -- -- 108 (!) 36 100 % --   09/04/19 0430 -- -- -- -- 128 (!) 38 (!) 83 % --   09/04/19 0400 -- -- -- -- 111 (!) 38 99 % --   09/04/19 0330 -- -- -- -- 113 13 97 % --   09/04/19 0250 -- -- -- -- 115 24 100 % --   09/04/19 0230 -- -- -- -- 115 (!) 40 100 % --   09/04/19 0156 -- -- -- -- -- -- 99 % --   09/04/19 0143 -- -- -- -- -- -- 92 % --   09/04/19 0142 -- -- -- -- -- -- 90 % --   09/04/19 0140 -- -- -- -- -- -- (!) 84 % --   09/04/19 0139 134/69 97.7  F (36.5  C) Oral 122 -- 18 93 % 104.3 kg (230 lb)     Physical Exam  General: Woman sitting upright in room 21  HENT: mucous membranes moist  CV: initially tachycardic rate, regular rhythm, moderate symmetric lower extremity edema  Resp: Coarse breath sounds bilaterally, slightly decreased air movement though without wheezing, no stridor, no cough observed  GI: abdomen soft and nontender, no guarding  MSK: no bony tenderness  Skin: appropriately warm and dry  Neuro: alert, clear speech, oriented  Psych: anxious, expresses impatient and frustration      Emergency Department Course     ECG:  ECG taken at 0355, ECG read at 0402  Sinus tachycardia with short MN with premature atrial complexes  Left axis deviation  Low voltage QRS  Possible inferior infarct  Cannot rule out anterior infarct   Abnormal ECG  Rate 116 bpm. MN interval 100 ms. QRS duration 72 ms. QT/QTc 354/492  ms. P-R-T [facility] 26 -31 12.    Laboratory:  Laboratory findings were communicated with the patient who voiced understanding of the findings.    ISTAT gases lactate rosie POCT: pH Venous 7.41, PCO2 Venous 40, PO2 Venous 34, Bicarbonate Venous 25, O2 Sat Venous 67, Lactic Acid 1.7    Procalcitonin 0.11    Interventions:  0225 Duoneb, 3 mL, nebulization   0512 Lorazepam, 1 mg, IV injection  0518 methylprednisolone 125 mg IV   IV levaquin  Lasix 20mg IV    Emergency Department Course:  Past medical records, nursing notes, and vitals reviewed.  0224: I performed an exam of the patient and obtained history, as documented above.     I performed electronic chart review in [employer].  The patient was placed on continuous cardiac and pulse ox monitoring.    Port was accessed and blood was drawn for laboratory testing, results above.    0416: I rechecked the patient. Explained findings to patient.    I personally reviewed the laboratory/imaging results with the Patient and answered all related questions prior to admission.    Findings and plan explained to the Patient who consents to admission.     0533: Discussed the patient with Dr. Lantigua, who will admit the patient to an oncology bed for further monitoring, evaluation, and treatment.     Impression & Plan      Medical Decision Making:  She presents with worsening respiratory symptoms that I suspect are multifactorial.  She was previously diagnosed with malignant pleural effusion and had pleurodesis on August 23.  While her symptoms including worsening infiltrate on x-ray could be from a bacterial infection such as pneumonia, the fact that she has bilateral leg swelling, orthopnea, and a BNP of nearly 6k this evening are suggestive of some degree of volume overload and for this reason I have initiated diuresis.  She has acute hypoxic respiratory failure, with oxygen saturations in the low 80s on which she did not wish to try BiPAP, nor did I feel this could be mandatory, though she  is at some risk for deterioration.  Pulmonary embolism also considered, though thought to be less likely given the gradual progression of her symptoms as well as the fact that she had a CT just 4 days ago showing no PE.  She was treated for some component of bronchospasm though I do not think this is the primary process.  On multiple occasions to her for several hours here in the ED, I recommended that the patient be admitted, though she repeatedly expressed reluctance.  At times she stated she would allow some testing and be hospitalized, only to have the nurse go in soon thereafter and the patient refused all testing.  At one point I had discussion with her regarding leaving AMA and she wished to do this, so I prescribed several medications in an effort to give her some degree of additional treatment despite her desire to go home against my strong recommendation to the contrary.  However, for unclear reasons she changed her mind yet again and ultimately agreed to allow her port to be accessed and consented to hospitalization.  She will be admitted to a monitored bed under the care of the hospital service for further multidisciplinary care of the above concerns.    Diagnosis:    ICD-10-CM    1. Acute respiratory failure with hypoxia (H) J96.01 Procalcitonin     Blood culture     Lactic acid whole blood     ISTAT gases lactate rosie POCT     ISTAT gases lactate rosie POCT     CANCELED: Lactic acid whole blood   2. Metastatic cancer (H) C79.9        Disposition:   Admitted to oncology tele bed      Scribe Disclosure:  Yaima CARDOSO, am serving as a scribe at 2:42 AM on 9/4/2019 to document services personally performed by Sajan Martin MD based on my observations and the provider's statements to me.     EMERGENCY DEPARTMENT  This record was created at least in part using electronic voice recognition software, so please excuse any typographical errors.       Sajan Martin MD  09/04/19 0819

## 2019-09-04 NOTE — DISCHARGE SUMMARY
[facility]    Discharge Summary  Hospitalist    Date of Admission:  9/4/2019  Date of Discharge:  9/4/2019  Discharging Provider: Abilio Herrera MD    Discharge Diagnoses   Principal Problem:    Pneumonia, RUL   -- discharging on Levaquin 750 mg daily for 1 week    Active Problems:    Malignant left pleural effusion -- S/P Pleurodesis 8/23/19   -- pain better with Ibuprofen alone      Breast CA 11/2018, ER Neg -- now stage IV (S/P Left Mast, S/P Rad Tx)      Uncomplicated asthma      S/P gastric bypass -- 2004      Generalized anxiety disorder      Drug-seeking behavior   -- avoid unnecessary IV Benzo's or Narcotics    (has a Port now, and has frequently told new providers that she can not swallow pills -- which is not true)      Dysfunctional Home situation     -- patient is  and appears to be fighting with her , and complicated by her friend, Genevieve, living in same apartment and is trying to be PCA for this patient (?conflict of interest, might explain why Genevieve appears to be supporting the patient's lies).       History of Present Illness   Megha Campbell is an 54 year old female with Stage IV Breast cancer with recent Pleurodesis with Dr. Rosado who comes back to the hospital 12 hours after being discharge, having new slight RUL infiltrate and slight yellow sputum production, but no fever or leukocytosis --but requesting additional care and has a complicated social situation.    Hospital Course   Started on Levaquin 750 mg daily, was given O2 with sat at one time dropping to 87%, and patient appearing frantic and possibly having a panic attack ... So provided Oxygen and discussed TCU options as this was the 4th day and wanted to offer her a stable environment with nursing support if she needed it -- at which point she stopped using oxygen, and said she was going to go home, and 92-98% on room air with no apparent SOB.     Social situation complicated as patient  appears to be fighting with her , and may be coming to the hospital to get away from him -- but unable to get to the bottom of it as the patient won't even acknowledge that she is  -- but son reports they have been  for 3 years (2nd marriage).      Offered TCU to patient, and son Delta strongly encouraged his mother to go to TCU -- but she reports she won't, and mentioned she is going to try to get her friend Genevieve to be her PCA.  Son will be here on 9/9/19, until then patient plans to return home, and refusing assistance.      Abilio Herrera MD, MD  Pager: [phone]  Cell Phone:  [phone]       Significant Results and Procedures   As above    Pending Results   These results will be followed up by Dr. Herrera  Unresulted Labs Ordered in the Past 30 Days of this Admission     Date and Time Order Name Status Description    9/4/2019 0511 Blood culture Preliminary           Code Status   Full Code       Primary Care Physician   Briseyda Lock    Physical Exam   Temp: 98.3  F (36.8  C) Temp src: Oral BP: (!) 155/93 Pulse: 121 Heart Rate: 102 Resp: 21 SpO2: 92 % O2 Device: None (Room air) Oxygen Delivery: 2 LPM  Vitals:    09/04/19 0139   Weight: 104.3 kg (230 lb)     Vital Signs with Ranges  Temp:  [95.5  F (35.3  C)-98.3  F (36.8  C)] 98.3  F (36.8  C)  Pulse:  [] 121  Heart Rate:  [102-128] 102  Resp:  [13-40] 21  BP: (127-155)/(69-93) 155/93  SpO2:  [83 %-100 %] 92 %  No intake/output data recorded.    Exam on discharge:   Lungs completely clear, no wheezing.     Discharge Disposition   Discharged to home  Condition at discharge: Stable    Consultations This Hospital Stay   CARE TRANSITION RN/SW IP CONSULT    Time Spent on this Encounter   I spent a total of 35 minutes discharging this patient.     Discharge Orders      Reason for your hospital stay    Pneumonia     Follow-up and recommended labs and tests     Follow up with primary care provider, Briseyda Lock, in 5  days, and follow-up with Dr. Garcia with [facility] in the next week (the clinic will call you with an appointment)     Activity    Your activity upon discharge: activity as tolerated     Discharge Instructions    Call Dr. Bermeo if any medical questions at Cell Phone [phone].     Full Code     Diet    Follow this diet upon discharge: Orders Placed This Encounter      Combination Diet Regular Diet Adult     Discharge Medications   Current Discharge Medication List      CONTINUE these medications which have CHANGED    Details   albuterol (PROVENTIL) (2.5 MG/3ML) 0.083% neb solution Take 1 vial (2.5 mg) by nebulization every 4 hours as needed for shortness of breath / dyspnea or wheezing  Qty: 30 vial, Refills: 3    Associated Diagnoses: Pneumonia of right upper lobe due to infectious organism (H)      levofloxacin (LEVAQUIN) 750 MG tablet Take 1 tablet (750 mg) by mouth daily for 7 days  Qty: 5 tablet, Refills: 0    Associated Diagnoses: Pneumonia of right upper lobe due to infectious organism (H)         CONTINUE these medications which have NOT CHANGED    Details   acetaminophen (TYLENOL) 325 MG tablet Take 2 tablets (650 mg) by mouth every 4 hours as needed for other (multimodal surgical pain management along with NSAIDS and opioid medication as indicated based on pain control and physical function.)  Qty: 100 tablet, Refills: 0    Associated Diagnoses: Acute post-operative pain; Malignant pleural effusion      albuterol (PROAIR HFA/PROVENTIL HFA/VENTOLIN HFA) 108 (90 Base) MCG/ACT inhaler Inhale 1-2 puffs into the lungs every 6 hours as needed for shortness of breath / dyspnea or wheezing  Qty: 1 Inhaler, Refills: 0    Comments: Pharmacy may dispense brand covered by insurance (Proair, or proventil or ventolin or generic albuterol inhaler)  Associated Diagnoses: Pneumonia of right lung due to infectious organism, unspecified part of lung      clonazePAM (KLONOPIN) 1 MG tablet 0.5 mg (1/2 pill) twice  a day for 3 days, then 0.5 mg (1/2 pill) at bedtime for 3 days -- then 0.5 mg at bedtime as needed for sleep    Associated Diagnoses: Generalized anxiety disorder      diphenhydrAMINE (BENADRYL) 25 MG tablet Take 1-2 tablets (25-50 mg) by mouth every 6 hours as needed for itching or allergies  Qty: 60 tablet, Refills: 0    Associated Diagnoses: Malignant neoplasm of lower-outer quadrant of left breast of female, estrogen receptor negative (H)      ibuprofen (ADVIL/MOTRIN) 800 MG tablet Take 1 tablet (800 mg) by mouth every 8 hours as needed for moderate pain  Qty: 100 tablet, Refills: 0    Comments: Take with food  Associated Diagnoses: Malignant neoplasm of lower-outer quadrant of left breast of female, estrogen receptor negative (H)      mirtazapine (REMERON) 15 MG tablet Take 1 tablet (15 mg) by mouth At Bedtime  Qty: 30 tablet, Refills: 3    Comments: Future refills by her primary physician.  Associated Diagnoses: Depressive disorder      ondansetron (ZOFRAN-ODT) 4 MG ODT tab Take 1 tablet (4 mg) by mouth every 6 hours as needed for nausea or vomiting  Qty: 20 tablet, Refills: 0    Associated Diagnoses: Malignant pleural effusion      ranitidine (ZANTAC) 150 MG tablet Take 150 mg by mouth daily      sertraline (ZOLOFT) 100 MG tablet Take 150 mg by mouth daily         STOP taking these medications       cyclobenzaprine (FLEXERIL) 10 MG tablet Comments:   Reason for Stopping:         furosemide (LASIX) 20 MG tablet Comments:   Reason for Stopping:         predniSONE (DELTASONE) 20 MG tablet Comments:   Reason for Stopping:             Allergies   Allergies   Allergen Reactions     Hydrocodone-Acetaminophen Itching     Patient states she is able to take it with Benadryl.       Oxycodone Itching and Rash     Data   Most Recent 3 CBC's:  Recent Labs   Lab Test 09/03/19  1635 09/03/19  0745 08/31/19  0528   WBC 7.5 8.4 6.8   HGB 11.3* 11.1* 11.1*   MCV 79 79 79    368 338      Most Recent 3 BMP's:  Recent Labs    Lab Test 09/03/19  1635 09/03/19  0745 09/01/19  0600    139 134   POTASSIUM 3.6 3.8 3.5   CHLORIDE 102 105 100   CO2 28 30 28   BUN 3* 3* 6*   CR 0.63 0.54 0.55   ANIONGAP 7 4 6   PABLO 8.6 8.8 8.6   * 110* 124*     Most Recent 2 LFT's:  Recent Labs   Lab Test 08/31/19  0528 08/14/19  0609   AST 19 15   ALT 17 15   ALKPHOS 100 111   BILITOTAL 0.4 0.3     Most Recent INR's and Anticoagulation Dosing History:  Anticoagulation Dose History     Recent Dosing and Labs Latest Ref Rng & Units 8/9/2019 8/14/2019 8/23/2019    INR 0.86 - 1.14 - 0.97 0.97    INR Point of Care 0.86 - 1.14 1.0 - -        Most Recent 3 Troponin's:  Recent Labs   Lab Test 09/03/19  1635 08/29/19  1453 08/08/19  1505   TROPI <0.015 <0.015 <0.015     Most Recent Cholesterol Panel:No lab results found.  Most Recent 6 Bacteria Isolates From Any Culture (See EPIC Reports for Culture Details):  Recent Labs   Lab Test 09/04/19  0515 08/21/19  1345 08/20/19  0833 08/20/19  0656 08/16/19  1455 08/16/19  0832   CULT No growth after 11 hours No growth No growth No growth No growth No growth     Most Recent TSH, T4 and A1c Labs:  Recent Labs   Lab Test 08/19/19  0006   TSH 0.70

## 2019-09-04 NOTE — CONSULTS
Care Transition Initial Assessment - RN        Met with: Patient.  DATA   Principal Problem:    Pneumonia, RUL  Active Problems:    Malignant left pleural effusion -- S/P Pleurodesis 8/23/19    Breast CA 11/2018, ER Neg -- now stage IV (S/P Left Mast, S/P Rad Tx)    Uncomplicated asthma    S/P gastric bypass -- 2004    Generalized anxiety disorder    Drug-seeking behavior       Cognitive Status: awake, alert and oriented.  [facility] Clinic [facility]: [facility]  Primary Care MD Name: Hayde,   Contact information and PCP information verified: Yes  Lives With: other (see comments)( said him, she said he lies, is't spouse, so unsure)   Insurance concerns: No Insurance issues identified  ASSESSMENT  Patient currently receives the following services:  n/a        Identified issues/concerns regarding health management: patient with PMH of L. Sided breast CA s/p lumpectomy/radiation with [facility], thoracentesis 8/19 with metastatic adenocarcinoma requiring left VATS (Dr. Rosado). Multiple ED/Inpatient visits where patient left AMA and came back.  Patient readmitted via EMS 9/3 for continued SOB requiring supplemental O2 and nebulizer treatments with PNA.  Writer met with the patient at the bedside today regarding discharge planning and Role for discharge.  Hospitalist informed this writer that the patient intends on discharging to home with assist of a friend.  MD is working on getting the patients nebulizer solution and oral antibiotics filled at the [facility] discharge RX (previously ordered at [facility] and MD to cancel and get filled at [facility]). Patient informs this writer that her son will be arriving out of town next week to help her get to her follow up appointments.   Patient is allowable to have writer schedule recommended follow up's to include: PCP for post ED/Inpatient hospitalizations, [facility] follow up with both Dr. Rosado and Dr. Mendoza. Patient states she has a nebulizer machine at home and is able to  use this.  Patient says her friend Genevieve will be picking her up for discharge.  Writer informed the patient to make sure that she has all of her medications and has reviewed discharge and follow up recommendations with the bedside RN.  Writer updated bedside RN of the above who will relay to upcoming shift.  Patient identifies no further needs for discharge at this time.   PLAN  Financial costs for the patient include n/a .  Patient given options and choices for discharge yes .  Patient/family is agreeable to the plan?  Yes:   Patient anticipates discharging to home with assistance of hillary Vallejo and her son (next week) .        Patient anticipates needs for home equipment: No  Transportation/person available to transport on day of discharge  is hillary Vallejo and have they been notified/set up ride for home once all medications and discharge orders with follow up recommendations are reviewed and questions were answered.  Plan/Disposition: Home   Appointments:     9/6 PCP follow up   9/9 [facility] dior and Dr. Rosado follow up  9/10 Pet/CT  9/11 Dr. Mendoza follow up.  Please see AVS with detailed follow up notes  Care  (CTS) will continue to follow as needed.

## 2019-09-04 NOTE — PHARMACY-ADMISSION MEDICATION HISTORY
Admission medication history interview status for the 9/4/2019  admission is complete. See EPIC admission navigator for prior to admission medications     Medication history source reliability:Moderate    Actions taken by pharmacist (provider contacted, etc):  Updated medication list based on information from discharge summary from yesterday.      Additional medication history information not noted on PTA med list :None    Medication reconciliation/reorder completed by provider prior to medication history? No    Time spent in this activity: 20 minutes    Prior to Admission medications    Medication Sig Last Dose Taking? Auth Provider   acetaminophen (TYLENOL) 325 MG tablet Take 2 tablets (650 mg) by mouth every 4 hours as needed for other (multimodal surgical pain management along with NSAIDS and opioid medication as indicated based on pain control and physical function.)  Yes Lourdes Fuentes PA-C   albuterol (PROAIR HFA/PROVENTIL HFA/VENTOLIN HFA) 108 (90 Base) MCG/ACT inhaler Inhale 1-2 puffs into the lungs every 6 hours as needed for shortness of breath / dyspnea or wheezing  Yes Ellie Pascual MD   albuterol (PROVENTIL) (2.5 MG/3ML) 0.083% neb solution Take 2.5 mg by nebulization every 4 hours as needed for shortness of breath / dyspnea or wheezing  Yes Unknown, Entered By History   clonazePAM (KLONOPIN) 1 MG tablet 0.5 mg (1/2 pill) twice a day for 3 days, then 0.5 mg (1/2 pill) at bedtime for 3 days -- then 0.5 mg at bedtime as needed for sleep  Yes Abilio Bermeo MD   cyclobenzaprine (FLEXERIL) 10 MG tablet Take 1 tablet (10 mg) by mouth 3 times daily as needed for muscle spasms  Yes Lesly Aponte MD   diphenhydrAMINE (BENADRYL) 25 MG tablet Take 1-2 tablets (25-50 mg) by mouth every 6 hours as needed for itching or allergies  Yes Lesly Aponte MD   ibuprofen (ADVIL/MOTRIN) 800 MG tablet Take 1 tablet (800 mg) by mouth every 8 hours as needed for moderate pain  Yes Abilio Bermeo  MD   mirtazapine (REMERON) 15 MG tablet Take 1 tablet (15 mg) by mouth At Bedtime  Yes Abilio Bermeo MD   ondansetron (ZOFRAN-ODT) 4 MG ODT tab Take 1 tablet (4 mg) by mouth every 6 hours as needed for nausea or vomiting  Yes Jamaal Dunn DO   ranitidine (ZANTAC) 150 MG tablet Take 150 mg by mouth daily  Yes Unknown, Entered By History   sertraline (ZOLOFT) 100 MG tablet Take 150 mg by mouth daily  Yes Unknown, Entered By History   levofloxacin (LEVAQUIN) 750 MG tablet Take 1 tablet (750 mg) by mouth daily for 5 days  at Did not start yet  Cheryl Miller, TERENCE Pino, PharmD, BCPS

## 2019-09-04 NOTE — ED NOTES
Patient requesting to ambulate to the bathroom without NC; SPO2 taken on room air low 80's.  NC reapplied at 4L.

## 2019-09-04 NOTE — ED TRIAGE NOTES
Ran out of albuterol nebulizer medication at home.  Dyspnea, [facility].  Dyspnea and LS clear after nebulizer given to patient en route to ED.

## 2019-09-04 NOTE — ED NOTES
"Patient refusing to change into gown for evaluation, EKG and accessing port to draw labs.  Patient educated regarding importance of obtaining access and evaluating lab work; patient states \"they took blood earlier you don't need to take anymore\"  \"I just want to go home and go to bed, I'm so tired\"  \"I feel better after that breathing treatment, please let me go home.\"    "

## 2019-09-04 NOTE — TELEPHONE ENCOUNTER
ED / Discharge Outreach Protocol    Pt is currently inpatient at [facility]    Nicole HDEZ RN

## 2019-09-04 NOTE — PROGRESS NOTES
RECEIVING UNIT ED HANDOFF REVIEW    ED Nurse Handoff Report was reviewed by: Eboni Gonzalez RN on September 4, 2019 at 6:33 AM

## 2019-09-04 NOTE — ED NOTES
"Patient reports dyspnea improving with duoneb treatment.  Patient also verbalizes that she was diagnosed with pneumonia earlier when ED this evening; prescribed duoneb and \"a pill for the infection\" however has not picked up or started either prescription.  "

## 2019-09-05 NOTE — PROGRESS NOTES
Patient discharged at 9:00 PM to home. Port was discontinued.  Belongings returned to patient.  Discharge instructions and medications reviewed with patient.  Patient verbalized understanding and all questions were answered.  Prescriptions given to patient.  At time of discharge, patient condition was stable and left the unit in a wheel chair escorted by Nursing assistant.

## 2019-09-06 PROBLEM — J18.9 BILATERAL PNEUMONIA: Status: ACTIVE | Noted: 2019-01-01

## 2019-09-06 NOTE — ED AVS SNAPSHOT
Emergency Department  [address] [place] [id number]  Phone:  [phone]  Fax:  [phone]                                    Megha Campbell   MRN: [id number]    Department:   Emergency Department   Date of Visit:  9/6/2019           After Visit Summary Signature Page    I have received my discharge instructions, and my questions have been answered. I have discussed any challenges I see with this plan with the nurse or doctor.    ..........................................................................................................................................  Patient/Patient Representative Signature      ..........................................................................................................................................  Patient Representative Print Name and Relationship to Patient    ..................................................               ................................................  Date                                   Time    ..........................................................................................................................................  Reviewed by Signature/Title    ...................................................              ..............................................  Date                                               Time          22EPIC Rev 08/18

## 2019-09-06 NOTE — DISCHARGE INSTRUCTIONS
Patient has been assessed for Home Oxygen needs. Oxygen readings:     *Pulse oximetry (SpO2) = 88% on room air at rest while awake.      *SpO2 improved to 94% on 4liters/minute at rest.     *SpO2 = 82% on room air during activity/with exercise.     *SpO2 improved to 92% on 4liters/minute during activity/with exercise.

## 2019-09-06 NOTE — ED PROVIDER NOTES
History     Chief Complaint:  Shortness of Breath      HPI   Megha Campbell is a 54 year old female with a history of stage 4 lung and breast cancer, pneumonia on 9/4/19, malignant pleural effusion who presents with Shortness of Breath. Per chart review, the patient had a recent pleurodesis procedure and she was then seen again around 0140 this morning for shortness of breath. She states that after she had been discharged she had refused admittance and went home but had stated that she did not have any oxygen and came back in to the emergency department with an O2 sat of 83%. Here, she notes that she is shortness of breath with leg swelling and a cough that had a reproduction of yellow phlegm but states that it is now white. She denies chest pain and any fevers.     9/6/2019 01:38 AM  XR Chest 2 views:   1. No convincing interval change since 9/3/2019.  2. Moderately extensive patchy hazy opacities scattered within both  lungs. These are nonspecific, but are most likely infectious or  inflammatory in etiology.  Read as per radiology.    Laboratory:  CBC: WBC: 8.0, HGB: 11.0 (L), PLT: 349     CMP: Glucose 105 (H), calcium: 8.4 (L), albumin: 2.1 (L), o/w WNL (Creatinine: 0.59)     VBG: pH 7.4 / PCO2 45 / PO2 39 / Bicarb 28 / O2 sat venous 74 /Lactic acid 1.7     N-terminal Pro BNP: 2,925 (H)     0153    Troponin: <0.015    Allergies:  Hydrocodone- Acetaminophen  Oxycodone    Medications:    Albuterol  Bupropion  Clonazepam  Sertraline  Ranitidine  Zofran  Mirtazapine  Levofloxacin     Past Medical History:    Anxiety  Breast cancer, metastatic  Depression  Malignant pleural effusion  Obese  Pneumonia RUL- 9/4/19  Gastric bypass  Drug seeking behavior      Past Surgical History:    Gastric bypass  IR chest port placement  Lumpectomy breast with seed localization  Thoracoscopic pleurodesis  Tubal ligation    Family History:    Breast cancer    Social History:  Smoking status: Never  Alcohol use: yes  Drug use: No  PCP:  "Briseyda Lock  Marital Status:   [4]       Review of Systems   Constitutional: Negative for fever.   Respiratory: Positive for cough (with reproduction) and shortness of breath.    Cardiovascular: Positive for leg swelling. Negative for chest pain.   All other systems reviewed and are negative.      Physical Exam     Patient Vitals for the past 24 hrs:   BP Temp Temp src Heart Rate Resp SpO2 Height Weight   09/06/19 1532 -- -- -- -- -- -- 1.727 m (5' 8\") 108 kg (238 lb)   09/06/19 1513 (!) 164/101 97.6  F (36.4  C) Oral 120 26 96 % -- --        Physical Exam  Nursing note and vitals reviewed.  Constitutional:  Appears well-developed and well-nourished.   HENT:   Head:    Atraumatic.   Mouth/Throat:   Oropharynx is clear and moist. No oropharyngeal exudate.   Eyes:    Pupils are equal, round, and reactive to light.   Neck:    Normal range of motion. Neck supple.      No tracheal deviation present. No thyromegaly present.   Cardiovascular:  Normal rate, regular rhythm, no murmur   Pulmonary/Chest: Diminished air expiration, no crackles or wheezing.   Abdominal:   Soft. Bowel sounds are normal. Exhibits no distension and      no mass. There is no tenderness.      There is no rebound and no guarding.   Musculoskeletal:  2+ pre tibial pitting edema.  Lymphadenopathy:  No cervical adenopathy.   Neurological:   Alert and oriented to person, place, and time.   Skin:    Skin is warm and dry. No rash noted. No pallor.     Emergency Department Course     ECG:  ECG taken at 1537, ECG read at 1549  Sinus Tachycardia  Low voltage QRS  Cannot rule out Anterior infarct, age undetermined  Abnormal ECG  Rate 114 bpm. DC interval 130 ms. QRS duration 60 ms. QT/QTc 318/438 ms. P-R-T axes 45 48 32.      Procedures    Interventions:  1529 DuoNeb, 2.5mg/3 mL, Nebulizer   1611 Zosyn, 4.5g, IV  1612 Morphine, 4 mg, IV    Emergency Department Course:   Nursing notes and vitals reviewed.    1515 I performed an exam of the patient as " documented above. I personally reviewed the results with the patient and answered all related questions prior to admission.    1537 EKG was performed, see results above.     1549  I spoke to Dr. Dillon of the hospitalist service who accepts the patient for admission.     Impression & Plan      Medical Decision Making:  Megha Campbell is a 54 year old female who presents to the emergency department today for evaluation of acute respiratory failure with hypoxia relating to her metastatic lung cancer with pneumonia with left sided pleural effusion. She has been medication noncompliant and noncompliant with chemo therapy and treatments. She was given IV antibiotics for her pneumonia here and admitted to the oncology floor in the care of the hospitalist Dr. Dillon. I did not feel there was concern for sepsis or pulmonary embolism. The patient had blood work done several hours ago so this was not repeated since the blood results were unremarkable and chest XR was essentially unchanged from her previous xray.     Diagnosis:    ICD-10-CM    1. Acute respiratory failure with hypoxia (H) J96.01         Disposition:   The patient is admitted into the care of Dr. Dillon.       Scribe Disclosure:  Kathie CARDOSO, am serving as a scribe at 3:15 PM on 9/6/2019 to document services personally performed by Shasha Ansari MD based on my observations and the provider's statements to me.   EMERGENCY DEPARTMENT       Shasha Ansari MD  09/07/19 0037

## 2019-09-06 NOTE — PROGRESS NOTES
I was informed by the ER Tech that this patient refused to go to her Hem Onc appt when they were in the elevator of the [facility]e Onc Clinic.  ER Tech reported that this patent said she was too tired to go to the appt and left and went home. I informed the ER staff.  The ER provider requested this patients number to contact her.  I gave the provider the patients number from the demographics.

## 2019-09-06 NOTE — ED PROVIDER NOTES
History     Chief Complaint:  Shortness of Breath    HPI   Megha Campbell is a 54 year old female with a history significant for stage 4 breast cancer, pneumonia on 19, malignant pleural effusion, and malingering who presents with shortness of breath. She reports that she had a pleural effusion drained on 19 under anesthesia and has experienced leg swelling and tachycardia since then. She reports that she was admitted here for respiratory issues and coughing up yellow phlegm two days ago, improved here,  received a diagnosis of pneumonia, and was discharged with a recommendation to go to a care facility. She reports that she went home instead because she has help there. She reports that she has been under a lot of stress recently with her brother dying. She reports that she does not have oxygen at home, though she thinks that it would help. She reported that her cough excrement is now clear but that her shortness of breath got worse today, prompting the emergency department visit. She did not take her temperature at home but denies having a subjective fever, body aches, chilling, or diaphoresis.    Allergies:  Hydrocodone-Acetaminophen  Oxycodone     Medications:    Klonopin  Remeron  Zofran  Zantac    Past Medical History:    Malignant left pleural effusion  Pneumonia, RUL - 19  Breast cancer  Obesity  Asthma  Drug seeking behavior  Depression  Anxiety    Past Surgical History:    Gastric bypass  Lumpectomy, left breast  Thoracoscopic pleurodesis    Family History:    Breast cancer    Social History:  Smoking status: Never  Alcohol use: <1 drink a month    Marital Status:    Patient present to the emergency department alone.  Patient reports that her brother recently  and that she is that last one in her family who is alive.     Review of Systems   Constitutional: Negative for chills, diaphoresis and fever.   Respiratory: Positive for cough and shortness of breath.    Cardiovascular:  Positive for palpitations.   Musculoskeletal: Negative for myalgias.   All other systems reviewed and are negative.    Physical Exam     Patient Vitals for the past 24 hrs:   BP Temp Temp src Pulse Heart Rate Resp SpO2   09/06/19 0430 (!) 189/94 -- -- 100 -- -- 100 %   09/06/19 0420 (!) 186/85 -- -- -- -- -- 100 %   09/06/19 0410 -- -- -- -- -- -- 100 %   09/06/19 0400 (!) 171/88 -- -- 104 -- -- 100 %   09/06/19 0350 (!) 171/78 -- -- -- -- -- 99 %   09/06/19 0340 -- -- -- -- -- -- 100 %   09/06/19 0330 (!) 170/99 -- -- 105 -- -- 100 %   09/06/19 0320 (!) 149/78 -- -- -- -- -- 91 %   09/06/19 0315 (!) 149/78 -- -- 107 -- -- 100 %   09/06/19 0310 -- -- -- -- -- -- 100 %   09/06/19 0305 -- -- -- -- -- -- 100 %   09/06/19 0300 (!) 161/77 -- -- 108 -- -- 100 %   09/06/19 0255 -- -- -- -- -- -- 100 %   09/06/19 0250 (!) 139/123 -- -- -- -- -- 100 %   09/06/19 0245 -- -- -- 115 -- -- 97 %   09/06/19 0240 -- -- -- -- -- -- 100 %   09/06/19 0235 -- -- -- -- -- -- 100 %   09/06/19 0230 (!) 163/85 -- -- 108 -- -- 100 %   09/06/19 0225 -- -- -- -- -- -- 100 %   09/06/19 0220 (!) 184/105 -- -- -- -- -- 100 %   09/06/19 0215 -- -- -- 107 -- -- 100 %   09/06/19 0210 -- -- -- -- -- -- 100 %   09/06/19 0200 (!) 142/83 -- -- 108 102 (!) 33 100 %   09/06/19 0155 (!) 132/98 -- -- 113 110 (!) 36 100 %   09/06/19 0150 -- -- -- -- 109 (!) 40 100 %   09/06/19 0145 -- -- -- -- 114 (!) 42 100 %   09/06/19 0140 -- -- -- -- 112 (!) 36 100 %   09/06/19 0138 (!) 142/83 97.7  F (36.5  C) Temporal -- 108 (!) 40 100 %   09/06/19 0136 -- -- -- -- -- -- (!) 79 %       Physical Exam  SKIN:  Warm, dry.  Left posterior lateral cutaneous incision from recent pleurodesis procedure is intact and noninflamed without associated induration fluctuance or tenderness.  HEMATOLOGIC/IMMUNOLOGIC/LYMPHATIC:  No pallor.  Bilateral lower extremity nonpitting edema.  HENT: No JVD.  EYES:  Conjunctivae normal.  No periorbital edema.  CARDIOVASCULAR: Tachycardic rate and  regular rhythm, no murmur.  RESPIRATORY:  No respiratory distress, breath sounds equal and normal.  Inspiration does not produce thoracic pain.  GASTROINTESTINAL:  Soft, nontender abdomen.  MUSCULOSKELETAL: Obese body habitus.  NEUROLOGIC:  Alert, conversant.  PSYCHIATRIC: Normal mood.    Emergency Department Course   ECG:  Indication: shortness of breath   Time: 0134  Vent. Rate 113 bpm. PA interval 122. QRS duration 66. QT/QTc 354/485. P-R-T axis 53 -43 23. Sinus tachycardia. Left axis deviation. Low voltage QRS. Possible Inferior infarct, age undetermined. Cannot rule out Anterior infarct, age undetermined. Abnormal ECG. Read time: 0141    Imaging:  Radiographic findings were communicated with the patient who voiced understanding of the findings.    XR Chest 2 views:   1. No convincing interval change since 9/3/2019.  2. Moderately extensive patchy hazy opacities scattered within both  lungs. These are nonspecific, but are most likely infectious or  inflammatory in etiology.  Read as per radiology.    Laboratory:  CBC: WBC: 8.0, HGB: 11.0 (L), PLT: 349    CMP: Glucose 105 (H), calcium: 8.4 (L), albumin: 2.1 (L), o/w WNL (Creatinine: 0.59)    VBG: pH 7.4 / PCO2 45 / PO2 39 / Bicarb 28 / O2 sat venous 74 /Lactic acid 1.7    N-terminal Pro BNP: 2,925 (H)    0153 Troponin: <0.015    Interventions:  0221: Ativan 1 mg IV  0318: Morphine 4 mg IV    Emergency Department Course:  Nursing notes and vitals reviewed. (0138) I performed an exam of the patient as documented above.     IV inserted. Medicine administered as documented above. Blood drawn. This was sent to the lab for further testing, results above.    The patient was sent for a chest X-ray while in the emergency department, findings above.     0249 I rechecked the patient and discussed the results of her workup thus far.     0347 I rechecked the patient and discussed the results of her workup thus far.     0328 I consulted with Dr. Lnatigua, Hospitalist service,  regarding the patient's history and presentation here in the emergency department.  He thought the patient would benefit more from a  consultation for placement and/or setting up home oxygen.    At shift change signed the patient out to my colleague Dr. Elliott to facilitate disposition.    Impression & Plan    Medical Decision Making:  This patient presents with a history of cancer metastatic to the lung with a recent pleurodesis for this metastatic effusion. She has been in the hospital a couple times in the last couple weeks with dyspnea and most recently she had been recommended to be discharged to TCU for more advanced nursing care that cannot be provided at her home. Also, they could provide supplemental oxygenation. The patient states she is here really just as her dyspnea is increased. Underwent the above evaluation which revealed no acute findings. Given her fairly notable hypoxia in the context of dyspnea, I thought she should be admitted. I doubt pulmonary embolism and she did recently have advanced imaging which included CT chest on 9/1/19 that was negative for clot. I thought it unlikely that she developed pulmonary embolism between then and now. I consulted Dr. Lantigua for admission but he thought the patient would be best served with social work consultation for placement and/or setting up home oxygen. The patient awaits this process. I signed the patient out to my colleague Dr Elliott to help facilitate this or other needs or admission if these efforts fail.    Diagnosis:    ICD-10-CM    1. Metastatic cancer (H) C79.9    2. Hypoxia R09.02        Disposition:  Pending    Discharge Medications:  New Prescriptions    No medications on file       Nathan Saucedo  9/6/2019    EMERGENCY DEPARTMENT  Scribe Disclosure:  I, Nathan Saucedo, am serving as a scribe on 9/6/2019 at 1:38 AM to personally document services performed by No att. providers found based on my observations and the provider's statements  to me.          Eric Coe MD  09/06/19 0644       Eric Coe MD  09/06/19 0645

## 2019-09-06 NOTE — PROGRESS NOTES
Pt on room air sat 94%. Stat neb given Pt states better breathing. Aeration increase noted.  Cj  RT

## 2019-09-06 NOTE — ED NOTES
Patient weaned off oxygen to RA at rest and became SOB and increased WOB with saturations recorded at 82%, with minimal bed movement and small transfer to the bedside commode patient saturations decrease further to less than 80%.  Non-rebreather used to recover/increase sats to above 92% on 15 liters. At Rest patient requires 2-4 liters to sustain saturation above 92%, will need higher levels of NC or non-re breather to recover from activity and to do max activity/ADLS.

## 2019-09-06 NOTE — PHARMACY-ADMISSION MEDICATION HISTORY
Admission medication history interview status for the 9/6/2019  admission is complete. See EPIC admission navigator for prior to admission medications     Medication history source reliability:Moderate    Actions taken by pharmacist (provider contacted, etc):interviewed patient and checked [facility] records. Pt was in this ER 2 days ago.     Additional medication history information not noted on PTA med list :pt states she does not have supply of albuterol inhaler, diphenhydramine, mirtazapine, and never received levaquin that she was supposed to be taking since her visit here 2 days ago so hasn't been taking that either.    Medication reconciliation/reorder completed by provider prior to medication history? No    Time spent in this activity: 20 minutes    Prior to Admission medications    Medication Sig Last Dose Taking? Auth Provider   buPROPion (WELLBUTRIN XL) 150 MG 24 hr tablet Take 150 mg by mouth every morning 9/5/2019 at Unknown time Yes Unknown, Entered By History   clonazePAM (KLONOPIN) 1 MG tablet 0.5 mg (1/2 pill) twice a day for 3 days, then 0.5 mg (1/2 pill) at bedtime for 3 days -- then 0.5 mg at bedtime as needed for sleep 9/5/2019 at Unknown time Yes Abilio Bermeo MD   mirtazapine (REMERON) 15 MG tablet Take 1 tablet (15 mg) by mouth At Bedtime Past Week at Unknown time Yes Abilio Bermeo MD   ranitidine (ZANTAC) 150 MG tablet Take 150 mg by mouth daily 9/5/2019 at Unknown time Yes Unknown, Entered By History   sertraline (ZOLOFT) 100 MG tablet Take 150 mg by mouth daily 9/5/2019 at Unknown time Yes Unknown, Entered By History   acetaminophen (TYLENOL) 325 MG tablet Take 2 tablets (650 mg) by mouth every 4 hours as needed for other (multimodal surgical pain management along with NSAIDS and opioid medication as indicated based on pain control and physical function.) prn  Lourdes Fuentes PA-C   albuterol (PROAIR HFA/PROVENTIL HFA/VENTOLIN HFA) 108 (90 Base) MCG/ACT inhaler  Inhale 1-2 puffs into the lungs every 6 hours as needed for shortness of breath / dyspnea or wheezing prn  Ellie Pascual MD   albuterol (PROVENTIL) (2.5 MG/3ML) 0.083% neb solution Take 1 vial (2.5 mg) by nebulization every 4 hours as needed for shortness of breath / dyspnea or wheezing prn  Abilio Bermeo MD   diphenhydrAMINE (BENADRYL) 25 MG tablet Take 1-2 tablets (25-50 mg) by mouth every 6 hours as needed for itching or allergies prn  Lesly Aponte MD   ibuprofen (ADVIL/MOTRIN) 800 MG tablet Take 1 tablet (800 mg) by mouth every 8 hours as needed for moderate pain prn  Abilio Bermeo MD   levofloxacin (LEVAQUIN) 750 MG tablet Take 1 tablet (750 mg) by mouth daily for 7 days hasn't taken  Abilio Bermeo MD   ondansetron (ZOFRAN-ODT) 4 MG ODT tab Take 1 tablet (4 mg) by mouth every 6 hours as needed for nausea or vomiting prn  Jamaal Dunn DO

## 2019-09-06 NOTE — PROGRESS NOTES
Attempted to qualify patient for oxygen due to oxygen desaturation. Unable to qualify out of ER so care coordinator collaborated with [facility] to assist with qualification needs. In route pt. left and did not qualify for o2 needs leaving hospital with hospital transport tank.This is not sufficient o2 for patients needs/liter flow. All parties aware that patient does not have oxygen. No oxygen dispensed from [facility].  Call [phone] to have patient re qualify for o2 needs if pt. chooses to agree with process.

## 2019-09-06 NOTE — PHARMACY-ADMISSION MEDICATION HISTORY
Admission medication history interview status for the 9/6/2019  admission is complete. See EPIC admission navigator for prior to admission medications     Medication history source reliability:Good    Actions taken by pharmacist (provider contacted, etc): PTA Medication list was completed by another [facility] pharmacist this morning. Please see her notes.     Additional medication history information not noted on PTA med list :None    Medication reconciliation/reorder completed by provider prior to medication history? No    Time spent in this activity: 5 minutes    Prior to Admission medications    Medication Sig Last Dose Taking? Auth Provider   acetaminophen (TYLENOL) 325 MG tablet Take 2 tablets (650 mg) by mouth every 4 hours as needed for other (multimodal surgical pain management along with NSAIDS and opioid medication as indicated based on pain control and physical function.) prn at prn Yes Lourdes Fuentes PA-C   albuterol (PROAIR HFA/PROVENTIL HFA/VENTOLIN HFA) 108 (90 Base) MCG/ACT inhaler Inhale 1-2 puffs into the lungs every 6 hours as needed for shortness of breath / dyspnea or wheezing prn at prn Yes Ellie Pascual MD   albuterol (PROVENTIL) (2.5 MG/3ML) 0.083% neb solution Take 1 vial (2.5 mg) by nebulization every 4 hours as needed for shortness of breath / dyspnea or wheezing prn at prn Yes Abilio Bermeo MD   buPROPion (WELLBUTRIN XL) 150 MG 24 hr tablet Take 150 mg by mouth every morning 9/5/2019 at Unknown time Yes Unknown, Entered By History   clonazePAM (KLONOPIN) 1 MG tablet 0.5 mg (1/2 pill) twice a day for 3 days, then 0.5 mg (1/2 pill) at bedtime for 3 days -- then 0.5 mg at bedtime as needed for sleep 9/5/2019 at Unknown time Yes Abilio Bermeo MD   diphenhydrAMINE (BENADRYL) 25 MG tablet Take 1-2 tablets (25-50 mg) by mouth every 6 hours as needed for itching or allergies prn at prn Yes Lesly Aponte MD   ibuprofen (ADVIL/MOTRIN) 800 MG tablet Take 1 tablet  (800 mg) by mouth every 8 hours as needed for moderate pain prn at prn Yes Abilio Bermeo MD   mirtazapine (REMERON) 15 MG tablet Take 1 tablet (15 mg) by mouth At Bedtime Past Week at Unknown time Yes Abilio Bermeo MD   ondansetron (ZOFRAN-ODT) 4 MG ODT tab Take 1 tablet (4 mg) by mouth every 6 hours as needed for nausea or vomiting prn at prn Yes Jamaal Dunn,    ranitidine (ZANTAC) 150 MG tablet Take 150 mg by mouth daily 9/5/2019 at Unknown time Yes Unknown, Entered By History   sertraline (ZOLOFT) 100 MG tablet Take 150 mg by mouth daily 9/5/2019 at Unknown time Yes Unknown, Entered By History   levofloxacin (LEVAQUIN) 750 MG tablet Take 1 tablet (750 mg) by mouth daily for 7 days hasn't taken  Abilio Bermeo MD

## 2019-09-06 NOTE — LETTER
"Transition Communication Hand-off for Care Transitions to Next Level of Care Provider    Name: Megha Campbell  : 1965  MRN #: [id number]  Primary Care Provider: Briseyda Lock     Primary Clinic: [facility] [address]  [place] [address]     Reason for Hospitalization:  Pneumonia  Admit Date/Time: 2019  3:04 PM  Discharge Date: ***  Payor Source: Payor: [employer] / Plan: [employer] [place] / Product Type: HMO /     Readmission Assessment Measure (ROSALINE) Risk Score/category: ***    Plan of Care Goals/Milestone Events:   Patient Concerns: ***   Patient Goals:   Short-term ***   Long-term ***   Medical Goals   Short-term ***   Long-term ***         Reason for Communication Hand-off Referral: {CCREASONCMCTNHANDOFFREFERRALCTS:02605}    Discharge Plan:       Concern for non-adherence with plan of care:   Y/N ***  Discharge Needs Assessment:  Needs      Most Recent Value   Equipment Currently Used at Home  grab bar, toilet, grab bar, tub/shower   # of Referrals Placed by [facility]  Internal Clinic Care Coordination, External Care Coordination, Homecare, Specialty Providers, Durable Medical Equipment (DME) [[facility] confirmation DME O2/neb]          Already enrolled in Tele-monitoring program and name of program:  ***  Follow-up specialty is recommended: {YES / NO:91742::\"Yes\"}    Follow-up plan:    Future Appointments   Date Time Provider Department Center   2019  6:00 AM Kleist, Ludmila, [facility]       Any outstanding tests or procedures:    Procedures     Future Labs/Procedures    Oxygen Adult     Comments:    New Home Oxygen Order 4 liter(s) by nasal cannula continuously with use of portable tank. Expected treatment length is indefinite (99 months).. Test on conserving device as applicable.    Patients who qualify for home O2 coverage under the CMS guidelines require ABG tests or O2 sat readings obtained closest to, but no earlier than 2 days prior to the discharge, as evidence of the need for home oxygen therapy. " Testing must be performed while patient is in the chronic stable state. See notes for O2 sats.    I certify that this patient, Megha Campbell has been under my care and that I, or a nurse practitioner or physician's assistant working with me, had a face-to-face encounter that meets the face-to-face encounter requirements with this patient on 9/11/2019. The patient, Megha Campbell was evaluated or treated in whole, or in part, for the following medical condition, which necessitates the use of the ordered oxygen. Treatment Diagnosis: metastatic lung cancer    Attending Provider: Yung Dillon MD  Physician signature: See electronic signature associated with these discharge orders  Date of Order: September 11, 2019          Referrals     Future Labs/Procedures    Home care nursing referral     Comments:    RN skilled nursing visit start of care 9/12. RN to assess oxygen levels, heart failure/pneumonia/lung mets, anxiety, medications    Your provider has ordered home care nursing services. If you have not been contacted within 2 days of your discharge please call the inpatient department phone number at [phone] .    [facility] [phone]    Please bring your prescription for the Bedside commode to the following location:  Address: [facility] [place] [place] [address]  Phone: [phone]    Home Care OT Referral for Hospital Discharge     Comments:    OT to eval and treat    Your provider has ordered home care - occupational therapy. If you have not been contacted within 2 days of your discharge please call the department phone number listed on the top of this document.    Home Care PT Referral for Hospital Discharge     Comments:    PT to eval and treat    Your provider has ordered home care - physical therapy. If you have not been contacted within 2 days of your discharge please call the department phone number listed on the top of this document.            Key Recommendations:      Belinda  NILA Asher    AVS/Discharge Summary is the source of truth; this is a helpful guide for improved communication of patient story

## 2019-09-06 NOTE — PROGRESS NOTES
RECEIVING UNIT ED HANDOFF REVIEW    ED Nurse Handoff Report was reviewed by: Shazia Elias RN on September 6, 2019 at 5:19 PM

## 2019-09-06 NOTE — ED TRIAGE NOTES
Pt had surgery almost 2 weeks ago with fluids removed from her lungs. Pt developed SOB with any exertion tonight. Pt had a albutural tx and NEB tx by EMS.
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
## 2019-09-06 NOTE — ED NOTES
"[facility]  ED Nurse Handoff Report    ED Chief complaint: Shortness of Breath      ED Diagnosis:   Final diagnoses:   None       Code Status: Full Code    Allergies:   Allergies   Allergen Reactions     Hydrocodone-Acetaminophen Itching     Patient states she is able to take it with Benadryl.       Oxycodone Itching and Rash       Activity level - Baseline/Home:  Stand with Assist  Activity Level - Current:   Stand with Assist    Patient's Preferred language: English   Needed?: No    Isolation: No  Infection: Not Applicable  Bariatric?: No    Vital Signs:   Vitals:    09/06/19 1513 09/06/19 1532   BP: (!) 164/101    Resp: 26    Temp: 97.6  F (36.4  C)    TempSrc: Oral    SpO2: 96%    Weight:  108 kg (238 lb)   Height:  1.727 m (5' 8\")       Cardiac Rhythm: ,        Pain level: 0-10 Pain Scale: 7    Is this patient confused?: No   Does this patient have a guardian?  No         If yes, is there guardianship documents in the Epic \"Code/ACP\" activity?  No         Guardian Notified?  No  [place] - Suicide Severity Rating Scale Completed?  Yes  If yes, what color did the patient score?  White    Patient Report: Initial Complaint: Pt has stage 4 breast CA. Seen in ED earlier today for similar complaints. Seen on 9/4/10 with pneumonia and malignant pleural effusions. At earlier time, pt did not at that want to be admitted to hospital. See previous admissions shaina from RN and .   Focused Assessment: +SOB/Cough-Recent Pneuonia/Malignant Pleural Effusion.   Tests Performed: in previous admission (9/6).  Abnormal Results: Results for JONATHAN RIGGINS (MRN [id number]) as of 9/6/2019 16:53   Ref. Range 9/6/2019 01:53   Sodium Latest Ref Range: 133 - 144 mmol/L 136   Potassium Latest Ref Range: 3.4 - 5.3 mmol/L 3.5   Chloride Latest Ref Range: 94 - 109 mmol/L 101   Carbon Dioxide Latest Ref Range: 20 - 32 mmol/L 26   Urea Nitrogen Latest Ref Range: 7 - 30 mg/dL 9   Creatinine Latest Ref " Range: 0.52 - 1.04 mg/dL 0.59   GFR Estimate Latest Ref Range: >60 mL/min/1.73_m2 >90   GFR Estimate If Black Latest Ref Range: >60 mL/min/1.73_m2 >90   Calcium Latest Ref Range: 8.5 - 10.1 mg/dL 8.4 (L)   Anion Gap Latest Ref Range: 3 - 14 mmol/L 9   Albumin Latest Ref Range: 3.4 - 5.0 g/dL 2.1 (L)   Protein Total Latest Ref Range: 6.8 - 8.8 g/dL 7.2   Bilirubin Total Latest Ref Range: 0.2 - 1.3 mg/dL 0.3   Alkaline Phosphatase Latest Ref Range: 40 - 150 U/L 108   ALT Latest Ref Range: 0 - 50 U/L 23   AST Latest Ref Range: 0 - 45 U/L 34   Lactic Acid Latest Ref Range: 0.7 - 2.1 mmol/L 1.7   N-Terminal Pro BNP Inpatient Latest Ref Range: 0 - 900 pg/mL 2,925 (H)   Troponin I ES Latest Ref Range: 0.000 - 0.045 ug/L <0.015   Glucose Latest Ref Range: 70 - 99 mg/dL 105 (H)   Ph Venous Latest Ref Range: 7.32 - 7.43 pH 7.40   PCO2 Venous Latest Ref Range: 40 - 50 mm Hg 45   PO2 Venous Latest Ref Range: 25 - 47 mm Hg 39   Bicarbonate Venous Latest Ref Range: 21 - 28 mmol/L 28   O2 Sat Venous Latest Units: % 74   WBC Latest Ref Range: 4.0 - 11.0 10e9/L 8.0   Hemoglobin Latest Ref Range: 11.7 - 15.7 g/dL 11.0 (L)   Hematocrit Latest Ref Range: 35.0 - 47.0 % 34.7 (L)   Platelet Count Latest Ref Range: 150 - 450 10e9/L 349   RBC Count Latest Ref Range: 3.8 - 5.2 10e12/L 4.39   MCV Latest Ref Range: 78 - 100 fl 79   MCH Latest Ref Range: 26.5 - 33.0 pg 25.1 (L)   MCHC Latest Ref Range: 31.5 - 36.5 g/dL 31.7   RDW Latest Ref Range: 10.0 - 15.0 % 15.8 (H)   Diff Method Unknown Automated Method   % Neutrophils Latest Units: % 83.8   % Lymphocytes Latest Units: % 5.4   % Monocytes Latest Units: % 9.6   % Eosinophils Latest Units: % 0.7   % Basophils Latest Units: % 0.1   % Immature Granulocytes Latest Units: % 0.4   Nucleated RBCs Latest Ref Range: 0 /100 0   Absolute Neutrophil Latest Ref Range: 1.6 - 8.3 10e9/L 6.7   Absolute Lymphocytes Latest Ref Range: 0.8 - 5.3 10e9/L 0.4 (L)   Absolute Monocytes Latest Ref Range: 0.0 - 1.3  10e9/L 0.8   Absolute Eosinophils Latest Ref Range: 0.0 - 0.7 10e9/L 0.1   Absolute Basophils Latest Ref Range: 0.0 - 0.2 10e9/L 0.0   Abs Immature Granulocytes Latest Ref Range: 0 - 0.4 10e9/L 0.0   Absolute Nucleated RBC Unknown 0.0                                                                      IMPRESSION:  1. No convincing interval change since 9/3/2019.  2. Moderately extensive patchy hazy opacities scattered within both  lungs. These are nonspecific, but are most likely infectious or  inflammatory in etiology.     Treatments provided: Morphine 1x, Zosyn     Family Comments: None    OBS brochure/video discussed/provided to patient/family: N/A              Name of person given brochure if not patient: Na              Relationship to patient: NA    ED Medications:   Medications   ipratropium - albuterol 0.5 mg/2.5 mg/3 mL (DUONEB) neb solution 3 mL (3 mLs Nebulization Given 9/6/19 1529)   morphine (PF) injection 4 mg (4 mg Intravenous Given 9/6/19 1612)   piperacillin-tazobactam (ZOSYN) 4.5 g vial to attach to  mL bag (4.5 g Intravenous New Bag 9/6/19 1611)       Drips infusing?:  No    For the majority of the shift this patient was Green.   Interventions performed were NA.    Severe Sepsis OR Septic Shock Diagnosis Present: No    To be done/followed up on inpatient unit:  Cont to monitor    ED NURSE PHONE NUMBER: [id number]

## 2019-09-06 NOTE — ED NOTES
Pt put her call light on and indicated to me that she has pain on her back left shoulder going down to the middle of her back.  She indicated it was were the tubes were taken out.  She also indicated that she can not get comfortable at home or here with this pain.  I told her I would inform the nurse regarding this pain.

## 2019-09-06 NOTE — ED NOTES
Patient VSS on NC, 98% on 4 liters, but states that she has increased work of breath and is not tolerating her NC so we switched her back to non-breather mask.

## 2019-09-06 NOTE — ED NOTES
Bed: ED20  Expected date: 9/6/19  Expected time: 1:20 AM  Means of arrival: Ambulance  Comments:  Davonte Orr 54F silva/duo nebs

## 2019-09-06 NOTE — ED NOTES
Bed: ED22  Expected date:   Expected time:   Means of arrival:   Comments:  535  54 F sob/anxiety  [id number]

## 2019-09-06 NOTE — PROGRESS NOTES
I was asked to assist this patient with getting home oxygen and a wheel chair. I spoke with this patients Oncology Clinic[facility] and asked if they could see this patient and assist this patient with oxygen orders. I spoke with Shazia at the clinic who is assisting me.  I was able to fax a script to [facility] for a w/c and that is being delivered to this patient in the ER while we wait for the appt from [facility] to see this patient. I have updated the ER staff of the above.  Shazia and Kristy ([facility]) will work together to make sure this patient gets her oxygen.  I asked that the staff send this patient to the [facility] clinic with one of our tanks until the orders are obtained and [facility] supplies the equipment. I am available if further needs arise.  I will inform the staff of the [facility] appt so we can discharge pt to the clinic.    [facility] was able to deliver this patients w/c for home to her in the ER. This patient will go via this w/c to the [facility] appt.    I was informed by [facility] that the oxygen note qualify's patient for home oxygen.  Kristy [phone] at [facility] will be in touch with Shazia [phone] at the [facility] for Oxygen orders.  Everyone is aware of the 12:30 appt.    I spoke with patient and asked her how she will get home from the clinic? She said Jenna will come and pick her up.  I asked that she call Jenna and have her meet her at the clinic at 12:30pm. This patient agreed.  I explained that if Jenna can't make it and no other friend or family are able to pick her up she will need to call a cab to get home.  This patient agreed.    I have completed my assistance with this patient. I am available to the ER staff is further needs arise.

## 2019-09-07 NOTE — PLAN OF CARE
ED admit with SOB/PNA. Port accessed and heparin locked. Up ad preeti with steady gait. 4L O2 with LIND. Plan for IV antbx and resp support. Pt A/O x4.

## 2019-09-07 NOTE — PROGRESS NOTES
[facility]    Hospitalist Progress Note    Assessment & Plan   Megha Campbell is a 54 year old female with complex past medical history including metastatic breast cancer, malignant left pleural effusion status post pleurodesis 8/23/2019, recent diagnosis right upper lobe pneumonia earlier this month, recent concern for narcotic\drug-seeking behavior by previous treating hospitalist at [facility], depression and anxiety who presents with shortness of breath, hypoxia and pneumonia.  He will be admitted for evaluation and treatment of her hypoxia and for possible healthcare associated pneumonia.        Principal Problem:    Bilateral pneumonia  -Patient initiated on Levaquin on admission 9/4/2019 but did not take for unclear reasons.  Her reasoning for not taking oral Levaquin does not entirely seem plausible.  She also did not  her oxygen today for unclear reasons.   mildly hypoxic on admssion.  Did not require oxygen at time of her last discharge on September 4.  Labs from morning of admission in ED during her overnight ER stay showed normal white blood cell count.  She is afebrile.  Chest x-ray from this morning shows no clear interval change from September 3.  There are moderately extensive patchy hazy opacities in both lungs.  -initiated  treatment for healthcare associated pneumonia with zosyn for now. Consider aspiration pneumonia given issues with sedation on prior hospital stay  -Patient has been tachycardic through her last couple hospital stays therefore this is not new.  She does not describe a clear change in her breathing cough or shortness of breath.  No DVT symptoms.  -She had a CT PE protocol on September 1 with no PE.  -pt having issues with left chest wall pain overnight. Agreed to no narcotics last pm. Initiated on po morphine by cross cover overnight  - she describes muscle spasm but not seem clearly apparent on exam. Doubt intrapulmonary process. Doubt PE.  Pain complaints and reports of pain during exam of L chest wall seem out of proportion to my exam findings and Xray imaging.   - pt declines CT chest pe protocol to further eval though low suspicion for new process. Pt declines for changing reasons.  Agrees to Xray.   - given no clear source for pain on exam, current pneumonia, hypoxia and concern for drug seeking behaviour on prior admission I have concerns for narcotics at this time.   Pt agreable to stop.   Echo done  No taking good breaths  ? Component of volume overload. BLE edema seems better    Plan:   -for now, Zosyn for healthcare associated pneumonia.  -oxygen and wean off as able,   -lasix 20mg iv x 1 again today  - therapy,   - schedule tylenol, prn ibuprofen, aqua K pad  -avoid sedating meds as able, aspiration precautions  -Aggressive incentive spirometry, mobilize.  -Therapy consult  -cxr now to evaluate for effusion L lung and f/u infultrates. Pt declines CT chest  -Chest x-ray outpatient.  Keep follow-up with [facility] and thoracic surgeon.  -DuoNeb's 4 times daily and as needed.  -Ibuprofen and Tylenol for incisional wall pain.  Lidocaine patch to left chest wall for incisional wall pain    -Echo -limited imaging quality, nl EF, no clear WMA, nl RV, mild decrease RV.   -Telemetry  -Aspiration and fall precautions  -Avoid further sedating medications.  She is on Klonopin to prevent withdrawal and treat her anxiety she has been on this chronically.  Avoid narcotics at this time.     discussed care plan with RN and patient in detail.     Addendum: 18:00:  Pt seen again this evening. Sitting on edge of bed, up walking in room to BR. Calm, nad, ;nontoxic, breathing easily, states she is focusing on self talk to manage her anxiety.  Not having much pain in L chest. Continues to decline CT or even CXR this evening.   Agrees to no narcotics again as no clear indication at this time.   PT recommends TCU. Pt wants to go home. She live with   and son to arrive on Monday. Feels safe at home. Denies abuse. Discussed plan with RN and on call hospitalist      Malignant left pleural effusion -- S/P Pleurodesis 8/23/19   Malignant neoplasm of lower-outer quadrant of left breast of female, estrogen receptor negative (H)    Breast CA 11/2018, ER Neg -- now stage IV (S/P Left Mast, S/P Rad Tx)    Assessment: Status post pleurodesis 8/23/2019.  She states her incisional wall pain is been getting steadily better.  She is okay with just Tylenol lidocaine patch and PRN ibuprofen for pain.  I recommended against initiating narcotics given the fact profile and history of sedation encephalopathy on prior hospitalization.  See discussion below      Plan: Keep follow-up with Dr. Amira Colón of thoracic surgery, keep follow-up with [facility], PRN Tylenol and ibuprofen.  Proton pump inhibitor for ulcer prophylaxis, Lovenox for DVT prophylaxis, physical therapy and occupational therapy consult, encouraged ambulation.  Follow for need of [facility] consult during this hospital stay. Mobilize  Given issues around L chest wall pain, will consult oncology service so can follow along given recurrent hospitalization and in put on possible pain source.        Uncomplicated asthma    Assessment: Some wheezing noted by ER physician.  Poor respiratory effort at this time do not appreciate wheezing.  Patient is feeling like she needs nebulizer treatment at this time    Plan: Scheduled duo nebs and PRN duo nebs.            Depressive disorder    Generalized anxiety disorder    Assessment: Followed by psychiatry outpatient.  Patient seen by psychiatry and prior hospitalization.  She is currently taking Wellbutrin, Zoloft prior to admission doses.  She was to be on a Klonopin taper on last admission but she states she talked with her psychiatrist and she is still on Klonopin twice daily dosing though details are unclear about this.  He is alert.  No falls.  She does not  appear sedated.  States her shortness of breath is driving her anxiety.  Previous hospitalist have noted social issues at home and concerned that she may be required seeking hospitalization to be away from her .  Anxious earlier today.     Plan: Continue Wellbutrin, continue Zoloft 150 mg daily continue Klonopin 0.5 mg twice daily for now hold if sedated.  Psychiatry consult-case discussed. Will see today or tomorrow. Attempted to see today but pt having bath.  Treat pneumonia. Patient declined Zyprexa, consider cymbalta to tx anxiety, depression and pain     Suspected drug-seeking behavior by previous hospitalist on prior admissions.  L chest wall pain    Assessment:   -See HPI and previous discharge summary.    -Stated that I do not think narcotics are indicated at this time.  Will treat with Tylenol and ibuprofen.  Patient was agreeable with this.  I discussed the risk of narcotics including oversedation, infusion and risk of falls with this medication type.   Pt initiated on po morphine overnight by [facility] hospitalist for pain.   -pt doing well this am per RN then having significant pain over pleurodesis site.   -see my exam from today. No clear pain with palpation at times. Exam inconsistent.   - pt declines CT for changing reasons- pain, then anxiety  - to see psychiatry  -at this time, with available information and exam I do not see an indication for narcotics at this time and discussed last pm and today with patient and RN present today. Pt was agreable to this plan; she currently seems slightly sedated and had encephalopathy from narcotics and benzos during last hospital stay. Discussed potential side effects with patient especially in light of her pneumonia and hypoxia. ? Aspiration pneumonia.       Plan: Monitor.  For now it seems appropriate to just treat with Tylenol and as needed ibuprofen.  Will provide lidocaine patch to her left chest wall. Added aqua k pad      S/P gastric bypass --  2004    Assessment: Noted     DVT Prophylaxis: Enoxaparin (Lovenox) subcutaneous     Code Status: Full Code     Disposition: to see PT, OT, SW, encouarged pt to be active, pt declined TCU when discussed on admission, ongoing discussion, assess needs    Yung Dillon MD  Text Page  (7am to 6pm)  Interval History   Started on po morphine overnight by cross covere  Pt doing well this am per RN but late morning began complaining of significant L chest wall pain at site of 2 incision marks Left chest. Pt c/o musle spasm. Breathing is ok. No cp. No n/v/d/abd pain  Echo done  Pt declines CT scan because of her pain - told this to RN but tells me she feels anxious about being in cT scan. Pt declines ativan for CT scan. Declines CT scan after explaining reasoning for doing study  Pt agrees with not being on narcotics stating she never asked for them. Discussed side effects of narcotics    -Data reviewed today: I reviewed all new labs and imaging results over the last 24 hours. I personally reviewed imaging and labs since admission    Physical Exam   Temp: 97.6  F (36.4  C) Temp src: Oral BP: 113/77 Pulse: 121 Heart Rate: 108 Resp: 30 SpO2: 92 % O2 Device: Nasal cannula Oxygen Delivery: 3 LPM  Vitals:    09/06/19 1532 09/07/19 0406   Weight: 108 kg (238 lb) 109.4 kg (241 lb 1.6 oz)     Vital Signs with Ranges  Temp:  [97  F (36.1  C)-97.6  F (36.4  C)] 97.6  F (36.4  C)  Pulse:  [107-122] 121  Heart Rate:  [108-123] 108  Resp:  [16-36] 30  BP: (108-175)/() 113/77  SpO2:  [92 %-97 %] 92 %  I/O last 3 completed shifts:  In: 240 [P.O.:240]  Out: 1100 [Urine:1100]    Constitutional: Sitting up in bed, nontoxic appearing  Neck: ? Mild jvp elevation  Respiratory: Taking shallow breaths, sligntly tachypneic earlier when anxious,  sounds CTAB. ? Mild decrease BS L base  Chest: no clear muscle, incisions appear well healed. No spinal tenderness. On my initial exam pt yelled in pain when I lightly touched her left chest wall  and thorasic spine then immediately afterwards had no pain with palpation over L chest wall site and spine. No induration/swelling, redness.   Cardiovascular: RRR no r/g/m, 2+ BLE edema to knee level- seems better. No calf tenderness or redness or asymmetric swelling  GI: soft, nt, nd  Skin/Integumen: no rash   Neuro:pt seen twice today, intitially she was awake and conversant, she seems a little drowsy sedated at this time. Awake and appropriate  Psych; anxious and a little tachypneic earlier but now with flat affect sometime slow to respond to my questions    Medications       acetaminophen  1,000 mg Oral TID     buPROPion  150 mg Oral QAM     clonazePAM  0.5 mg Oral BID     enoxaparin  40 mg Subcutaneous Q24H     furosemide  20 mg Intravenous Once     heparin  5 mL Intracatheter Q28 Days     heparin lock flush  5-10 mL Intracatheter Q24H     iopamidol  79 mL Intravenous Once     ipratropium - albuterol 0.5 mg/2.5 mg/3 mL  3 mL Nebulization Q4H     lidocaine  1 patch Transdermal Q24h    And     lidocaine   Transdermal Q24H    And     lidocaine   Transdermal Q8H     pantoprazole  40 mg Oral QAM AC     piperacillin-tazobactam  3.375 g Intravenous Q6H     potassium chloride  20 mEq Oral Once     ranitidine  150 mg Oral Daily     sertraline  150 mg Oral Daily     sodium chloride (PF)  10 mL Intracatheter Q8H     sodium chloride 0.9 %  101 mL Intravenous Once       Data   Recent Labs   Lab 09/07/19  0600 09/06/19  1902 09/06/19  0153 09/03/19  1635   WBC 7.2 7.3 8.0 7.5   HGB 10.8* 10.9* 11.0* 11.3*   MCV 80 80 79 79    352 349 352    138 136 137   POTASSIUM 3.5 3.7 3.5 3.6   CHLORIDE 100 102 101 102   CO2 32 33* 26 28   BUN 9 8 9 3*   CR 0.56 0.52 0.59 0.63   ANIONGAP 6 3 9 7   PABLO 8.6 8.9 8.4* 8.6   * 120* 105* 110*   ALBUMIN  --   --  2.1*  --    PROTTOTAL  --   --  7.2  --    BILITOTAL  --   --  0.3  --    ALKPHOS  --   --  108  --    ALT  --   --  23  --    AST  --   --  34  --    TROPI  --    --  <0.015 <0.015       Imaging:   No results found for this or any previous visit (from the past 24 hour(s)).

## 2019-09-07 NOTE — PROGRESS NOTES
Pt received all nebs per MD order. PT sttd having problems productive cough. I started pt on Aerobika with neb inline. She prefers this as she states can cough better after neb.    9/7/2019  Adrianne Ingram, RRT

## 2019-09-07 NOTE — PROVIDER NOTIFICATION
Paged on call hospitalist re: pt having poor pain control, unable to swallow meds due to increase in pain. Spoke to Dr. Lantigua, putting in orders for one time IV dose, and scheduled MS oxycontin and prn roxinol

## 2019-09-07 NOTE — PLAN OF CARE
"Attempted PT eval at scheduled time; patient willing to discuss social history but refuses transfers and ambulation repeating \"today has been a hard day.\" Patient lives with  in an apartment with no stairs to enter.      "

## 2019-09-07 NOTE — PLAN OF CARE
A&O. Denied pain this am but did request nebs for c/o SOB. VSS on 3L n/c. LS diminished. Currently refusing CXR (MD notified), will send later if pt agrees. Did not eat anything today. Port patent.

## 2019-09-07 NOTE — PROVIDER NOTIFICATION
MD Notification    Person notified: primary hospitalist    Person Name: Dr. Dillon    Date/Time: 9/7/19    Interaction: web-based page    Purpose of Notification: Pt is refusing to go to CT due to uncontrolled pain.  Would you please come up to the floor and talk with patient about need for CT or consider ordering something more for pain.    Orders Received:    Comments:    * Paged on behalf of the bedside RN

## 2019-09-07 NOTE — PLAN OF CARE
OT: Following consult with RN will hold OT eval at this time as pt is SOB, will continue to follow. Pt had been evaluated by OT 4 days ago with previous hospitalization and was at that time declining TCU and wanted to return home with assist from friends/ family.     PM: re-attempted to see pt for OT eval, pt toileting at time of attempt and declined

## 2019-09-07 NOTE — PLAN OF CARE
A &O x 4, VSS except HR tachy. Tele: ST w/PAC. C/o pain, has lidocaine patch or L lower back and had increasing pain and received 1x dose IV morphine and prn roxinal. Up ind in room to bathroom, good UOP. LS dim, on 3L O2 overnight, unable to wean. R port hep locked w/good blood return noted, getting intermittent IV ABX. PT, OT, SW and psych consulted.

## 2019-09-07 NOTE — PROGRESS NOTES
09/07/19 1500   Quick Adds   Type of Visit Initial PT Evaluation   Living Environment   Lives With spouse   Living Arrangements apartment   Home Accessibility no concerns   Transportation Anticipated family or friend will provide   Living Environment Comment grab bars in tub/shower   Self-Care   Usual Activity Tolerance good   Current Activity Tolerance moderate   Regular Exercise No   Equipment Currently Used at Home none   Activity/Exercise/Self-Care Comment Pt reports IND at baseline, recently received wheelchair for use until December   Functional Level Prior   Ambulation 0-->independent   Transferring 0-->independent   Toileting 0-->independent   Bathing 0-->independent   Communication 0-->understands/communicates without difficulty   Prior Functional Level Comment pv IND with mobility   General Information   Onset of Illness/Injury or Date of Surgery - Date 09/06/19   Referring Physician Yung Dillon MD   Patient/Family Goals Statement take my time and go home when I'm ready   Pertinent History of Current Problem (include personal factors and/or comorbidities that impact the POC) Pt is a 54 year old year old female admitted 9/6 for hypoxia, shortness of breath. PMHx signficant for pleurodesis 8/23/2019, breast cancer, chronic pain, depression, and recent concern for drug-seeking behaviors. Pt previously living in an apartment with  and no stairs to enter. Patient reports the apartment is accessible and  is available to assist. Unable to determine prior level of function with accuracy due to patient inconsistency in responses. Reports she ambulates apt halls IND with no AD then reported that she uses a FWW for all mobility. Reported later she rarely uses FWW inside apartment and is receiving a wheelchair for long-distance mobility. Reports she is IND with ADLs.    Precautions/Limitations fall precautions;oxygen therapy device and L/min   General Observations pt on 3L O2 via nc, up in  "bathroom on PT encounter when patient is high falls risk; safety judgment impaired   Cognitive Status Examination   Orientation orientation to person, place and time   Level of Consciousness alert   Follows Commands and Answers Questions 100% of the time   Personal Safety and Judgment impaired   Pain Assessment   Patient Currently in Pain No   Posture    Posture Protracted shoulders;Kyphosis   Range of Motion (ROM)   ROM Comment B LE ROM WFL   Strength   Strength Comments B LE strength WFL   Bed Mobility   Bed Mobility Comments unable to assess this session   Transfer Skills   Transfer Comments sit<>stand with CGA, UE support on grab bars, and verbal cues   Gait   Gait Comments amb with CGA, 3L O2, FWW, demos gross instability and low tolerance for gait distance   Balance   Balance Comments seated balance unimpaired, requires UE support for upright balance   General Therapy Interventions   Planned Therapy Interventions balance training;bed mobility training;strengthening;transfer training;gait training   Clinical Impression   Criteria for Skilled Therapeutic Intervention yes, treatment indicated   PT Diagnosis impaired IND with functional mobility from baseline    Influenced by the following impairments LE weakness, O2 needs, medical status   Functional limitations due to impairments increased assist required for mobility, increased O2 needs, falls risk, decreased activity tolerance   Clinical Presentation Evolving/Changing   Clinical Presentation Rationale clinical judgment   Clinical Decision Making (Complexity) Low complexity   Therapy Frequency Daily   Predicted Duration of Therapy Intervention (days/wks) 3-5 days   Anticipated Discharge Disposition Transitional Care Facility   Risk & Benefits of therapy have been explained Yes   Patient, Family & other staff in agreement with plan of care Yes   [facility] AM-PAC  \"6 Clicks\" V.2 Basic Mobility Inpatient Short Form   1. Turning from your back to your side " while in a flat bed without using bedrails? 4 - None   2. Moving from lying on your back to sitting on the side of a flat bed without using bedrails? 3 - A Little   3. Moving to and from a bed to a chair (including a wheelchair)? 3 - A Little   4. Standing up from a chair using your arms (e.g., wheelchair, or bedside chair)? 3 - A Little   5. To walk in hospital room? 3 - A Little   6. Climbing 3-5 steps with a railing? 3 - A Little   Basic Mobility Raw Score (Score out of 24.Lower scores equate to lower levels of function) 19   Total Evaluation Time   Total Evaluation Time (Minutes) 12

## 2019-09-07 NOTE — PLAN OF CARE
Discharge Planner PT   Patient plan for discharge: Home, open to alternate discharge plans  Current status: PT orders received, chart reviewed, eval completed and treatment initiated. Pt is a 54 year old year old female admitted 9/6 for hypoxia, shortness of breath. PMHx signficant for pleurodesis 8/23/2019, breast cancer, chronic pain, depression, and recent concern for drug-seeking behaviors. Pt previously living in an apartment with  and no stairs to enter. Patient reports the apartment is accessible and  is available to assist. Unable to determine prior level of function with accuracy due to patient inconsistency in responses. Reports she ambulates apt halls IND with no AD then reported that she uses a FWW for all mobility. Reported later she rarely uses FWW inside apartment and is receiving a wheelchair for long-distance mobility. Reports she is IND with ADLs.     Unable to assess bed mobility as patient seated on toilet when this writer arrived and declined following transfer off toilet. Patient seated balance unimpaired, sit<>stand with CGA and UE support through grab bars. Ambulates 25 ft in room with FWW, CGA, and 3L O2 via nc. Pt impulsively seated on chair when this writer encouraged pt to return to bed; reports SOB and needs rest - SpO2 81%. Patient education on PLB but unable to complete appropriately; SpO2 return to 88% in 2 min with 4L O2 via nc. Patient refusing further ambulation or bed mobility following patient education on benefits of upright mobility.     Barriers to return to prior living situation: inconsistent support, currently Ax1, current medical status and O2 needs, impaired judgment with related falls risk  Recommendations for discharge: TCU  Rationale for recommendations: Patient is currently below baseline IND with mobility. Unable to determine prior level of function with accuracy, however, patient currently requires Ax1 for ambulation and transfers to reduce falls risk  and monitor O2 needs. Demonstrates significant impairments in activity tolerance. Patient will benefit from continued skilled PT services during stay to address above limitations and will likely need TCU stay on discharge to return to baseline. Pending progress, patient may be able to return home with home services.       Entered by: Arianne Gil 09/07/2019 3:52 PM

## 2019-09-07 NOTE — PROGRESS NOTES
"About 1130, pt c/o nausea and stated that \"nothing tastes good. Nothing. I haven't been able to eat anything lately.\" Became quite anxious about this. Had previously refused am meds until breakfast, did take scheduled meds at this time. Appeared to be having a panic attack as she was hyperventilating and anxious and shared how she was afraid and worried as all of her family members have  (most from [place]) and she is the only one left. In the midst of this, began having \"12/10\" pain to L back. She stated it is the same pain she has been getting since her recent surgery. PO morphine given, warm packs applied, encouraged to use deep breathing techniques with no improvement. Dr. Dillon notified at this time. Evaluated pt at bedside. Ordered chest CT.    "
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
## 2019-09-08 NOTE — PLAN OF CARE
Attempted to see patient for PT treatment this AM. Patient seated partially EOB and initially agreeable to amb, refused due to anxiety and SOB after this writer encouraged her to sit fully upright and put on shoes or socks. Patient education on importance of progressing mobility independence, weaning O2, and increasing activity tolerance, risks of bedrest. Patient continues to refuse and reports she will be going out to dinner this afternoon with son so may not be agreeable later on. Patient currently IND in room; encouraged patient to wear shoes, use FWW, and call for assist if any question of instability. Patient agreeable.

## 2019-09-08 NOTE — PLAN OF CARE
Discharge Planner OT   Patient plan for discharge: Home  Current status: OT orders received, chart reviewed and initial eval complete. Pt is a 54 year old year old female admitted 9/6 for hypoxia, shortness of breath. PMHx signficant for pleurodesis 8/23/2019, breast cancer, chronic pain, depression, and recent concern for drug-seeking behaviors. Pt lives w/spouse in an apartment with and elevator to enter. Pt stated spouse can assist as needed and son is coming to stay for a couple months to help out.   Pt seated slumped over at EOB using bedside table for support. Sit EOB to stand CGA, Sit>sup SBA and extra time, SBA to position self in bed. Pt declined donning/doffing socks and OOB activity stating she's too tired and SOB. SPO2 98% on 6 L 02 via nc. Educated pt on PLB technique but unable to complete correctly. Pt oriented to self, but not date or place. Will continue to monitor cognition during ADL tasks.   Barriers to return to prior living situation:  Weakness, level of assist, O2 needs, cognition,   Recommendations for discharge: TCU  Rationale for recommendations: Pt currently below baseline with I/ADL tasks, and requires A for transfers, and cognitive concerns. Pt will benefit from continued rehab to increase activity tolerance for I/ADL's, increased safety with transfers and mobiltiy and monitor cognition.       Entered by: Vikki Mcallister 09/08/2019 9:31 AM

## 2019-09-08 NOTE — PLAN OF CARE
A & O times four. Denies pain this ovidio. But for comfort, she use the aqua k-pad. Tele ST. Lung sound diminish bilaterally. Currently, she refused both chest x-ray and ct scan. Encouraged the use of IS. Oxygen sat WDL with 4 liters NC. On neb tx per RT. SBA with activity. . Continue to monitor.

## 2019-09-08 NOTE — PROGRESS NOTES
09/08/19 0853   Quick Adds   Type of Visit Initial Occupational Therapy Evaluation   Living Environment   Lives With spouse   Living Arrangements apartment   Home Accessibility no concerns  (elevator to enter)   Transportation Anticipated family or friend will provide   Living Environment Comment lives w/spouse who is retired and can assist as needed.   Self-Care   Usual Activity Tolerance moderate   Current Activity Tolerance poor   Regular Exercise No   Equipment Currently Used at Home grab bar, toilet;grab bar, tub/shower   Functional Level   Ambulation 0-->independent   Transferring 0-->independent   Toileting 0-->independent  (grab bars )   Bathing 0-->independent  (bathtub shower, grab bars)   Dressing 0-->independent   Eating 0-->independent   Communication 0-->understands/communicates without difficulty   Fall history within last six months no   Which of the above functional risks had a recent onset or change? dressing;transferring;bathing;toileting;ambulation   Prior Functional Level Comment Previously IND w/all I/ADL's   General Information   Onset of Illness/Injury or Date of Surgery - Date 09/06/19   Referring Physician Yung Dillon MD   Patient/Family Goals Statement Home   Additional Occupational Profile Info/Pertinent History of Current Problem Pt is a 54 year old year old female admitted 9/6 for hypoxia, shortness of breath. PMHx signficant for pleurodesis 8/23/2019, breast cancer, chronic pain, depression, and recent concern for drug-seeking behaviors.   Cognitive Status Examination   Orientation person   Level of Consciousness lethargic/somnolent   Visual Perception   Visual Perception Comments glasses for driving   Sensory Examination   Sensory Quick Adds No deficits were identified   Pain Assessment   Patient Currently in Pain No   Posture   Posture forward head position   Range of Motion (ROM)   ROM Quick Adds No deficits were identified   Strength   Strength Comments fair in BUE   Hand  "Strength   Hand Strength Comments poor in BUE   (pt repoerts feeling extra weak today)   Coordination   Upper Extremity Coordination No deficits were identified   Transfer Skill: Sit to Stand   Level of [place]: Sit/Stand contact guard   Physical Assist/Nonphysical Assist: Sit/Stand supervision   Lower Body Dressing   Level of [place]: Dress Lower Body unable to perform   Instrumental Activities of Daily Living (IADL)   Previous Responsibilities driving;work;housekeeping   Activities of Daily Living Analysis   Impairments Contributing to Impaired Activities of Daily Living strength decreased   General Therapy Interventions   Planned Therapy Interventions ADL retraining   Clinical Impression   Criteria for Skilled Therapeutic Interventions Met yes, treatment indicated   OT Diagnosis Impaired I/ADL, impaired functioal transfers and mobility   Influenced by the following impairments SOB, weakness, fatigue   Assessment of Occupational Performance 1-3 Performance Deficits   Identified Performance Deficits I/ALD, transfers, mobility   Clinical Decision Making (Complexity) Low complexity   Therapy Frequency Daily   Predicted Duration of Therapy Intervention (days/wks) 7   Anticipated Equipment Needs at Discharge bath sponge;tub bench;raised toilet seat   Anticipated Discharge Disposition Transitional Care Facility   Risks and Benefits of Treatment have been explained. Yes   Patient, Family & other staff in agreement with plan of care Yes   [facility] TM \"6 Clicks\"   2016, Trustees of [facility], under license to [employer].  All rights reserved.   6 Clicks Short Forms Daily Activity Inpatient Short Form   [facility] AM-PAC  \"6 Clicks\" Daily Activity Inpatient Short Form   1. Putting on and taking off regular lower body clothing? 2 - A Lot   2. Bathing (including washing, rinsing, drying)? 2 - A Lot   3. Toileting, which includes using toilet, bedpan or urinal? 3 - A Little   4. " Putting on and taking off regular upper body clothing? 3 - A Little   5. Taking care of personal grooming such as brushing teeth? 3 - A Little   6. Eating meals? 4 - None   Daily Activity Raw Score (Score out of 24.Lower scores equate to lower levels of function) 17   Total Evaluation Time   Total Evaluation Time (Minutes) 8

## 2019-09-08 NOTE — PROVIDER NOTIFICATION
Paged on call hospitalist, re: pt noting poor pain control with current regimen. Spoke to Dr. Lantigua who stated that current prn of tylenol and ibuprofen are adequate and no further prn orders at this time

## 2019-09-08 NOTE — PROGRESS NOTES
RRT called for tachycardia. PT Spo2 stable. House NP Kan dismissed me.    9/8/2019  Adrianne Ingram, RT

## 2019-09-08 NOTE — PROGRESS NOTES
Patient denied pain all shift and declined her HS tylenol. But now reported that left lower back pain. States not a new pain,and it is chronic. HS tylenol given.  Also, her lidocaine  patch was coming off. Re-taped the patch. Aqua K pad was on. Report off to RN. Continue to monitor.

## 2019-09-08 NOTE — PROVIDER NOTIFICATION
Paged oncall hospitalist re: pt now requesting to leave. Spoke to Dr. Lantigua, will not do discharge but ok if pt wants to go AMA.

## 2019-09-08 NOTE — PLAN OF CARE
Tachy with runs of SVT. Dr. Dillon notified; given dose of po metoprolol. RRT called at 1445 due to sustained episode; see notes. Patient anxious and difficult to assess. Seen by psych; meds added. Sats 95 on 3LNC but dyspneic and tachypneic. Requests prn nebs but likely increasing heart rate and anxiety; changed to xopenex. LS dim but no wheezes. No complaints of pain today. K+ replaced per protocol; recheck this ovidio. 3+BLE edema; teds ordered. Up with SBA and walker to BR. Plan pending clinical progress. Continue to monitor.

## 2019-09-08 NOTE — PLAN OF CARE
"A &O x 4 but anxious, frustrated and scared; at one point wanted to leave AMA but realized had difficulty breathing w/out oxygen and agreed to stay. Did c/o feeling restless, unable to get comfortable and \"crawling out of her skin\", got one time order of po 25 mg Seroquel. VSS except HR tachy. Tele: ST w/PAC. Has lidocaine patch on L lower back. Up ind in room to BR, good UOP. LS dim, on 3L O2. Port hep locked, good blood return noted, getting intermittent IV ABX.  "

## 2019-09-08 NOTE — PROVIDER NOTIFICATION
MD Notification    Notified Person: MD    Notified Person Name: Dr. Dillon     Notification Date/Time: 9-8-19 1215    Notification Interaction: Spoke in person    Purpose of Notification: Runs of SVT; about 2 minutes.; activity induced? Pt also on nebs    Orders Received: MD will assess patient. May order beta blocker    Comments:

## 2019-09-08 NOTE — PROGRESS NOTES
[facility]    Hospitalist Progress Note    Assessment & Plan   Megha Campbell is a 54 year old female with complex past medical history including metastatic breast cancer, malignant left pleural effusion status post pleurodesis 8/23/2019, recent diagnosis right upper lobe pneumonia earlier this month, recent concern for narcotic\drug-seeking behavior by previous treating hospitalist at [facility], depression and anxiety who presents with shortness of breath, hypoxia and pneumonia.  He will be admitted for evaluation and treatment of her hypoxia and for possible healthcare associated pneumonia.        Principal Problem:    Bilateral pneumonia  -Patient initiated on Levaquin on admission 9/4/2019 but did not take for unclear reasons.  Her reasoning for not taking oral Levaquin does not entirely seem plausible.  She also did not  her oxygen today for unclear reasons.   mildly hypoxic on admssion.  Did not require oxygen at time of her last discharge on September 4.  Labs from morning of admission in ED during her overnight ER stay showed normal white blood cell count.  She is afebrile.  Chest x-ray from this morning shows no clear interval change from September 3.  There are moderately extensive patchy hazy opacities in both lungs.  -initiated  treatment for healthcare associated pneumonia with zosyn for now. Consider aspiration pneumonia given issues with sedation on prior hospital stay  -Patient has been tachycardic through her last couple hospital stays therefore this is not new.  She does not describe a clear change in her breathing cough or shortness of breath.  No DVT symptoms.  -She had a CT PE protocol on September 1 with no PE.  -pt having issues with left chest wall pain overnight. Agreed to no narcotics last pm. Initiated on po morphine by cross cover overnight  - she describes muscle spasm but not seem clearly apparent on exam. Doubt intrapulmonary process. Doubt PE.  Pain complaints and reports of pain during exam of L chest wall seem out of proportion to my exam findings and Xray imaging.   - pt declines CT chest pe protocol to further eval though low suspicion for new process. Pt declines for changing reasons.  Agrees to Xray.   - given no clear source for pain on exam, current pneumonia, hypoxia and concern for drug seeking behaviour on prior admission I have concerns for narcotics at this time.   Pt agreable to stop.   Echo done  No taking good breaths  ? Component of volume overload. BLE edema seems better  CXR 9/8: stable bilateral infultrates. No effusion  Stable pulmonary status.   Not having issues with left chest wall pain today.   -Echo -limited imaging quality, nl EF, no clear WMA, nl RV, mild decrease RV.     Plan:   -for now, Zosyn for healthcare associated pneumonia.  -oxygen and wean off as able,   -diurese.   - therapy,   - schedule tylenol, prn ibuprofen, aqua K pad  -avoid sedating meds as able, aspiration precautions  -Aggressive incentive spirometry, mobilize.  -Therapy consult. Pt declines CT chest  -Chest x-ray outpatient.  Keep follow-up with [place] [facility] and thoracic surgeon.  -stop duonebs as may be driving new svt and tachycardia  -Ibuprofen and Tylenol for incisional wall pain.  Lidocaine patch to left chest wall for incisional wall pain  -Telemetry  -Aspiration and fall precautions  -Avoid further sedating medications.  She is on Klonopin to prevent withdrawal and treat her anxiety she has been on this chronically.  Avoid narcotics at this time.         discussed care plan with RN and psychiatry and patient in detail.     Sinus tachycardia  SVT runs  - Sinus tachycardia not new and review of chart indicates present for last 3 hospital stay. Pain and anxiety issues. On duonebs scheduled.   -negative CT PE protocol study 9/1/19.   - some runs of SVT, no afib today  -K low nl  - Mg pending.     Plan:   Cont tele, no discharge today, follow K and  Mg, repplace per protocol, stop scheduled nebs, start toprol xl 25mg every day for now, diuresis.     Addendum: 1700  RRT called for sustained SVT. Discussed with house NP.   Pt using prn nebs for sob and anxiety even though no wheezing  Changed to xopenex and atrovent prn  Scheduled neb stopped this am  Received toprol XL. Have prn metoprolol iv ordered  Lasix at 1500 today once K replaced.   Am labs  Pt seen and appears comfortable, mild tachypnea. No complaints. She appears as he has been for several days. NAD.   May increase bber dose tomorrow.   Follow lytes  Consider pulmonary consult for pulmonary infultrates-? Noninfectious. diuresising      Malignant left pleural effusion -- S/P Pleurodesis 8/23/19   Malignant neoplasm of lower-outer quadrant of left breast of female, estrogen receptor negative (H)    Breast CA 11/2018, ER Neg -- now stage IV (S/P Left Mast, S/P Rad Tx)    Assessment: Status post pleurodesis 8/23/2019.  She states her incisional wall pain is been getting steadily better.  She is okay with just Tylenol lidocaine patch and PRN ibuprofen for pain.  I recommended against initiating narcotics given the fact profile and history of sedation encephalopathy on prior hospitalization.  See discussion below  Seen by oncology 9/8  Pt now agreeing to chemo. Consider palliative care consult    Plan: consider palliative care consult,  Keep follow-up with Dr. Amira Colón of thoracic surgery, keep follow-up with [place] [facility], PRN Tylenol and ibuprofen.  Proton pump inhibitor for ulcer prophylaxis, Lovenox for DVT prophylaxis, physical therapy and occupational therapy consult, encouraged ambulation.  Follow for need of [place] [facility] consult during this hospital stay. Mobilize  Given issues around L chest wall pain, will consult oncology service so can follow along given recurrent hospitalization and in put on possible pain source.        Uncomplicated asthma    Assessment: Some wheezing noted by  ER physician.  Poor respiratory effort at this time do not appreciate wheezing.  Patient is feeling like she needs nebulizer treatment at this time  No wheezing at this time. Doubt asthma exacerbation    Plan: prn nebs, stop scheduled nebs as driving sinus tach and possibly SVT,             Depressive disorder    Generalized anxiety disorder    Assessment: Followed by psychiatry outpatient.  Patient seen by psychiatry and prior hospitalization.  She is currently taking Wellbutrin, Zoloft prior to admission doses.  She was to be on a Klonopin taper on last admission but she states she talked with her psychiatrist and she is still on Klonopin twice daily dosing though details are unclear about this.  He is alert.  No falls.  She does not appear sedated.  States her shortness of breath is driving her anxiety.  Previous hospitalist have noted social issues at home and concerned that she may be required seeking hospitalization to be away from her .  Anxiety playing a role in pain and coping currently with medical issues  Seen by psychiatry 9/8. Initiated on seroquel  Pt agrees that anxiety playing role in pain.     Plan: start seroquel 50mg tid, stop, Wellbutrin, continue Zoloft 150 mg daily continue Klonopin 0.5 mg twice daily for now hold if sedated. Close pcp follow up, recommend tCu      Suspected drug-seeking behavior by previous hospitalist on prior admissions.  L chest wall pain    Assessment:   -See HPI and previous discharge summary.    -Stated that I do not think narcotics are indicated at this time.  Will treat with Tylenol and ibuprofen.  Patient was agreeable with this.  I discussed the risk of narcotics including oversedation, infusion and risk of falls with this medication type.   Pt initiated on po morphine overnight by [facility] hospitalist for pain.   -pt doing well this am per RN then having significant pain over pleurodesis site.   -see my exam from today. No clear pain with palpation at  times. Exam inconsistent.   - pt declines CT for changing reasons- pain, then anxiety  - to see psychiatry  - I do not see an indication for narcotics at this time and discussed last pm and today with patient and RN present today. Pt was agreable to this plan; she currently seems slightly sedated and had encephalopathy from narcotics and benzos during last hospital stay. Discussed potential side effects with patient especially in light of her pneumonia and hypoxia. ? Aspiration pneumonia.       Plan: Monitor.  For now it seems appropriate to just treat with Tylenol and as needed ibuprofen.  Will provide lidocaine patch to her left chest wall. Added aqua k pad, treat anxiety. No narcotics.       S/P gastric bypass -- 2004    Assessment: Noted     DVT Prophylaxis: Enoxaparin (Lovenox) subcutaneous     Code Status: Full Code     Disposition: to see PT, OT, SW, encouarged pt to be active, pt declined TCU when discussed on admission, ongoing discussion, assess needs, discussed TCU with patient. She will consider but resistant to idea.     Yung Dillon MD  Text Page  (7am to 6pm)  Interval History   Had flair of low back pain earlier but has been doing well today. No complaints of left chest wall pain over prior incision site today. States anxiety contributes to her pain  Having short runs of svt.   Breathing stable.   Son now in town  Feels safe at home. resistent to idea of TCU.   Wants to go home   agrees to chemo now  Seen by psych and oncology today    -Data reviewed today: I reviewed all new labs and imaging results over the last 24 hours. I personally reviewed imaging and labs since admission    Physical Exam   Temp: 95.7  F (35.4  C) Temp src: Oral BP: 135/69 Pulse: 116 Heart Rate: 118 Resp: 22 SpO2: 95 % O2 Device: Nasal cannula Oxygen Delivery: 3 LPM  Vitals:    09/06/19 1532 09/07/19 0406 09/08/19 0400   Weight: 108 kg (238 lb) 109.4 kg (241 lb 1.6 oz) 95.5 kg (210 lb 8 oz)     Vital Signs with  Ranges  Temp:  [95.7  F (35.4  C)-98  F (36.7  C)] 95.7  F (35.4  C)  Pulse:  [116] 116  Heart Rate:  [103-118] 118  Resp:  [16-24] 22  BP: (115-177)/(63-85) 135/69  SpO2:  [90 %-100 %] 95 %  I/O last 3 completed shifts:  In: -   Out: 1350 [Urine:1350]    Constitutional: Sitting up in bed, nontoxic appearing  Neck: ? Mild jvp elevation  Respiratory:sligntly tachypneic,  Some crackles L base. Taking shallow breaths  Chest: no clear muscle, incisions appear well healed. No spinal tenderness. On my initial exam pt yelled in pain when I lightly touched her left chest wall and thorasic spine then immediately afterwards had no pain with palpation over L chest wall site and spine. No induration/swelling, redness.   Cardiovascular: RRR no r/g/m, tachy, regular,  2+ BLE edema to knee level- seems better. No calf tenderness or redness or asymmetric swelling  GI: soft, nt, nd  Skin/Integumen: no rash   Neuro: nl speech and fuly oriented. Awake and alert, cooperative, pleasant  Psych; anxious and a little tachypneic earlier but now with flat affect sometime slow to respond to my questions    Medications       acetaminophen  1,000 mg Oral TID     clonazePAM  0.5 mg Oral BID     DULoxetine  30 mg Oral Daily     enoxaparin  40 mg Subcutaneous Q24H     heparin  5 mL Intracatheter Q28 Days     heparin lock flush  5-10 mL Intracatheter Q24H     iopamidol  79 mL Intravenous Once     lidocaine  1 patch Transdermal Q24h    And     lidocaine   Transdermal Q24H    And     lidocaine   Transdermal Q8H     metoprolol succinate ER  25 mg Oral Daily     pantoprazole  40 mg Oral QAM AC     piperacillin-tazobactam  3.375 g Intravenous Q6H     potassium chloride  20 mEq Oral Once     QUEtiapine  50 mg Oral TID     ranitidine  150 mg Oral Daily     sertraline  150 mg Oral Daily     sodium chloride (PF)  10 mL Intracatheter Q8H     sodium chloride 0.9 %  101 mL Intravenous Once       Data   Recent Labs   Lab 09/08/19  0600 09/07/19  0600  09/06/19  1902 09/06/19  0153 09/03/19  1635   WBC 8.1 7.2 7.3 8.0 7.5   HGB 10.9* 10.8* 10.9* 11.0* 11.3*   MCV 81 80 80 79 79    303 352 349 352    138 138 136 137   POTASSIUM 3.4 3.5 3.7 3.5 3.6   CHLORIDE 98 100 102 101 102   CO2 31 32 33* 26 28   BUN 9 9 8 9 3*   CR 0.61 0.56 0.52 0.59 0.63   ANIONGAP 7 6 3 9 7   PABLO 8.8 8.6 8.9 8.4* 8.6   GLC 95 109* 120* 105* 110*   ALBUMIN  --   --   --  2.1*  --    PROTTOTAL  --   --   --  7.2  --    BILITOTAL  --   --   --  0.3  --    ALKPHOS  --   --   --  108  --    ALT  --   --   --  23  --    AST  --   --   --  34  --    TROPI  --   --   --  <0.015 <0.015       Imaging:   Recent Results (from the past 24 hour(s))   XR Chest 2 Views    Narrative    CHEST 2 VIEWS   9/8/2019 12:41 AM     HISTORY: Status post left pleurodesis on 8/23/2019. Follow-up  infiltrates. Evaluate for pleural effusion. Left chest wall pain at  incision site.    COMPARISON: 9/6/2019 at 0211 hours.    FINDINGS: Moderately extensive patchy opacities are present in the  central aspect of the right lung and mid and lower left lung. No  convincing pleural effusion. Possible cardiomegaly. Right anterior  chest wall port with catheter entering the right internal jugular vein  and distal tip in the superior vena cava.      Impression    IMPRESSION:   1. No convincing interval change since 9/6/2019 at 0211 hours.  2. Moderately extensive patchy opacities in both lungs.  3. No convincing pleural effusion.    SALOMÓN ALLEN MD

## 2019-09-08 NOTE — CODE/RAPID RESPONSE
[facility]    RRT Note  9/8/2019   Time Called: 1443    Code Status: Full Code    I was called to evaluate Megha Campbell, who is a 54 year old female who was admitted on 9/6/2019 for tachycardia, question pSVT. PMH is complex, includes metastatic breast cancer, malignant left pleural effusion s/p pleurodesis 8/23/2019, recent diagnosis right upper lobe pneumonia earlier this month, recent concern for narcotic\drug-seeking behavior .    Assessment & Plan     Suspected PSVT: Per nursing, patient has been experiencing self limiting runs of PSVT, associated with dyspnea, but stable hemodynamics. The patient has been tachycardiac during multiple admissions -- her symptoms are not new, but dyspnea is worse with SVT runs. PSVT runs thought to be occurring 2-3 an hour. She is edematous, has scheduled lasix this afternoon.  -- Electrolytes are optimized  -- Metoprolol XR has been started   -- switch Duonebs to xopenex and atrovent to avoid tachycardia side effect (pt has been requesting these about every 2 hours)  -EKG sinus tach  -- recheck electrolytes after lasix this evening at 2000 to avoid any hypokalemia, hypomag, hypophos    Her situation is difficult as she has advanced cancer, declining treatment until this admission.  Had negative CT PE r/o on 9/1, but has refused repeat imaging.   --was amenable to pCXR on 9/7, showing no significant changes     Discussed with and defer further cares to ELLEN Marcum CNP  Hospitalist - House Officer  Pager: [phone] (6c - 6x)      Physical Exam   Vital Signs with Ranges:  Temp:  [95.7  F (35.4  C)-98  F (36.7  C)] 95.7  F (35.4  C)  Pulse:  [116] 116  Heart Rate:  [] 142  Resp:  [16-30] 30  BP: (115-177)/() 150/115  SpO2:  [90 %-100 %] 96 %  I/O last 3 completed shifts:  In: -   Out: 1500 [Urine:1500]    Constitutional: Awake, alert, cooperative, no apparent distress.  Respiratory: profoundly diminished left sided  breath sounds to posterior auscultation, right side diminished. No wheezing or crackles.  Cardiovascular: regular rhythm and increased rate, normal S1 and S2, and no murmur noted. 2+ peripheral edema bilaterally   GI: Soft, non-distended, non-tender, normal bowel sounds.  Skin: cool and dry  Neurologic: no focal deficits   Psychiatric: anxious, but flat  Data     EKG:  Interpreted by ELLEN Campbell CNP  Time reviewed:   Symptoms at time of EKG: None   Rhythm: sinus tachycardia  Rate: Tachycardia  Axis: Normal  Ectopy: premature ventricular contractions (unifocal)  Conduction: normal  ST Segments/ T Waves: No acute ischemic changes  Comparison to prior: Unchanged    Clinical Impression: no acute changes and non-specific EKG    IMAGING: (X-ray/CT/MRI)   Recent Results (from the past 24 hour(s))   XR Chest 2 Views    Narrative    CHEST 2 VIEWS   9/8/2019 12:41 AM     HISTORY: Status post left pleurodesis on 8/23/2019. Follow-up  infiltrates. Evaluate for pleural effusion. Left chest wall pain at  incision site.    COMPARISON: 9/6/2019 at 0211 hours.    FINDINGS: Moderately extensive patchy opacities are present in the  central aspect of the right lung and mid and lower left lung. No  convincing pleural effusion. Possible cardiomegaly. Right anterior  chest wall port with catheter entering the right internal jugular vein  and distal tip in the superior vena cava.      Impression    IMPRESSION:   1. No convincing interval change since 9/6/2019 at 0211 hours.  2. Moderately extensive patchy opacities in both lungs.  3. No convincing pleural effusion.    SALOMÓN ALLEN MD       CBC with Diff:  Recent Labs   Lab Test 09/08/19  0600  08/23/19  0743   WBC 8.1   < > 7.6   HGB 10.9*   < > 11.8   MCV 81   < > 79      < > 343   INR  --   --  0.97    < > = values in this interval not displayed.      Comprehensive Metabolic Panel:  Recent Labs   Lab 09/08/19  0600  09/06/19  0153      < > 136   POTASSIUM 3.4   <  > 3.5   CHLORIDE 98   < > 101   CO2 31   < > 26   ANIONGAP 7   < > 9   GLC 95   < > 105*   BUN 9   < > 9   CR 0.61   < > 0.59   GFRESTIMATED >90   < > >90   GFRESTBLACK >90   < > >90   PABLO 8.8   < > 8.4*   MAG 2.1   < >  --    PROTTOTAL  --   --  7.2   ALBUMIN  --   --  2.1*   BILITOTAL  --   --  0.3   ALKPHOS  --   --  108   AST  --   --  34   ALT  --   --  23    < > = values in this interval not displayed.     Time Spent on this Encounter   I spent 20 minutes on the unit/floor managing the care of Megha Campbell. Over 50% of my time was spent counseling the patient and/or coordinating care regarding services listed in this note.

## 2019-09-08 NOTE — PROVIDER NOTIFICATION
Paged oncall hospitalist re: pt labile in mood, notes 'crawling out of her skin' possible mood stabilizer like Seroquel? Spoke to Dr Lantigua who will put in 1x order for 25 mg of seroquel.

## 2019-09-08 NOTE — CONSULTS
[facility]    Oncology Consultation     Date of Admission:  9/6/2019  Date of Consult (When I saw the patient): 09/08/19    Assessment & Plan   Megha Campbell is a 54 year old female who was admitted on 9/6/2019. I was asked to see the patient for breast cancer.      1. Stage III ( T3 N2a M0) triple negative left sided breast cancer  -diagnosis 11/2018  -[id number] stage III with 8 cm tumor and 5 positive lymph nodes, extranodal extension, LVI  -s/p lumpectomy with re-excision 12/2018   -s/p radiation 2/4/19-4/1/19  -declined chemotherapy (both neoadjuvant or adjuvant)  -CT 8/8/19 revealed a large left pleural effusion  -thoracentesis 8/9/19 confirmed metastatic adenocarcinoma, ER/PA negative, consistent with breast primary  -CT thoracic and lumbar spine shows no evidence of metastatic disease  -outpatient PET/CT being scheduled  -androgen receptors only weakly positive at 1-3 % and PDL1 is negative at 0%  -prognosis and that her cancer is now metastatic and no longer curable has been discussed  -discussed use of chemotherapy versus palliative cares alone (no other viable options)  -she has been very hesitant to consider chemotherapy but now states she will try it  -plan abraxane plus or minus platinum chemotherapy as outpatient if she consents  -follow up with PET/CT results for discussion and initiation of systemic therapies after discharge     2. Recurrent Pleural Effusion  -malignant cytology positive on 8/9/2019  -s/p VATS with pleurodesis this PM     3. SOB  -multifocal etiology related to malignant  pleural effusion/COPD exacerbation/possible heart failure  -continue O2, nebs, diuresis, supportive care  -on antibiotics with zosyn per hospitalist and continue as needed     4. Hematology  -h/o B12 and iron deficiency post gastrectomy  -anemia due to chronic illness  -recheck   -Jehovah Witness       5.  Pain  -CT thoracic and lumbar spine 8/17/19 showed no metastatic disease  -lidocaine patch, as  needed tylenol, ibuprofen  -try to minimize sedative drugs  -consider palliative care consult    6. Psychosocial Concerns  -depression and generalized anxiety disorder  -has repeatedly declined recommended treatment  -concerned about possible drug seeking behavior  -psychiatry consult    DVT Prophylaxis: enoxaparin    Jamaal Spicerginiadolfo    Code Status    Full Code    Primary Care Physician   Briseyda Lock    History of Present Illness   Megha Campbell is a 54 year old female who presents with dyspnea.    Past Medical History   I have reviewed this patient's medical history and updated it with pertinent information if needed.   Past Medical History:   Diagnosis Date     Acute respiratory failure with hypoxia (H)      Anxiety      Breast cancer in female (H) 11/21/2018    breast cancer, left, s/p mastectomy and radiation tx      Depressive disorder      Malignant pleural effusion 08/2019    Related to breast cancer, S/P Pleurodesis 8/23/19 left side     Metastatic cancer (H)      Obese      Pleural effusion      Pneumonia      Uncomplicated asthma        Past Surgical History   I have reviewed this patient's surgical history and updated it with pertinent information if needed.  Past Surgical History:   Procedure Laterality Date     BIOPSY BREAST Left 12/7/2018    Procedure: RE-EXCISION LEFT BREAST INFERIOR MARGIN, ASPIRATION OF AXILLARY SEROMA;  Surgeon: Lesly Aponte MD;  Location:  OR     GASTRIC BYPASS  2004    abdominal plasty, and scar tissue removal     IR CHEST PORT PLACEMENT > 5 YRS OF AGE  8/23/2019     LUMPECTOMY BREAST WITH SEED LOCALIZATION Left 11/21/2018    Procedure: SEED LOCALIZED LEFT BREAST LUMPECTOMY WITH LEFT AXILLARY LYMPH NODE DISSECTION;  Surgeon: Lesly Aponte MD;  Location:  OR     THORACOSCOPIC PLEURODESIS Left 8/23/2019    Procedure: LEFT VIDEO-ASSISTED THORACIC SURGERY  TALC PLEURODESIS, PARITAL PLEURAL BIOPSY.;  Surgeon: Zackary Rosado MD;  Location:  OR       Prior to  Admission Medications   Prior to Admission Medications   Prescriptions Last Dose Informant Patient Reported? Taking?   acetaminophen (TYLENOL) 325 MG tablet prn at prn Self No Yes   Sig: Take 2 tablets (650 mg) by mouth every 4 hours as needed for other (multimodal surgical pain management along with NSAIDS and opioid medication as indicated based on pain control and physical function.)   albuterol (PROAIR HFA/PROVENTIL HFA/VENTOLIN HFA) 108 (90 Base) MCG/ACT inhaler prn at prn Self No Yes   Sig: Inhale 1-2 puffs into the lungs every 6 hours as needed for shortness of breath / dyspnea or wheezing   albuterol (PROVENTIL) (2.5 MG/3ML) 0.083% neb solution prn at prn Self No Yes   Sig: Take 1 vial (2.5 mg) by nebulization every 4 hours as needed for shortness of breath / dyspnea or wheezing   buPROPion (WELLBUTRIN XL) 150 MG 24 hr tablet 2019 at Unknown time Self Yes Yes   Sig: Take 150 mg by mouth every morning   clonazePAM (KLONOPIN) 1 MG tablet 2019 at Unknown time Self No Yes   Si.5 mg (1/2 pill) twice a day for 3 days, then 0.5 mg (1/2 pill) at bedtime for 3 days -- then 0.5 mg at bedtime as needed for sleep   diphenhydrAMINE (BENADRYL) 25 MG tablet prn at prn Self No Yes   Sig: Take 1-2 tablets (25-50 mg) by mouth every 6 hours as needed for itching or allergies   ibuprofen (ADVIL/MOTRIN) 800 MG tablet prn at prn Self No Yes   Sig: Take 1 tablet (800 mg) by mouth every 8 hours as needed for moderate pain   levofloxacin (LEVAQUIN) 750 MG tablet hasn't taken Self No No   Sig: Take 1 tablet (750 mg) by mouth daily for 7 days   mirtazapine (REMERON) 15 MG tablet Past Week at Unknown time Self No Yes   Sig: Take 1 tablet (15 mg) by mouth At Bedtime   ondansetron (ZOFRAN-ODT) 4 MG ODT tab prn at prn Self No Yes   Sig: Take 1 tablet (4 mg) by mouth every 6 hours as needed for nausea or vomiting   ranitidine (ZANTAC) 150 MG tablet 2019 at Unknown time Self Yes Yes   Sig: Take 150 mg by mouth daily    sertraline (ZOLOFT) 100 MG tablet 2019 at Unknown time Self Yes Yes   Sig: Take 150 mg by mouth daily      Facility-Administered Medications: None     Allergies   Allergies   Allergen Reactions     Hydrocodone-Acetaminophen Itching     Patient states she is able to take it with Benadryl.       Oxycodone Itching and Rash       Social History   I have reviewed this patient's social history and updated it with pertinent information if needed. Jonathan Campbell  reports that she has never smoked. She has never used smokeless tobacco. She reports that she drinks alcohol. She reports that she does not use drugs.    Family History   I have reviewed this patient's family history and updated it with pertinent information if needed.   Family History   Problem Relation Age of Onset     Breast Cancer Mother        Review of Systems   The 5 point Review of Systems is negative other than noted in the HPI or here.     Physical Exam   Temp: 95.9  F (35.5  C) Temp src: Oral BP: 117/71 Pulse: 116 Heart Rate: 103 Resp: 24 SpO2: 100 % O2 Device: Nasal cannula Oxygen Delivery: 6 LPM  Vital Signs with Ranges  Temp:  [95.9  F (35.5  C)-98  F (36.7  C)] 95.9  F (35.5  C)  Pulse:  [116-121] 116  Heart Rate:  [103-116] 103  Resp:  [16-30] 24  BP: (113-177)/(63-85) 117/71  SpO2:  [90 %-100 %] 100 %  210 lbs 8 oz    Constitutional: awake, cooperative, no acute distress, port in place  Hematologic / Lymphatic: no cervical lymphadenopathy  GI:  soft, non-distended, non-tender, no masses palpated  Skin: no bruising or bleeding  Musculoskeletal: no pedal edema    Data   Results for orders placed or performed during the hospital encounter of 19 (from the past 24 hour(s))   Echocardiogram Complete    Narrative    [id number]  [id number]  [id number]  [id number]           [facility]  Echocardiography Laboratory  [address] [place] [id number]        Name: JONATHAN CAMPBELL  MRN: [id number]  : 1965  Study  Date: 09/07/2019 11:49 AM  Age: 54 yrs  Gender: Female  Patient Location: [facility]  Reason For Study: SOB  Ordering Physician: NIDA VERGARA  Referring Physician: Briseyda Lock  Performed By: Kayleen Ross     BSA: 2.2 m2  Height: 68 in  Weight: 241 lb  HR: 110  BP: 113/77 mmHg  _____________________________________________________________________________  __        Procedure  Complete Portable Echo Adult. Optison (NDC #8069-7684) given intravenously.  _____________________________________________________________________________  __        Interpretation Summary     Difficult study, patient on back and upright due to back pain/spasm.     1. Very limited image quality, difficult to assess EF. Probably normal, on  some views appears hyperdynamic. EF 60-70%. Cannot assess wall motion due to  poor image quality.  2. The right ventricle is normal size. Mildly decreased right ventricular  systolic function  3. No valve disease, but views are limited.  4. Normal IVC size with partial respiratory collapse.     No significant changes from echo 8-21-19.  _____________________________________________________________________________  __        Left Ventricle  Very limited image quality, difficult to assess EF. Probably normal, on some  views appears hyperdynamic. EF 60-70%. Cannot assess wall motion due to poor  image quality. The left ventricle is normal in size. There is moderate  concentric left ventricular hypertrophy. The visual ejection fraction is  estimated at 60-70%. Diastolic function not assessed due to tachycardia.  Regional wall motion abnormalities cannot be excluded due to limited  visualization.     Right Ventricle  The right ventricle is normal size. Mildly decreased right ventricular  systolic function.     Atria  The left atrium is not well visualized. Right atrium not well visualized.  There is no atrial shunt seen.     Mitral Valve  There is no mitral regurgitation noted.        Tricuspid Valve  No tricuspid  regurgitation.     Aortic Valve  The aortic valve is normal in structure and function. The aortic valve is not  well visualized.     Pulmonic Valve  The pulmonic valve is not well visualized.     Vessels  Normal ascending, transverse (arch), and descending aorta. Normal IVC size  with partial respiratory collapse.     Pericardium  Trivial pericardial effusion.        Rhythm  The rhythm was sinus tachycardia.  _____________________________________________________________________________  __  MMode/2D Measurements & Calculations  IVSd: 1.5 cm     LVIDd: 2.9 cm  LVIDs: 1.8 cm  LVPWd: 1.3 cm  FS: 38.0 %  LV mass(C)d: 128.1 grams  LV mass(C)dI: 57.9 grams/m2  asc Aorta Diam: 3.5 cm  RWT: 0.88        Doppler Measurements & Calculations  MV E max roz: 56.3 cm/sec  MV A max roz: 87.5 cm/sec  MV E/A: 0.64  MV dec slope: 609.3 cm/sec2  MV dec time: 0.09 sec  Ao V2 max: 145.2 cm/sec  Ao max P.0 mmHg  Ao V2 mean: 88.0 cm/sec  Ao mean PG: 3.8 mmHg  Ao V2 VTI: 18.1 cm  E/E' av.6  Lateral E/e': 4.1  Medial E/e': 7.1              _____________________________________________________________________________  __        Report approved by: Bouchra Corey 2019 01:58 PM      XR Chest 2 Views    Narrative    CHEST 2 VIEWS   2019 12:41 AM     HISTORY: Status post left pleurodesis on 2019. Follow-up  infiltrates. Evaluate for pleural effusion. Left chest wall pain at  incision site.    COMPARISON: 2019 at 0211 hours.    FINDINGS: Moderately extensive patchy opacities are present in the  central aspect of the right lung and mid and lower left lung. No  convincing pleural effusion. Possible cardiomegaly. Right anterior  chest wall port with catheter entering the right internal jugular vein  and distal tip in the superior vena cava.      Impression    IMPRESSION:   1. No convincing interval change since 2019 at 0211 hours.  2. Moderately extensive patchy opacities in both lungs.  3. No convincing  pleural effusion.    SALOMÓN ALLEN MD   Basic metabolic panel   Result Value Ref Range    Sodium 136 133 - 144 mmol/L    Potassium 3.4 3.4 - 5.3 mmol/L    Chloride 98 94 - 109 mmol/L    Carbon Dioxide 31 20 - 32 mmol/L    Anion Gap 7 3 - 14 mmol/L    Glucose 95 70 - 99 mg/dL    Urea Nitrogen 9 7 - 30 mg/dL    Creatinine 0.61 0.52 - 1.04 mg/dL    GFR Estimate >90 >60 mL/min/[1.73_m2]    GFR Estimate If Black >90 >60 mL/min/[1.73_m2]    Calcium 8.8 8.5 - 10.1 mg/dL   CBC with platelets   Result Value Ref Range    WBC 8.1 4.0 - 11.0 10e9/L    RBC Count 4.40 3.8 - 5.2 10e12/L    Hemoglobin 10.9 (L) 11.7 - 15.7 g/dL    Hematocrit 35.4 35.0 - 47.0 %    MCV 81 78 - 100 fl    MCH 24.8 (L) 26.5 - 33.0 pg    MCHC 30.8 (L) 31.5 - 36.5 g/dL    RDW 15.4 (H) 10.0 - 15.0 %    Platelet Count 351 150 - 450 10e9/L

## 2019-09-09 NOTE — PROGRESS NOTES
[facility] Progress Note     Primary Oncologist/Hematologist:  Dr. Winston          Assessment and Plan:      Megha Campbell is a 54 year old female who was admitted on 9/6/2019.     1. Stage III ( T3 N2a M0) triple negative left sided breast cancer  -diagnosis 11/2018  -[id number] stage III with 8 cm tumor and 5 positive lymph nodes, extranodal extension, LVI  -s/p lumpectomy with re-excision 12/2018   -s/p radiation 2/4/19-4/1/19  -declined chemotherapy (both neoadjuvant or adjuvant)  -CT 8/8/19 revealed a large left pleural effusion  -thoracentesis 8/9/19 confirmed metastatic adenocarcinoma, ER/IN negative, consistent with breast primary  -CT thoracic and lumbar spine shows no evidence of metastatic disease  -outpatient PET/CT being scheduled  -androgen receptors only weakly positive at 1-3 % and PDL1 is negative at 0%  -prognosis and that her cancer is now metastatic and no longer curable has been discussed  -we have discussed use of chemotherapy versus palliative cares alone (no other viable options)  -she has been very hesitant to consider chemotherapy but now states she will try it  -plan abraxane plus or minus platinum chemotherapy as outpatient if she consents  -follow up with PET/CT results for discussion and initiation of systemic therapies after discharge     2. Recurrent Pleural Effusion  -malignant cytology positive on 8/9/2019  -s/p VATS with pleurodesis      3. SOB  -multifocal etiology related to malignant  pleural effusion/COPD exacerbation/possible heart failure  -continue O2, nebs, diuresis, supportive care  -on antibiotics with zosyn per hospitalist and continue as needed     4. Hematology  -h/o B12 and iron deficiency post gastrectomy  -anemia due to chronic illness  -recheck   -Jehovah Witness       5.  Pain  -CT thoracic and lumbar spine 8/17/19 showed no metastatic disease  -lidocaine patch, as needed tylenol, ibuprofen  -try to minimize sedative drugs  -consider palliative  care consult     6. Psychosocial Concerns  -depression and generalized anxiety disorder  -has repeatedly declined recommended treatment  -concerned about possible drug seeking behavior  -psychiatry consult     DVT Prophylaxis: enoxaparin          Interval History:   Drowsy, leaning forward on pillows.               Review of Systems:   The 5 point Review of Systems is negative other than noted in the HPI             Medications:   Scheduled Medications    acetaminophen  1,000 mg Oral TID     clonazePAM  0.5 mg Oral BID     DULoxetine  30 mg Oral Daily     enoxaparin  40 mg Subcutaneous Q24H     heparin  5 mL Intracatheter Q28 Days     heparin lock flush  5-10 mL Intracatheter Q24H     iopamidol  79 mL Intravenous Once     lidocaine  1 patch Transdermal Q24h    And     lidocaine   Transdermal Q24H    And     lidocaine   Transdermal Q8H     methylPREDNISolone  40 mg Intravenous Daily     metoprolol succinate ER  25 mg Oral Daily     pantoprazole  40 mg Oral QAM AC     piperacillin-tazobactam  3.375 g Intravenous Q6H     potassium chloride  20 mEq Oral Once     QUEtiapine  50 mg Oral TID     ranitidine  150 mg Oral Daily     sertraline  150 mg Oral Daily     sodium chloride (PF)  10 mL Intracatheter Q8H     sodium chloride 0.9 %  101 mL Intravenous Once     PRN Medications  acetaminophen, bisacodyl, heparin lock flush, ibuprofen, ipratropium, levalbuterol, lidocaine 4%, lidocaine (buffered or not buffered), magnesium sulfate, melatonin, metoprolol, naloxone, ondansetron **OR** ondansetron, polyethylene glycol, potassium chloride, potassium chloride with lidocaine, potassium chloride, potassium chloride, potassium chloride, potassium phosphate (KPHOS) in D5W IV, potassium phosphate (KPHOS) in D5W IV, potassium phosphate (KPHOS) in D5W IV, potassium phosphate (KPHOS) in D5W IV, prochlorperazine **OR** prochlorperazine **OR** prochlorperazine, QUEtiapine, senna-docusate **OR** senna-docusate, sodium chloride (PF), sodium  "chloride (PF), sodium chloride (PF)               Physical Exam:   Vitals were reviewed  Blood pressure 130/82, pulse 108, temperature 98  F (36.7  C), temperature source Axillary, resp. rate 24, height 1.727 m (5' 8\"), weight 95.5 kg (210 lb 8 oz), last menstrual period 12/07/2017, SpO2 99 %, not currently breastfeeding.  Wt Readings from Last 4 Encounters:   09/08/19 95.5 kg (210 lb 8 oz)   09/04/19 104.3 kg (230 lb)   08/31/19 110.6 kg (243 lb 12.8 oz)   08/29/19 110.2 kg (243 lb)       I/O last 3 completed shifts:  In: 120 [P.O.:120]  Out: 350 [Urine:350]                    Data:   All laboratory data and imaging studies reviewed.    CMP  Recent Labs   Lab 09/09/19  0530 09/08/19 0600 09/07/19 0600 09/06/19 1902 09/06/19  0153   NA  --  136 138 138 136   POTASSIUM 3.4 3.4 3.5 3.7 3.5   CHLORIDE  --  98 100 102 101   CO2  --  31 32 33* 26   ANIONGAP  --  7 6 3 9   GLC  --  95 109* 120* 105*   BUN  --  9 9 8 9   CR 0.47* 0.61 0.56 0.52 0.59   GFRESTIMATED >90 >90 >90 >90 >90   GFRESTBLACK >90 >90 >90 >90 >90   PABLO  --  8.8 8.6 8.9 8.4*   MAG 2.0 2.1  --  2.1  --    PHOS 2.5  --   --   --   --    PROTTOTAL  --   --   --   --  7.2   ALBUMIN  --   --   --   --  2.1*   BILITOTAL  --   --   --   --  0.3   ALKPHOS  --   --   --   --  108   AST  --   --   --   --  34   ALT  --   --   --   --  23     CBC  Recent Labs   Lab 09/09/19  0530 09/08/19  0600 09/07/19  0600 09/06/19 1902 09/06/19  0153   WBC  --  8.1 7.2 7.3 8.0   RBC  --  4.40 4.31 4.35 4.39   HGB  --  10.9* 10.8* 10.9* 11.0*   HCT  --  35.4 34.6* 34.8* 34.7*   MCV  --  81 80 80 79   MCH  --  24.8* 25.1* 25.1* 25.1*   MCHC  --  30.8* 31.2* 31.3* 31.7   RDW  --  15.4* 15.6* 15.7* 15.8*    351 303 352 349     INRNo lab results found in last 7 days.        Ramos Narayan CNP  Nurse Practitioner  [facility]  [phone]      "

## 2019-09-09 NOTE — PROGRESS NOTES
[facility]    Hospitalist Progress Note    Assessment & Plan   Megha Campbell is a 54 year old female with complex past medical history including metastatic breast cancer, malignant left pleural effusion status post pleurodesis 8/23/2019, recent diagnosis right upper lobe pneumonia earlier this month, recent concern for narcotic\drug-seeking behavior by previous treating hospitalist at [facility], depression and anxiety who presents with shortness of breath, hypoxia and pneumonia.  He will be admitted for evaluation and treatment of her hypoxia and for possible healthcare associated pneumonia.        Principal Problem:    Bilateral pneumonia  -Patient initiated on Levaquin on admission 9/4/2019 but did not take for unclear reasons.  Her reasoning for not taking oral Levaquin does not entirely seem plausible.  She also did not  her oxygen today for unclear reasons.   mildly hypoxic on admssion.  Did not require oxygen at time of her last discharge on September 4.  Labs from morning of admission in ED during her overnight ER stay showed normal white blood cell count.  She is afebrile.   - Chest x-ray shows no clear interval change from September 3.  There are moderately extensive patchy hazy opacities in both lungs.  -initiated  treatment for healthcare associated pneumonia with zosyn for now. Consider aspiration pneumonia given issues with sedation on prior hospital stay  -Patient has been tachycardic through her last couple hospital stays therefore this is not new.  She does not describe a clear change in her breathing cough or shortness of breath. - No DVT symptoms.  -She had a CT PE protocol on September 1 with no PE.  -pt having issues with left chest wall pain early on in hospital stay but not been issues for last several days, possibly because no narcotics are being given for this.  Pt agreed to no narcotics last pm. Initiated on po morphine by cross cover overnight  -  she described muscle spasm but not seem clearly apparent on exam. Doubt intrapulmonary process. Doubt PE. Pain complaints and reports of pain during exam of L chest wall seemed out of proportion to my exam findings and Xray imaging.   - pt has declined CT chest pe protocol to further eval though low suspicion for new process. Pt declines for changing reasons.  Agrees to Xray.   - given no clear source for pain on exam, current pneumonia, hypoxia and concern for drug seeking behaviour on prior admission I have concerns for narcotics at this time.   -pt agrees with anxiety may be driving her sense of sob and impacting her breathing.   -pt taking shallow breaths  -? Component of volume overload. BLE edema   -CXR 9/8: stable bilateral infultrates. No effusion  -Stable pulmonary status.   Not having issues with left chest wall pain today.   -Echo -limited imaging quality, nl EF, no clear WMA, nl RV, mild decrease RV.   -have been diuresing with daily IV lasix  -no cough. Afebrile. Nl wbc.   - etiology of infultrates- aspiration vs health care associated pneumonia (nl wbc, afebrile though) vs tumor    Had some wheezing on exam this am- - I gave one dose of solumedrol  On repeat exam, pt sounds clear, slight crackles. No wheezing, taking shallow breaths -pt feeling need for neb but explained not needed at this time. Rec'd aggressive pulmonary toilet    -hypoxia: 2/2 infultrates, shallow breaths    Plan:   -wean oxygen as able. IS  -solumedrol 40mg iv X 1 this am as wheezing- not appreciate on prior exams.   -for now, Zosyn for healthcare associated pneumonia-complete 7 days course of abx  -oxygen and wean off as able,   -lasix 40mg iv x1 now may repeat later today  - schedule tylenol, prn ibuprofen, aqua K pad  -avoid sedating meds as able, aspiration precautions  -Aggressive incentive spirometry, mobilize. Emphasized again with patient  -Therapy consult.   -stop duonebs as may be driving new svt and tachycardia, changed  to atrovent and xopenex  -Ibuprofen and Tylenol for incisional wall pain.  Lidocaine patch to left chest wall for incisional wall pain  -Telemetry  -Aspiration and fall precautions  -Avoid further sedating medications.  She is on Klonopin to prevent withdrawal and treat her anxiety she has been on this chronically.  Avoid narcotics at this time.         discussed care plan with RN and patient.     Sinus tachycardia  SVT runs  - Sinus tachycardia not new and review of chart indicates present for last 3 hospital stay. Pain and anxiety issues. On duonebs scheduled.   -negative CT PE protocol study 9/1/19.   - some runs of SVT, no afib today  -following Mg/K  - 9/8: RRT called for sustained SVT. No afib, Discussed with house NP.   Pt using prn nebs for sob and anxiety even though no wheezing  Changed to xopenex and atrovent prn  Scheduled neb stopped this am    -  Less tachy off duoneb and with seroquel  -very brief run of svt this am.   Plan:   -Cont tele,    -follow K and Mg. replace per protocol,  -stop scheduled nebs,   started toprol xl 25mg every day for now,  - diuresis.   -atrovent and xopenex prn        Malignant left pleural effusion -- S/P Pleurodesis 8/23/19   Malignant neoplasm of lower-outer quadrant of left breast of female, estrogen receptor negative (H)    Breast CA 11/2018, ER Neg -- now stage IV (S/P Left Mast, S/P Rad Tx)    Assessment: Status post pleurodesis 8/23/2019.  She states her incisional wall pain is been getting steadily better.  She is okay with just Tylenol lidocaine patch and PRN ibuprofen for pain.  I recommended against initiating narcotics given the fact profile and history of sedation encephalopathy on prior hospitalization.  See discussion below  Seen by oncology 9/8  Pt now agreeing to chemo. Consider palliative care consult    Plan: consider palliative care consult,  Keep follow-up with Dr. Amira Colón of thoracic surgery, keep follow-up with [place] [facility], PRN Tylenol and  ibuprofen.  Proton pump inhibitor for ulcer prophylaxis, Lovenox for DVT prophylaxis, physical therapy and occupational therapy consult, encouraged ambulation.  Follow for need of [place] [facility] consult during this hospital stay. Mobilize  Given issues around L chest wall pain, will consult oncology service so can follow along given recurrent hospitalization and in put on possible pain source.        Uncomplicated asthma    Assessment: Some wheezing noted by ER physician.  Poor respiratory effort at this time do not appreciate wheezing.  Patient is feeling like she needs nebulizer treatment at this time  No wheezing at this time. Doubt asthma exacerbation   But pt had some wheezing this am, no wheezing on other days  Repeat exam today without wheezing.   Stopped duonebs given svt, sinus tachycardia-? Making anxiety worse      Plan: changes to prn atrovent and xopenex.             Depressive disorder    Generalized anxiety disorder    Assessment: Followed by psychiatry outpatient.  Patient seen by psychiatry and prior hospitalization.  She is currently taking Wellbutrin, Zoloft prior to admission doses.  She was to be on a Klonopin taper on last admission but she states she talked with her psychiatrist and she is still on Klonopin twice daily dosing though details are unclear about this.  He is alert.  No falls.  She does not appear sedated.  States her shortness of breath is driving her anxiety.  Previous hospitalist have noted social issues at home and concerned that she may be required seeking hospitalization to be away from her .  Anxiety playing a role in pain and coping currently with medical issues  Seen by psychiatry 9/8. Initiated on seroquel  wellbutrin stopped  Pt agrees that anxiety playing role in pain.   Pt finds seroquel helpful but 50mg dose too sedating. Appears sedated on 50mg dose today.       Plan: decrease seroquel from 50mg to 25mg TID and reeval, am psychiatry consult-appreciate  input,  continue Zoloft 150 mg daily, continue Klonopin 0.5 mg twice daily for now hold if sedated. Close pcp follow up,     Suspected drug-seeking behavior by previous hospitalist on prior admissions.  L chest wall pain    Assessment:   -See HPI and previous discharge summary.    -Stated that I do not think narcotics are indicated at this time.  Will treat with Tylenol and ibuprofen.  Patient was agreeable with this.  I discussed the risk of narcotics including oversedation, infusion and risk of falls with this medication type.   Pt initiated on po morphine overnight by [facility] hospitalist for pain.   -pt doing well this am per RN then having significant pain over pleurodesis site.   -see my exam from today. No clear pain with palpation at times. Exam inconsistent.   - pt declines CT for changing reasons- pain, then anxiety  - to see psychiatry  - I do not see an indication for narcotics at this time and discussed last pm and today with patient and RN present today. Pt was agreable to this plan; she currently seems slightly sedated and had encephalopathy from narcotics and benzos during last hospital stay. Discussed potential side effects with patient especially in light of her pneumonia and hypoxia. ? Aspiration pneumonia.   -for last 2 days pt has not complain of L chest wall pain.     Plan: Monitor.  For now it seems appropriate to just treat with Tylenol and as needed ibuprofen.  Will provide lidocaine patch to her left chest wall. Added aqua k pad, treat anxiety. No narcotics.       S/P gastric bypass -- 2004    Assessment: Noted     DVT Prophylaxis: Enoxaparin (Lovenox) subcutaneous     Code Status: Full Code     Disposition: to see PT, OT, SW, encouarged pt to be active, pt continues to decline TCU and finds discussion of this upsetting.  Follow    Yung Dillon MD  Text Page  (7am to 6pm)  Interval History   Had flair of low back pain earlier but has been doing well today. No complaints of left  chest wall pain over prior incision site today. States anxiety contributes to her pain  Having short runs of svt.   Breathing stable.   Son now in town  Feels safe at home. resistent to idea of TCU.   Wants to go home   agrees to chemo now  Seen by psych and oncology today    -Data reviewed today: I reviewed all new labs and imaging results over the last 24 hours. I personally reviewed imaging and labs since admission    Physical Exam   Temp: 98.2  F (36.8  C) Temp src: Axillary BP: 127/70 Pulse: 104 Heart Rate: 104 Resp: 22 SpO2: 95 % O2 Device: Nasal cannula Oxygen Delivery: 3 LPM  Vitals:    09/06/19 1532 09/07/19 0406 09/08/19 0400   Weight: 108 kg (238 lb) 109.4 kg (241 lb 1.6 oz) 95.5 kg (210 lb 8 oz)     Vital Signs with Ranges  Temp:  [95.3  F (35.2  C)-98.2  F (36.8  C)] 98.2  F (36.8  C)  Pulse:  [104-119] 104  Heart Rate:  [] 104  Resp:  [18-32] 22  BP: (121-156)/() 127/70  SpO2:  [91 %-99 %] 95 %  I/O last 3 completed shifts:  In: 120 [P.O.:120]  Out: 350 [Urine:350]    Constitutional: Sitting up in bed, sitting leaning forward,  nontoxic appearing  Neck: ? Mild jvp elevation  Respiratory:sligntly tachypneic,  Some crackles L base-no change, some exp wheeze this am now clear. Continues to take shallow breaths   Chest: no clear muscle, incisions appear well healed. No spinal tenderness. On my initial exam pt yelled in pain when I lightly touched her left chest wall and thorasic spine then immediately afterwards had no pain with palpation over L chest wall site and spine. No induration/swelling, redness.   Cardiovascular: RRR no r/g/m, ~tachy, regular,  2+ BLE edema to knee level, No calf tenderness or redness or asymmetric swelling  GI: soft, nt, nd  Skin/Integumen: no rash   Neuro: nl speech and fuly oriented. Awake and alert, cooperative, pleasant  Psych:  Calm, cooperative, conversant, appears slightly sedated, feeling less anxious overall from 1-2 days ago.   Medications        acetaminophen  1,000 mg Oral TID     clonazePAM  0.5 mg Oral BID     DULoxetine  30 mg Oral Daily     enoxaparin  40 mg Subcutaneous Q24H     furosemide  40 mg Intravenous Once     heparin  5 mL Intracatheter Q28 Days     heparin lock flush  5-10 mL Intracatheter Q24H     iopamidol  79 mL Intravenous Once     lidocaine  1 patch Transdermal Q24h    And     lidocaine   Transdermal Q24H    And     lidocaine   Transdermal Q8H     methylPREDNISolone  40 mg Intravenous Daily     metoprolol succinate ER  25 mg Oral Daily     pantoprazole  40 mg Oral QAM AC     piperacillin-tazobactam  3.375 g Intravenous Q6H     potassium chloride  20 mEq Oral Once     QUEtiapine  25 mg Oral TID     ranitidine  150 mg Oral Daily     sertraline  150 mg Oral Daily     sodium chloride (PF)  10 mL Intracatheter Q8H     sodium chloride 0.9 %  101 mL Intravenous Once       Data   Recent Labs   Lab 09/09/19  0530 09/08/19  0600 09/07/19  0600 09/06/19  1902 09/06/19  0153 09/03/19  1635   WBC  --  8.1 7.2 7.3 8.0 7.5   HGB  --  10.9* 10.8* 10.9* 11.0* 11.3*   MCV  --  81 80 80 79 79    351 303 352 349 352   NA  --  136 138 138 136 137   POTASSIUM 3.4 3.4 3.5 3.7 3.5 3.6   CHLORIDE  --  98 100 102 101 102   CO2  --  31 32 33* 26 28   BUN  --  9 9 8 9 3*   CR 0.47* 0.61 0.56 0.52 0.59 0.63   ANIONGAP  --  7 6 3 9 7   PABLO  --  8.8 8.6 8.9 8.4* 8.6   GLC  --  95 109* 120* 105* 110*   ALBUMIN  --   --   --   --  2.1*  --    PROTTOTAL  --   --   --   --  7.2  --    BILITOTAL  --   --   --   --  0.3  --    ALKPHOS  --   --   --   --  108  --    ALT  --   --   --   --  23  --    AST  --   --   --   --  34  --    TROPI  --   --   --   --  <0.015 <0.015       Imaging:   No results found for this or any previous visit (from the past 24 hour(s)).

## 2019-09-09 NOTE — PLAN OF CARE
OT: Attempted session. Despite encouragement and education, pt declined session. Pt reported that she just returned from the BSC and is fatigue. Pt requesting therapist return tomorrow.

## 2019-09-09 NOTE — PLAN OF CARE
DATE & TIME: 9/8/2019 9679-3916   Cognitive Concerns/ Orientation : A&)  BEHAVIOR & AGGRESSION TOOL COLOR: Green  CIWA SCORE: NA  ABNL VS/O2: 3-4 L NC  MOBILITY: SBA/ WGB  PAIN MANAGMENT: refused schedule tylenol, lido patch  DIET: Regular  BOWEL/BLADDER: BC  ABNL LAB/BG:   DRAIN/DEVICES: NA  TELEMETRY RHYTHM: ST  SKIN: WNL  TESTS/PROCEDURES: NA  D/C DAY/GOALS/PLACE: [facility]  OTHER IMPORTANT INFO:  Pt here with extended psyc history, pain, history of breast cancer, A&O on 3-L NC lethargic refused to take schedule pain medication denies pain at this time stay at the edge of the bed several teaching done regarding her safety in the bed regardless patient choose to sit at the edge of the bed and set the alarm charge notified, BS active LS some crackle noted generalized edema +1-+2 plan to go home pending continue to monitor

## 2019-09-09 NOTE — PLAN OF CARE
Rested quietly after RRT. C/o difficulty breathing once this shift and wanted neb treatment. Scheduled Seroquel given. Oxygen increased to 3L while dyspneic. VSS.  to 120 most of evening. No appetite.

## 2019-09-09 NOTE — PLAN OF CARE
Oriented x4, lethargic for most part of the shift. Pt stated 50mg Seroquel TID is contributing it. Dr Dillon notified. Seroquel decreased to 25mg TID, held afternoon dose. Pt more alert this afternoon. O2 sating mid 90s on 3-4L NC. Gave prn Neb x1 this AM for both exp and inspiratory wheezes. Started on Solumedrol, gave iv Lasix 40mg once. Pt diuresing well. IS at 375. Denies chest pain. Non-sustained runs of SVTs lasting 5-10 seconds. Dr Dillon updated. +3 edema to BLE. SIDNY stockings on. Pt need lots of encouragement and education to participate in cares. Elevated BLE in bed. Pt got up to chair. Up SBA to BSC. On cardiac diet/no caffeine. Poor appetite. Port heparin locked. Denies pain. Discharge to home vs TCU pending progress.

## 2019-09-09 NOTE — PLAN OF CARE
"PT: Attempted PT session. Pt was sitting up in a recliner chair on 4.5L O2 NC, O2 sats 94% at rest. Pt declined any mobility at this time. Continued education on role of acute PT and importance of assessing her mobility to determine if she can safely return home at hospital disch. Pt firmly stated \"I'm not going to a rehab so you can quit talking about that, everyone wants to talk about that.\" Pt then requested to \"go to the bathroom.\" Writer offered to assist pt with walking to the restroom, which pt firmly declined stating \"I only want to use the commode, bring it right here next to me because I can't walk that far.\" Pt then requested to have nursing staff assist her with the BSC stating \"that's what they do\" despite writer's offer to assist. Pt was resistant to discussing how it may be challenging to return home, even with assist, if walking ~20' to the restroom was too difficult. Pt remained seated in the recliner with call light and chair alarm on.    "

## 2019-09-09 NOTE — PLAN OF CARE
" A&Ox3 ex. lethargic/sleepy, inconsistent at time to follow commands, anxious at times. VSS ex weaned from 3L to 2L NC, Tachy HR 96-110s. LS dim, LIND, SOB, pt most comfortable sitting directly up on edge of bed. CMS intact ex +2-+3 BLE edema. Denies pain. IV SL, IV abx given x1. Tolerating low fat, no caffeine diet. Voiding in BSC, adequate UOP. Prefers slower pace with cares stating \"I need some time\", \"I'll call when I'm ready\". Port Heparin locked, IV Abx given x1. Discharge pending, TCU recommended. Continue to monitor.   "

## 2019-09-10 NOTE — PROVIDER NOTIFICATION
MD Notification    Notified Person: MD    Notified Person Name: Dr. Dillon    Notification Date/Time: 09/10/19 8253    Notification Interaction: Phone    Purpose of Notification: Patient unable to do CT, anxiety.    Orders Received: MD entered orders for ativan for the scan.    Comments: MD stated that if the patient declined the scan today he would talk to her about it tomorrow.    [id number] - Updated MD that the patient declined the ativan and the scan tonight.  Also notified MD that the patient wanted him to know that she wants to discharge by 12:00 tomorrow.

## 2019-09-10 NOTE — CONSULTS
Pulmonary Medicine Consultation        Date of Admission: 9/6/2019  Primary Attending:  Yung Dillon MD  Consulting Physician: Leighton Green MD      History:    Megha Campbell is a 54 year old female with complex past medical history including metastatic breast cancer, malignant left pleural effusion status post pleurodesis 8/23/2019, recent diagnosis right upper lobe pneumonia earlier this month, depression and anxiety who presents with shortness of breath, hypoxia and pneumonia.  He will be admitted for evaluation and treatment of her hypoxia and for possible healthcare associated pneumonia.  Were consulted for  Bilateral pulmonary infiltrates of unclear etiology pneumonia, versus aspiration, versus tumThe patient continues to complain of generalized pain and difficulty breathing, Stage III ( T3 N2a M0) triple negative left sided breast cancer. Diagnosis 11/2018. [id number] stage III with 8 cm tumor and 5 positive lymph nodes, extranodal extension, LVI. /p lumpectomy with re-excision 12/2018   -s/p radiation 2/4/19-4/1/19. declined chemotherapy (both neoadjuvant or adjuvant). CT 8/8/19 revealed a large left pleural effusion. thoracentesis 8/9/19 confirmed metastatic adenocarcinoma, ER/DC negative, consistent with breast primary  The patient complains of generalized pain and difficulty breathing, does not feel like talking on other than allowing me to examine her did not want me in the room at that time     Review of system:   ROS is negative except for items mentioned above and in HPI.           Prior medical history:  Past Medical History:   Diagnosis Date     Acute respiratory failure with hypoxia (H)      Anxiety      Breast cancer in female (H) 11/21/2018    breast cancer, left, s/p mastectomy and radiation tx      Depressive disorder      Malignant pleural effusion 08/2019    Related to breast cancer, S/P Pleurodesis 8/23/19 left side     Metastatic cancer (H)      Obese      Pleural effusion       Pneumonia      Uncomplicated asthma        Past Surgical History:   Procedure Laterality Date     BIOPSY BREAST Left 12/7/2018    Procedure: RE-EXCISION LEFT BREAST INFERIOR MARGIN, ASPIRATION OF AXILLARY SEROMA;  Surgeon: Lesly Aponte MD;  Location:  OR     GASTRIC BYPASS  2004    abdominal plasty, and scar tissue removal     IR CHEST PORT PLACEMENT > 5 YRS OF AGE  8/23/2019     LUMPECTOMY BREAST WITH SEED LOCALIZATION Left 11/21/2018    Procedure: SEED LOCALIZED LEFT BREAST LUMPECTOMY WITH LEFT AXILLARY LYMPH NODE DISSECTION;  Surgeon: eLsly Aponte MD;  Location:  OR     THORACOSCOPIC PLEURODESIS Left 8/23/2019    Procedure: LEFT VIDEO-ASSISTED THORACIC SURGERY  TALC PLEURODESIS, PARITAL PLEURAL BIOPSY.;  Surgeon: Zackary Rosado MD;  Location:  OR       Patient Active Problem List   Diagnosis     Malignant neoplasm of lower-outer quadrant of left breast of female, estrogen receptor negative (H)     Breast CA 11/2018, ER Neg -- now stage IV (S/P Left Mast, S/P Rad Tx)     Malignant left pleural effusion -- S/P Pleurodesis 8/23/19     Uncomplicated asthma     Obese     Depressive disorder     Pneumonia, RUL     S/P gastric bypass -- 2004     Generalized anxiety disorder     Drug-seeking behavior     Bilateral pneumonia       Social History     Social History     Socioeconomic History     Marital status:      Spouse name: Not on file     Number of children: 2     Years of education: Not on file     Highest education level: Not on file   Occupational History     Occupation: accounting   Social Needs     Financial resource strain: Not on file     Food insecurity:     Worry: Not on file     Inability: Not on file     Transportation needs:     Medical: Not on file     Non-medical: Not on file   Tobacco Use     Smoking status: Never Smoker     Smokeless tobacco: Never Used   Substance and Sexual Activity     Alcohol use: Yes     Comment: < 1 drink a month     Drug use: No     Sexual  activity: Not on file   Lifestyle     Physical activity:     Days per week: Not on file     Minutes per session: Not on file     Stress: Not on file   Relationships     Social connections:     Talks on phone: Not on file     Gets together: Not on file     Attends Zoroastrianism service: Not on file     Active member of club or organization: Not on file     Attends meetings of clubs or organizations: Not on file     Relationship status: Not on file     Intimate partner violence:     Fear of current or ex partner: Not on file     Emotionally abused: Not on file     Physically abused: Not on file     Forced sexual activity: Not on file   Other Topics Concern     Parent/sibling w/ CABG, MI or angioplasty before 65F 55M? Not Asked   Social History Narrative    , has 2 children, lives in an apartment with her new  of 3 years (Liban Abbott), and he is disabled (?spinal cord injury), and she helps care for him.  (last updated 9/4/2019)          Family History  Family History   Problem Relation Age of Onset     Breast Cancer Mother            Medications  Current Outpatient Medications   Medication Sig Dispense Refill     DULoxetine (CYMBALTA) 30 MG capsule Take 1 capsule (30 mg) by mouth daily 30 capsule 0     QUEtiapine (SEROQUEL) 50 MG tablet Take 1 tablet (50 mg) by mouth 3 times daily 90 tablet 0     Current Facility-Administered Medications Ordered in Epic   Medication Dose Route Frequency Last Rate Last Dose     acetaminophen (TYLENOL) tablet 1,000 mg  1,000 mg Oral TID   1,000 mg at 09/09/19 0824     acetaminophen (TYLENOL) tablet 650 mg  650 mg Oral Q4H PRN         bisacodyl (DULCOLAX) Suppository 10 mg  10 mg Rectal Daily PRN         clonazePAM (klonoPIN) tablet 0.5 mg  0.5 mg Oral BID   0.5 mg at 09/10/19 0817     DULoxetine (CYMBALTA) DR capsule 30 mg  30 mg Oral Daily   30 mg at 09/09/19 0824     enoxaparin (LOVENOX) injection 40 mg  40 mg Subcutaneous Q24H   40 mg at 09/09/19 1939     heparin 100  UNIT/ML injection 5 mL  5 mL Intracatheter Q28 Days   5 mL at 09/08/19 0121     heparin lock flush 10 UNIT/ML injection 5-10 mL  5-10 mL Intracatheter Q24H   5 mL at 09/09/19 1641     heparin lock flush 10 UNIT/ML injection 5-10 mL  5-10 mL Intracatheter Q1H PRN   5 mL at 09/10/19 0623     ibuprofen (ADVIL/MOTRIN) tablet 800 mg  800 mg Oral Q8H PRN   800 mg at 09/07/19 2325     iopamidol (ISOVUE-370) solution 79 mL  79 mL Intravenous Once         ipratropium (ATROVENT) 0.02 % neb solution 0.5 mg  0.5 mg Nebulization 4x Daily PRN   0.5 mg at 09/09/19 2035     levalbuterol (XOPENEX CONC) neb solution 0.63 mg  0.63 mg Nebulization Q6H PRN   1.25 mg at 09/09/19 0754     Lidocaine (LIDOCARE) 4 % Patch 1 patch  1 patch Transdermal Q24h   1 patch at 09/10/19 0132    And     lidocaine patch REMOVAL   Transdermal Q24H        And     lidocaine patch in PLACE   Transdermal Q8H         lidocaine (LMX4) cream   Topical Q1H PRN         lidocaine 1 % 0.1-1 mL  0.1-1 mL Other Q1H PRN         magnesium sulfate 4 g in 100 mL sterile water (premade)  4 g Intravenous Q4H PRN         melatonin tablet 1 mg  1 mg Oral At Bedtime PRN   1 mg at 09/07/19 2325     methylPREDNISolone sodium succinate (solu-MEDROL) injection 40 mg  40 mg Intravenous Daily   40 mg at 09/10/19 0817     metoprolol (LOPRESSOR) injection 5 mg  5 mg Intravenous Q6H PRN         metoprolol succinate ER (TOPROL-XL) 24 hr tablet 25 mg  25 mg Oral Daily   25 mg at 09/10/19 0817     naloxone (NARCAN) injection 0.1-0.4 mg  0.1-0.4 mg Intravenous Q2 Min PRN         ondansetron (ZOFRAN-ODT) ODT tab 4 mg  4 mg Oral Q6H PRN   4 mg at 09/07/19 1121    Or     ondansetron (ZOFRAN) injection 4 mg  4 mg Intravenous Q6H PRN         pantoprazole (PROTONIX) EC tablet 40 mg  40 mg Oral QAM AC   40 mg at 09/10/19 0816     piperacillin-tazobactam (ZOSYN) 3.375 g vial to attach to  mL bag  3.375 g Intravenous Q6H 200 mL/hr at 09/07/19 1115 3.375 g at 09/10/19 0339     polyethylene  glycol (MIRALAX/GLYCOLAX) Packet 17 g  17 g Oral Daily PRN         potassium chloride (KLOR-CON) Packet 20-40 mEq  20-40 mEq Oral or Feeding Tube Q2H PRN         potassium chloride 10 mEq in 100 mL intermittent infusion with 10 mg lidocaine  10 mEq Intravenous Q1H PRN         potassium chloride 10 mEq in 100 mL sterile water intermittent infusion (premix)  10 mEq Intravenous Q1H PRN         potassium chloride 20 mEq in 50 mL intermittent infusion  20 mEq Intravenous Q1H PRN 50 mL/hr at 09/09/19 0950 20 mEq at 09/09/19 0950     potassium chloride ER (K-DUR/KLOR-CON M) CR tablet 20 mEq  20 mEq Oral Once         potassium chloride ER (K-DUR/KLOR-CON M) CR tablet 20-40 mEq  20-40 mEq Oral Q2H PRN         potassium phosphate 15 mmol in D5W 250 mL intermittent infusion  15 mmol Intravenous Daily PRN         potassium phosphate 20 mmol in D5W 250 mL intermittent infusion  20 mmol Intravenous Q6H PRN         potassium phosphate 20 mmol in D5W 500 mL intermittent infusion  20 mmol Intravenous Q6H PRN         potassium phosphate 25 mmol in D5W 500 mL intermittent infusion  25 mmol Intravenous Q8H PRN         prochlorperazine (COMPAZINE) injection 10 mg  10 mg Intravenous Q6H PRN        Or     prochlorperazine (COMPAZINE) tablet 10 mg  10 mg Oral Q6H PRN        Or     prochlorperazine (COMPAZINE) Suppository 25 mg  25 mg Rectal Q12H PRN         QUEtiapine (SEROquel) tablet 25 mg  25 mg Oral TID   25 mg at 09/09/19 2105     QUEtiapine (SEROquel) tablet 50 mg  50 mg Oral Q2H PRN         ranitidine (ZANTAC) tablet 150 mg  150 mg Oral Daily   150 mg at 09/09/19 2105     senna-docusate (SENOKOT-S/PERICOLACE) 8.6-50 MG per tablet 1 tablet  1 tablet Oral BID PRN        Or     senna-docusate (SENOKOT-S/PERICOLACE) 8.6-50 MG per tablet 2 tablet  2 tablet Oral BID PRN         sertraline (ZOLOFT) tablet 150 mg  150 mg Oral Daily   150 mg at 09/09/19 0824     sodium chloride (PF) 0.9% PF flush 10 mL  10 mL Intracatheter Q8H   10 mL at  19 2115     sodium chloride (PF) 0.9% PF flush 10-20 mL  10-20 mL Intracatheter q1 min prn         sodium chloride (PF) 0.9% PF flush 10-20 mL  10-20 mL Intracatheter Q1H PRN   10 mL at 09/10/19 0820     sodium chloride (PF) 0.9% PF flush 3 mL  3 mL Intracatheter q1 min prn         sodium chloride 0.9 % bag 500mL for CT scan flush use  101 mL Intravenous Once         Current Outpatient Medications Ordered in Epic   Medication     DULoxetine (CYMBALTA) 30 MG capsule     QUEtiapine (SEROQUEL) 50 MG tablet       Allergies   Allergen Reactions     Hydrocodone-Acetaminophen Itching     Patient states she is able to take it with Benadryl.       Oxycodone Itching and Rash               Physical Examination:   Vitals:    09/10/19 0200 09/10/19 0350 09/10/19 0611 09/10/19 0735   BP:  133/58  124/86   BP Location:  Right arm  Right arm   Pulse:       Resp:   28   Temp:  96.6  F (35.9  C)  95.8  F (35.4  C)   TempSrc:  Oral  Axillary   SpO2:  96%  99%   Weight:   89.1 kg (196 lb 6.4 oz)    Height:         Body mass index is 29.86 kg/m .  Temp (24hrs), Av.8  F (36  C), Min:95.8  F (35.4  C), Max:98.2  F (36.8  C)        Constitutional:  Appears uncomfortable and in pain, Lethargic  HENT:  mucous membranes moist.  Eyes: PERRLA, no icterus, no pallor.   Neck: No lymphadenopathy or thyromegaly, trachea midline, no carotid bruits.  Cardiovascular: Regular rate and rythym, no murmurs, rubs or gallops, Positive bilateral lower extremity edema  Respiratory/Chest: Coarse breath sounds bilaterally  Gastrointestinal: Abdomen was soft, non-tender, non-distended, no masses felt, no hepatosplenomegaly.  Musculoskeletal: No clubbing or cyanosis, full range of motion in all extremities.  Neurological: No focal motor or sensory deficits.   Skin: No skin rash, hives, petechiae, or breakdown.        CMP  Recent Labs   Lab 09/10/19  0600 19  0530 19  0619  0619  1902 19  0153     --  136  138 138 136   POTASSIUM 3.3* 3.4 3.4 3.5 3.7 3.5   CHLORIDE 96  --  98 100 102 101   CO2 36*  --  31 32 33* 26   ANIONGAP 6  --  7 6 3 9   *  --  95 109* 120* 105*   BUN 16  --  9 9 8 9   CR 0.58 0.47* 0.61 0.56 0.52 0.59   GFRESTIMATED >90 >90 >90 >90 >90 >90   GFRESTBLACK >90 >90 >90 >90 >90 >90   PABLO 9.4  --  8.8 8.6 8.9 8.4*   MAG 1.8 2.0 2.1  --  2.1  --    PHOS  --  2.5  --   --   --   --    PROTTOTAL  --   --   --   --   --  7.2   ALBUMIN  --   --   --   --   --  2.1*   BILITOTAL  --   --   --   --   --  0.3   ALKPHOS  --   --   --   --   --  108   AST  --   --   --   --   --  34   ALT  --   --   --   --   --  23     CBC  Recent Labs   Lab 09/09/19  0530 09/08/19  0600 09/07/19  0600 09/06/19  1902 09/06/19  0153   WBC  --  8.1 7.2 7.3 8.0   RBC  --  4.40 4.31 4.35 4.39   HGB  --  10.9* 10.8* 10.9* 11.0*   HCT  --  35.4 34.6* 34.8* 34.7*   MCV  --  81 80 80 79   MCH  --  24.8* 25.1* 25.1* 25.1*   MCHC  --  30.8* 31.2* 31.3* 31.7   RDW  --  15.4* 15.6* 15.7* 15.8*    351 303 352 349     INRNo lab results found in last 7 days.  Arterial Blood GasNo lab results found in last 7 days.  Recent Labs   Lab 09/04/19  0515   CULT No growth       Diagnostic Studies:  Chest Radiology:    CT CHEST PULMONARY EMBOLISM WITH CONTRAST  9/1/2019 7:42 AM     HISTORY:  Shortness of breath; tachycardia, anxiety, known malignancy.     TECHNIQUE: Scans obtained from the apices through the diaphragm with  IV contrast. 85 mL Isovue-370 IV injected. Radiation dose for this  scan was reduced using automated exposure control, adjustment of the  mA and/or kV according to patient size, or iterative reconstruction  technique.     COMPARISON:  Chest x-ray on 8/31/2019 and chest CT on 8/16/2019 exam.     FINDINGS:    Vascular: No pulmonary emboli.                                                                            IMPRESSION:   1. No evidence of pulmonary emboli.  2. Increase in the patchy predominantly right upper lobe  groundglass  pulmonary opacities, as compared to 8/16/2019 exam, could represent  pulmonary edema versus infection. Similarly slight interval increase  in the scattered consolidative pulmonary opacities, could be  infectious, atelectatic or less likely metastatic.  3. Interval decrease in the mildly loculated left pleural effusion and  slight increase in the right pleural effusion, all as compared to  8/16/2019 exam.  4. Interval increase in the size of the soft tissue density/lesion in  the left axilla currently measures 4.5 x 2.6 cm, previously measured  2.2 x 1.7 cm on 8/16/2019. Minimal increase in the left breast fluid  collection, possibly seroma.           CHEST 2 VIEWS   9/8/2019 12:41 AM      HISTORY: Status post left pleurodesis on 8/23/2019. Follow-up  infiltrates. Evaluate for pleural effusion. Left chest wall pain at  incision site.     COMPARISON: 9/6/2019 at 0211 hours.     FINDINGS: Moderately extensive patchy opacities are present in the  central aspect of the right lung and mid and lower left lung. No  convincing pleural effusion. Possible cardiomegaly. Right anterior  chest wall port with catheter entering the right internal jugular vein  and distal tip in the superior vena cava.                                                                      IMPRESSION:   1. No convincing interval change since 9/6/2019 at 0211 hours.  2. Moderately extensive patchy opacities in both lungs.  3. No convincing pleural effusion.          Assessment:     54 year old female with complex past medical history including metastatic breast cancer, malignant left pleural effusion status post pleurodesis 8/23/2019, recent diagnosis right upper lobe pneumonia earlier this month, depression and anxiety who presents with shortness of breath, hypoxia and pneumonia.     Pulmonary Diagnoses: Abnl CT/CXR R91.8, Hpoxemia R09.02, Interstitial pneum J84.111 and Resp fail acute J96.00    Recommendations       Metastatic disease  involves the pleura, lungs, and bones in the setting of widely metastatic disease no further tissue diagnosis is needed.    I see no beneficial role for bronchoscopy  In regards to her infiltrates treating her as an infectious pneumonia makes sense.    Can consider trial of corticosteroids but if this is metastatic disease unlikely to help.  Continue supplemental oxygen target pulse oximetry 89% or greater  If suspected aspiration performe swallowing eval  Pain management per internal medicine Palliative  Agree with palliative care evaluation  Please call if questions        Leighton Richardson M.D.  Pulmonary, Critical Care and Sleep Medicine  [facility]   Pager: [phone]  Office:[phone]

## 2019-09-10 NOTE — PROGRESS NOTES
INTERNAL MEDICINE UPDATE    Called re: wanting to leave AMA    Pt was upset that she was not allowed to temporarily leave the unit with her son, who was visiting from [place]. I d/w pt regarding going home and signing AMA form. I highly recommended that she not do so, as she is still requiring oxygen (4L); I d/w her that it would be highly likely that she would decline w/o oxygen and would need to be re-hospitalized and it there could be fatal consequences. She had thought that she was being discriminated against because of her race, but it was made clear to her that this was a unit/hospital policy. While I was speaking with her, she expressed understanding and apologized to the nursing staff about her accusations of racial discrimination. Presently more calm and OK with staying; inquired about going home with oxygen tomorrow.    Manuel Ku Jr., MD  [phone] (p)  Text Page

## 2019-09-10 NOTE — PROGRESS NOTES
[facility] Progress Note     Primary Oncologist/Hematologist:            Assessment and Plan:     Megha Campbell is a 54 year old female who was admitted on 9/6/2019.      1. Stage III ( T3 N2a M0) triple negative left sided breast cancer  -diagnosis 11/2018  -[id number] stage III with 8 cm tumor and 5 positive lymph nodes, extranodal extension, LVI  -s/p lumpectomy with re-excision 12/2018   -s/p radiation 2/4/19-4/1/19  -declined chemotherapy (both neoadjuvant or adjuvant)  -CT 8/8/19 revealed a large left pleural effusion  -thoracentesis 8/9/19 confirmed metastatic adenocarcinoma, ER/NC negative, consistent with breast primary  -CT thoracic and lumbar spine shows no evidence of metastatic disease  -outpatient PET/CT being scheduled  -androgen receptors only weakly positive at 1-3 % and PDL1 is negative at 0%  -prognosis and that her cancer is now metastatic and no longer curable has been discussed  -we have discussed use of chemotherapy versus palliative cares alone (no other viable options)  -she has been very hesitant to consider chemotherapy but now states she will try it  -plan abraxane plus or minus platinum chemotherapy as outpatient if she consents  -follow up with PET/CT results for discussion and initiation of systemic therapies after discharge     2. Recurrent Pleural Effusion  -malignant cytology positive on 8/9/2019  -s/p VATS with pleurodesis      3. SOB  -multifocal etiology related to malignant  pleural effusion/COPD exacerbation/possible heart failure  -continue O2, nebs, diuresis, supportive care  -on antibiotics with zosyn per hospitalist and continue as needed     4. Hematology  -h/o B12 and iron deficiency post gastrectomy  -anemia due to chronic illness  -recheck   -[employer]       5.  Pain  -CT thoracic and lumbar spine 8/17/19 showed no metastatic disease  -lidocaine patch, as needed tylenol, ibuprofen  -try to minimize sedative drugs  -consider palliative care  consult     6. Psychosocial Concerns  -depression and generalized anxiety disorder  -has repeatedly declined recommended treatment  -concerned about possible drug seeking behavior  -psychiatry following    7. Disposition  - pts son Delta has arrived from [place].  He verbalizes interest in taking his mom back to [place] with him, hoping she will have a stronger support system there.  He would establish care for her with an oncology team close to his home. We discussed milestones that would need to be achieved before she travels, including breathing comfortably and having her anxiety well controlled. He seems very motivated to be involved in her care.  Will consult  to meet with him.  He has questions about how to verify where she will have insurance coverage in [place]  He plans to be here for one week and is hoping she will be ready to travel by then.      DVT Prophylaxis: enoxaparin            Interval History:   Lethargic again this morning.  Her son indicates that she was more wakeful earlier              Review of Systems:   The 5 point Review of Systems is negative other than noted in the HPI             Medications:   Scheduled Medications    acetaminophen  1,000 mg Oral TID     clonazePAM  0.5 mg Oral BID     DULoxetine  30 mg Oral Daily     enoxaparin  40 mg Subcutaneous Q24H     heparin  5 mL Intracatheter Q28 Days     heparin lock flush  5-10 mL Intracatheter Q24H     iopamidol  79 mL Intravenous Once     lidocaine  1 patch Transdermal Q24h    And     lidocaine   Transdermal Q24H    And     lidocaine   Transdermal Q8H     methylPREDNISolone  40 mg Intravenous Daily     metoprolol succinate ER  25 mg Oral Daily     pantoprazole  40 mg Oral QAM AC     piperacillin-tazobactam  3.375 g Intravenous Q6H     potassium chloride  20 mEq Oral Once     QUEtiapine  25 mg Oral TID     ranitidine  150 mg Oral Daily     sertraline  150 mg Oral Daily     sodium chloride (PF)  10 mL Intracatheter Q8H      "sodium chloride 0.9 %  101 mL Intravenous Once     PRN Medications  acetaminophen, bisacodyl, heparin lock flush, ibuprofen, ipratropium, levalbuterol, lidocaine 4%, lidocaine (buffered or not buffered), magnesium sulfate, melatonin, metoprolol, naloxone, ondansetron **OR** ondansetron, polyethylene glycol, potassium chloride, potassium chloride with lidocaine, potassium chloride, potassium chloride, potassium chloride, potassium phosphate (KPHOS) in D5W IV, potassium phosphate (KPHOS) in D5W IV, potassium phosphate (KPHOS) in D5W IV, potassium phosphate (KPHOS) in D5W IV, prochlorperazine **OR** prochlorperazine **OR** prochlorperazine, QUEtiapine, senna-docusate **OR** senna-docusate, sodium chloride (PF), sodium chloride (PF), sodium chloride (PF)               Physical Exam:   Vitals were reviewed  Blood pressure 124/86, pulse 101, temperature 95.8  F (35.4  C), temperature source Axillary, resp. rate 28, height 1.727 m (5' 8\"), weight 89.1 kg (196 lb 6.4 oz), last menstrual period 12/07/2017, SpO2 99 %, not currently breastfeeding.  Wt Readings from Last 4 Encounters:   09/10/19 89.1 kg (196 lb 6.4 oz)   09/04/19 104.3 kg (230 lb)   08/31/19 110.6 kg (243 lb 12.8 oz)   08/29/19 110.2 kg (243 lb)       I/O last 3 completed shifts:  In: 990 [P.O.:990]  Out: 500 [Urine:500]                    Data:   All laboratory data and imaging studies reviewed.    CMP  Recent Labs   Lab 09/10/19  0600 09/09/19  0530 09/08/19  0600 09/07/19  0600 09/06/19  1902 09/06/19  0153     --  136 138 138 136   POTASSIUM 3.3* 3.4 3.4 3.5 3.7 3.5   CHLORIDE 96  --  98 100 102 101   CO2 36*  --  31 32 33* 26   ANIONGAP 6  --  7 6 3 9   *  --  95 109* 120* 105*   BUN 16  --  9 9 8 9   CR 0.58 0.47* 0.61 0.56 0.52 0.59   GFRESTIMATED >90 >90 >90 >90 >90 >90   GFRESTBLACK >90 >90 >90 >90 >90 >90   PABLO 9.4  --  8.8 8.6 8.9 8.4*   MAG 1.8 2.0 2.1  --  2.1  --    PHOS  --  2.5  --   --   --   --    PROTTOTAL  --   --   --   --   -- "  7.2   ALBUMIN  --   --   --   --   --  2.1*   BILITOTAL  --   --   --   --   --  0.3   ALKPHOS  --   --   --   --   --  108   AST  --   --   --   --   --  34   ALT  --   --   --   --   --  23     CBC  Recent Labs   Lab 09/09/19  0530 09/08/19  0600 09/07/19  0600 09/06/19  1902 09/06/19  0153   WBC  --  8.1 7.2 7.3 8.0   RBC  --  4.40 4.31 4.35 4.39   HGB  --  10.9* 10.8* 10.9* 11.0*   HCT  --  35.4 34.6* 34.8* 34.7*   MCV  --  81 80 80 79   MCH  --  24.8* 25.1* 25.1* 25.1*   MCHC  --  30.8* 31.2* 31.3* 31.7   RDW  --  15.4* 15.6* 15.7* 15.8*    351 303 352 349     INRNo lab results found in last 7 days.        Ramos Narayan CNP  Nurse Practitioner  [facility]  [phone]

## 2019-09-10 NOTE — PROVIDER NOTIFICATION
"MD Notification    Notified Person: MD    Notified Person Name: DR Dillon    Notification Date/Time:9:54 AM    Notification Interaction  text paged    Purpose of Notification: status update that pt at shift start tele continues to show  occasional PVC followed by runs of SVT up to 190's for couple minutes but then either slows to <120/hr on own for couple minutes before next run of SVT. Or slows to regular rhythm rate 90's-100/min. Son at bedside and requests talk [facility] MD    Orders Received:    Comments:   Pt did receive oral Metoprolol dose. She did not yet meet the parameter of >5 minutes of \"sustained\" for prn IV Metoprolol dose         1015 Addendum: Pt son arrived early this am from [place] and wants to speak with MD. Pt adamant about wanting to go home and live with son in [place] - writer doesn't know if that what son wants or not. Requesting physician advisement with this issue. pt still on O2 and has poor activity tolerance (SVT with activity). Thanks Adrianne DOTSON  "

## 2019-09-10 NOTE — PROGRESS NOTES
hospitalist addendum  Called by RN as pt could not do CT can 2/2 anxiety despite son being down with her.   Down in cT scanner for 45 min  Discussed with RN, give scheduled seroquel and ask pt to reconsider  Ativan 0.5mg iv x 1 for CT ordered as well.   Will readdress in am if patient declines cT tonight.   Discussed case with pulmonary earlier: ddx infultrates- pneumonia (less likely), pulmonary edema, pneumonitis (on steroids), tumor  Yung Dillon MD

## 2019-09-10 NOTE — PROGRESS NOTES
TAMELA Note:    D/I:  SW is following for consult/discharge planning.  Attempted to meet with patient x3 and she was unavailable for discussion.     P: SW will continue to assist for discharge as needed.    JATIN Brunner, LGSW

## 2019-09-10 NOTE — CONSULTS
[facility] Psychiatric Consult Progress Note      Interval History:   Pt seen, care reviewed with treatment team. Psychiatry was asked to follow-up with the patient with respect to her anxiety and depression. She reportedly has been overly sedated on Seroquel even though she has found it beneficial. She tells me she sees Dr. Alejandra Villalta at [facility] and so she was reluctant to elaborate on her current symptomatology. She says she is doing better and sleeping well. She denies fresh psychosocial stressors. Denies suicidal or homicidal ideation. Tolerating medications well without significant side effects.     Review of systems:   The Review of Systems is negative other than noted in the HPI     Medications:       acetaminophen  1,000 mg Oral TID     clonazePAM  0.5 mg Oral BID     DULoxetine  30 mg Oral Daily     enoxaparin  40 mg Subcutaneous Q24H     furosemide  40 mg Intravenous Q12H     heparin  5 mL Intracatheter Q28 Days     heparin lock flush  5-10 mL Intracatheter Q24H     iopamidol  79 mL Intravenous Once     lidocaine  1 patch Transdermal Q24h    And     lidocaine   Transdermal Q24H    And     lidocaine   Transdermal Q8H     methylPREDNISolone  40 mg Intravenous Daily     [START ON 9/11/2019] metoprolol succinate ER  50 mg Oral Daily     pantoprazole  40 mg Oral QAM AC     piperacillin-tazobactam  3.375 g Intravenous Q6H     potassium chloride  20 mEq Oral Once     QUEtiapine  25 mg Oral TID     ranitidine  150 mg Oral Daily     sertraline  150 mg Oral Daily     sodium chloride (PF)  10 mL Intracatheter Q8H     sodium chloride 0.9 %  101 mL Intravenous Once     acetaminophen, bisacodyl, heparin lock flush, ibuprofen, ipratropium, levalbuterol, lidocaine 4%, lidocaine (buffered or not buffered), magnesium sulfate, melatonin, metoprolol, naloxone, ondansetron **OR** ondansetron, polyethylene glycol, potassium chloride, potassium chloride with lidocaine, potassium chloride, potassium  chloride, potassium chloride, potassium phosphate (KPHOS) in D5W IV, potassium phosphate (KPHOS) in D5W IV, potassium phosphate (KPHOS) in D5W IV, potassium phosphate (KPHOS) in D5W IV, prochlorperazine **OR** prochlorperazine **OR** prochlorperazine, QUEtiapine, senna-docusate **OR** senna-docusate, sodium chloride (PF), sodium chloride (PF), sodium chloride (PF)      Mental Status Examination:     Appearance:  appeared older than stated age, fatigued and cooperative  Eye Contact:  fair  Speech:  clear, coherent  Psychomotor Behavior:  no evidence of tardive dyskinesia, dystonia, or tics  Mood:  sad   Affect:  mood congruent, intensity is blunted and intensity is flat  Thought Process:  logical, linear and goal oriented no loose associations  Thought Content:  no evidence of suicidal ideation or homicidal ideation and no evidence of psychotic thought  Oriented to:  time, person, and place  Attention Span and Concentration:  fair  Recent and Remote Memory:  intact  Fund of Knowledge: appropriate  Muscle Strength and Tone: normal  Gait and Station: Normal  Insight:  fair  Judgment:  fair        Labs/vitals:     Recent Results (from the past 24 hour(s))   Basic metabolic panel    Collection Time: 09/10/19  6:00 AM   Result Value Ref Range    Sodium 138 133 - 144 mmol/L    Potassium 3.3 (L) 3.4 - 5.3 mmol/L    Chloride 96 94 - 109 mmol/L    Carbon Dioxide 36 (H) 20 - 32 mmol/L    Anion Gap 6 3 - 14 mmol/L    Glucose 130 (H) 70 - 99 mg/dL    Urea Nitrogen 16 7 - 30 mg/dL    Creatinine 0.58 0.52 - 1.04 mg/dL    GFR Estimate >90 >60 mL/min/[1.73_m2]    GFR Estimate If Black >90 >60 mL/min/[1.73_m2]    Calcium 9.4 8.5 - 10.1 mg/dL   Magnesium    Collection Time: 09/10/19  6:00 AM   Result Value Ref Range    Magnesium 1.8 1.6 - 2.3 mg/dL   Nt probnp inpatient    Collection Time: 09/10/19  6:00 AM   Result Value Ref Range    N-Terminal Pro BNP Inpatient 5,150 (H) 0 - 900 pg/mL     B/P: 135/69, T: 97.3, P: 101, R: 20     Impression:   Megha Campbell is a 54 year old year old female who continues to present with signs and symptoms of severe depression and anxiety. Despite medication management, she continues to exhibit impaired levels of energy and motivation. She is agitated, irritable, and withdrawn. Despite her linger symptoms, the patient currently denies active suicidal ideation or a plan of action. Due to her symptoms, we reinitiated Cymbalta 30mg qam and Seroquel was increased to 50mg tid but she finds this too sedating.      DIagnoses:   1.         Major depression, recurrent, severe, without psychotic features.   2.         Post-traumatic stress disorder  3.         Anxiety NOS    4.         Sedative hypnotic dependence.   5.         Borderline Personality Disorder.        Plan:   1. Written information given on medications. Side effects, risks, benefits reviewed.  2. In agreement with reduction of Seroquel dose to 25 mg tid.   3. Continue other medications.         Attestation:  Patient has been seen and evaluated by me,  Jovanny Huntley MD

## 2019-09-10 NOTE — CONSULTS
Patient seen for follow-up psychiatric consultation. Please see my note for details. Thanks.    Jovanny Huntley MD

## 2019-09-10 NOTE — PLAN OF CARE
A&Ox4, alert and more awake than earlier yesterday. VSS ex tachy at times on 4L O2. Tele: Sinus Tachy. Denied pain, refused scheduled Tylenol. Lidocaine patch placed at 0130 per pt request on left lower back. LIND. Pt attempted to leave AMA - see MD note. PRN neb given for pt getting worked up. Diminished lung sounds, no wheezing noted. Incentive spirometer encouraged. Cardiac no caffeine diet. +2 BLE edema. SINDY socks removed at bedtime per pt request. SBA/IND to BSC. Port hep locked + intermittent antibiotics.

## 2019-09-10 NOTE — PROGRESS NOTES
"Palliative Care Clinical Social Work Note     D:  Megha Campbell is a 54 year old woman with diagnosis of stage III breast cancer and long history of mental health including depression and anxiety. Psych is following this hospitalization and she sees a mental health provider in the community as well per chart review.   I:  Attempted to meet Megha and her son Delta this afternoon.  I explained that we had been consulted as an extra layer of support and asked if it would be helpful if I would explain our service and see if we might be helpful.  Megha initially stated she was open to this but asked to use the bathroom first.  I stepped out of the room and returned about 15 minutes later.  She was still in the bathroom, her son then came out and explained that \"she knows all about the program, she's not interested at this time\".  He then asked if we are the same as hospice and I clarified that we are not hospice, and that we offer support at all stages of illness.  He was accepting of this information but reiterated that his mother has asked that we not be involved in her care at this time.       A:  Megha declined offer for visit this afternoon.  In discussing her case with Dr. Dillon and her bedside RN, Adrianne, it sounds like her motivation and ability to engage with providers is low right now, though it seems her affect has brightened since her son's arrival this morning. It also sounds like she does not have significant pain concerns, though shortness of breath continues to be a concern.         P:  At this time I will not re-attempt to visit with patient but will be available as needed.  Will discuss with hospitalist team as to whether or not it is appropriate for palliative to remain involved.      JATIN Frias, [facility]   Palliative Care    Pgr:[phone]  Ph: [phone]      "

## 2019-09-10 NOTE — PROGRESS NOTES
[facility]    Hospitalist Progress Note    Assessment & Plan   Megha Campbell is a 54 year old female with complex past medical history including metastatic breast cancer, malignant left pleural effusion status post pleurodesis 8/23/2019, recent diagnosis right upper lobe pneumonia earlier this month, recent concern for narcotic\drug-seeking behavior by previous treating hospitalist at [facility], depression and anxiety who presents with shortness of breath, hypoxia and pneumonia.  He will be admitted for evaluation and treatment of her hypoxia and for possible healthcare associated pneumonia.        Principal Problem:   Bilateral pneumonia ?  Bilateral lung infultrates  Hypoxia  -Patient initiated on Levaquin on admission 9/4/2019 but did not take for unclear reasons.  Her reasoning for not taking oral Levaquin does not entirely seem plausible.    -She also did not  her oxygen today for unclear reasons.   -bilateral infultrates first noted on CXR on 8/27/19.   -  mildly hypoxic on admssion.  Did not require oxygen at time of her last discharge on September 4.   - Labs from morning of admission in ED during her overnight ER stay showed normal white blood cell count.  She is afebrile.   - Chest x-ray shows no clear interval change from September 3.  There are moderately extensive patchy hazy opacities in both lungs.  -initiated  treatment for healthcare associated pneumonia with zosyn on admission. Consider aspiration pneumonia given issues with sedation on prior hospital stay  -Patient has been tachycardic through her last couple hospital stays therefore this is not new.  She does not describe a clear change in her breathing cough or shortness of breath. - No DVT symptoms.  -She had a CT PE protocol on September 1 with no PE.  -pt having issues with left chest wall pain early on in hospital stay but not been issues for last several days, possibly because no narcotics are being  given for this.  Pt agreed to no narcotics last pm. Initiated on po morphine by cross cover overnight  - she described muscle spasm but not seem clearly apparent on exam and no recurrence.  Doubt intrapulmonary process. Doubt PE. Pain complaints and reports of pain during exam of L chest wall seemed out of proportion to my exam findings and Xray imaging at beginning of hospital stay. Significant anxiety at that time.   - pt has declined CT chest pe protocol- given no clear source for pain on exam, current pneumonia, hypoxia and concern for drug seeking behaviour on prior admission I have concerns for narcotics at this time.   -pt agrees with anxiety may be driving her sense of sob and impacting her breathing.   -pt taking shallow breaths  - BLE edema -2-3+ stable despite IV lasix, sits with legs in dependent position  -CXR 9/8: stable bilateral infultrates. No effusion  -Stable pulmonary status this hospital stay.    -chest wall pain on L seems resolved.   -Echo -limited imaging quality, nl EF, no clear WMA, nl RV, mild decrease RV.   -have been diuresing with daily IV lasix for last 4 days.   -no cough. Afebrile. Nl wbc.   -Low albumin of 2.1 contributing to edema  - etiology of infultrates- aspiration vs health care associated pneumonia (nl wbc, afebrile though) vs tumor  -RAD/wheezing not apparent on exam but noted on 9/9 and solumedrol dose given.   -duonebs stopped given SVT and sinus tach and no clear wheezing. Pt feels it makes her feel better. Changed to atrovent and xopenex  -persisting hypoxia and tachypnea, afebrile, nl wbc, low albumin state, stable on 3L NC since admission but on 5 L today.   No clear improvement with IV lasix for last 4 days. Lasix 40mg bid yesterday.   Pt taking shallow breaths and not participating in IS or PT, OT    Son is now in town and will be with her and now seems motivated to participate    Pulmonary infultrates: ddx -hosp acquired pneumonia (now seems less likely)vs pulmonary  edema vs noninfectious inflammation vs tumor vs aspiration pneumonitis.   Hypoxia: 2/2 infultrates, shallow breaths, r/o PE      Plan:   -wean oxygen as able.  -IS encouraged  - hold on further solumedrol as no wheezing on exam   -day 4 of zosyn. Consider change to po augmentin vs stopping as not clearly seem infectious  -Pulmonary consult regarding etiology of infultrates-appreciate in put  -pt now agreable to CT. CT chest PE protocol given persistent hypoxia, Cancer, svt.   -oxygen and wean off as able,   -cont IV lasix --> 40mg iv bid for now, strict I/O.   - schedule tylenol, prn ibuprofen, aqua K pad  -Therapy consult.   -stop duonebs as may be driving new svt and tachycardia, changed to atrovent and xopenex  -Ibuprofen and Tylenol for incisional wall pain.  Lidocaine patch to left chest wall for incisional wall pain  -Telemetry  -Aspiration and fall precautions  -Avoid further sedating medications.  She is on Klonopin to prevent withdrawal and treat her anxiety she has been on this chronically.  Avoid narcotics at this time.  -palliative care consult given medical complexity, not participating in cares, high risk of rehospitalization       discussed care plan with RN and patient, pulmonary, pt's sone    Sinus tachycardia  SVT runs  - Sinus tachycardia not new and review of chart indicates present for last 3 hospital stay. Pain and anxiety issues. On duonebs scheduled.   -negative CT PE protocol study 9/1/19.   - some runs of SVT, no afib today  -following Mg/K  - 9/8: RRT called for sustained SVT. No afib, Discussed with house NP.   Pt using prn nebs for sob and anxiety even though no wheezing  Changed to xopenex and atrovent prn  -overall less tachy off duonebs and txing anxiety  - still with runs of SVT including this am. Brief runs  K 3.3. Mg 1.8    Plan:   -Cont tele,    -follow K and Mg. replace per protocol,  -stop scheduled nebs,  -increase Toprol XL to 50mg qd  - diuresis.   -atrovent and xopenex  prn        Malignant left pleural effusion -- S/P Pleurodesis 8/23/19   Malignant neoplasm of lower-outer quadrant of left breast of female, estrogen receptor negative (H)    Breast CA 11/2018, ER Neg -- now stage IV (S/P Left Mast, S/P Rad Tx)    Assessment: Status post pleurodesis 8/23/2019.  She states her incisional wall pain is been getting steadily better.  She is okay with just Tylenol lidocaine patch and PRN ibuprofen for pain.  I recommended against initiating narcotics given the fact profile and history of sedation encephalopathy on prior hospitalization.  See discussion below  Seen by oncology 9/8  Pt now agreeing to chemo.  Not participating in cares  significant anxiety contributing to erratic behavior and reshospitalization  Anxiety better with seroquel  Ongoing bilateral infultrates and hypoxia      Plan:   -oncology following  -will consult palliative care,    -Keep follow-up with Dr. Amira Colón of thoracic surgery,   -keep follow-up with [place] [facility],   -PRN Tylenol and ibuprofen.    -Proton pump inhibitor for ulcer prophylaxis,  - Lovenox for DVT prophylaxis,   -physical therapy and occupational therapy consult,  - encouraged ambulation.     Uncomplicated asthma    Assessment: Some wheezing noted by ER physician.  Poor respiratory effort at this time do not appreciate wheezing.  Patient is feeling like she needs nebulizer treatment at this time  No wheezing at this time. Doubt asthma exacerbation   But pt had some wheezing this am, no wheezing on other days  Repeat exam today without wheezing.   Stopped duonebs given svt, sinus tachycardia-? Making anxiety worse      Today. Pt feeling need for nebs but no RAD on exam      Plan: changes to prn atrovent and xopenex.             Depressive disorder    Generalized anxiety disorder    Assessment: Followed by psychiatry outpatient.  Patient seen by psychiatry and prior hospitalization.  She is currently taking Wellbutrin, Zoloft prior to  admission doses.  She was to be on a Klonopin taper on last admission but she states she talked with her psychiatrist and she is still on Klonopin twice daily dosing though details are unclear about this.  He is alert.  No falls.  She does not appear sedated.  States her shortness of breath is driving her anxiety.  Previous hospitalist have noted social issues at home and concerned that she may be required seeking hospitalization to be away from her .  Anxiety playing a role in pain and coping currently with medical issues  Seen by psychiatry 9/8. Initiated on seroquel  wellbutrin stopped  Pt agrees that anxiety playing role in pain.   Pt finds seroquel helpful but 50mg dose too sedating. Appears sedated on 50mg dose today.   - anxiety better with seroquel. Seems less sedated on seroquel 25mg tid.   -psychiatry to see again today    Plan: decrease seroquel from 50mg to 25mg TID and reeval, psychiatry consult-appreciate input,  continue Zoloft 150 mg daily, continue Klonopin 0.5 mg twice daily for now hold if sedated. Close pcp follow up,     Suspected drug-seeking behavior by previous hospitalist on prior admissions.  L chest wall pain    Assessment:   -See HPI and previous discharge summary.    -Stated that I do not think narcotics are indicated at this time.  Will treat with Tylenol and ibuprofen.  Patient was agreeable with this.  I discussed the risk of narcotics including oversedation, infusion and risk of falls with this medication type.   Pt initiated on po morphine overnight by [facility] hospitalist for pain.   -pt doing well this am per RN then having significant pain over pleurodesis site.   -see my exam from today. No clear pain with palpation at times. Exam inconsistent.   - pt declines CT for changing reasons- pain, then anxiety  - I do not see an indication for narcotics at this time and discussed last pm and today with patient and RN present today. Pt was agreable to this plan; she currently  seems slightly sedated and had encephalopathy from narcotics and benzos during last hospital stay. Discussed potential side effects with patient especially in light of her pneumonia and hypoxia. ? Aspiration pneumonia.   -for last 3 days pt has not complain of L chest wall pain.   -anxiety better controlled.     Plan: Monitor.  For now it seems appropriate to just treat with Tylenol and as needed ibuprofen.  Will provide lidocaine patch to her left chest wall. Added aqua k pad, treat anxiety. No narcotics.       S/P gastric bypass -- 2004    Assessment: Noted     DVT Prophylaxis: Enoxaparin (Lovenox) subcutaneous     Code Status: Full Code     Disposition: to see PT, OT, SW, encouarged pt to be active, pt continues to decline TCU and finds discussion of this upsetting.  Follow  Patient wants to go home with with her son. She is considering moving to [place] to have tx there so to be closer to son.   Pt again declines TCU     Yung Dillon MD  Text Page  (7am to 6pm)  Interval History   Feels anxiety is well treated  Feels she needs neb currently  Son now at bedside   Wants to go home then [place] to be closer to son  No L chest wall pain  No other concerns  UO not accurate  No n/v/d/abd pain  No cough    Having short runs svt    -Data reviewed today: I reviewed all new labs and imaging results over the last 24 hours. I personally reviewed imaging and labs since admission    Physical Exam   Temp: 97.3  F (36.3  C) Temp src: Axillary BP: 135/69 Pulse: 101 Heart Rate: 85 Resp: 20 SpO2: 100 % O2 Device: Nasal cannula Oxygen Delivery: 5 LPM  Vitals:    09/07/19 0406 09/08/19 0400 09/10/19 0611   Weight: 109.4 kg (241 lb 1.6 oz) 95.5 kg (210 lb 8 oz) 89.1 kg (196 lb 6.4 oz)     Vital Signs with Ranges  Temp:  [95.8  F (35.4  C)-97.5  F (36.4  C)] 97.3  F (36.3  C)  Pulse:  [101] 101  Heart Rate:  [] 85  Resp:  [20-28] 20  BP: (119-137)/(58-86) 135/69  SpO2:  [94 %-100 %] 100 %  I/O last 3 completed shifts:  In:  990 [P.O.:990]  Out: 500 [Urine:500]    Constitutional: Sitting up in chair with legs in depeddent position ,sitting leaning forward,  nontoxic appearing  Neck: ? Mild jvp elevation  Respiratory: no change in exam for last several days. sligntly tachypneic,  Some slight crackles L base-no change, no wheezing, diminished at bases, Continues to take shallow breaths   Cardiovascular: RRR no r/g/m, not tachy, regular,  2+ BLE edema to knee level, No calf tenderness or redness or asymmetric swelling  GI: soft, nt, nd  Skin/Integumen: no rash   Neuro: nl speech and fuly oriented. Awake and alert, cooperative, pleasant  Psych:  Calm, cooperative, conversant, appears slightly sedated,   Medications       acetaminophen  1,000 mg Oral TID     clonazePAM  0.5 mg Oral BID     DULoxetine  30 mg Oral Daily     enoxaparin  40 mg Subcutaneous Q24H     furosemide  40 mg Intravenous Q12H     heparin  5 mL Intracatheter Q28 Days     heparin lock flush  5-10 mL Intracatheter Q24H     iopamidol  79 mL Intravenous Once     lidocaine  1 patch Transdermal Q24h    And     lidocaine   Transdermal Q24H    And     lidocaine   Transdermal Q8H     methylPREDNISolone  40 mg Intravenous Daily     [START ON 9/11/2019] metoprolol succinate ER  50 mg Oral Daily     pantoprazole  40 mg Oral QAM AC     piperacillin-tazobactam  3.375 g Intravenous Q6H     potassium chloride  20 mEq Oral Once     QUEtiapine  25 mg Oral TID     ranitidine  150 mg Oral Daily     sertraline  150 mg Oral Daily     sodium chloride (PF)  10 mL Intracatheter Q8H     sodium chloride 0.9 %  101 mL Intravenous Once       Data   Recent Labs   Lab 09/10/19  0600 09/09/19  0530 09/08/19  0600 09/07/19  0600 09/06/19  1902 09/06/19  0153 09/03/19  1635   WBC  --   --  8.1 7.2 7.3 8.0 7.5   HGB  --   --  10.9* 10.8* 10.9* 11.0* 11.3*   MCV  --   --  81 80 80 79 79   PLT  --  320 351 303 352 349 352     --  136 138 138 136 137   POTASSIUM 3.3* 3.4 3.4 3.5 3.7 3.5 3.6   CHLORIDE 96   --  98 100 102 101 102   CO2 36*  --  31 32 33* 26 28   BUN 16  --  9 9 8 9 3*   CR 0.58 0.47* 0.61 0.56 0.52 0.59 0.63   ANIONGAP 6  --  7 6 3 9 7   PABLO 9.4  --  8.8 8.6 8.9 8.4* 8.6   *  --  95 109* 120* 105* 110*   ALBUMIN  --   --   --   --   --  2.1*  --    PROTTOTAL  --   --   --   --   --  7.2  --    BILITOTAL  --   --   --   --   --  0.3  --    ALKPHOS  --   --   --   --   --  108  --    ALT  --   --   --   --   --  23  --    AST  --   --   --   --   --  34  --    TROPI  --   --   --   --   --  <0.015 <0.015       Imaging:   No results found for this or any previous visit (from the past 24 hour(s)).

## 2019-09-10 NOTE — PLAN OF CARE
7a-3p shift report  Had a few minutes of runs of SVT Off and on this am usually following occasional PVC's  with rates in mid 190's but not sustained for the MD's parameter of >5 minutes for the prn IV Metoprolol. Dr piedra.Scheduled oral metoprolol dose was increased today and was effective for the SVT. . By afternoon HR stayed <100/min. Lungs diminished. No cough heard. Resp labored all shift but no accessory muscle use.Has poor activity tolerance. Fatigues easily and refuses further mobility. Writer called PT therapists 3 times this shift leaving messages (twice) for them to return to see pt since son is here and that she may be more cooperative with them then- but PT never returned my calls  Prefers to sit in the orthopneic position in chair or edge of bed with arms spread over bedside table and with her head lying on pillow placed on the bedside table. O2 remains at 5 LPM nc.  Pt uncooperative several times. Refused to work with therapists. She refused to take some of her scheduled meds at their scheduled time. Refused to take any oral potassium for her hypokalemia of 3.3 (is on the high Potassium replacement protocal). Next shift to try IOV K+ replacement. Refused PCD's despite being educated on DVT potential complication. CT scan to be done at 1600 to r/o DVT. Has Powerportacath intact/heparinized. General mild weakness- requires assist of 1 with transfers from [facility] to [facility].   Disposition: Lives with EX-. Son, Delta, who arrived from [place] this seems very supportive and caring to pt. Refuses any transfers to TCU. States wants to leave with her son, Delta and live with him in [place] - Not sure if son agrees with this. Pt not stable yet for discharge or travel

## 2019-09-10 NOTE — PLAN OF CARE
PT- Attempted to see pt this AM. Pt sleeping in chair at arrival. After multiple attempts to wake pt up with verbal and tactile cues pt does not open her eyes. Pt mumbled but it was unclear and would not repeat what she had stated.

## 2019-09-10 NOTE — PROGRESS NOTES
SPIRITUAL HEALTH SERVICES Progress Note  [facility]    Visit with pt and her son, Delta Campbell, who was at her bedside.  Pt was sitting in a chair, appeared very sleepy, and said as much (as she was drifting in and out).  Pt said she is doing all right, and named no specific needs at present.  I see that she was seen by another  9/1/2019, and at that time confirmed to him that she practices the [employer] shayne.  SH team is available for on-going support, per need or request.                                                                                                                                                 Anca Moser M.A.  Staff   Pager [phone]  Phone [phone]

## 2019-09-11 PROBLEM — I82.441 ACUTE DEEP VEIN THROMBOSIS (DVT) OF TIBIAL VEIN OF RIGHT LOWER EXTREMITY (H): Status: ACTIVE | Noted: 2019-01-01

## 2019-09-11 PROBLEM — R09.02 HYPOXIA: Status: ACTIVE | Noted: 2019-01-01

## 2019-09-11 NOTE — PLAN OF CARE
VSS.  Declined pain meds, including scheduled tylenol.  4L NC with humidity.  Up to chair and commode this evening but refused a walk.  Up with assist of 1.  Anxious at times, unable to do CT scan, see MD notification note.  Refused PCDs.  Potassium replaced, redraw back at 3.2 tonight, replacement started again.  PRN nebs given by PT.  Son at bedside, supportive.

## 2019-09-11 NOTE — PROGRESS NOTES
Received intake call for home oxygen at 10:40AM. Reviewed patient's chart;   11am spoke with CCBelinda, informed that I was looking into pt's chart. We still need O2 Sats and face to face notes. Suggested doctor uses the homeofacetoface dot phrase.   11:13am called pt's room no asnwer  11:32am Called pt's son's (Delta) cell phone. Spoke with pt and son.Offered choice and patient is okay with [facility] setting them up. Discussed equipment with patient and informed them that we would be to bedside with oxygen in the next 2 hours.   11:42am dot phrase is entered.    Patient qualifies under Medicare guidelines and all documentation is in the chart including a good order.      12:45PM- Spoke with care coordinator, Belinda, confirmed we received the order, and provided them with ETA of oxygen. Just need discharge note signed.

## 2019-09-11 NOTE — PROGRESS NOTES
Writer and TAMELA met with patient and son carly at the bedside.  Patient A+O patient aware of possible discharge to home today with Homecare RN/PT/OT with start of care 9/12.    Writer confirmed patients home address and phone number in Epic are correct as listed.  Patient agreeable to [place] and is aware that [facility] can do an RN start of care 9/12.  Per [facility] Liaison Zenobia she will meet with the patient and her son today ~13:30 to go over Homecare.    MD has started orders for discharge to include O2/Nebulizer/BSC writer is working on getting this delivered to the bedside today.      Patient is scheduled for MOHPA f/up this Friday 9/13 and next Monday 9/16 (in AVS)    Patient and son identify no further needs or questions for discharge at this time.

## 2019-09-11 NOTE — PROGRESS NOTES
Hospitalist:  Pt to discharge today  Discussed with patient and family and oncology  Declines another day in hospital for further emperic diuresis  Discharge home oxygn 4 liters continuous  I certify that this patient, Megha Campbell has been under my care and that I, or a nurse practitioner or physician's assistant working with me, had a face-to-face encounter that meets face-to-face encounter requirements with this patient on September 11, 2019.    Megha Campbell is now in a chronic stable state and continues to require supplemental oxygen due to continued oxygen desaturation.  This patient has been treated in part, or in whole for the following medical condition(s):  breast cancer, heart failure, pneumonia  Treatments tried and failed or ruled out to treat hypoxemia include IV diuresis, IV antibiotics.  If portability is ordered, is the patient mobile within the home? Yes    Yung Dillon MD

## 2019-09-11 NOTE — DISCHARGE INSTRUCTIONS
Oxygen Provider:  Arranged through [employer] [facility], contact number [phone].  If you have any questions or concerns please call the oxygen company directly.

## 2019-09-11 NOTE — PROGRESS NOTES
Palliative consult received to review goals of care. Reviewed case with Dr. Dillon yesterday. Pt was seen by our , Laure Sidhu. Pt declined our team's involvement at this time. Dr. Dillon aware. At this time, will cancel consult. Please do not hesitate to reach me if questions arise. Thanks.    Kayla HUGHES, [facility]  Palliative Medicine   Pager: [phone]

## 2019-09-11 NOTE — CONSULTS
SW Consult Note:    Care Transition Initial Assessment - TAMELA     Met with: Patient and Son Delta  Principal Problem:    Bilateral pneumonia  Active Problems:    Malignant left pleural effusion -- S/P Pleurodesis 8/23/19    Malignant neoplasm of lower-outer quadrant of left breast of female, estrogen receptor negative (H)    Breast CA 11/2018, ER Neg -- now stage IV (S/P Left Mast, S/P Rad Tx)    Uncomplicated asthma    Depressive disorder    Pneumonia, RUL    S/P gastric bypass -- 2004    Generalized anxiety disorder    Drug-seeking behavior       DATA  Lives With: spouse   Living Arrangements: apartment  Quality of Family Relationships: helpful, involved, stressful, supportive  Description of Support System: Supportive, Involved  Who is your support system?: , Children  Support Assessment: Adequate family and caregiver support, Adequate social supports. Per chart review, patient's relationship with spouse may be complicated.  Son is visiting from [place] and is supportive and helpful.  Long term plan is for patient to move to [place] to be with son.  Patient and son state that there are other supports to help her after son returns back home.    Identified issues/concerns regarding health management: Patient is a 54 year old female that was admitted to the hospital on 9/6/19 with a primary diagnosis of bilateral pneumonia.  Tentative date for discharge is 9/11/19. Reviewed chart and spoke with patient and son along with Care Coordinator regarding discharge planning. Patient reports that she lives at home with spouse in a one level apartment with an elevator.  Patient is in agreement with [facility] and will be meeting with the [facility] Liaison today regarding discharge plan. Son reports that he will be providing transportation for patient and she will have someone else to assist with transportation after he returns back to [place]. TAMELA provided Medicaid Application for [place] to patient and son for  reference in the future. Patient and son stated they did not have any additional concerns or needs at this time.           Quality of Family Relationships: helpful, involved, stressful, supportive  Transportation Anticipated: family or friend will provide    ASSESSMENT  Cognitive Status:  awake, alert and oriented  Concerns to be addressed: Discharge planning.     PLAN  Financial costs for the patient includes: N/A  Patient given options and choices for discharge: Home with [facility]  Patient/family is agreeable to the plan?  Yes  Transportation/person available to transport on day of discharge: Son  Patient Goals and Preferences: Home with [facility]  Patient anticipates discharging to: Home with [facility]    JATIN Brunner, LGSW

## 2019-09-11 NOTE — PROVIDER NOTIFICATION
MD Notification    Notified Person: MD    Notified Person Name: SAMPSON Dillon    Notification Date/Time: 9/11/19 11:31 a.m.    Notification Interaction: Need further documentation for O2 delivery in [facility]. Will continue to work with MD and Marc FLETCHER for delivery of equipment for safe discharge planning    Purpose of Notification: Update for O2 coverage and delivery    Orders Received: await updated note or call back     Comments:

## 2019-09-11 NOTE — PLAN OF CARE
Discharge Planner OT   Patient plan for discharge: Home today   Current status: While sitting, with set up and SBA, pt completed 1 hygiene task, pt required 2 rest breaks reporting pain and fatigue. Educated on LE dressing with AE. While seated/standing pt completed LE dressing with moderate assist. Educated on AE/DME for home including BSC, shower chair, reacher.   Barriers to return to prior living situation: Decreased activity tolerance and independence for I/ADLs; current level of A  Recommendations for discharge: Recommend TCU.   Rationale for recommendations: Pt is below baseline in I/ADL's. Notes indicate plans for home today. If home, pt will need 24/7 A/supervision for all I/ADLs and home OT. Continued OT to address activity tolerance, independence, and safety in I/ADLs and for home safety evaluation.        Entered by: Eliu Copeland 09/11/2019 2:58 PM

## 2019-09-11 NOTE — PROGRESS NOTES
[facility]  Met with patient and son Delta to discuss plans for HC. Patient to be discharged home today 9/11/19 and has agreed to have [facility] follow with RN, PT, and OT. Patient care support center processing referral. Patient verbalized understanding that initial visit is scheduled for 9/12/19. Patient has 24 hour phone number for [facility] for any questions or concerns.

## 2019-09-11 NOTE — PROGRESS NOTES
Patient discharged at 4:38 PM to home with home care and son who will be staying with her 24/7.   Port was de-accessed.  Pain at time of discharge was 0/10. Belongings returned to patient.  Discharge instructions and medications reviewed with patient and son.  Patient and son verbalized understanding and all questions were answered.  Prescriptions given to patient including DME walker, and son picked up comode.  Lovenox education done with teach back with patient and son. At time of discharge, patient condition was stable and left the unit via wheelchair on portable oxygen  escorted by JULIO

## 2019-09-11 NOTE — DISCHARGE SUMMARY
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 [facility] at 4:00 on 9/16 with labs     Reason for your hospital stay    Lung infultrates with low oxygen levels: possible causes as discussed: heart failure, pneumonia, aspiration, tumor, noninfectious inflammation or combination  Anxiety     Follow-up and recommended labs and tests     1. BMP, Mg, CBC with platelet count this Friday in oncology office  2. [facility] visit this Friday and next Monday as arranged  3, see primary care provider next week  4. Follow up with thorasic surgeon, dr. Kev Colón as previously arranged  5. Home care  6. Follow imaging per oncology team outpatient     Activity    Your activity upon discharge: activity as tolerated. Recommend using assist device and assist of others for all ambulation at this time to avoid falls.     Discharge Instructions    1. Toprol XL heart medication to reduce heart irritability  2. Lasix with potassium twice daily for leg swelling possible heart failure  3. seroquel for anxiety  4. augmentin for possible pneumonia  5. Oxygen 4 liters continuous to keep oxygen saturations above 90%     MD face to face encounter    Documentation of Face to Face and Certification for Home Health Services    I certify that  patient: Megha Campbell is under my care and that I, or a nurse practitioner or physician's assistant working with me, had a face-to-face encounter that meets the physician face-to-face encounter requirements with this patient on: 9/11/2019.    This encounter with the patient was in whole, or in part, for the following medical condition, which is the primary reason for home health care: metastatic breast cancer.    I certify that, based on my findings, the following services are medically necessary home health services: Nursing, Occupational Therapy and Physical Therapy.    My clinical findings support the need for the above services because: Nurse is needed: To assess lung infultrates, hypoxia, medications, anxiety, possible heart failure after changes in medications or other medical regimen.., Occupational Therapy Services are needed to assess and treat cognitive ability and address ADL safety due to impairment in mobility and Physical Therapy Services are needed to assess and treat the following functional impairments: deconditioned state, gait and strength training    Further, I certify that my clinical findings support that this patient is homebound (i.e. absences from home require considerable and taxing effort and are for medical reasons or [facility] or infrequently or of short duration when for other reasons) because: Patient is bedbound due to: metastatic breast cancer with hypoxia.    Based on the above findings. I certify that this patient is confined to the home and needs intermittent skilled nursing care, physical therapy and/or speech therapy.  The patient is under my care, and I have initiated the establishment of the plan of care.  This patient will be followed by a physician who will periodically review the plan of care.  Physician/Provider to provide follow up care: Briseyda Lock    Attending hospital physician (the Medicare certified [facility] provider): Yung Dillon MD  Physician  Signature: See electronic signature associated with these discharge orders.  Date: 9/11/2019     Full Code     Oxygen Adult    [place] [facility] Order 4 liter(s) by nasal cannula continuously with use of portable tank. Expected treatment length is indefinite (99 months).. Test on conserving device as applicable.    Patients who qualify for home O2 coverage under the CMS guidelines require ABG tests or O2 sat readings obtained closest to, but no earlier than 2 days prior to the discharge, as evidence of the need for home oxygen therapy. Testing must be performed while patient is in the chronic stable state. See notes for O2 sats.    I certify that this patient, Megha Campbell has been under my care and that I, or a nurse practitioner or physician's assistant working with me, had a face-to-face encounter that meets the face-to-face encounter requirements with this patient on 9/11/2019. The patient, Megha Campbell was evaluated or treated in whole, or in part, for the following medical condition, which necessitates the use of the ordered oxygen. Treatment Diagnosis: metastatic lung cancer    Attending Provider: Yung Dillon MD  Physician signature: See electronic signature associated with these discharge orders  Date of Order: September 11, 2019     Diet    Follow this diet upon discharge: Orders Placed This Encounter      Combination Diet Low Saturated Fat Na <2400mg Diet, No Caffeine Diet  Low salt  Ensure, boost or carnation Instant breakfast 2-3 times daily.     Discharge Medications   Current Discharge Medication List      START taking these medications    Details   amoxicillin-clavulanate (AUGMENTIN) 875-125 MG tablet Take 1 tablet by mouth 2 times daily for 3 days  Qty: 6 tablet, Refills: 0    Comments: Future refills by PCP Dr. Briseyda Lock with phone number [phone].  Associated Diagnoses: Pneumonia of both lower lobes due to infectious organism (H)      bisacodyl (DULCOLAX) 10 MG suppository Place 1  suppository (10 mg) rectally daily as needed for constipation  Qty: 5 suppository, Refills: 0    Comments: Future refills by PCP Dr. Briseyda Lock with phone number [phone].  Associated Diagnoses: Other constipation      DULoxetine (CYMBALTA) 30 MG capsule Take 1 capsule (30 mg) by mouth daily  Qty: 30 capsule, Refills: 0    Associated Diagnoses: Depressive disorder      enoxaparin (LOVENOX) 100 MG/ML syringe Inject 1 mL (100 mg) Subcutaneous 2 times daily  Qty: 60 Syringe, Refills: 0    Comments: Future refills by PCP Dr. Briseyda Lock with phone number [phone].  Dispense enough medication for one month  Associated Diagnoses: Acute deep vein thrombosis (DVT) of right lower extremity, unspecified vein (H)      furosemide (LASIX) 20 MG tablet Take 1 tablet (20 mg) by mouth 2 times daily  Qty: 60 tablet, Refills: 0    Comments: Future refills by PCP Dr. Briseyda Lock with phone number [phone].  Associated Diagnoses: Lower extremity edema      ipratropium (ATROVENT) 0.02 % neb solution Take 2.5 mLs (0.5 mg) by nebulization 4 times daily as needed for wheezing  Qty: 157 mL, Refills: 1    Comments: Future refills by PCP Dr. Briseyda Lock with phone number [phone].  Give enough for 60 doses  Associated Diagnoses: Acute respiratory failure with hypoxia (H)      levalbuterol (XOPENEX CONC) 1.25 MG/0.5ML neb solution Take 0.25 mLs (0.63 mg) by nebulization every 6 hours as needed for wheezing  Qty: 1 each, Refills: 0    Comments: Future refills by PCP Dr. Briseyda Lock with phone number [phone].  Give enough medication for 60 doses  Associated Diagnoses: Acute respiratory failure with hypoxia (H)      Lidocaine (LIDOCARE) 4 % Patch Place 1 patch onto the skin every 24 hours To prevent lidocaine toxicity, patient should be patch free for 12 hrs daily.  Apply to site of pain left chest wall  Qty: 10 patch, Refills: 0    Associated Diagnoses: Malignant pleural effusion      metoprolol succinate ER  (TOPROL-XL) 50 MG 24 hr tablet Take 1 tablet (50 mg) by mouth daily  Qty: 30 tablet, Refills: 0    Comments: Future refills by PCP Dr. Briseyda Lock with phone number [phone].  Associated Diagnoses: SVT (supraventricular tachycardia) (H)      !! order for DME Equipment being ordered: Nebulizer  Qty: 1 Device, Refills: 0    Comments: Future refills by PCP Dr. Briseyda Lock with phone number [phone].  Associated Diagnoses: Acute respiratory failure with hypoxia (H)      !! order for DME Equipment being ordered: bedside commode  Qty: 1 Device, Refills: 0    Associated Diagnoses: Malignant neoplasm of lower-outer quadrant of left breast of female, estrogen receptor negative (H)      !! 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 Briseyda Lock with phone number [phone].  Associated Diagnoses: Gastroesophageal reflux disease without esophagitis      polyethylene glycol (MIRALAX/GLYCOLAX) packet Take 17 g by mouth daily as needed for constipation  Qty: 12 packet, Refills: 0    Comments: Future refills by PCP Dr. Briseyda Lock with phone number [phone].  Associated Diagnoses: Other constipation      potassium chloride ER (K-DUR/KLOR-CON M) 20 MEQ CR tablet Take 1 tablet (20 mEq) by mouth 2 times daily  Qty: 60 tablet, Refills: 0    Comments: Future refills by PCP Dr. Briseyda Lock with phone number [phone].  Associated Diagnoses: Lower extremity edema      predniSONE (DELTASONE) 10 MG tablet Take 4 tablets (40 mg) by mouth daily for 3 days  Qty: 12 tablet, Refills: 0    Comments: Future  refills by PCP Dr. Briseyda Lock with phone number [phone].  Associated Diagnoses: Pneumonia of both upper lobes due to infectious organism (H)      QUEtiapine (SEROQUEL) 25 MG tablet Take 1 tablet (25 mg) by mouth 3 times daily  Qty: 90 tablet, Refills: 0    Comments: Future refills by PCP Dr. Briseyda Lock with phone number [phone].  Associated Diagnoses: Generalized anxiety disorder      senna-docusate (SENOKOT-S/PERICOLACE) 8.6-50 MG tablet Take 1 tablet by mouth 2 times daily as needed for constipation  Qty: 30 tablet, Refills: 0    Comments: Future refills by PCP Dr. Briseyda Lock with phone number [phone].  Associated Diagnoses: Other constipation       !! - Potential duplicate medications found. Please discuss with provider.      CONTINUE these medications which have NOT CHANGED    Details   acetaminophen (TYLENOL) 325 MG tablet Take 2 tablets (650 mg) by mouth every 4 hours as needed for other (multimodal surgical pain management along with NSAIDS and opioid medication as indicated based on pain control and physical function.)  Qty: 100 tablet, Refills: 0    Associated Diagnoses: Acute post-operative pain; Malignant pleural effusion      albuterol (PROAIR HFA/PROVENTIL HFA/VENTOLIN HFA) 108 (90 Base) MCG/ACT inhaler Inhale 1-2 puffs into the lungs every 6 hours as needed for shortness of breath / dyspnea or wheezing  Qty: 1 Inhaler, Refills: 0    Comments: Pharmacy may dispense brand covered by insurance (Proair, or proventil or ventolin or generic albuterol inhaler)  Associated Diagnoses: Pneumonia of right lung due to infectious organism, unspecified part of lung      clonazePAM (KLONOPIN) 1 MG tablet 0.5 mg (1/2 pill) twice a day for 3 days, then 0.5 mg (1/2 pill) at bedtime for 3 days -- then 0.5 mg at bedtime as needed for sleep    Associated Diagnoses: Generalized anxiety disorder      ibuprofen (ADVIL/MOTRIN) 800 MG tablet Take 1 tablet (800 mg) by mouth every 8 hours as needed for  moderate pain  Qty: 100 tablet, Refills: 0    Comments: Take with food  Associated Diagnoses: Malignant neoplasm of lower-outer quadrant of left breast of female, estrogen receptor negative (H)      ondansetron (ZOFRAN-ODT) 4 MG ODT tab Take 1 tablet (4 mg) by mouth every 6 hours as needed for nausea or vomiting  Qty: 20 tablet, Refills: 0    Associated Diagnoses: Malignant pleural effusion      sertraline (ZOLOFT) 100 MG tablet Take 150 mg by mouth daily         STOP taking these medications       albuterol (PROVENTIL) (2.5 MG/3ML) 0.083% neb solution Comments:   Reason for Stopping:         buPROPion (WELLBUTRIN XL) 150 MG 24 hr tablet Comments:   Reason for Stopping:         diphenhydrAMINE (BENADRYL) 25 MG tablet Comments:   Reason for Stopping:         levofloxacin (LEVAQUIN) 750 MG tablet Comments:   Reason for Stopping:         mirtazapine (REMERON) 15 MG tablet Comments:   Reason for Stopping:         ranitidine (ZANTAC) 150 MG tablet Comments:   Reason for Stopping:             Allergies   Allergies   Allergen Reactions     Hydrocodone-Acetaminophen Itching     Patient states she is able to take it with Benadryl.       Oxycodone Itching and Rash     Data   Most Recent 3 CBC's:  Recent Labs   Lab Test 09/09/19  0530 09/08/19  0600 09/07/19  0600 09/06/19  1902   WBC  --  8.1 7.2 7.3   HGB  --  10.9* 10.8* 10.9*   MCV  --  81 80 80    351 303 352      Most Recent 3 BMP's:  Recent Labs   Lab Test 09/11/19  0600 09/10/19  2220 09/10/19  0600 09/09/19  0530 09/08/19  0600     --  138  --  136   POTASSIUM 3.1* 3.2* 3.3* 3.4 3.4   CHLORIDE 94  --  96  --  98   CO2 38*  --  36*  --  31   BUN 15  --  16  --  9   CR 0.53  --  0.58 0.47* 0.61   ANIONGAP 4  --  6  --  7   PABLO 8.8  --  9.4  --  8.8   *  --  130*  --  95     Most Recent 2 LFT's:  Recent Labs   Lab Test 09/06/19  0153 08/31/19  0528   AST 34 19   ALT 23 17   ALKPHOS 108 100   BILITOTAL 0.3 0.4     Most Recent INR's and Anticoagulation  Dosing History:  Anticoagulation Dose History     Recent Dosing and Labs Latest Ref Rng & Units 8/9/2019 8/14/2019 8/23/2019    INR 0.86 - 1.14 - 0.97 0.97    INR Point of Care 0.86 - 1.14 1.0 - -        Most Recent 3 Troponin's:  Recent Labs   Lab Test 09/06/19  0153 09/03/19  1635 08/29/19  1453   TROPI <0.015 <0.015 <0.015     Most Recent Cholesterol Panel:No lab results found.  Most Recent 6 Bacteria Isolates From Any Culture (See EPIC Reports for Culture Details):  Recent Labs   Lab Test 09/04/19  0515 08/21/19  1345 08/20/19  0833 08/20/19  0656 08/16/19  1455 08/16/19  0832   CULT No growth No growth No growth No growth No growth No growth     Most Recent TSH, T4 and A1c Labs:  Recent Labs   Lab Test 08/19/19  0006   TSH 0.70     Results for orders placed or performed during the hospital encounter of 09/06/19   XR Chest 2 Views    Narrative    CHEST 2 VIEWS   9/8/2019 12:41 AM     HISTORY: Status post left pleurodesis on 8/23/2019. Follow-up  infiltrates. Evaluate for pleural effusion. Left chest wall pain at  incision site.    COMPARISON: 9/6/2019 at 0211 hours.    FINDINGS: Moderately extensive patchy opacities are present in the  central aspect of the right lung and mid and lower left lung. No  convincing pleural effusion. Possible cardiomegaly. Right anterior  chest wall port with catheter entering the right internal jugular vein  and distal tip in the superior vena cava.      Impression    IMPRESSION:   1. No convincing interval change since 9/6/2019 at 0211 hours.  2. Moderately extensive patchy opacities in both lungs.  3. No convincing pleural effusion.    SALOMÓN ALLEN MD

## 2019-09-11 NOTE — PLAN OF CARE
"PT: Attempted to see pt for PT. Son at bedside, pt sitting up in recliner chair. Pt declining participation in PT despite extensive encouragement and education regarding concern for current activity tolerance, pt's limited mobility while hospitalized. Pt states \"I'm leaving today at noon, I have to\". When writer asked where pt intends to disch to, she states she plans to return to her home and son states he plans to stay in [place] \"for a little bit\", when writer asked how long that could be, a week, a month, son replies \"a little bit\". Pt did not allow son to participate much in conversation. Attempted to encourage pt to mobilize so that pt's son could see how she is moving and how he could potentially assist and to allow writer to assist in determining if any equipment could be helpful to pt, pt states \"well now I have to use BSC, maybe later\". Pt then repeats that she \"is leaving at noon today and someone set me up with oxygen and a wheelchair already\". Discussed with CC who reports she is not aware that these services have been set up. Reviewed recommendation of TCU with pt which she continues to refuse, reviewed potential home care which pt reports she is agreeable to. Discussed with RN and CC.  "

## 2019-09-11 NOTE — PLAN OF CARE
A&Ox4, VSS ex occasionally tachy, 95% on 4L. 's when up moving to commode. LIND. Complained of 10/10 pain. Refused tylenol, ibuprofen, heat and ice. Moved to chair and now denies pain. Lidocaine patch placed at 0100 on middle left back. Pt requested neb x1.  Diminished LS. Pt wants to leave at 1200 tomorrow with her son and move to [place] with him. Lasix given at 0200. Adequate UOP. SBA to bedside commode. [place] locked.

## 2019-09-11 NOTE — PLAN OF CARE
Physical Therapy Discharge Summary    Reason for therapy discharge:    Discharged to home with home therapy.    Progress towards therapy goal(s). See goals on Care Plan in [facility] electronic health record for goal details.  Goals not met.  Barriers to achieving goals:   limited tolerance for therapy and discharge from facility.    Therapy recommendation(s):    Continued therapy is recommended.  Rationale/Recommendations:  Pt is significantly below baseline and would benefit from continued skilled PT services to progress functional activity tolerance, strength, balance, safety and IND with functional mobility. Pt will require A with all mobility. TCU was recommended but pt refused.

## 2019-09-11 NOTE — PROGRESS NOTES
Patient has been assessed for Home Oxygen needs. Oxygen readings:    *Pulse oximetry (SpO2) = 89% on room air at rest while awake.    *SpO2 improved to 98% on 4 liters/minute at rest.    *SpO2 = 82% on room air during activity/with exercise.    *SpO2 improved to 91% on 4liters/minute during activity/with exercise.

## 2019-09-11 NOTE — PROGRESS NOTES
Progress Note     Primary Oncologist/Hematologist:  Dr. Mendoza          Assessment and Plan:     Megha Campbell is a 54 year old female who was admitted on 9/6/2019.      1. Stage III ( T3 N2a M0) triple negative left sided breast cancer, now with metastatic disease  -diagnosis 11/2018  -[id number] stage III with 8 cm tumor and 5 positive lymph nodes, extranodal extension, LVI  -s/p lumpectomy with re-excision 12/2018   -s/p radiation 2/4/19-4/1/19  -declined chemotherapy (both neoadjuvant or adjuvant)  -CT 8/8/19 revealed a large left pleural effusion  -thoracentesis 8/9/19 confirmed metastatic adenocarcinoma, ER/FL negative, consistent with breast primary  -CT thoracic and lumbar spine shows no evidence of metastatic disease  -outpatient PET/CT being scheduled  -androgen receptors only weakly positive at 1-3 % and PDL1 is negative at 0%  -prognosis and that her cancer is now metastatic and no longer curable has been discussed  -we have discussed use of chemotherapy versus palliative cares alone (no other viable options)  -she has been very hesitant to consider chemotherapy but now states she will try it  -performance status is poor and her follow up has been challenging  -plan abraxane plus or minus platinum chemotherapy as outpatient if she consents  -follow up with PET/CT results for discussion and initiation of systemic therapies after discharge  -will get short interval follow up with Dr. Mendoza in next few days or early next week at the latest     2. Recurrent Pleural Effusion  -malignant cytology positive on 8/9/2019  -s/p VATS with pleurodesis      3. SOB  -multifocal etiology related to malignant  pleural effusion/COPD exacerbation/possible heart failure  -with sedation I also worry about aspiration events  -continue O2, nebs, diuresis, supportive care  -on antibiotics with zosyn per hospitalist and continue as needed  -will go home on home oxygen therapy     4. Hematology  -h/o B12 and iron  deficiency post gastrectomy  -anemia due to chronic illness  -recheck   -[employer]       5.  Pain  -CT thoracic and lumbar spine 8/17/19 showed no metastatic disease  -lidocaine patch, as needed tylenol, ibuprofen  -try to minimize sedative drugs  -palliative care consulted, but she is resistant to additional support from them     6. Psychosocial Concerns  -depression and generalized anxiety disorder  -has repeatedly declined recommended treatment  -concerned about possible drug seeking behavior  -psychiatry following     7. Disposition  -pts son Delta has arrived from [place].    -willing to follow up in clinic here with Dr. Mendoza  -it will be a challenge to get her by car or plane to [place]  -home care will be ordered    Lei HUGHES CNP  [facility]  [phone]; [phone] (cell)        Interval History:     Short of breath is about the same. Wants to go home. We talked about follow up with Dr. Mendoza in the clinic.              Review of Systems:     The 5 point Review of Systems is negative other than noted in the HPI             Medications:   Scheduled Medications    acetaminophen  1,000 mg Oral TID     clonazePAM  0.5 mg Oral BID     DULoxetine  30 mg Oral Daily     enoxaparin  40 mg Subcutaneous Q24H     furosemide  40 mg Intravenous Q8H     heparin  5 mL Intracatheter Q28 Days     heparin lock flush  5-10 mL Intracatheter Q24H     iopamidol  79 mL Intravenous Once     lidocaine  1 patch Transdermal Q24h    And     lidocaine   Transdermal Q24H    And     lidocaine   Transdermal Q8H     methylPREDNISolone  40 mg Intravenous Daily     metoprolol succinate ER  50 mg Oral Daily     pantoprazole  40 mg Oral QAM AC     piperacillin-tazobactam  3.375 g Intravenous Q6H     potassium chloride  20 mEq Oral Once     QUEtiapine  25 mg Oral TID     ranitidine  150 mg Oral Daily     sertraline  150 mg Oral Daily     sodium chloride (PF)  10 mL Intracatheter Q8H     sodium chloride 0.9 %   "101 mL Intravenous Once     PRN Medications  acetaminophen, bisacodyl, heparin lock flush, ibuprofen, ipratropium, levalbuterol, lidocaine 4%, lidocaine (buffered or not buffered), LORazepam, magnesium sulfate, melatonin, metoprolol, naloxone, ondansetron **OR** ondansetron, polyethylene glycol, potassium chloride, potassium chloride with lidocaine, potassium chloride, potassium chloride, potassium chloride, potassium phosphate (KPHOS) in D5W IV, potassium phosphate (KPHOS) in D5W IV, potassium phosphate (KPHOS) in D5W IV, potassium phosphate (KPHOS) in D5W IV, prochlorperazine **OR** prochlorperazine **OR** prochlorperazine, QUEtiapine, senna-docusate **OR** senna-docusate, sodium chloride (PF), sodium chloride (PF), sodium chloride (PF)               Physical Exam:   Vitals were reviewed  Blood pressure 130/69, pulse 74, temperature 96.5  F (35.8  C), temperature source Oral, resp. rate 18, height 1.727 m (5' 8\"), weight 111.2 kg (245 lb 1.6 oz), last menstrual period 12/07/2017, SpO2 93 %, not currently breastfeeding.  Wt Readings from Last 4 Encounters:   09/11/19 111.2 kg (245 lb 1.6 oz)   09/04/19 104.3 kg (230 lb)   08/31/19 110.6 kg (243 lb 12.8 oz)   08/29/19 110.2 kg (243 lb)       I/O last 3 completed shifts:  In: 200 [P.O.:200]  Out: 200 [Urine:200]      Constitutional: Awake, alert, cooperative, no apparent distress     Lungs: Diminished. NC oxygen.    Cardiovascular: Regular rate and rhythm, normal S1 and S2, and no murmur noted   Abdomen: Normal bowel sounds, soft, non-distended, non-tender   Skin: Bilateral lower extremity edema.   Other: Port in the right anterior chest.              Data:   All laboratory data and imaging studies reviewed.    CMP  Recent Labs   Lab 09/11/19  0600 09/10/19  2220 09/10/19  0600 09/09/19  0530 09/08/19 0600 09/07/19 0600 09/06/19  0153     --  138  --  136 138   < > 136   POTASSIUM 3.1* 3.2* 3.3* 3.4 3.4 3.5   < > 3.5   CHLORIDE 94  --  96  --  98 100   < > " 101   CO2 38*  --  36*  --  31 32   < > 26   ANIONGAP 4  --  6  --  7 6   < > 9   *  --  130*  --  95 109*   < > 105*   BUN 15  --  16  --  9 9   < > 9   CR 0.53  --  0.58 0.47* 0.61 0.56   < > 0.59   GFRESTIMATED >90  --  >90 >90 >90 >90   < > >90   GFRESTBLACK >90  --  >90 >90 >90 >90   < > >90   PABLO 8.8  --  9.4  --  8.8 8.6   < > 8.4*   MAG 1.9  --  1.8 2.0 2.1  --    < >  --    PHOS  --   --   --  2.5  --   --   --   --    PROTTOTAL  --   --   --   --   --   --   --  7.2   ALBUMIN  --   --   --   --   --   --   --  2.1*   BILITOTAL  --   --   --   --   --   --   --  0.3   ALKPHOS  --   --   --   --   --   --   --  108   AST  --   --   --   --   --   --   --  34   ALT  --   --   --   --   --   --   --  23    < > = values in this interval not displayed.     CBC  Recent Labs   Lab 09/09/19  0530 09/08/19  0600 09/07/19  0600 09/06/19  1902 09/06/19  0153   WBC  --  8.1 7.2 7.3 8.0   RBC  --  4.40 4.31 4.35 4.39   HGB  --  10.9* 10.8* 10.9* 11.0*   HCT  --  35.4 34.6* 34.8* 34.7*   MCV  --  81 80 80 79   MCH  --  24.8* 25.1* 25.1* 25.1*   MCHC  --  30.8* 31.2* 31.3* 31.7   RDW  --  15.4* 15.6* 15.7* 15.8*    [phone]

## 2019-09-12 NOTE — PLAN OF CARE
Occupational Therapy Discharge Summary    Reason for therapy discharge:    Discharged to home.    Progress towards therapy goal(s). See goals on Care Plan in [facility] electronic health record for goal details.  Goals partially met.  Barriers to achieving goals:   discharge from facility.    Therapy recommendation(s):    Continued therapy is recommended.  Rationale/Recommendations:  If patient goes home, would need 24/7 A/supervision for all I/ADLs and home OT.

## 2019-09-12 NOTE — ED PROVIDER NOTES
History     Chief Complaint:  Shortness of Breath      HPI   Megha Campbell is a 54 year old female on 4L oxygen, with a history of asthma, anxiety, drug seeking behavior, DVT, metastatic breast cancer, malignant left pleural effusion s/p pleurodesis on 8/23/19, who presents via EMS with shortness of breath and nausea. To note, the patient was admitted to the hospital from 9/6/19 through 9/11/19 for shortness of breath, hypoxia, and pneumonia. Per EMS, the patient was on 10 L oxygen when they arrived to her home and anxious as taking 2 Duonebs provided no relief of her shortness of breath. En route, they report that the patient was not wheezing on exam, but noted some tightness in her chest. Her oxygen sats were in the high 90s. Here, the patient reports that her current shortness of breath feels different than her previous hospitalization, describing that she feels more restriction in her chest. She notes that she is most concerned about her left sided abdominal/chest wall pain from her procedure on 8/23/19 and reports that the anxiety she has about this is making her feel more short of breath. She reports increased shortness of breath when talking and endorses feeling anxious. She states that she was supposed to be taking prednisone, but only began taking this earlier today. She reports that she is scheduled to see her oncologist in 2 days.     Allergies:  Hydrocodone-acetaminophen  Oxycodone     Medications:    Albuterol  Dulcolax  Klonopin  Cymbalta  Lovenox   Lasix  Toprol  Zofran   Protonix  Miralax  Prednisone  Klor-con  Seroquel  Zoloft    Past Medical History:    Acute respiratory failure with hypoxia  Anxiety  Breast cancer  Depressive disorder  Malignant pleural effusion  Metastatic cancer  Obesity  Pneumonia  Asthma  DVT  Drug seeking behavior    Past Surgical History:    Breast biopsy  Gastric bypass  IR chest port placement  Lumpectomy breast with seed localization  Thoracoscopic pleurodesis  Tubal  ligation    Family History:    Breast cancer    Social History:  Smoking status: never  Alcohol use: yes  Drug use: no  PCP: [facility]  Marital Status:        Review of Systems   Respiratory: Positive for shortness of breath.    Gastrointestinal: Positive for nausea.   Musculoskeletal: Positive for arthralgias.   Psychiatric/Behavioral: The patient is nervous/anxious.    All other systems reviewed and are negative.        Physical Exam     Patient Vitals for the past 24 hrs:   BP Temp Temp src Pulse Heart Rate Resp SpO2 Weight   09/12/19 0452 127/78 -- -- 98 98 26 96 % --   09/12/19 0430 -- -- -- -- 93 28 96 % --   09/12/19 0415 -- -- -- -- 103 26 95 % --   09/12/19 0400 -- -- -- -- 114 26 94 % --   09/12/19 0345 -- -- -- -- 114 28 98 % --   09/12/19 0336 (!) 147/87 97.7  F (36.5  C) Oral 110 -- 24 97 % 108.9 kg (240 lb)       Physical Exam  General: Patient is alert, anxious and short of breath.  HEENT: Head atraumatic    Eyes: pupils equal and reactive. Conjunctiva clear   Nares: patent   Oropharynx: no lesions, uvula midline, no palatal draping, normal voice, no trismus  Neck: Supple without lymphadenopathy, no meningismus  Chest: Tachycardic but regular  Lungs: Tachypnea, equal air exchange bilaterally, occasional end expiratory wheeze  Abdomen: Soft, non tender, nondistended, normal bowel sounds  Back: No costovertebral angle tenderness, no midline C, T or L spine tenderness  Neuro: Grossly nonfocal, normal speech, strength equal bilaterally, CN 2-12 intact  Extremities: No deformities, equal radial and DP pulses. No clubbing, cyanosis.  1+ lower extremity edema bilaterally  Skin: Warm and dry with no rash.       Emergency Department Course   ECG:  ECG (3:44:55):  Rate 110 bpm. IN interval 126. QRS duration 64. QT/QTc 328/443. P-R-T axes 53 -5 19. Sinus tachycardia with premature supraventricular complexes and with occasional premature ventricular complexes. Low voltage QRS. Cannot  rule out anterior infarct, age undetermined. Abnormal ECG. Agree with computer interpretation. No significant change compared to EKG dated 9/8/19. Interpreted at 0425 by Penny Greco MD.    Imaging:  Radiographic findings were communicated with the patient who voiced understanding of the findings.    XR Chest 2 Views  Moderately extensive irregular patchy opacities within  both lungs. They have decreased in the lower right lung since  9/8/2019, but otherwise not convincingly changed.  As read by Radiology.    Laboratory:  Procalcitonin: 0.09  BNP: 2504 (H)  BMP: Glucose 108 (H), potassium 2.8 (L), calcium 8.2 (L), o/w WNL (Creatinine: 0.54)  CBC: WBC 9.1, HGB 11.1 (L),   Troponin 1 (0400): <0.015    Interventions:  0405: Ativan 1 mg IV  0405: solu-MEDROL 125 MG IV  0410: Morphine 4 mg IV  0432: Seroquel 25 mg PO  0433: Morphine 2 mg IV  0433: KLOR-CON 40 mEq PO    Emergency Department Course:  0332: Nursing notes and vitals reviewed. I performed an exam of the patient as documented above.     IV inserted. Medicine administered as documented above. Blood drawn. This was sent to the lab for further testing, results above.    The patient was sent for a chest xray while in the emergency department, findings above.     0352: I rechecked the patient and discussed the results of her workup thus far.     0515: I rechecked the patient and discussed the results of her workup thus far.     Findings and plan explained to the Patient. Patient discharged home with instructions regarding supportive care, medications, and reasons to return. The importance of close follow-up was reviewed.     I personally reviewed the laboratory results with the Patient and answered all related questions prior to discharge.     Impression & Plan      Medical Decision Making:  Patient is a 54-year-old female with multiple recent admissions for shortness of breath and anxiety.  Patient has known metastatic breast cancer including to the  lung with VATS on 8/23/2019.  Patient's had continued left-sided chest wall pain since that time.  She states that her pain becomes worse and then she becomes anxious.  She took 2 nebs at home followed by 2 nebs in route with not much change in her shortness of breath.  Here patient was given Ativan for anxiety and morphine for pain with complete relief of her symptoms.  She was also given a dose of her PRN Seroquel to help with her anxiety.  Patient has oxygen saturations of 96% on her normal 4 L nasal cannula oxygen at this time.  She has no increased work of breathing.  Patient is afebrile, normal procalcitonin, no change in her chest x-ray therefore I do not feel this is worsening or significant pneumonia as the cause of her symptoms.  She does have hazy infiltrates bilaterally but this is been present for quite some time.  In addition she has known metastasis to her lungs.  Patient is hemodynamically stable.  Heart rate improved with treating of her anxiety.  Mostly patient feels that her pain has not been controlled and following pain medication here she feels comfortable with the plan for discharge.  Patient is currently on Lovenox and is due to take her next dose this morning.  Do not suspect pulmonary embolism at this time due to her taking Lovenox.  Patient's EKG reveals no acute ischemic changes and troponin is negative making MI unlikely as the cause of her pain.  Her pain seems to be status post VATS and likely nerve pain.  Discussed with patient that she sees her oncologist Friday and she should discuss palliative care with her oncologist.  Certainly at least to see a pain specialist.  Patient is agreeable with this plan.  We are awaiting son to go home and get her oxygen tank to transport her home at which time she will be discharged.  All patient's questions and concerns were addressed and return precautions to the emergency department reviewed at length.    Diagnosis:    ICD-10-CM    1. Shortness of  breath R06.02 Basic metabolic panel     Troponin I     Nt probnp inpatient (BNP)     Procalcitonin   2. Anxiety F41.9    3. Chest wall pain R07.89    4. Hypokalemia E87.6        Disposition:  discharged to home    Discharge Medications:  HYDROmorphone (DILAUDID) 2 MG tablet  Take 1 tablet (2 mg) by mouth every 6 hours as needed for pain, Disp-10 tablet, R-0, Local Print, Refills: 0 ordered, Ordered by: Penny Greco MD    IAmee, am serving as a scribe at 3:32 AM on 9/12/2019 to document services personally performed by Penny Greco MD based on my observations and the provider's statements to me.       Amee Xiong  9/12/2019    EMERGENCY DEPARTMENT       Penny Greco MD  09/12/19 0532

## 2019-09-12 NOTE — TELEPHONE ENCOUNTER
Chief Complaint: Acute Respiratory Failure With Hypoxia (H), Depressive Disorder,WED 11-SEP-2019,ed/ip 4 / 5    [phone] (home)

## 2019-09-12 NOTE — ED NOTES
Bed: ED03  Expected date: 9/12/19  Expected time: 3:25 AM  Means of arrival: Ambulance  Comments:  [address] [id number] asthma/ nebs

## 2019-09-12 NOTE — TELEPHONE ENCOUNTER
ED / Discharge Outreach Protocol    Patient Contact    ED/Discharge Protocol    Not listed as [facility] patient, noted pt was discharged 9/11/19, but went back to ER today 9/12/19    Shazia SANCHEZ RN

## 2019-09-12 NOTE — ED AVS SNAPSHOT
Emergency Department  [address] [place] [address]  Phone:  [phone]  Fax:  [phone]                                    Megha Campbell   MRN: [id number]    Department:   Emergency Department   Date of Visit:  9/12/2019           After Visit Summary Signature Page    I have received my discharge instructions, and my questions have been answered. I have discussed any challenges I see with this plan with the nurse or doctor.    ..........................................................................................................................................  Patient/Patient Representative Signature      ..........................................................................................................................................  Patient Representative Print Name and Relationship to Patient    ..................................................               ................................................  Date                                   Time    ..........................................................................................................................................  Reviewed by Signature/Title    ...................................................              ..............................................  Date                                               Time          22EPIC Rev 08/18

## 2019-09-14 PROBLEM — J96.21 ACUTE AND CHRONIC RESPIRATORY FAILURE WITH HYPOXIA (H): Status: ACTIVE | Noted: 2019-01-01

## 2019-09-14 NOTE — H&P
[facility]    History and Physical - Hospitalist Service       Date of Admission:  9/13/2019    Assessment & Plan   Megha Campbell is a 54 year old female admitted on 9/13/2019. She presents to the emergency department with dyspnea and increased work of breathing similar to prior admissions in the setting of metastatic breast cancer, DVT, medication nonadherence, recent hospitalization with diagnosis of pneumonia on Augmentin, though again, not adherent.    Acute on chronic hypoxic respiratory failure: Baseline 4 L nasal cannula oxygen per her report, and she is using oxygen at home, though apparently was on room air when EMS arrived.  Bilateral extensive groundglass opacities, increasing: Suspect healthcare associated pneumonia.  Patient has had multiple hospitalizations and exposure to anti-infectives, though notes that on discharge, she is often not adherent with her prescribed medications, so difficult to say if this represents a failure of outpatient antibiotics.  Has been prescribed levofloxacin, though was not adherent with this prescription.  Most recently during hospitalization on Zosyn and transition to Augmentin, though again not adherent.  Infiltrates have essentially been present since late August.  -Consider pulmonology consultation given ongoing increasing pulmonary infiltrates if patient's x-ray findings do not improve with adherence to antibiotic regimen  -Initiated on Zosyn in the emergency department.  I have added vancomycin at this time as unclear if this represents healthcare associated pneumonia  -Sputum culture and Gram stain if able to obtain.  Note thick tan secretions in BiPAP mask suggestive of significant pneumonia.  -Blood cultures x2 pending  -Initially requiring BiPAP, though this was discontinued as patient had an episode of emesis early on arrival in the emergency department, and has ongoing episodes of retching at time of my assessment.  -High flow nasal cannula  oxygen  -Addendum: Patient with increased work of breathing following arrival to the floor, RRT called, and patient intubated.  -Pending ability to wean oxygen therapy, consider repeat thoracentesis versus thoracic surgery consultation for loculated left pleural effusion.  -Procalcitonin pending  -Xopenex nebulizers.  Patient has a history of SVT following albuterol treatments both in the emergency department today as well as historically.  Have added albuterol to allergy list.  -IV Solu-Medrol for potential reactive airway component with wheezing associated with increased work of breathing and improved with nebulizer treatments.  -Addendum: Patient intubated following an episode of increased work of breathing shortly after arrival to the floor despite oxygenating well and breathing fairly comfortably on facemask and high flow nasal cannula oxygen.    Left metastatic breast cancer: Initial diagnosis November 2018 status post mastectomy with radiation therapy and VATS with talc pleurodesis by Dr. Rosado late August.  Follows with [facility], though she is uncertain of her primary oncologist name.  -[facility] consulted; request discussion/assessment regarding prognosis given patient's nonadherence with medications.  Patient may benefit from a more palliative approach ongoing increasing pulmonary infiltrate her frequent admissions and ongoing nonadherence with medications, though this would depend on optimal versus likely prognosis as provided by oncology.    Generalized anxiety disorder: Per prior notes, patient has been on clonazepam with plan for taper through primary psychiatrist.  Historically has not followed through with this taper.  She is unable to tell me her medications at this time.  -Await pharmacy reconciliation of medications.  Son may have additional information regarding medications which patient is taking versus not taking, as she provides a history of medication use which is  variable.  Tells me that she is not taking any medications.  -Continue prior to admission Seroquel, Zoloft, Cymbalta, possibly benzodiazepines pending pharmacy reconciliation of medications.  Cautious with benzodiazepines given somewhat somnolent state noted in the emergency department with anxiolytics and tenuous respiratory status.  -5 mg ODT Zyprexa administered for anxiety on floor.    Paroxysmal SVT:  -Have added albuterol to allergy list as above  -Continue prior to admission metoprolol when reconciled by pharmacy       Diet: N.p.o. except meds  DVT Prophylaxis: Therapeutic subcutaneous Lovenox  High Catheter: not present  Code Status: Full code.  Confirmed with patient on admission.    Disposition Plan   Expected discharge: 2 - 3 days, recommended to prior living arrangement with assistance of son.  Patient would benefit from TCU, though it appears that she has been declined by TCU's during prior admissions(?) as well as declined TCU placement herself.  Once Oxygenation status returns to baseline 3-4 L nasal cannula oxygen.  Entered: Nikos Sosa MD 09/14/2019, 3:07 AM     The patient's care was discussed with the Patient and Dr. Gregg in the emergency department..    Nikos Sosa MD  [facility]    ______________________________________________________________________    Chief Complaint   Shortness of breath    History is obtained from the patient, chart review, discussion with Dr. Xavier in the emergency department.  Unfortunately, patient's son, who recently arrived from [place] to help his mother with her cares is not available in the emergency department.  He left shortly before my arrival to assess patient in the stabilization room.    History of Present Illness   Megha Campbell is a 54 year old female who presented via EMS with shortness of breath, hypoxic to 70% on room air, though reports that she is chronically on 4 L nasal cannula oxygen at home.  Has metastatic lung  cancer receiving radiation, recent hospitalization and diagnosis with aspiration pneumonia on Augmentin, though not adherent to medication.  Frequent hospitalizations, and it appears that she has not actually met with her oncologist in the outpatient setting secondary to frequency of hospitalizations.  Medication nonadherence.  History of DVT, does not take any of her medications as an outpatient including subcutaneous Lovenox.  With recent hospitalizations, attempts were made to transition patient to transitional care unit, though she was refused.  Son recently arrived from [place] to help with care of mother, though he is no longer in the emergency department when I came down to evaluate patient.    Patient tells me that her shortness of breath happened suddenly, though cannot tell me exactly when this took place.     In the emergency department, patient was initially placed on BiPAP for work of breathing.    CT PE study performed given nonadherence with Lovenox, which was negative for pulmonary embolism.  Increasing pulmonary infiltrates, however.  Had described wheezing for which she received nebulizer treatments.  This precipitated an episode of SVT, of which patient has a history of paroxysmal SVT which has also been associated with nebulizer treatments in the past.  Did not break with vagal maneuvers, though improved following adenosine administration.  Note that patient did have one episode of nausea with emesis for which she received Ativan, Zofran on arrival in the emergency department.  During my encounter with patient she again has episodes of retching.  No emesis at this time, though significant dry heaves concerning as patient on facemask BiPAP.  BiPAP removed with plan to transition to high flow nasal cannula oxygen versus intubation, though patient actually was saturating 100% on 10 L facemask, appeared well with no significant tachypnea.  Plan was to transition to IMC on high flow nasal cannula,  intubate if persistent nausea and vomiting as patient was unsafe for BiPAP if having ongoing episodes of emesis.  As she was doing well on facemask oxygen, it did not appear that intubation would be necessary.  Plan was for high flow nasal cannula to allow for some degree of positive pressure.  Patient confirmed in the emergency department her desire for full CODE STATUS.    Also early in the emergency department, patient had an episode of SVT following DuoNeb/albuterol therapy.  The patient has known paroxysmal SVT.  She required adenosine as well as subsequent metoprolol in the emergency department.  I am uncertain if she had a change in symptoms otherwise with SVT, though note heart rates greater than 200 with this episode by EKG review.     Significant anxiety history, poor historian.  She cannot tell me who her oncologist is.  Reports episodic abrupt onset shortness of breath without identified precipitants.  She cannot tell me exactly when she began feeling short of breath.  She cannot tell me why she does not take her medications.  At one point she reported to Dr. Xavier in the emergency department that she had been taking her Lovenox, though her son, who was in the emergency department at that time, reminded her that she had not yet opened her packaging for subcutaneous Lovenox to administer.    CT PE protocol demonstrates worsening pulmonary infiltrates in the setting of recent pneumonia diagnosis, again not adherent with outpatient antibiotics.  No pulmonary embolism noted.  Patient was initiated on heparin given nonadherence with Lovenox therapy, transitioned to therapeutic Lovenox therapy by myself on admission.     On the floor, patient had again increased anxiety for which she received 5 mg ODT Zyprexa.  Later in the morning, an RRT was apparently called given increased work of breathing which required transfer to ICU for intubation.    Multiple recent hospitalizations in the setting of her metastatic  breast cancer.  Underwent 8/23 left-sided VATS with pleurodesis for malignant left pleural effusion.  Chronic chest pain related to this.  Recurrent hospitalization 8/31-9/3  with complaints of pain and constipation, left AGAINST MEDICAL ADVICE.  Readmitted 9/4 for right upper lobe pneumonia with prescription for Levaquin.       Review of Systems    The 10 point Review of Systems is negative other than noted in the HPI or here.  Review of systems is somewhat limited as she is a poor historian giving a variable history of her medication use and symptoms.  Nausea with vomiting, which patient reports that she has at baseline, though describes as infrequent.  She cannot tell me how often she has these episodes of N/V  She cannot describe what precipitates her episodes of shortness of breath  Does not describe pain currently, though has a history of chest pain thought associated with her VATS  Sleepy following Ativan administration in the emergency department  Tells me that she has oxygen at home, and wears continuous 4 L nasal cannula, had oxygen at arrival of EMS, though was documented as being 70% on room air  Left breast without pain    Past Medical History    I have reviewed this patient's medical history and updated it with pertinent information if needed.   Past Medical History:   Diagnosis Date     Acute respiratory failure with hypoxia (H)      Anxiety      Breast cancer in female (H) 11/21/2018    breast cancer, left, s/p mastectomy and radiation tx      Depressive disorder      Malignant pleural effusion 08/2019    Related to breast cancer, S/P Pleurodesis 8/23/19 left side     Metastatic cancer (H)      Obese      Pleural effusion      Pneumonia      Uncomplicated asthma        Past Surgical History   I have reviewed this patient's surgical history and updated it with pertinent information if needed.  Past Surgical History:   Procedure Laterality Date     BIOPSY BREAST Left 12/7/2018    Procedure: RE-EXCISION  LEFT BREAST INFERIOR MARGIN, ASPIRATION OF AXILLARY SEROMA;  Surgeon: Lesly Aponte MD;  Location: [facility] OR     GASTRIC BYPASS  2004    abdominal plasty, and scar tissue removal     IR CHEST PORT PLACEMENT > 5 YRS OF AGE  8/23/2019     LUMPECTOMY BREAST WITH SEED LOCALIZATION Left 11/21/2018    Procedure: SEED LOCALIZED LEFT BREAST LUMPECTOMY WITH LEFT AXILLARY LYMPH NODE DISSECTION;  Surgeon: Lesly Aponte MD;  Location:  OR     THORACOSCOPIC PLEURODESIS Left 8/23/2019    Procedure: LEFT VIDEO-ASSISTED THORACIC SURGERY  TALC PLEURODESIS, PARITAL PLEURAL BIOPSY.;  Surgeon: Zackary Rosado MD;  Location:  OR       Social History   I have reviewed this patient's social history and updated it with pertinent information if needed.  Social History     Tobacco Use     Smoking status: Never Smoker     Smokeless tobacco: Never Used   Substance Use Topics     Alcohol use: Yes     Comment: < 1 drink a month     Drug use: No       Family History   I have reviewed this patient's family history and updated it with pertinent information if needed.   Family History   Problem Relation Age of Onset     Breast Cancer Mother    \    Prior to Admission Medications   Prior to Admission Medications   Prescriptions Last Dose Informant Patient Reported? Taking?   DULoxetine (CYMBALTA) 30 MG capsule   No No   Sig: Take 1 capsule (30 mg) by mouth daily   HYDROmorphone (DILAUDID) 2 MG tablet   No No   Sig: Take 1 tablet (2 mg) by mouth every 6 hours as needed for pain   Lidocaine (LIDOCARE) 4 % Patch   No No   Sig: Place 1 patch onto the skin every 24 hours To prevent lidocaine toxicity, patient should be patch free for 12 hrs daily.  Apply to site of pain left chest wall   QUEtiapine (SEROQUEL) 25 MG tablet   No No   Sig: Take 1 tablet (25 mg) by mouth 3 times daily   acetaminophen (TYLENOL) 325 MG tablet  Self No No   Sig: Take 2 tablets (650 mg) by mouth every 4 hours as needed for other (multimodal surgical pain  management along with NSAIDS and opioid medication as indicated based on pain control and physical function.)   albuterol (PROAIR HFA/PROVENTIL HFA/VENTOLIN HFA) 108 (90 Base) MCG/ACT inhaler  Self No No   Sig: Inhale 1-2 puffs into the lungs every 6 hours as needed for shortness of breath / dyspnea or wheezing   amoxicillin-clavulanate (AUGMENTIN) 875-125 MG tablet   No No   Sig: Take 1 tablet by mouth 2 times daily for 3 days   bisacodyl (DULCOLAX) 10 MG suppository   No No   Sig: Place 1 suppository (10 mg) rectally daily as needed for constipation   clonazePAM (KLONOPIN) 1 MG tablet  Self No No   Si.5 mg (1/2 pill) twice a day for 3 days, then 0.5 mg (1/2 pill) at bedtime for 3 days -- then 0.5 mg at bedtime as needed for sleep   enoxaparin (LOVENOX) 100 MG/ML syringe   No No   Sig: Inject 1 mL (100 mg) Subcutaneous 2 times daily   furosemide (LASIX) 20 MG tablet   No No   Sig: Take 1 tablet (20 mg) by mouth 2 times daily   ibuprofen (ADVIL/MOTRIN) 800 MG tablet  Self No No   Sig: Take 1 tablet (800 mg) by mouth every 8 hours as needed for moderate pain   ipratropium (ATROVENT) 0.02 % neb solution   No No   Sig: Take 2.5 mLs (0.5 mg) by nebulization 4 times daily as needed for wheezing   levalbuterol (XOPENEX CONC) 1.25 MG/0.5ML neb solution   No No   Sig: Take 0.25 mLs (0.63 mg) by nebulization every 6 hours as needed for wheezing   metoprolol succinate ER (TOPROL-XL) 50 MG 24 hr tablet   No No   Sig: Take 1 tablet (50 mg) by mouth daily   ondansetron (ZOFRAN-ODT) 4 MG ODT tab  Self No No   Sig: Take 1 tablet (4 mg) by mouth every 6 hours as needed for nausea or vomiting   order for DME   No No   Sig: Equipment being ordered: Nebulizer   order for DME   No No   Sig: Equipment being ordered: bedside commode   order for DME   No No   Sig: Equipment being ordered: Walker Wheels () and Walker ()  Treatment Diagnosis: Difficulty ambulating   pantoprazole (PROTONIX) 40 MG EC tablet   No No   Sig: Take 1  tablet (40 mg) by mouth every morning (before breakfast)   polyethylene glycol (MIRALAX/GLYCOLAX) packet   No No   Sig: Take 17 g by mouth daily as needed for constipation   potassium chloride ER (K-DUR/KLOR-CON M) 20 MEQ CR tablet   No No   Sig: Take 1 tablet (20 mEq) by mouth 2 times daily   predniSONE (DELTASONE) 10 MG tablet   No No   Sig: Take 4 tablets (40 mg) by mouth daily for 3 days   senna-docusate (SENOKOT-S/PERICOLACE) 8.6-50 MG tablet   No No   Sig: Take 1 tablet by mouth 2 times daily as needed for constipation   sertraline (ZOLOFT) 100 MG tablet  Self Yes No   Sig: Take 150 mg by mouth daily      Facility-Administered Medications: None     Allergies   Allergies   Allergen Reactions     Hydrocodone-Acetaminophen Itching     Patient states she is able to take it with Benadryl.       Oxycodone Itching and Rash       Physical Exam   Vital Signs: Temp: 96.4  F (35.8  C) Temp src: Oral BP: (!) 131/98 Pulse: 105 Heart Rate: 97 Resp: 23 SpO2: 95 % O2 Device: BiPAP/CPAP    Weight: 222 lbs 3.58 oz    General Appearance: Ill-appearing 54-year-old female, -American.  Appears older than age.  No significant tachypnea, on BiPAP during initial evaluation  Eyes: No scleral icterus or injection.  Respiratory: No wheezing at time of my evaluation on BiPAP.  No crackles.  Some mild increased work of breathing, again, on BiPAP.  Patient with thick tan secretions noted in BiPAP mask.  No cough  Cardiovascular: Heart rate in the 100 range.  No murmur appreciated, the heart sounds distant.  GI: Obese, nontender to palpation.  Retching without emesis.  Bowel sounds present.  Lymph/Hematologic: 1-2+ pitting edema bilaterally.  Genitourinary: Left breast is hyperpigmented, indurated, firm.  Nontender per patient report.  Note seroma on CT  Skin: Indurated hyperpigmented L breast (radiation effect vs cancer?)  Musculoskeletal: Muscular tone intact in all extremities.  Neurologic: Patient is sleepy.  Awakens, and is able  to provide some history, though limited.  Psychiatric: Normal affect.  Does not appear overly anxious at this juncture.    Data   Data reviewed today: I reviewed all medications, new labs and imaging results over the last 24 hours. I personally reviewed the EKG tracing showing SVT with rates over 200 and subsequent sinus arrhythmia.    Recent Labs   Lab 09/14/19  0610 09/14/19  0013 09/12/19  0400   WBC 10.3 10.6 9.1   HGB 10.9* 11.5* 11.1*   MCV 81 80 79    273 281   INR  --  1.27*  --     134 138   POTASSIUM 3.4 3.0* 2.8*   CHLORIDE 97 93* 98   CO2 34* 36* 30   BUN 11 14 17   CR 0.37* 0.43* 0.54   ANIONGAP 5 5 10   PABLO 8.4* 9.2 8.2*   * 127* 108*   ALBUMIN 2.2*  --   --    PROTTOTAL 7.1  --   --    BILITOTAL 0.5  --   --    ALKPHOS 92  --   --    ALT 26  --   --    AST 33  --   --    TROPI  --  <0.015 <0.015

## 2019-09-14 NOTE — ED NOTES
Patient dry heaving with BiPaP on. Given 4mg IV zofran. Paged RT, will trial HiFlo nasal cannula.

## 2019-09-14 NOTE — PROVIDER NOTIFICATION
Brief note, admission dictation pending:    Patient presented via EMS with shortness of breath, hypoxic to 70% on room air, though reports that she is chronically on 4 L nasal cannula oxygen at home.  Has metastatic lung cancer receiving radiation.  Frequent hospitalizations, and it appears that she has not actually met with her oncologist in the outpatient setting secondary to frequency of hospitalizations.  Medication nonadherence.  History of DVT, does not take any of her medications as an outpatient including subcutaneous Lovenox.  With recent hospitalizations, attempts were made to transition patient to transitional care unit, though she was refused.  Son recently arrived from [place] to help with care of mother, though he is no longer in the emergency department when I came down to evaluate patient.    In the emergency department, patient was initially placed on BiPAP for work of breathing.  Note that she did have one episode of nausea with emesis for which she received Ativan, Zofran.  During my encounter with patient she again has episodes of retching.  No emesis at this time.  BiPAP removed with plan to transition to high flow nasal cannula oxygen versus intubation, though patient actually was saturating 100% on 10 L facemask.  Plan was to transition to IMC on high flow nasal cannula, intubate if persistent nausea and vomiting.    Significant anxiety history, poor historian.  She cannot tell me who her oncologist is.  Reports episodic abrupt onset shortness of breath without identified precipitants.  CT PE protocol demonstrates worsening pulmonary infiltrates in the setting of recent pneumonia diagnosis, again not adherent with outpatient antibiotics.  No pulmonary embolism noted.    On the floor, patient had again increased anxiety for which she received 5 mg ODT Zyprexa.  Later in the morning, an RRT was apparently called given increased work of breathing which required transfer to ICU for  intubation.    Nikos Sosa MD  6:54 AM  As full H&P is pending, please page with any further questions regarding initial presentation.  [phone]

## 2019-09-14 NOTE — PROVIDER NOTIFICATION
MD Notification    Notified Person:  Curly     Notified Persons Name: MD dozier    Notification Date/Time: 09/14/19 @ 1718    Notification Interaction:  bedside    Purpose of Notification: Lactic acid 12.1, ABG Ph7.12, VBG Ph 6.98    Orders Received:  none

## 2019-09-14 NOTE — PHARMACY-VANCOMYCIN DOSING SERVICE
Pharmacy Vancomycin Initial Note  Date of Service 2019  Patient's  1965  54 year old, female    Indication: Healthcare-Associated Pneumonia    Current estimated CrCl = Estimated Creatinine Clearance: 188 mL/min (A) (based on SCr of 0.43 mg/dL (L)).    Creatinine for last 3 days  2019:  6:00 AM Creatinine 0.53 mg/dL  2019:  4:00 AM Creatinine 0.54 mg/dL  2019: 12:13 AM Creatinine 0.43 mg/dL    Recent Vancomycin Level(s) for last 3 days  No results found for requested labs within last 72 hours.      Vancomycin IV Administrations (past 72 hours)                   vancomycin (VANCOCIN) 2,000 mg in sodium chloride 0.9 % 500 mL intermittent infusion (mg) 2,000 mg New Bag 19 0447                Nephrotoxins and other renal medications (From now, onward)    Start     Dose/Rate Route Frequency Ordered Stop    19 0600  piperacillin-tazobactam (ZOSYN) 4.5 g vial to attach to  mL bag      4.5 g  over 30 Minutes Intravenous EVERY 6 HOURS 19 0328      19 0400  vancomycin (VANCOCIN) 2,000 mg in sodium chloride 0.9 % 500 mL intermittent infusion      2,000 mg  over 2 Hours Intravenous EVERY 12 HOURS 19 0339            Contrast Orders - past 72 hours (72h ago, onward)    Start     Dose/Rate Route Frequency Ordered Stop    19 0032  iopamidol (ISOVUE-370) solution 78 mL      78 mL Intravenous ONCE 19 0031 19 0032                Plan:  1.  Start vancomycin  2000 mg IV q12h.   2.  Goal Trough Level: 15-20 mg/L   3.  Pharmacy will check trough levels as appropriate in 1-3 Days.    4. Serum creatinine levels will be ordered daily for the first week of therapy and at least twice weekly for subsequent weeks.    5. Sparks method utilized to dose vancomycin therapy: Method 1    Miah Pace [facility]

## 2019-09-14 NOTE — CODE/RAPID RESPONSE
Brief House Officer Note:  Hand-off received from Dr. Laguna. In brief, patient admitted with worsening hypoxic respiratory failure. Ms. Campbell has had multiple ED visits and hospital admissions with chest pain, hypoxia, and shortness of breath. She has active metastatic breast cancer. At the time of diagnosis several months ago she refused treatment. She is status post VATS in August 2019 by Dr. Rosado and has had recurrent pleural effusions. She is on therapeutic Lovenox for personal history of DVT.     CT with ground glass opacities mildly increased from 9/1/2019, no PE. Has been intolerant to BiPap with vomiting/dry heaving and significant anxiety. She was placed on HFNC with continued significant work of breathing on 45- liters. BiPap placed for transport to ICU, she is requiring 100% FiO2 and pulling less than 300 tidal volume. Given acutely worsening respiratory status decision was made to intubate.     Per EHR Ms. Campbell has family at home. I have asked CCU nursing staff to update family.     Will sign out to Intensivist. The Hospitalist Service will sign off. We are happy to assume care again once she is extubated.    ELLEN Carver, CNP  Hospitalist Service, House Officer  [facility]     Text Page  Pager: [phone]

## 2019-09-14 NOTE — ANESTHESIA CARE TRANSFER NOTE
Patient: Megha Campbell    * No procedures listed *    Diagnosis: * No pre-op diagnosis entered *  Diagnosis Additional Information: No value filed.    Anesthesia Type:   No value filed.     Note:  Airway :Ventilator  Patient transferred to:ICU  Comments: Diagnosis: Respiratory failure, pleural effusions  Procedure: Intubation  Ordering Physician:Harshil  Location :      Vitals: (Last set prior to Anesthesia Care Transfer)              Electronically Signed By: ELLEN Simons CRNA  September 14, 2019  7:09 AM

## 2019-09-14 NOTE — PROGRESS NOTES
MD Notification    Notified Person: MD    Notified Person Name: Dr. Sosa     Notification Date/Time: 9/14/2019 5:01 AM    Notification Interaction: Paged    Purpose of Notification: Pt complaining of anxiety requesting PRN.     Orders Received:    Comments:

## 2019-09-14 NOTE — CONSULTS
Consult Date:  09/14/2019      MEDICAL ONCOLOGY CONSULTATION      REASON FOR CONSULTATION:  I am asked by Dr. Garcia from the Intensive Care Service to see Ms. Megha Campbell for Medical Oncology consultation regarding history of metastatic breast cancer and for recommendations regarding further management.      HISTORY OF PRESENT ILLNESS:  Ms. Megha Campbell is a 54-year-old woman who has a history of stage III (T3 N2a M0) triple-negative left-sided breast cancer diagnosed in 11/2018.  She had initial surgery which showed an 8 cm tumor with 5 positive lymph nodes and also vascular invasion along with extranodal extension.  She had lumpectomy and reexcision in 12/2018 and also adjuvant radiation, which was completed in 04/2019.  She was offered chemotherapy, both neoadjuvant basis and also after surgery but declined treatment.  In 08/2018, she developed a large left pleural effusion and thoracentesis showed metastatic adenocarcinoma which was ER and OK negative.  There was no evidence of other sites of metastatic disease at that time.  Chemotherapy was again discussed with her and she declined treatment.  She has since been hospitalized on several occasions, most recently in early September.  In early August, she had a recurrence of the effusion and had a VATS procedure with pleurodesis.  During her most recent hospitalization, discussion was again undertaken with her regarding beginning palliative chemotherapy.  At the time of discharge, she was still considering this issue.  She was admitted again on 09/13/2019 with progressive respiratory failure.  Since admission to the hospital, she has had further decline in her status and was intubated earlier this morning.  She is currently seen in the Intensive Care Unit.  She is sedated with propofol.      PAST MEDICAL HISTORY:   1.  Stage III breast cancer with treatment as summarized.   2.  Asthma.   3.  Generalized anxiety disorder.      PAST SURGICAL HISTORY:   1.  Left  breast lumpectomy.   2.  Port placement.   3.  Pleurodesis.      SOCIAL HISTORY:  She does not use tobacco products.  She reports alcohol use rarely.      FAMILY HISTORY:  Includes a diagnosis of breast cancer in her mother.      HOME MEDICATIONS:  As listed in the electronic record and these include:   1.  Duloxetine.   2.  Hydromorphone.   3.  Lidocaine.   4.  Quetiapine.   5.  Acetaminophen.   6.  Albuterol.   7.  Augmentin.   8.  Bisacodyl.   9.  Clonazepam.   10.  Enoxaparin.   11.  Furosemide.   12.  Ibuprofen.   13.  Ipratropium.   14.  Levalbuterol.   15.  Metoprolol.   16.  Ondansetron.   17.  Pantoprazole.   18.  Sertraline.      REVIEW OF SYSTEMS:  Cannot be obtained at this time as she is sedated.      PHYSICAL EXAMINATION:   VITAL SIGNS:  Vital signs from the time of the consultation show temperature 99.0, heart rate 113, blood pressure 120/68, respiratory rate 20 and oxygen saturation 99% on 60% supplemental oxygen.     GENERAL:  Exam shows her to be a sedated woman who is intubated in the Intensive Care Unit.   HEENT:  Scalp appears normal.   LYMPHATICS:  Lymph node exam shows no palpable adenopathy in the cervical or axillary regions.   BREASTS:  Not examined.   Port-A-Cath nontender.   CARDIOVASCULAR:  Regular heart rhythm.  There is no obvious murmur.   CHEST:  Lungs show decreased breath sounds bilaterally.     ABDOMEN:  Soft and not distended.  Spleen tip and liver edge not palpable.   EXTREMITIES:  No significant upper extremity edema.  Muscle groups appear symmetric.   NEUROLOGIC:  Not performed at this time as she is sedated and nonresponsive.      LABORATORY STUDIES:  CT scan of the chest from 09/14/2019 shows extensive patchy and ground-glass opacities scattered within both lungs overall mildly encouraged increased compared with the scan on 09/01/2019.  There was soft tissue attenuation within the mediastinum.  Very small bilateral pleural effusions were noted and felt to be unchanged.  No  pulmonary embolus was identified.      Laboratory studies from today show normal serum electrolytes, creatinine 0.5, calcium 8.2.  Liver chemistry studies showed normal liver chemistry studies with an albumin of 2.2.  Procalcitonin 0.1.  Glucose levels since admission have varied between 125-194.  CBC from earlier today shows a white count of 9000, hemoglobin 10.5 and platelet count 268,000.  White cell differential showed 88% neutrophils.  INR 1.27 with a PTT of 32 seconds.  Blood and urine cultures are pending.      Doppler ultrasound of the lower extremities shows right posterior tibial vein deep vein thrombosis with no evidence of deep vein thrombosis in the left leg.      IMPRESSION:   1.  Respiratory failure related to pulmonary infiltrates.  Possible causes certainly include an infectious or an inflammatory process.  Alternatively, this could represent lymphangitic spread due to her cancer.  I reviewed the different management options with Dr. Garcia.  She is not a candidate at this point for any form of cytotoxic chemotherapy.  In the event that the lung infiltrates represent progression of metastatic disease, the prognosis is poor and systemic therapy would be unlikely to lead to significant or rapid improvement in her status.  Further, she has steadfastly refused chemotherapy in the past and, therefore, it is uncertain that she would agree to treatment at this time.   2.  Thrombophilia with ongoing managed with enoxaparin.      PLAN:   1.  Continue supportive care using antibiotics.   2.  Review prognosis and goals of care with her family when present.   3.  Would not recommend systemic chemotherapy based on above discussion.        I appreciate the chance to help in Ms. Campbell's care.         COLIN STRE[address] MD             D: 2019   T: 2019   MT: SYED      Name:     JONATHAN CAMPBELL   MRN:      [id number]        Account:       [id number]   :      1965           Consult Date:   09/14/2019      Document: [id number]       cc: Hima Garcia MD

## 2019-09-14 NOTE — PLAN OF CARE
PT and OT: Orders received, chart reviewed, pt intubated this AM due to respiratory distress, now in ICU. Will hold therapy evaluations at this time to allow for further medical management.

## 2019-09-14 NOTE — CONSULTS
"CLINICAL NUTRITION SERVICES  -  ASSESSMENT NOTE      Recommendations Ordered by Registered Dietitian (RD):   Pending FT placement (agree with post-pyloric access as able), orders written to begin TF Peptamen Intense VHP at 15 mL/hr and hold at this rate. RD to re-assess advancement daily.    Malnutrition:   % Weight Loss:  Weight loss does not meet criteria for malnutrition   % Intake: Unable to determine with lack of history   Subcutaneous Fat Loss:  Unable to determine with lack of exam   Muscle Loss:  Unable to determine with lack of exam   Fluid Retention:  Mild 2+    Malnutrition Diagnosis: Unable to determine due to lack of diet history and nutrition focused physical exam         REASON FOR ASSESSMENT  Megha Campbell is a 54 year old female seen by Registered Dietitian for Provider Order - Registered Dietitian to Assess and Order TF per Medical Nutrition Therapy Protocol      NUTRITION HISTORY  - Information obtained from chart review:   - H/o metastatic breast cancer, DVT with recent hospitalization for PNA. Presents with SOB and required intubation for respiratory failure   - Patient with gastric bypass procedure in 2004 ([id number] recorded from further chart review)  - Multiple recent admissions recorded   - [facility]. She was on a regular diet and eating well during that time. Formal CHF/Low Na diet teaching was provided    - Unable to obtain nutrition history from pt/family today as pt is intubated and no family present    CURRENT NUTRITION ORDERS  Diet Order:     NPO, vented this AM    Current Intake/Tolerance:  N/A  No BM recorded thus far    NUTRITION FOCUSED PHYSICAL ASSESSMENT FOR DIAGNOSING MALNUTRITION)  Completed:  No Patient not available (receiving bedside procedure)         Observed:    Unable    Obtained from Chart/Interdisciplinary Team:  Edema 2+ mild (legs)    ANTHROPOMETRICS  Height: 5' 8\"  Weight: 222 lbs 3.58 oz (100.8 kg)  Body mass index is 33.79 " kg/m .  Weight Status:  Obesity Grade I BMI 30-34.9  IBW: 63.6 kg  % IBW: 158%  Weight History: Weights fluctuating ~20# within the last month (suspect fluids shifts 2/2 recent admission and chronic lymphedema recorded), but ultimately patient is weighing the same as she did on 8/14/19.   Wt Readings from Last 10 Encounters:   09/14/19 100.8 kg (222 lb 3.6 oz)   09/12/19 108.9 kg (240 lb)   09/11/19 111.2 kg (245 lb 1.6 oz)   09/04/19 104.3 kg (230 lb)   08/31/19 110.6 kg (243 lb 12.8 oz)   08/29/19 110.2 kg (243 lb)   08/28/19 111.4 kg (245 lb 11.2 oz)   08/19/19 120.7 kg (266 lb 3.2 oz)   08/14/19 100.3 kg (221 lb 3.2 oz)   08/09/19 90.7 kg (200 lb)       LABS  Labs reviewed  Na 137 (NL)  K 4 (NL)  Mg 2.1 (NL)  No Phos  No results found for: A1C  Recent Labs   Lab 09/14/19  0955 09/14/19  0610 09/14/19  0013 09/12/19  0400 09/11/19  0600 09/10/19  0600   * 183* 127* 108* 125* 130*       MEDICATIONS  Medications reviewed  Propofol at 24.2 mL/hr = 638 kcal/day    ASSESSED NUTRITION NEEDS PER APPROVED PRACTICE GUIDELINES:    Dosing Weight 100 kg (energy); 63.6 kg (protein)  Estimated Energy Needs: 4150-8837 kcals (14-17 Kcal/Kg)  Justification: obese and vented  Estimated Protein Needs:  grams protein (1.5-2 g pro/Kg)  Justification: obesity guidelines and preservation of lean body mass  Estimated Fluid Needs: >/=1 mL/Kcal  Justification: per provider pending fluid status    MALNUTRITION:  % Weight Loss:  Weight loss does not meet criteria for malnutrition   % Intake: Unable to determine with lack of history   Subcutaneous Fat Loss:  Unable to determine with lack of exam   Muscle Loss:  Unable to determine with lack of exam   Fluid Retention:  Mild 2+    Malnutrition Diagnosis: Unable to determine due to lack of diet history and nutrition focused physical exam     NUTRITION DIAGNOSIS:  Inadequate protein-energy intake related to NPO, vented as evidenced by receiving 0% estimated protein and 40% estimated  energy from propofol       NUTRITION INTERVENTIONS  Recommendations / Nutrition Prescription  Pending FT placement (agree with post-pyloric access as able), orders written to begin TF Peptamen Intense VHP at 15 mL/hr and hold at this rate. RD to re-assess advancement daily.   Standard flushes 60 q4 hrs     Implementation  Nutrition education: Not appropriate at this time due to patient condition  EN Composition, EN Schedule, Feeding Tube Flush: as above   Collaboration and Referral of Nutrition care: dicussed FT plan with MD, RN, PharmD      Nutrition Goals  EN to begin within 24 hrs       MONITORING AND EVALUATION:  Progress towards goals will be monitored and evaluated per protocol and Practice Guidelines        Diana Olmos RD, LD  Clinical Dietitian

## 2019-09-14 NOTE — PROGRESS NOTES
PerMD ETT pulled to 22cm @lip Per MD Pt currently on continuous Albuterol Nebs    Will continue to monitoir    Storm Ellis.RT

## 2019-09-14 NOTE — PROGRESS NOTES
VAT RN, late entry: Spoke to Dr. Garcia about which arm to use for PICC placement prior to procedure this morning. Patient has had a mastectomy on the L which makes PICC placement in that arm a contraindication. Pt has a lincoln cath on R side which can increase the risk of blood clot with both PICC and port sharing the same vessel.  MD okay with placing PICC on R side.

## 2019-09-14 NOTE — H&P
Critical Care  Note      09/14/2019    Name: Megha Campbell MRN#: [id number]   Age: 54 year old YOB: 1965     [facility]    [facility] DAY #             Problem List:   Active Problems:    Acute and chronic respiratory failure with hypoxia (H)           Summary/Hospital Course:   54 year old  female presented to the ER with shortness of breath, hypoxia to 70% Sats on room air (apparently she is on 4L nasal cannula oxygen at home).   There has been recent hospitalizations.   Recently she had been on BIPAP on the floor but she progressively got worse. She was transferred to the ICU and got immediately intubated.     Past medical history signficant for   Asthma  Anxiety  Drug seeking behavior  Metastatic breast cancer  Malignant left pleural effusion s/p pleurodesis on 8/23/2019  Depressive disorder  Obesity  Right lower extremity DVT    Past Surgical History:    Breast biopsy  Gastric bypass  IR chest port placement  Lumpectomy breast with seed localization  Thoracoscopic pleurodesis  Tubal ligation     Family History:    Breast cancer     Social History:  Smoking status: never  Alcohol use: yes  Drug use: no  PCP: [facility]  Marital Status:             Assessment and plan :     Megha Campbell IS a 54 year old female admitted on 9/13/2019 for acute on chronic respiratory failure.   I have personally reviewed the daily labs, imaging studies, cultures and discussed the case with referring physician and consulting physicians.     My assessment and plan by system for this patient is as follows:    Neurology/Psychiatry:   1. Intubated and sedated  2. Pain/analgesia (fentanyl)  3. Anxiety on zoloft and seroquel at home  4. Delirium   Plan  Started propofol and fentanyl for sedation.      Cardiovascular:   1.Hemodynamics -not on pressors  2.Rhythm - Sinus Tach  3. Ischemia - first troponin is normal   Difficult study, patient on back and upright due to back  pain/spasm.   Echo 9/07/2019  1. Very limited image quality, difficult to assess EF. Probably normal, on  some views appears hyperdynamic. EF 60-70%. Cannot assess wall motion due to  poor image quality.  2. The right ventricle is normal size. Mildly decreased right ventricular  systolic function  3. No valve disease, but views are limited.  4. Normal IVC size with partial respiratory collapse.  Troponins are normal (two values)  Pro BNP is elevated but trending downwards  5150 on 9/10 and now 1060 on 9/14   Plan   Continue to monitor    Pulmonary/Ventilator Management:   1. Airway Intubated  2. Oxygenation/ventilation/mechanics  Initial settings on ventilator  400, peep 10, FiO 2 1.0, RR 20   Peak pressures are 40 to 50s.  Chest xray and CT scan reports  Bilateral opacities   Extensive patchy and ground-glass opacities scattered within both  lungs, most prominent in the mid and lower left lung and upper right  lung. These have overall mildly increased since 9/1/2019. For example,  there is a region of ground-glass opacities in the left upper lobe  (for example, series 6, image 68) that is new. Very small right  pleural effusion again noted. Very small loculated-appearing left  pleural effusion again noted. Very small amount of pericardial fluid.  Strandy soft tissue attenuation throughout the mediastinum again  noted. Partial visualization of a nonspecific irregular-shaped region  of patchy soft tissue in the left axilla (series 5, image 72),  unchanged. 7 cm circumscribed fluid collection in the left breast  again noted and likely a seroma.   Negative for PE  ARDS picture  Plan  -  Currently on steroids: Continue steroids  Start continuous nebs  Treat for pneumonia on Zosyn and Vanco  May need ketamine if persistent wheezes  Lung protective strategy      GI and Nutrition :   1. Enteral feedings not started  Plan  - Nutrition consult     Renal/Fluids/Electrolytes:   1.  Electrolyte abnormality nad  Creatinine  0.37  3. Acid/base status: respiratory acidosis  4. Volume status: elevated lactic acid  Plan  - monitor function and electrolytes as needed with replacement per ICU protocols. - generally avoid nephrotoxic agents such as NSAID, IV contrast unless specifically required  - adjust medications as needed for renal clearance  - follow I/O's as appropriate.    Infectious Disease:   1. zosyn and vanc  2. procalcitonin 0.10   Plan  - Continue antibiotics    Endocrine:   1. Stress induced hyperglycemia  Plan  - ICU insulin protocol, goal sugar <180    Hematology/Oncology:   1.  Anemia, no signs, symptoms of active blood loss  Plan  - continue to monitor     IV/Access:   1. Venous access - PICC line placed today, Has a PORT as well  2. Arterial access -   3.  Plan  - central access required and necessary      ICU Prophylaxis:   1. DVT:heparin infusion for DVT/ mechanical  2. VAP: HOB 30 degrees, chlorhexidine rinse  3. Stress Ulcer: PPI  4. Restraints: Nonviolent soft two point restraints required and necessary for patient safety and continued cares and good effect as patient continues to pull at necessary lines, tubes despite education and distraction. Will readdress daily.   5. Wound care  -   6. Feeding - will start feeds  7. Family Update:will try and contact son  8. Disposition - ICU        Key goals for next 24 hours:   1. Lung protective strategies, ketamine, steroids and bronchodilators for asthma  2. Antibiotics  3. Therapeutic heparin for DVT (donna NEGRO)  4. Breast cancer patient ( follow up with oncologist)               Medical History:     Past Medical History:   Diagnosis Date     Acute respiratory failure with hypoxia (H)      Anxiety      Breast cancer in female (H) 11/21/2018    breast cancer, left, s/p mastectomy and radiation tx      Depressive disorder      Malignant pleural effusion 08/2019    Related to breast cancer, S/P Pleurodesis 8/23/19 left side     Metastatic cancer (H)      Obese      Pleural  effusion      Pneumonia      Uncomplicated asthma      Past Surgical History:   Procedure Laterality Date     BIOPSY BREAST Left 12/7/2018    Procedure: RE-EXCISION LEFT BREAST INFERIOR MARGIN, ASPIRATION OF AXILLARY SEROMA;  Surgeon: Lesly Aponte MD;  Location:  OR     GASTRIC BYPASS  2004    abdominal plasty, and scar tissue removal     IR CHEST PORT PLACEMENT > 5 YRS OF AGE  8/23/2019     LUMPECTOMY BREAST WITH SEED LOCALIZATION Left 11/21/2018    Procedure: SEED LOCALIZED LEFT BREAST LUMPECTOMY WITH LEFT AXILLARY LYMPH NODE DISSECTION;  Surgeon: Lesly Aponte MD;  Location:  OR     THORACOSCOPIC PLEURODESIS Left 8/23/2019    Procedure: LEFT VIDEO-ASSISTED THORACIC SURGERY  TALC PLEURODESIS, PARITAL PLEURAL BIOPSY.;  Surgeon: Zackary Rosado MD;  Location:  OR     Social History     Socioeconomic History     Marital status:      Spouse name: Not on file     Number of children: 2     Years of education: Not on file     Highest education level: Not on file   Occupational History     Occupation: accounting   Social Needs     Financial resource strain: Not on file     Food insecurity:     Worry: Not on file     Inability: Not on file     Transportation needs:     Medical: Not on file     Non-medical: Not on file   Tobacco Use     Smoking status: Never Smoker     Smokeless tobacco: Never Used   Substance and Sexual Activity     Alcohol use: Yes     Comment: < 1 drink a month     Drug use: No     Sexual activity: Not on file   Lifestyle     Physical activity:     Days per week: Not on file     Minutes per session: Not on file     Stress: Not on file   Relationships     Social connections:     Talks on phone: Not on file     Gets together: Not on file     Attends [employer]ism service: Not on file     Active member of club or organization: Not on file     Attends meetings of clubs or organizations: Not on file     Relationship status: Not on file     Intimate partner violence:     Fear of  current or ex partner: Not on file     Emotionally abused: Not on file     Physically abused: Not on file     Forced sexual activity: Not on file   Other Topics Concern     Parent/sibling w/ CABG, MI or angioplasty before 65F 55M? Not Asked   Social History Narrative    , has 2 children, lives in an apartment with her new  of 3 years (Liban Abbott), and he is disabled (?spinal cord injury), and she helps care for him.  (last updated 9/4/2019)         Allergies   Allergen Reactions     Hydrocodone-Acetaminophen Itching     Patient states she is able to take it with Benadryl.       Oxycodone Itching and Rash     Albuterol      SVT following DuoNeb/albuterol treatments in the past              Key Medications:       chlorhexidine  15 mL Mouth/Throat Q12H     famotidine  20 mg Intravenous Q12H     ipratropium - albuterol 0.5 mg/2.5 mg/3 mL  3 mL Nebulization Q4H     methylPREDNISolone  62.5 mg Intravenous Q8H     piperacillin-tazobactam  4.5 g Intravenous Q6H     sodium chloride (PF)  3 mL Intracatheter Q8H     vancomycin (VANCOCIN) IV  2,000 mg Intravenous Q12H       fentaNYL 25 mcg/hr (09/14/19 0800)     HEParin       norepinephrine       propofol (DIPRIVAN) infusion 40 mcg/kg/min (09/14/19 0741)        Home Meds    No current facility-administered medications on file prior to encounter.   Current Outpatient Medications on File Prior to Encounter:  acetaminophen (TYLENOL) 325 MG tablet Take 2 tablets (650 mg) by mouth every 4 hours as needed for other (multimodal surgical pain management along with NSAIDS and opioid medication as indicated based on pain control and physical function.)   albuterol (PROAIR HFA/PROVENTIL HFA/VENTOLIN HFA) 108 (90 Base) MCG/ACT inhaler Inhale 1-2 puffs into the lungs every 6 hours as needed for shortness of breath / dyspnea or wheezing   amoxicillin-clavulanate (AUGMENTIN) 875-125 MG tablet Take 1 tablet by mouth 2 times daily for 3 days   bisacodyl (DULCOLAX) 10 MG  suppository Place 1 suppository (10 mg) rectally daily as needed for constipation   clonazePAM (KLONOPIN) 1 MG tablet 0.5 mg (1/2 pill) twice a day for 3 days, then 0.5 mg (1/2 pill) at bedtime for 3 days -- then 0.5 mg at bedtime as needed for sleep   DULoxetine (CYMBALTA) 30 MG capsule Take 1 capsule (30 mg) by mouth daily   enoxaparin (LOVENOX) 100 MG/ML syringe Inject 1 mL (100 mg) Subcutaneous 2 times daily   furosemide (LASIX) 20 MG tablet Take 1 tablet (20 mg) by mouth 2 times daily   HYDROmorphone (DILAUDID) 2 MG tablet Take 1 tablet (2 mg) by mouth every 6 hours as needed for pain   ibuprofen (ADVIL/MOTRIN) 800 MG tablet Take 1 tablet (800 mg) by mouth every 8 hours as needed for moderate pain   ipratropium (ATROVENT) 0.02 % neb solution Take 2.5 mLs (0.5 mg) by nebulization 4 times daily as needed for wheezing   levalbuterol (XOPENEX CONC) 1.25 MG/0.5ML neb solution Take 0.25 mLs (0.63 mg) by nebulization every 6 hours as needed for wheezing   Lidocaine (LIDOCARE) 4 % Patch Place 1 patch onto the skin every 24 hours To prevent lidocaine toxicity, patient should be patch free for 12 hrs daily.Apply to site of pain left chest wall   metoprolol succinate ER (TOPROL-XL) 50 MG 24 hr tablet Take 1 tablet (50 mg) by mouth daily   ondansetron (ZOFRAN-ODT) 4 MG ODT tab Take 1 tablet (4 mg) by mouth every 6 hours as needed for nausea or vomiting   order for DME Equipment being ordered: Nebulizer   order for DME Equipment being ordered: bedside commode   order for DME Equipment being ordered: Walker Wheels () and Walker ()Treatment Diagnosis: Difficulty ambulating   pantoprazole (PROTONIX) 40 MG EC tablet Take 1 tablet (40 mg) by mouth every morning (before breakfast)   polyethylene glycol (MIRALAX/GLYCOLAX) packet Take 17 g by mouth daily as needed for constipation   potassium chloride ER (K-DUR/KLOR-CON M) 20 MEQ CR tablet Take 1 tablet (20 mEq) by mouth 2 times daily   predniSONE (DELTASONE) 10 MG tablet  Take 4 tablets (40 mg) by mouth daily for 3 days   QUEtiapine (SEROQUEL) 25 MG tablet Take 1 tablet (25 mg) by mouth 3 times daily   senna-docusate (SENOKOT-S/PERICOLACE) 8.6-50 MG tablet Take 1 tablet by mouth 2 times daily as needed for constipation   sertraline (ZOLOFT) 100 MG tablet Take 150 mg by mouth daily              Physical Examination:   Temp:  [96.4  F (35.8  C)-100.9  F (38.3  C)] 100.9  F (38.3  C)  Pulse:  [] 125  Heart Rate:  [] 125  Resp:  [16-34] 20  BP: ()/(64-98) 102/86  FiO2 (%):  [30 %-100 %] 100 %  SpO2:  [91 %-100 %] 100 %    Intake/Output Summary (Last 24 hours) at 9/14/2019 0845  Last data filed at 9/14/2019 0836  Gross per 24 hour   Intake --   Output 260 ml   Net -260 ml     Wt Readings from Last 4 Encounters:   09/14/19 100.8 kg (222 lb 3.6 oz)   09/12/19 108.9 kg (240 lb)   09/11/19 111.2 kg (245 lb 1.6 oz)   09/04/19 104.3 kg (230 lb)     BP - Mean:  [] 96  Ventilation Mode: CMV/AC  (Continuous Mandatory Ventilation/ Assist Control)  FiO2 (%): 100 %  Rate Set (breaths/minute): 14 breaths/min  Tidal Volume Set (mL): 500 mL  PEEP (cm H2O): 5 cmH2O  Oxygen Concentration (%): 100 %  Resp: 20    Recent Labs   Lab 09/14/19  0758 09/14/19  0020   PH 7.33* 7.49*   PCO2 65* 49*   PO2 190* 108*   HCO3 34* 37*   O2PER  --  40       GEN: no acute distress   HEENT: head ncat, sclera anicteric, OP patent, trachea midline   PULM: unlabored synchronous with vent, bilateral wheezes  CV/COR: RRR S1S2 no gallop,  No rub, no murmur  ABD: soft nontender, hypoactive bowel sounds, no mass  EXT:  Edema   warm  NEURO: grossly intact  SKIN: no obvious rash  LINES: clean, dry intact         Data:   All data and imaging reviewed     ROUTINE ICU LABS (Last four results)  CMP  Recent Labs   Lab 09/14/19  0610 09/14/19  0017 09/14/19  0013 09/12/19  0400 09/11/19  0600  09/10/19  0600 09/09/19  0530 09/08/19  0600     --  134 138 136  --  138  --  136   POTASSIUM 3.4  --  3.0* 2.8*  3.1*   < > 3.3* 3.4 3.4   CHLORIDE 97  --  93* 98 94  --  96  --  98   CO2 34*  --  36* 30 38*  --  36*  --  31   ANIONGAP 5  --  5 10 4  --  6  --  7   *  --  127* 108* 125*  --  130*  --  95   BUN 11  --  14 17 15  --  16  --  9   CR 0.37*  --  0.43* 0.54 0.53  --  0.58 0.47* 0.61   GFRESTIMATED >90 >90 >90 >90 >90  --  >90 >90 >90   GFRESTBLACK >90 >90 >90 >90 >90  --  >90 >90 >90   PABLO 8.4*  --  9.2 8.2* 8.8  --  9.4  --  8.8   MAG  --   --   --   --  1.9  --  1.8 2.0 2.1   PHOS  --   --   --   --   --   --   --  2.5  --    PROTTOTAL 7.1  --   --   --   --   --   --   --   --    ALBUMIN 2.2*  --   --   --   --   --   --   --   --    BILITOTAL 0.5  --   --   --   --   --   --   --   --    ALKPHOS 92  --   --   --   --   --   --   --   --    AST 33  --   --   --   --   --   --   --   --    ALT 26  --   --   --   --   --   --   --   --     < > = values in this interval not displayed.     CBC  Recent Labs   Lab 09/14/19  0610 09/14/19  0013 09/12/19  0400 09/09/19  0530 09/08/19  0600   WBC 10.3 10.6 9.1  --  8.1   RBC 4.43 4.65 4.51  --  4.40   HGB 10.9* 11.5* 11.1*  --  10.9*   HCT 35.8 37.0 35.8  --  35.4   MCV 81 80 79  --  81   MCH 24.6* 24.7* 24.6*  --  24.8*   MCHC 30.4* 31.1* 31.0*  --  30.8*   RDW 15.5* 15.4* 15.4*  --  15.4*    273 281 320 351     INR  Recent Labs   Lab 09/14/19  0013   INR 1.27*     Arterial Blood Gas  Recent Labs   Lab 09/14/19  0758 09/14/19  0020   PH 7.33* 7.49*   PCO2 65* 49*   PO2 190* 108*   HCO3 34* 37*   O2PER  --  40       All cultures:  Recent Labs   Lab 09/14/19  0755 09/14/19  0037 09/14/19  0014   CULT Canceled, Test credited  Incorrectly ordered by PCU/Clinic   No growth after 3 hours No growth after 3 hours     Recent Results (from the past 24 hour(s))   XR Chest Port 1 View    Narrative    CHEST SINGLE VIEW PORTABLE   9/14/2019 12:10 AM     HISTORY: Shortness of breath.    COMPARISON: 9/12/2019 at 0449 hours.    FINDINGS: Moderately extensive patchy  opacities in the mid and lower  left lung and a few patchy opacities in the central aspect of the  upper right lung. Possible small left pleural effusion and possible  very small right pleural effusion. Possible cardiomegaly. Right  anterior chest wall port with catheter entering the right internal  jugular vein and distal tip in the superior vena cava.      Impression    IMPRESSION:   1. Nonspecific moderately extensive patchy opacities in the mid and  lower left lung, not convincingly changed since 9/12/2019. A few  patchy opacities in the central aspect of the right lung, likely  mildly decreased.  2. No other significant interval change.    SALOMÓN ALLEN MD   CT Chest Pulmonary Embolism w Contrast    Narrative    CT CHEST WITH CONTRAST   9/14/2019 1:27 AM     HISTORY: Shortness of breath.    COMPARISON: 9/1/2019.    TECHNIQUE: Following the administration of 78 mL Isovue-370  intravenous contrast, helical sections were acquired through the lungs  according to the pulmonary embolism protocol. Coronal reconstructions  were generated. Radiation dose for this scan was reduced using  automated exposure control, adjustment of the mA and/or kV according  to the patient's size, or iterative reconstruction technique.    FINDINGS: No visualized pulmonary embolus. The thoracic aorta is  normal in caliber without dissection. Right anterior chest wall port  with catheter entering the right internal jugular vein and distal tip  in the right atrium.    Extensive patchy and ground-glass opacities scattered within both  lungs, most prominent in the mid and lower left lung and upper right  lung. These have overall mildly increased since 9/1/2019. For example,  there is a region of ground-glass opacities in the left upper lobe  (for example, series 6, image 68) that is new. Very small right  pleural effusion again noted. Very small loculated-appearing left  pleural effusion again noted. Very small amount of pericardial  fluid.  Strandy soft tissue attenuation throughout the mediastinum again  noted. Partial visualization of a nonspecific irregular-shaped region  of patchy soft tissue in the left axilla (series 5, image 72),  unchanged. 7 cm circumscribed fluid collection in the left breast  again noted and likely a seroma.    Scan through the upper abdomen is unremarkable.         Impression    IMPRESSION:   1. Extensive patchy and ground-glass opacities scattered within both  lungs, overall mildly increased since 9/1/2019. Additionally, there is  soft tissue attenuation within the mediastinum. These findings are  nonspecific, but could relate to an atypical infectious or  inflammatory process. Neoplasm is less likely, but cannot be excluded.  2. Very small bilateral pleural effusions, unchanged.  3. No pulmonary embolus.    SALOMÓN ALLEN MD   XR Chest Port 1 View    Narrative    CHEST ONE VIEW PORTABLE   9/14/2019 7:20 AM     HISTORY: intubation    COMPARISON: 9/14/2019 0005 hours      Impression    IMPRESSION:   1. New endotracheal tube, tip is in very proximal left mainstem  bronchus and needs to be pulled back. ET tube position telephoned to  patient's ICU nurse Bruce at 7:35 AM, 9/14/2019.  2. Increased dense right upper lobe opacity consistent with worsening  atelectasis. Worsening infiltrate not excluded. Increased opacity left  mid and lower lung consistent with increased atelectasis or  infiltrate. There is mild right lower lobe opacity. Redemonstrated  right chest Port-A-Cath with tip near SVC-right atrial junction.    SULY AMANDA MD   XR Chest Port 1 View    Narrative    CHEST ONE VIEW PORTABLE   9/14/2019 7:55 AM     HISTORY: ET tube position    COMPARISON: 9/14/2019 0720 hours      Impression    IMPRESSION:   1. Endotracheal tube has been pulled back and tip is now 3.5 cm above  the ruba.  2. Improving right upper lobe opacity consistent with improving  atelectasis. There continues to be residual right upper  lobe, right  lower lobe and left mid and lower lung opacity consistent with  atelectasis or infiltrate.  3. No change right chest Port-A-Cath.    SULY AMANDA MD         Billing: This patient is critically ill: Yes. Total critical care time today 64 min.

## 2019-09-14 NOTE — PROGRESS NOTES
CODE NOTE    CODE Blue Called approx 4:15 PM. Pt earlier this afternoon issues with HR, tach >140, metoprolol  5 ordered, initially with improvement in HR but continued bradying down into HR 60's with loss of EtCO - atropine dosed with no improvement and then note loss of pulse.  Compressions started immediately with epi pushed, mag 2.1 thus addition mag given and levophed drip started with ROSC after ~ 2min.   Stat labs returned showing severe acidosis ph 6.97/120/40 (post code).  Bedside ECHO with relatively small LV but adequate contractility , RV mildly elevated no effusion  Fluid  500 ml bolused.     1. Bradycardic arrest - ? Metabolic/hypercarbic as primary etiology plus med ? PE - ROSC after 2 min  but continue shock (pressor requirement)   2. Hypoxemic/hypercarbic resp failure - multifactorial       PLAN  - PE CT   - Head CT  - continue levophed  - continue hep for now  - art line  - increase  , goal MV >10     Update son Delta over phone - verbal agreement for arterial line, brief discussion of goals of care     Additional 45 min CCT separate from Dr Garcia prior effort, excluding procedures    Jesus Rawls MD

## 2019-09-14 NOTE — PROGRESS NOTES
[facility] ICU RESPIRATORY NOTE        Date of Admission: 9/13/2019    Date of Intubation (most recent): 9/14/19    Reason for Mechanical Ventilation: Resp Failure/Asthma Exacerbation    Number of Days on Mechanical Ventilation: 1    Met Criteria for Pressure Support Trial: No    Length of Pressure Support Trial: none    Reason for Stopping Pressure Support Trial: none    Reason for No Pressure Support Trial: PerMD    Significant Events Today: None    ABG Results:   Recent Labs   Lab 09/14/19  1632 09/14/19  1629 09/14/19  1242 09/14/19  0758 09/14/19  0020   PH 7.12* 6.97* 7.41 7.33* 7.49*   PCO2 71* 120* 47* 65* 49*   PO2 97 40* 140* 190* 108*   HCO3 23 28 30* 34* 37*   O2PER  --   --   --   --  40       Current Vent Settings: Ventilation Mode: CMV/AC  (Continuous Mandatory Ventilation/ Assist Control)  FiO2 (%): 100 %  Rate Set (breaths/minute): (S) 22 breaths/min  Tidal Volume Set (mL): 380 mL  PEEP (cm H2O): (S) 5 cmH2O  Oxygen Concentration (%): 60 %  Resp: 26      Skin Assessment: Intact    Plan: Rest on current vent settings    Storm Ellis, RT

## 2019-09-14 NOTE — PROGRESS NOTES
Code blue was called.Pt's airway was protected by AMB bagging on 15Litres with ResQ pad. Blood gas was was collected after resuscitation   will continue to monitor    Storm Ellis.RT    9/14/19

## 2019-09-14 NOTE — CONSULTS
Patient seen and examined. Full note dictated. Pulmonary infiltrates may represent lymphangitic spread of her breast cancer. She is not strong enough for starting palliative chemotherapy. Recommend continued supportive cares. Review goals of care with her family when they are available.

## 2019-09-14 NOTE — PROVIDER NOTIFICATION
DATE:  9/14/2019   TIME OF RECEIPT FROM LAB:  0587  LAB TEST:  Hep 10A  LAB VALUE:  1.11  RESULTS GIVEN WITH READ-BACK TO (PROVIDER): Jesus Rawls MD  TIME LAB VALUE REPORTED TO PROVIDER:   7557

## 2019-09-14 NOTE — PROGRESS NOTES
Pt intubated by CRNA with 7.0 ETT 24 @ lip. Pt placed on ventilator in  R14 Peep 5 FIO2 100%. No distress noted.

## 2019-09-14 NOTE — PROGRESS NOTES
Pt was tachy with 's earlier with very little  response to 2.5mg IV metoprolol. MD ordered one time additional dose of IV metoprolol 5mg. After administration, Pt HR progressively starting decreasing from 140 to low 80's and then 60's, weak pulse with unable to get any BP, MD called to the bedside, ordered one time 0.5mg IV atropine and levophed drip. Pt with no response to the initial 0.5mg of atropine, HR continues to decrease to 58's with thready pulse, code blue called. See code blue sheet for details.

## 2019-09-14 NOTE — PROGRESS NOTES
Pt very SOB when arrived on unit, placed on HF NC, pt tripoding to breath sitting up at 90 degrees. Lung sounds w/ expiratory wheezes. Pt received neb treatment. Pt continued to feel anxious, obtained x1 order for xyprexa, with no relief. Pt felt like she needed to void but became very dyspneic with any moment, unable to get up to BSC and unable to tolerate bedpan. Pt continued to feel like she was unable to catch breath, respirations elevated. RRT called. Pt transferred to ICU. Pt's son Delta notified that pt was transferred. Pt belongings sent with pt.

## 2019-09-14 NOTE — PROGRESS NOTES
ICU Update      Added formoterol q 12 for bronchodilation  Added 1 x metoprolol for HR >140     Jesus Rawls MD

## 2019-09-14 NOTE — ED PROVIDER NOTES
History     Chief Complaint:  Shortness of Breath      The history is provided by the patient and the EMS personnel. The history is limited by the condition of the patient.      Megha Campbell is a 54 year old female on 6L home oxygen, with a history of asthma, metastatic breast cancer, malignant left pleural effusion s/p pleurodesis on 8/23/19, acute right leg DVT, drug seeking behavior, and anxiety, who presents via EMS with shortness of breath. To note, the patient was recently admitted to the hospital from 9/6/19 through 9/11/19 for shortness of breath, hypoxia, pneumonia, and right leg DVT, and discharged with Lovenox and Augmentin. The patient was also seen in the ED on 9/12/19 for shortness of breath. Per EMS, the patient developed shortness of breath tonight and tried her at home nebulizers without relief. On EMS arrival, the patient had bilateral wheezing, left sided diminished more than right. They report that her end tidal was 39 and oxygen saturation on room air was in the 70s. After she received a Duoneb and continuous albuterol, her sats candy to . Final BP was 130s/99. EMS reports that the patient did not tolerate the CPAP and informed them that inhaling was difficult for her. They also note that she developed pedal edema within the past week.     Here, the patient reports that she has a chronic cough and is feeling anxious. She states that she finished her course of Augmentin for pneumonia, but notes that she has not started the Lovenox. She reports that she is currently on radiation for her cancer. She denies use of anticoagulants or history of blood clot. She denies fever.  Her brother who came later states that she is not taking her metoprolol and has not taken her Lovenox since she has been home from the hospital.      Allergies:  Hydrocodone-acetaminophen  Oxycodone      Medications:    Albuterol  Dulcolax  Klonopin  Cymbalta  Lovenox   Lasix  Toprol  Zofran    Protonix  Miralax  Prednisone  Klor-con  Seroquel  Zoloft    Past Medical History:    Acute respiratory failure with hypoxia  Anxiety  Breast cancer  Depressive disorder  Malignant pleural effusion  Metastatic cancer  Obesity  Pneumonia  Asthma  DVT  Drug seeking behavior    Past Surgical History:    Breast biopsy  Gastric bypass  IR chest port placement  Lumpectomy breast with seed localization  Thoracoscopic pleurodesis  Tubal ligation    Family History:    Breast cancer    Social History:  Smoking status: never   Alcohol use: yes   Drug use: no   PCP: [facility]  Marital Status:        Review of Systems   Constitutional: Negative for fever.   Respiratory: Positive for cough and shortness of breath.    Psychiatric/Behavioral: The patient is nervous/anxious.    All other systems reviewed and are negative.      Physical Exam     Patient Vitals for the past 24 hrs:   BP Temp Temp src Pulse Heart Rate Resp SpO2 Weight   09/14/19 0230 91/71 -- -- -- 86 26 100 % --   09/14/19 0215 112/64 -- -- -- 99 16 100 % --   09/14/19 0200 134/88 -- -- 107 110 27 99 % --   09/14/19 0145 -- -- -- -- 122 20 91 % --   09/14/19 0115 (!) 153/84 -- -- -- -- -- -- --   09/14/19 0100 -- -- -- -- 133 (!) 32 99 % --   09/14/19 0045 -- -- -- -- (!) 231 16 94 % --   09/14/19 0003 (!) 147/94 96.4  F (35.8  C) Oral 111 105 (!) 32 98 % 100.8 kg (222 lb 3.6 oz)        Physical Exam    Physical Exam   Constitutional:  Ill appearing female. Moderate respiratory distress.   HENT:   Mouth/Throat:   Oropharynx is clear and moist.   Eyes:    Conjunctivae normal and EOM are normal. Pupils are equal, round, and reactive to light.   Neck:    Normal range of motion.   Cardiovascular: Tachycardic, regular rhythm and normal heart sounds.  Exam reveals no gallop and no friction rub.  No murmur heard.  Pulmonary/Chest:  Tachypneic. Moderately diminished breath sounds bilaterally with expiratory wheezes heard bilaterally. mild pain on  the left side of her chest. No palpable crepitus. No shingles. No erythema. Port in right anterior chest.   Abdominal:   Soft. Bowel sounds are normal. Patient exhibits no mass. There is no tenderness. There is no rebound and no guarding. Ecchymosis to the left abdominal wall.   Musculoskeletal:  Normal range of motion. Patient exhibits mild bilateral pitting edema.    Neurological:   Patient is alert and oriented to person, place, and time. Patient is generally weak. No cranial nerve deficit or sensory deficit. GCS 15  Skin:   Skin is warm and dry. No rash noted. No erythema.   Psychiatric:   Patient has a normal mood and affect. Patient's behavior is normal. Judgment and thought content normal.     Emergency Department Course   ECG:  ECG (0:04:25):  Rate 112 bpm. MS interval 118. QRS duration 70. QT/QTc 324/442. P-R-T axes 45 -19 17. Sinus tachycardia with premature atrial complexes with aberrant conduction. Low voltage QRS. Inferior infarct, age undetermined. Cannot rule out anterior infarct, age undetermined. Abnormal ECG. Agree with computer interpretation. Interpreted at 0006 by Nurys Gregg MD.    ECG #2 (0:40:43):  Rate 227 bpm. MS interval * QRS duration 70. QT/QTc 186/361. P-R-T axes * -3 198. Supraventricular tachycardia. Cannot rule out anterior infarct, age undetermined. Marked ST abnormality, possible lateral subendocardial injury. Abnormal ECG. Agree with computer interpretation. Interpreted at 0040 by Nurys Gregg MD.    ECG #3 (0:55:42):  Rate 119 bpm. MS interval 128. QRS duration 58. QT/QTc 310/436. P-R-T axes 64 -15 54. Sinus bradycardia with frequent premature ventricular complexes. Possible left atrial enlargement. Low voltage QRS. Cannot rule out anterior infarct, age undetermined. Abnormal ECG. Agree with computer interpretation. Interpreted at 0100 by Nurys Gregg MD.    Imaging:  Radiographic findings were communicated with the patient who voiced understanding  of the findings.    XR Chest Port 1 View  1. Nonspecific moderately extensive patchy opacities in the mid and  lower left lung, not convincingly changed since 9/12/2019. A few  patchy opacities in the central aspect of the right lung, likely  mildly decreased.  2. No other significant interval change.  As read by Radiology.    CT Chest Pulmonary Embolism w Contrast  1. Extensive patchy and ground-glass opacities scattered within both  lungs, overall mildly increased since 9/1/2019. Additionally, there is  soft tissue attenuation within the mediastinum. These are nonspecific,  but could relate to an atypical infectious or inflammatory process.  Neoplasm less likely, but cannot be excluded.  2. Very small bilateral pleural effusions, unchanged.  3. No pulmonary embolus.  As read by Radiology.    Laboratory:  Blood culture: pending  Procalcitonin: pending  BNP: 1061 (H)  Troponin 1 (0013): <0.015  BMP: Glucose 127 (H), potassium 3.0 (L), chloride 93 (L), carbon dioxide 36 (H), creatinine 0.43 (L), o/w WNL   CBC: WBC 10.6 , HGB 11.5 (L),    INR: 1.27 (H)  PTT: 32  ISTAT Lactate (0020): pH 7.44 (H), pCO2 58 (H), pO2 36, Bicarbonate 39 (H), O2 sat venous 69, Lactic Acid 2.3 (H)  Blood gas arterial and oxyhgb: pH: 7.49 (H), PCO2: 49 (H), PO2: 108 (H), Bicarbonate: 37 (H), FIO2 40, Oxyhgb: 98  Creatinine POCT: 0.5 (L)    Procedures:    Chemical Cardioversion Procedure Note:         Indication:  Supraventricular Tachycardia        Consent:  Risks (including but not limited to: arrhythmia, stroke or death),  benefits and alternatives were discussed with patient and consent for procedure was obtained.      Timeout:  Universal protocol was followed.  TIME OUT conducted just    Prior to starting procedure confirmed patient identity, site/side, procedure    Procedure note:   Trial #1: chemical cardioversion with 6 mg Adenosine was successful.    Patient Status:  Patient tolerated the procedure well.  There were no  complications.    Interventions:  0005: Duoneb 3 ml nebulization  0005: Duoneb 3 ml nebulization  0021: Ativan 1 mg IV  0025: NS 1L IV Bolus   0055: Adenosine 6 mg IV  0056: Zosyn 3.375 mg IV  0135: Ativan 0.5 mg IV  0140: solu-MEDROL 125 mg IV  0215: heparin infusion 25,000 units in 0.45% NaCl 250 mL 1450 units/hr  0215: Lopressor 5 mg IV  0248: Zofran 2 mg/ml IV    Emergency Department Course:  2353: Nursing notes and vitals reviewed. I performed an exam of the patient as documented above.     2356: The patient was placed on bipap.     IV inserted. Medicine administered as documented above. Blood drawn. This was sent to the lab for further testing, results above.    The patient had a chest xray while in the emergency department, findings above.     0036: I rechecked the patient and discussed the results of her workup thus far. She was in SVT so she was cardioverted, see procedure note above.     0106: I rechecked the patient. She is now being sent to CT, findings above.     0150: I rechecked the patient and discussed the results of her workup thus far.     0210: I rechecked the patient and discussed the results of her workup thus far.    0220:  I consulted with Dr. Sosa of the hospitalist services. They are in agreement to accept the patient for admission.    Findings and plan explained to the Patient who consents to admission. Discussed the patient with Dr. Sosa, who will admit the patient to a medical bed for further monitoring, evaluation, and treatment.    0248: The patient was retching with the bipap in place, so she was switched to HiFlo nasal cannula.     0258: Dr. Sosa was in the room and saw the patient sats were 100% on the mask so she will be kept on this.     Impression & Plan      CMS Diagnoses: The Lactic acid level is elevated due to hypoxia, at this time there is no sign of severe sepsis or septic shock.    Medical Decision Making:  Megha Campbell is a very complex 54 year old female with frequent  visits to the emergency department. She is a moderately poor historian who comes in with respiratory distress/shortness of breath. On presentation to stab 1, I had her immediately placed on bipap due to her known history of asthma and lung cancer. We also know that she is dependent on oxygen at home. She tolerated this with coaching. ISTAT lactic acid was obtained. This was slightly elevated. An EKG showed a mild sinus tachycardia. Along with the bipap, she was given 2 Duonebs in line. Her port was accessed and a right AC peripheral IV was established. Portable chest xray was obtained initially to ensure there was no emergent condition, such as a pneumothorax. This showed bilateral opacities which was similar to her chest xray from 2 days ago. I started her on zosyn due to recent pneumonia and no improvement on xray.     We attempted to get her to CT immediately, however the patient initially refused unless given ativan. I did agree to give her one dose of ativan to tolerate this but advised her that we want to use this judiciously as this can affect her breathing.. In preparation to go to CT, the patient was noted to have SVT. I note that she had this on her previous hospitalization and she is supposed to be on metoprolol. the patient's son states that she has not started the metoprolol. I also note the patient was diagnosed with a right lower extremity acute DVT and she was discharged on Lovenox. However, the patient's son states she is not taking this either. It is not clear, frankly, what medications she is or is not taking. Because of her SVT that did not break with vagal maneuvers, adenosine was given successfully. EKG's were done pre and post chemical cardioversion. She then was stable to go to CT to better evaluate for PE, worsening pneumonia, worsening cancer burden, pleural effusion, pericardial effusion, etc. Findings are as noted above. She continues to have opacities which is slightly worsened, so again  I favor this is pneumonia. I did end up giving her metoprolol as she appeared to have recurrent paroxsymal SVT but these subsequent episodes resolved spontaneously. Her cardiac enzymes within normal limits. Her BNP is slightly elevated, though improved, from 2 days ago. Blood cultures were obtained and Procalcitonin is pending. She has no evidence of leukocytosis and she is not febrile. She is not actually receiving chemotherapy. She tells me she is just getting radiation at this time. ABG shows slight retention of CO2. Heparin was given knowing that she has a DVT and has not been taking her Lovenox since discharge.     At this time, I will admit her to [facility]. The patient did have some retching after coming back from CT and taken some ice chips. Dr. Sosa requested we take her off bipap and put her on hiflo, however she maintained her oxygen saturations on the oximyzer, so after discussing with Dr. Sosa and evaluating the patient together, we kept her on this and put up in IMC.     The patient's son, Delta, was present throughout her stay here and was made aware of her condition. He is power of . At this time, she is stable and will be admitted to [facility] under Dr. Sosa.     Critical Care time:  was 90 minutes for this patient excluding procedures.    Diagnosis:    ICD-10-CM    1. Pneumonia of both lower lobes due to infectious organism (H) J18.1    2. Moderate asthma with exacerbation, unspecified whether persistent J45.901    3. Deep vein thrombosis (DVT) of proximal lower extremity, unspecified chronicity, unspecified laterality (H) I82.4Y9        Disposition:  Admitted to mAee Uriostegui, am serving as a scribe at 11:53 PM on 9/13/2019 to document services personally performed by Nurys Gregg MD based on my observations and the provider's statements to me.       Amee Xiong  9/13/2019    EMERGENCY DEPARTMENT       Nurys Gregg MD  09/14/19 0703

## 2019-09-15 NOTE — PLAN OF CARE
Neuro: sedated and medically paralyzed  CV: ST, BP within parameters with levophed drip  Pulm: % Vt 400 PEEP 5, RR 26, LS right side dim and exp, insp wheeze, left side coarse and dim. Peak pressures continues to remain high, see RT flowsheet for details  GI/: TF at15ml/hr, stokes with low UOP-some response to fluid boluses  Skin: bruises, lower extremities edema  Lines: JASMYNE PICC TLC, rt PIV, Rt chest port, Right rad art line  Restraints: NA  Plan: Rest overnight on vent, continue scheduled neb treatment and peak pressures, monitor for ectopies. Vanco and zosyn continued.     Family- son carly here at the bedside, updated about the POC. Pt younger son arriving tomorrow. Will continue to monitor

## 2019-09-15 NOTE — PROGRESS NOTES
ICU Brief    744  Pplat mid 40s despite adjustments.  Peep Titration attempted but limited given airway pressures.  Decreased  PEEP 8 with plat 37 (from 43) ..  Will trial EPM to further adjust PEEP. CT 9/14 w/o PE but extensive infitrates      Jesus Rawls MD    921  BP soft - ordered albumin and add vasopressin as near maxed on levophed.     Jesus Rawls MD        934   EtCO on vent dropping (similar pattern prior to CODE 9/14) ABG sent, empirically dose 1 amp HCO as MV 9.8 .  Inflated cuff with improvement.  EPM initial Ptpeep -8 on PEEP 5 and Pplat 47.    Increased PEEP to 12-14 with TPpeep  0 to -2 however Ptplat 26.   Will continue to titrate overall goal PTPeep 0-4 and PTPlat <20.    Jesus Rawls MD

## 2019-09-15 NOTE — PROCEDURES
[facility]    Arterial line placement  Date/Time: 9/15/2019 7:35 AM  Performed by: Jesus Rawls MD  Authorized by: Jesus Rawls MD     UNIVERSAL PROTOCOL   Site Marked: Yes  Prior Images Obtained and Reviewed:  NA  Required items: Required blood products, implants, devices and special equipment available    Patient identity confirmed:  Anonymous protocol, patient vented/unresponsive  Patient was reevaluated immediately before administering moderate or deep sedation or anesthesia  Confirmation Checklist:  Procedure was appropriate and matched the consent or emergent situation  Time out: Immediately prior to the procedure a time out was called    Preparation: Patient was prepped and draped in usual sterile fashion    Indication: multiple ABGs hemodynamic monitoring  Location: right radial      SEDATION    Patient Sedated: Yes    Sedation:  See MAR for details  Vital signs: Vital signs monitored during sedation      PROCEDURE DETAILS  Jose's Test Normal?: Jose's test not abnormal  Needle Gauge:  18  Seldinger technique: Seldinger technique used    Number of Attempts:  1  Post-procedure:  Line sutured and dressing applied  CMS: normal  PROCEDURE   Patient Tolerance:  Patient tolerated the procedure well with no immediate complications  Describe Procedure: Arterial line placed via modified seldinger technique via arrow radial art catheter set without difficult.  Sterile precautions utilized (cap, mask, gown, gloves). Good waveform. Pt tolerated well.     Time of Sedation in Minutes by Physician:  15

## 2019-09-15 NOTE — PROGRESS NOTES
[facility] Progress Note          Assessment and Plan:   Ms. Megha Campbell is a 54 year old woman with a history of metastatic breast cancer who was admitted on 9/13 with respirtaory failure    1. Respiratory failure with CT findings showing extensive pulmonary infiltrates  - her condition has declined since admission and she is requiring increasing support  - discussed prognosis with patient's son and also reviewed with Dr. Garcia  - prognosis poor and her family is currently weighing different options for limiting care    2. Stage III breast cancer with treatment as summarized in her records.  - she has previously been offered chemotherapy but declined  - she is not strong enough to tolerate or benefit from chemotherapy             Interval History:   Patient unresponsive.              Medications:       arformoterol  15 mcg Nebulization Q12H     chlorhexidine  15 mL Mouth/Throat Q12H     ipratropium - albuterol 0.5 mg/2.5 mg/3 mL  3 mL Nebulization Q6H     methylPREDNISolone  62.5 mg Intravenous Q8H     piperacillin-tazobactam  4.5 g Intravenous Q6H     rantidine  150 mg Per Feeding Tube BID     sodium chloride (PF)  3 mL Intracatheter Q8H     vancomycin (VANCOCIN) IV  2,000 mg Intravenous Q12H     acetaminophen, acetaminophen, albuterol, bisacodyl, IV fluid REPLACEMENT ONLY, glucose **OR** dextrose **OR** glucagon, fentaNYL, hypromellose, levalbuterol, lidocaine 4%, lidocaine (buffered or not buffered), magnesium sulfate, magnesium sulfate, melatonin, metoclopramide **OR** metoclopramide, naloxone, nitroGLYcerin, ondansetron **OR** ondansetron, polyethylene glycol, potassium chloride, potassium chloride with lidocaine, potassium chloride, potassium chloride, potassium chloride, prochlorperazine **OR** prochlorperazine **OR** prochlorperazine, propofol (DIPRIVAN) infusion **AND** propofol **AND** CK total **AND** Triglycerides, senna-docusate **OR** senna-docusate, sodium chloride (PF), sodium  phosphate, sodium phosphate, sodium phosphate, sodium phosphate               Physical Exam:   Blood pressure 122/77, pulse 114, temperature 102.9  F (39.4  C), resp. rate 26, weight 110.9 kg (244 lb 7.8 oz), last menstrual period 2017, SpO2 95 %, not currently breastfeeding.  Wt Readings from Last 4 Encounters:   09/15/19 110.9 kg (244 lb 7.8 oz)   19 108.9 kg (240 lb)   19 111.2 kg (245 lb 1.6 oz)   19 104.3 kg (230 lb)         Vital Sign Ranges  Temperature Temp  Av.6  F (38.1  C)  Min: 97.3  F (36.3  C)  Max: 102.9  F (39.4  C)   Blood pressure Systolic (24hrs), Av , Min:107 , Max:157        Diastolic (24hrs), Av, Min:24, Max:99      Pulse Pulse  Av.9  Min: 61  Max: 147   Respirations Resp  Av.2  Min: 0  Max: 28   Pulse oximetry SpO2  Av.2 %  Min: 91 %  Max: 100 %         Intake/Output Summary (Last 24 hours) at 9/15/2019 1057  Last data filed at 9/15/2019 1000  Gross per 24 hour   Intake 4650.91 ml   Output 845 ml   Net 3805.91 ml       Constitutional: Intubated and unresponsive   Lungs:    Cardiovascular:    Abdomen:    Skin:    Other:           Data:   Laboratory:  CMP  Recent Labs   Lab 09/15/19  0609 19  1635 19  1629 19  0955 19  0610  19  0600  09/10/19  0600  19  0530    140 136 137 136   < > 136  --  138   < >  --    POTASSIUM 3.7 4.8 4.4 4.0 3.4   < > 3.1*   < > 3.3*  --  3.4   CHLORIDE 103 101  --  100 97   < > 94  --  96   < >  --    CO2 28 28  --  33* 34*   < > 38*  --  36*   < >  --    ANIONGAP 6 11  --  4 5   < > 4  --  6   < >  --    * 293*  --  194* 183*   < > 125*  --  130*   < >  --    BUN 17 15  --  13 11   < > 15  --  16   < >  --    CR 0.60 0.78  --  0.50* 0.37*   < > 0.53  --  0.58  --  0.47*   GFRESTIMATED >90 87  --  >90 >90   < > >90  --  >90  --  >90   GFRESTBLACK >90 >90  --  >90 >90   < > >90  --  >90  --  >90   PABLO 8.1* 8.7  --  8.2* 8.4*   < > 8.8  --  9.4   < >  --    MAG 2.3   --   --  2.1  --   --  1.9  --  1.8  --  2.0   PHOS 2.0*  --   --  3.0  --   --   --   --   --   --  2.5   PROTTOTAL 6.2* 6.3*  --  6.5* 7.1  --   --   --   --   --   --    ALBUMIN 2.0* 2.0*  --  2.1* 2.2*  --   --   --   --   --   --    BILITOTAL 0.4 0.4  --  0.4 0.5  --   --   --   --   --   --    ALKPHOS 91 94  --  90 92  --   --   --   --   --   --    AST 87* 46*  --  38 33  --   --   --   --   --   --    ALT 53* 32  --  28 26  --   --   --   --   --   --     < > = values in this interval not displayed.     CBC  Recent Labs   Lab 09/15/19  0609 09/14/19  1635 09/14/19  1629 09/14/19  0955 09/14/19  0610   WBC 15.4* 11.9*  --  9.0 10.3   RBC 4.15 4.40  --  4.31 4.43   HGB 10.4* 10.8* 11.2* 10.5* 10.9*   HCT 33.5* 37.5  --  34.6* 35.8   MCV 81 85  --  80 81   MCH 25.1* 24.5*  --  24.4* 24.6*   MCHC 31.0* 28.8*  --  30.3* 30.4*   RDW 16.0* 15.9*  --  15.6* 15.5*    331  --  268 269     INR  Recent Labs   Lab 09/15/19  0609 09/14/19  1635 09/14/19  0013   INR 1.22* 1.37* 1.27*       Imaging data:  Results for orders placed or performed during the hospital encounter of 09/13/19   XR Chest Port 1 View    Narrative    CHEST SINGLE VIEW PORTABLE   9/14/2019 12:10 AM     HISTORY: Shortness of breath.    COMPARISON: 9/12/2019 at 0449 hours.    FINDINGS: Moderately extensive patchy opacities in the mid and lower  left lung and a few patchy opacities in the central aspect of the  upper right lung. Possible small left pleural effusion and possible  very small right pleural effusion. Possible cardiomegaly. Right  anterior chest wall port with catheter entering the right internal  jugular vein and distal tip in the superior vena cava.      Impression    IMPRESSION:   1. Nonspecific moderately extensive patchy opacities in the mid and  lower left lung, not convincingly changed since 9/12/2019. A few  patchy opacities in the central aspect of the right lung, likely  mildly decreased.  2. No other significant interval  change.    SALOMÓN ALLEN MD   CT Chest Pulmonary Embolism w Contrast    Narrative    CT CHEST WITH CONTRAST   9/14/2019 1:27 AM     HISTORY: Shortness of breath.    COMPARISON: 9/1/2019.    TECHNIQUE: Following the administration of 78 mL Isovue-370  intravenous contrast, helical sections were acquired through the lungs  according to the pulmonary embolism protocol. Coronal reconstructions  were generated. Radiation dose for this scan was reduced using  automated exposure control, adjustment of the mA and/or kV according  to the patient's size, or iterative reconstruction technique.    FINDINGS: No visualized pulmonary embolus. The thoracic aorta is  normal in caliber without dissection. Right anterior chest wall port  with catheter entering the right internal jugular vein and distal tip  in the right atrium.    Extensive patchy and ground-glass opacities scattered within both  lungs, most prominent in the mid and lower left lung and upper right  lung. These have overall mildly increased since 9/1/2019. For example,  there is a region of ground-glass opacities in the left upper lobe  (for example, series 6, image 68) that is new. Very small right  pleural effusion again noted. Very small loculated-appearing left  pleural effusion again noted. Very small amount of pericardial fluid.  Strandy soft tissue attenuation throughout the mediastinum again  noted. Partial visualization of a nonspecific irregular-shaped region  of patchy soft tissue in the left axilla (series 5, image 72),  unchanged. 7 cm circumscribed fluid collection in the left breast  again noted and likely a seroma.    Scan through the upper abdomen is unremarkable.         Impression    IMPRESSION:   1. Extensive patchy and ground-glass opacities scattered within both  lungs, overall mildly increased since 9/1/2019. Additionally, there is  soft tissue attenuation within the mediastinum. These findings are  nonspecific, but could relate to an atypical  infectious or  inflammatory process. Neoplasm is less likely, but cannot be excluded.  2. Very small bilateral pleural effusions, unchanged.  3. No pulmonary embolus.    SALOMÓN ALLEN MD   XR Chest Port 1 View    Narrative    CHEST ONE VIEW PORTABLE   9/14/2019 7:20 AM     HISTORY: intubation    COMPARISON: 9/14/2019 0005 hours      Impression    IMPRESSION:   1. New endotracheal tube, tip is in very proximal left mainstem  bronchus and needs to be pulled back. ET tube position telephoned to  patient's ICU nurse Bruce at 7:35 AM, 9/14/2019.  2. Increased dense right upper lobe opacity consistent with worsening  atelectasis. Worsening infiltrate not excluded. Increased opacity left  mid and lower lung consistent with increased atelectasis or  infiltrate. There is mild right lower lobe opacity. Redemonstrated  right chest Port-A-Cath with tip near SVC-right atrial junction.    SULY AMANDA MD   XR Chest Port 1 View    Narrative    CHEST ONE VIEW PORTABLE   9/14/2019 7:55 AM     HISTORY: ET tube position    COMPARISON: 9/14/2019 0720 hours      Impression    IMPRESSION:   1. Endotracheal tube has been pulled back and tip is now 3.5 cm above  the ruba.  2. Improving right upper lobe opacity consistent with improving  atelectasis. There continues to be residual right upper lobe, right  lower lobe and left mid and lower lung opacity consistent with  atelectasis or infiltrate.  3. No change right chest Port-A-Cath.    SULY AMANDA MD   XR Abdomen Port 1 View    Narrative    ABDOMEN ONE VIEW PORTABLE  9/14/2019 12:11 PM     HISTORY: Feeding tube placement    COMPARISON: None.       Impression    IMPRESSION: Feeding tube tip in the antrum of the stomach. Minimal  amount of stool. Nonobstructed bowel gas pattern.    NAVYA OBREGON MD   CT Chest Pulmonary Embolism w Contrast    Narrative    CT CHEST PULMONARY EMBOLISM WITH CONTRAST  9/14/2019 6:55 PM     HISTORY: Post CPR, on heparin.    COMPARISON: September 14,  2019.    TECHNIQUE: Volumetric helical acquisition of CT images of the chest  from the lung apices to the kidneys were acquired after the  administration of 78 mL Isovue-370  IV contrast. Radiation dose for  this scan was reduced using automated exposure control, adjustment of  the mA and/or kV according to patient size, or iterative  reconstruction technique.    FINDINGS: There is no pulmonary embolism. Extensive bilateral  pulmonary infiltrates. Small amount of pleural fluid right worse than  left. Fluid collection in the left breast again evident. Presumed  adenopathy in the left axilla. Endotracheal tube tip in the distal  trachea. The heart is not enlarged. Thoracic aorta is unremarkable  without evidence of dissection or aneurysm. No pericardial effusion.   There is no pneumothorax. Adrenal glands are normal. Remainder of the  visualized upper abdomen is unremarkable.      Impression    IMPRESSION:   1. No pulmonary embolism demonstrated.  2. No thoracic aortic dissection or aneurysm.   3. Extensive bilateral pulmonary infiltrates and minimal effusions.  Infiltrates have progressed since the comparison study.     NAVYA OBREGON MD   CT Head w/o Contrast    Narrative    CT SCAN OF THE HEAD WITHOUT CONTRAST   9/14/2019 6:55 PM     HISTORY: Altered mental status, unexplained.    TECHNIQUE:  Axial images of the head and coronal reformations without  IV contrast material.  Radiation dose for this scan was reduced using  automated exposure control, adjustment of the mA and/or kV according  to patient size, or iterative reconstruction technique.    COMPARISON: None.    FINDINGS:  The ventricles are normal in size, shape and configuration.   The brain parenchyma and subarachnoid spaces are normal. There is no  evidence of intracranial hemorrhage, mass, acute infarct or anomaly.     The visualized portions of the sinuses and mastoids appear normal.  There is no evidence of trauma.      Impression    IMPRESSION: Normal  CT scan of the head.      MD To AL MD

## 2019-09-15 NOTE — PROGRESS NOTES
[facility] ICU RESPIRATORY NOTE        Date of Admission: 9/13/2019    Date of Intubation (most recent): 9/14/19    Reason for Mechanical Ventilation: Resp Failure/Asthma Exacerbation    Number of Days on Mechanical Ventilation: 2    Met Criteria for Pressure Support Trial: NO    Reason for No Pressure Support Trial: PerMD    Significant Events Today: Esophageal Monitoring    ABG Results:   Recent Labs   Lab 09/15/19  1606 09/15/19  1530 09/15/19  1020 09/15/19  0928   PH 7.41 7.37 7.41 7.39   PCO2 40 41 43 43   PO2 101 69* 67* 65*   HCO3 25 24 27 26   O2PER 6L 70% 70% 70       Current Vent Settings: Ventilation Mode: CMV/AC  (Continuous Mandatory Ventilation/ Assist Control)  FiO2 (%): 70 %  Rate Set (breaths/minute): 26 breaths/min  Tidal Volume Set (mL): 336 mL  PEEP (cm H2O): 10 cmH2O  Oxygen Concentration (%): 60 %  Resp: 26      Skin Assessment: Intact    Plan: Rest on Current Vent settings and continue Esophageal monitoring every four hours    Storm Ellis, RT

## 2019-09-15 NOTE — PROVIDER NOTIFICATION
MD Notification    Notified Person: MD    Notified Person Name:ROLANDA Garcia    Notification Date/Time:09/15/19 at 1220    Notification Interaction:bedside    Purpose of Notification:  LA 5    Orders Received: 1Litre of plasmalyte solution     Comments: serial LA ordered

## 2019-09-15 NOTE — PROGRESS NOTES
Critical Care Progress Note      09/15/2019    Name: Megha Campbell MRN#: [id number]   Age: 54 year old YOB: 1965     [facility]  [facility]    [facility] DAY #             Problem List:   Active Problems:    Acute and chronic respiratory failure with hypoxia (H)           Summary/Hospital Course:     Active Problems:    Acute and chronic respiratory failure with hypoxia (H)      9/15/2019    Patient had a cardiac arrest yesterday afternoon but had ROSC and currently on pressors. It is not clear as to the etiology of the cardiac arrest but it is likely an arrythmia in the presence of severe septic shock.   The son was notified of the situation     This afternoon at around 4 PM, patient showed further instability with blood pressures falling below 80 systolic.   Required multiple doses of pressors, correction of electrolytes and started epinephrine. Patient's son was in the room during this phase and we explained to him the critical nature of the patient's unstable blood pressure.          Summary/Hospital Course:   54 year old  female presented to the ER with shortness of breath, hypoxia to 70% Sats on room air (apparently she is on 4L nasal cannula oxygen at home).   There has been recent hospitalizations.   Recently she had been on BIPAP on the floor but she progressively got worse. She was transferred to the ICU and got immediately intubated.     Code status was changed to DNR after long discussion with the son.        Assessment and plan :      Megha Campbell IS a 54 year old female admitted on 9/13/2019 for acute on chronic respiratory failure in the presence of septic shock  I have personally reviewed the daily labs, imaging studies, cultures and discussed the case with referring physician and consulting physicians.      My assessment and plan by system for this patient is as follows:     Neurology/Psychiatry:   1. Intubated and sedated  2. Pain/analgesia (fentanyl) and ketamine   3.  Anxiety on zoloft and seroquel at home  4. Delirium (sedated)  5. Muscle relaxant: the need for muscle relaxants is for the respiratory status : oxygenation and ventilation was compromised despite maximum therapy. Patient is not an ECMO candidate in view of her advanced cancer    Plan  Started propofol and fentanyl for sedation on admission. We have transitioned her to ketamine and fentanyl  The ketamine is to help with the bronchospasm and provide sedation.           Cardiovascular:   1.Hemodynamics -not on pressors  2.Rhythm - Sinus Tach  3. Ischemia - first troponin is normal   Difficult study, patient on back and upright due to back pain/spasm.   Echo 9/07/2019  1. Very limited image quality, difficult to assess EF. Probably normal, on  some views appears hyperdynamic. EF 60-70%. Cannot assess wall motion due to  poor image quality.  2. The right ventricle is normal size. Mildly decreased right ventricular  systolic function  3. No valve disease, but views are limited.  4. Normal IVC size with partial respiratory collapse.  Troponins are normal (two values)  Pro BNP is elevated but trending downwards  5150 on 9/10 and now 1060 on 9/14   Plan   Continue to monitor     Pulmonary/Ventilator Management:   1. Airway Intubated  2. Oxygenation/ventilation/mechanics  Initial settings on ventilator  400, peep 10, FiO 2 1.0, RR 20   Peak pressures are 40 to 50s.  Chest xray and CT scan reports  Bilateral opacities   Extensive patchy and ground-glass opacities scattered within both  lungs, most prominent in the mid and lower left lung and upper right  lung. These have overall mildly increased since 9/1/2019. For example,  there is a region of ground-glass opacities in the left upper lobe  (for example, series 6, image 68) that is new. Very small right  pleural effusion again noted. Very small loculated-appearing left  pleural effusion again noted. Very small amount of pericardial fluid.  Strandy soft tissue attenuation  throughout the mediastinum again  noted. Partial visualization of a nonspecific irregular-shaped region  of patchy soft tissue in the left axilla (series 5, image 72),  unchanged. 7 cm circumscribed fluid collection in the left breast  again noted and likely a seroma.   Negative for PE  ARDS picture  Plan  -  Currently on steroids: Continue steroids  On bronchodilators: duo nebs and ketamine  Placed a esophageal pressure monitor for better assessment of plateau pressures using transpulmonary pressure measurements.   Peep was adjusted accordingly  Wean oxygenation to maintain PaO2 at 60 or below.      GI and Nutrition :   1. Enteral feedings  started  Plan  - May need to reduce feeds in view of higher pressor requirements     Renal/Fluids/Electrolytes:   1.  Electrolyte abnormality corrected as necessary    3. Acid/base status: normalized  4. Volume status: fluids to be given judiciously but lactates are elevated  Plan  - monitor function and electrolytes as needed with replacement per ICU protocols. - generally avoid nephrotoxic agents such as NSAID, IV contrast unless specifically required  - adjust medications as needed for renal clearance  - follow I/O's as appropriate.     Infectious Disease:   1. zosyn and vanc  2. procalcitonin 0.10   Plan  - Continue antibiotics     Endocrine:   1. Stress induced hyperglycemia  Plan  - ICU insulin protocol, goal sugar <180     Hematology/Oncology:   1.  Anemia, no signs, symptoms of active blood loss  Plan  - continue to monitor      IV/Access:   1. Venous access - PICC line placed today, Has a PORT as well  2. Arterial access - right radial placed  3.  Plan  - central access required and necessary        ICU Prophylaxis:   1. DVT:heparin infusion for RLE DVT/ mechanical  2. VAP: HOB 30 degrees, chlorhexidine rinse  3. Stress Ulcer: PPI  4. Restraints: Nonviolent soft two point restraints required and necessary for patient safety and continued cares and good effect as patient  continues to pull at necessary lines, tubes despite education and distraction. Will readdress daily.   6. Feeding - on feeds  7. Family Update:spoke to the son  8. Disposition - ICU           Key goals for next 24 hours:   1. Lung protective strategies, ketamine, steroids and bronchodilators for asthma  2. Antibiotics  3. Therapeutic heparin for DVT (donna NEGRO)  4. Breast cancer patient: advanced breast cancer. Patient had received radiation therapy but also did not want chemotherapy.   5. DNR status           Key Medications:       acetaminophen  650 mg Oral Q6H     arformoterol  15 mcg Nebulization Q12H     chlorhexidine  15 mL Mouth/Throat Q12H     ibuprofen  400 mg Oral Q6H     ipratropium - albuterol 0.5 mg/2.5 mg/3 mL  3 mL Nebulization Q6H     methylPREDNISolone  62.5 mg Intravenous Q8H     piperacillin-tazobactam  4.5 g Intravenous Q6H     rantidine  150 mg Per Feeding Tube BID     sodium chloride (PF)  3 mL Intracatheter Q8H     vancomycin (VANCOCIN) IV  2,000 mg Intravenous Q12H       cisatracurium (NIMBEX) infusion ADULT Stopped (09/15/19 1417)     IV fluid REPLACEMENT ONLY       fentaNYL 25 mcg/hr (09/15/19 0800)     HEParin 1,100 Units/hr (09/15/19 1012)     insulin (regular) 2 Units/hr (09/15/19 1200)     ketamine 50 mg/mL ADULT 50 mg/hr (09/15/19 0800)     norepinephrine 0.13 mcg/kg/min (09/15/19 1417)     propofol (DIPRIVAN) infusion 20 mcg/kg/min (09/15/19 1337)     sodium chloride 20 mL/hr at 09/15/19 0800     vasopressin (PITRESSIN) infusion ADULT (40 mL) 2.4 Units/hr (09/15/19 1200)               Physical Examination:   Temp:  [99.86  F (37.7  C)-103.8  F (39.9  C)] 101.7  F (38.7  C)  Pulse:  [] 114  Heart Rate:  [] 131  Resp:  [0-28] 26  BP: (107-144)/(24-99) 122/77  MAP:  [59 mmHg-95 mmHg] 71 mmHg  Arterial Line BP: ()/(35-82) 81/61  FiO2 (%):  [55 %-100 %] 55 %  SpO2:  [91 %-100 %] 96 %    Intake/Output Summary (Last 24 hours) at 9/15/2019 1501  Last data filed at 9/15/2019  1400  Gross per 24 hour   Intake 4352.91 ml   Output 970 ml   Net 3382.91 ml     Wt Readings from Last 4 Encounters:   09/15/19 110.9 kg (244 lb 7.8 oz)   09/12/19 108.9 kg (240 lb)   09/11/19 111.2 kg (245 lb 1.6 oz)   09/04/19 104.3 kg (230 lb)     Arterial Line BP: ()/(35-82) 81/61  MAP:  [59 mmHg-95 mmHg] 71 mmHg  BP - Mean:  [] 91  Ventilation Mode: CMV/AC  (Continuous Mandatory Ventilation/ Assist Control)  FiO2 (%): 55 %  Rate Set (breaths/minute): 26 breaths/min  Tidal Volume Set (mL): 336 mL  PEEP (cm H2O): (S) 10 cmH2O  Oxygen Concentration (%): (S) 55 %  Resp: 26    Recent Labs   Lab 09/15/19  1020 09/15/19  0928 09/15/19  0609 09/14/19  2344  09/14/19  0020   PH 7.41 7.39 7.44 7.41   < > 7.49*   PCO2 43 43 45 50*   < > 49*   PO2 67* 65* 95 152*   < > 108*   HCO3 27 26 30* 31*   < > 37*   O2PER 70% 70  --   --   --  40    < > = values in this interval not displayed.       GEN: no acute distress, anasarca  HEENT: head ncat, sclera anicteric, OP patent, trachea midline   PULM: unlabored synchronous with vent, clear anteriorly    CV/COR: RRR S1S2 no gallop,  No rub, no murmur  ABD: soft nontender, hypoactive bowel sounds, no mass  EXT:  Edema   warm  NEURO: sedated  SKIN: no obvious rash  LINES: clean, dry intact         Data:   All data and imaging reviewed     ROUTINE ICU LABS (Last four results)  CMP  Recent Labs   Lab 09/15/19  0609 09/14/19  1635 09/14/19  1629 09/14/19  0955 09/14/19  0610  09/11/19  0600  09/10/19  0600  09/09/19  0530    140 136 137 136   < > 136  --  138   < >  --    POTASSIUM 3.7 4.8 4.4 4.0 3.4   < > 3.1*   < > 3.3*  --  3.4   CHLORIDE 103 101  --  100 97   < > 94  --  96   < >  --    CO2 28 28  --  33* 34*   < > 38*  --  36*   < >  --    ANIONGAP 6 11  --  4 5   < > 4  --  6   < >  --    * 293*  --  194* 183*   < > 125*  --  130*   < >  --    BUN 17 15  --  13 11   < > 15  --  16   < >  --    CR 0.60 0.78  --  0.50* 0.37*   < > 0.53  --  0.58  --  0.47*    GFRESTIMATED >90 87  --  >90 >90   < > >90  --  >90  --  >90   GFRESTBLACK >90 >90  --  >90 >90   < > >90  --  >90  --  >90   PABLO 8.1* 8.7  --  8.2* 8.4*   < > 8.8  --  9.4   < >  --    MAG 2.3  --   --  2.1  --   --  1.9  --  1.8  --  2.0   PHOS 2.0*  --   --  3.0  --   --   --   --   --   --  2.5   PROTTOTAL 6.2* 6.3*  --  6.5* 7.1  --   --   --   --   --   --    ALBUMIN 2.0* 2.0*  --  2.1* 2.2*  --   --   --   --   --   --    BILITOTAL 0.4 0.4  --  0.4 0.5  --   --   --   --   --   --    ALKPHOS 91 94  --  90 92  --   --   --   --   --   --    AST 87* 46*  --  38 33  --   --   --   --   --   --    ALT 53* 32  --  28 26  --   --   --   --   --   --     < > = values in this interval not displayed.     CBC  Recent Labs   Lab 09/15/19  0609 09/14/19  1635 09/14/19  1629 09/14/19  0955 09/14/19  0610   WBC 15.4* 11.9*  --  9.0 10.3   RBC 4.15 4.40  --  4.31 4.43   HGB 10.4* 10.8* 11.2* 10.5* 10.9*   HCT 33.5* 37.5  --  34.6* 35.8   MCV 81 85  --  80 81   MCH 25.1* 24.5*  --  24.4* 24.6*   MCHC 31.0* 28.8*  --  30.3* 30.4*   RDW 16.0* 15.9*  --  15.6* 15.5*    331  --  268 269     INR  Recent Labs   Lab 09/15/19  0609 09/14/19  1635 09/14/19  0013   INR 1.22* 1.37* 1.27*     Arterial Blood Gas  Recent Labs   Lab 09/15/19  1020 09/15/19  0928 09/15/19  0609 09/14/19  2344  09/14/19  0020   PH 7.41 7.39 7.44 7.41   < > 7.49*   PCO2 43 43 45 50*   < > 49*   PO2 67* 65* 95 152*   < > 108*   HCO3 27 26 30* 31*   < > 37*   O2PER 70% 70  --   --   --  40    < > = values in this interval not displayed.       All cultures:  Recent Labs   Lab 09/14/19  0755 09/14/19  0037 09/14/19  0014   CULT Light growth  Normal kristine to date    Canceled, Test credited  Incorrectly ordered by PCU/Clinic   No growth after 1 day No growth after 1 day     Recent Results (from the past 24 hour(s))   CT Head w/o Contrast    Narrative    CT SCAN OF THE HEAD WITHOUT CONTRAST   9/14/2019 6:55 PM     HISTORY: Altered mental status,  unexplained.    TECHNIQUE:  Axial images of the head and coronal reformations without  IV contrast material.  Radiation dose for this scan was reduced using  automated exposure control, adjustment of the mA and/or kV according  to patient size, or iterative reconstruction technique.    COMPARISON: None.    FINDINGS:  The ventricles are normal in size, shape and configuration.   The brain parenchyma and subarachnoid spaces are normal. There is no  evidence of intracranial hemorrhage, mass, acute infarct or anomaly.     The visualized portions of the sinuses and mastoids appear normal.  There is no evidence of trauma.      Impression    IMPRESSION: Normal CT scan of the head.      TATI LAWRENCE MD   CT Chest Pulmonary Embolism w Contrast    Narrative    CT CHEST PULMONARY EMBOLISM WITH CONTRAST  9/14/2019 6:55 PM     HISTORY: Post CPR, on heparin.    COMPARISON: September 14, 2019.    TECHNIQUE: Volumetric helical acquisition of CT images of the chest  from the lung apices to the kidneys were acquired after the  administration of 78 mL Isovue-370  IV contrast. Radiation dose for  this scan was reduced using automated exposure control, adjustment of  the mA and/or kV according to patient size, or iterative  reconstruction technique.    FINDINGS: There is no pulmonary embolism. Extensive bilateral  pulmonary infiltrates. Small amount of pleural fluid right worse than  left. Fluid collection in the left breast again evident. Presumed  adenopathy in the left axilla. Endotracheal tube tip in the distal  trachea. The heart is not enlarged. Thoracic aorta is unremarkable  without evidence of dissection or aneurysm. No pericardial effusion.   There is no pneumothorax. Adrenal glands are normal. Remainder of the  visualized upper abdomen is unremarkable.      Impression    IMPRESSION:   1. No pulmonary embolism demonstrated.  2. No thoracic aortic dissection or aneurysm.   3. Extensive bilateral pulmonary infiltrates and  minimal effusions.  Infiltrates have progressed since the comparison study.     NAVYA OBREGON MD         Billing: This patient is critically ill: Yes. Total critical care time today 42 min.

## 2019-09-15 NOTE — PHARMACY-VANCOMYCIN DOSING SERVICE
Pharmacy Vancomycin Note  Date of Service September 15, 2019  Patient's  1965   54 year old, female    Indication: [facility]ated Pneumonia  Goal Trough Level: 15-20 mg/L  Day of Therapy: 2  Current Vancomycin regimen:  2000 mg IV q12h    Current estimated CrCl = Estimated Creatinine Clearance: 106.2 mL/min (based on SCr of 0.8 mg/dL).    Creatinine for last 3 days  2019: 12:13 AM Creatinine 0.43 mg/dL;  6:10 AM Creatinine 0.37 mg/dL;  9:55 AM Creatinine 0.50 mg/dL;  4:35 PM Creatinine 0.78 mg/dL  9/15/2019:  6:09 AM Creatinine 0.60 mg/dL;  3:12 PM Creatinine 0.80 mg/dL    Recent Vancomycin Levels (past 3 days)  9/15/2019:  3:12 PM Vancomycin Level 18.0 mg/L    Vancomycin IV Administrations (past 72 hours)                   vancomycin (VANCOCIN) 2,000 mg in sodium chloride 0.9 % 500 mL intermittent infusion (mg) 2,000 mg New Bag 09/15/19 1705     2,000 mg New Bag  0417     2,000 mg New Bag 19 1551     2,000 mg New Bag  0447                Nephrotoxins and other renal medications (From now, onward)    Start     Dose/Rate Route Frequency Ordered Stop    09/15/19 1415  ibuprofen (ADVIL/MOTRIN) suspension 400 mg      400 mg Oral EVERY 6 HOURS 09/15/19 1413      09/15/19 0930  vasopressin (VASOSTRICT) 40 Units in D5W 40 mL infusion      2.4 Units/hr  2.4 mL/hr  Intravenous CONTINUOUS 09/15/19 0921      19 0730  norepinephrine (LEVOPHED) 16 mg in  mL infusion      0.03-0.2 mcg/kg/min × 100.8 kg  2.8-18.9 mL/hr  Intravenous CONTINUOUS 19 0723 19 0729    19 0600  piperacillin-tazobactam (ZOSYN) 4.5 g vial to attach to  mL bag      4.5 g  over 30 Minutes Intravenous EVERY 6 HOURS 19 0328      19 0400  vancomycin (VANCOCIN) 2,000 mg in sodium chloride 0.9 % 500 mL intermittent infusion      2,000 mg  over 2 Hours Intravenous EVERY 12 HOURS 19 0339               Contrast Orders - past 72 hours (72h ago, onward)    Start     Dose/Rate Route  Frequency Ordered Stop    09/14/19 1815  iopamidol (ISOVUE-370) solution 78 mL      78 mL Intravenous ONCE 09/14/19 1804 09/14/19 1845    09/14/19 1415  perflutren diluted 1mL to 2mL with saline (OPTISON) diluted injection 3 mL      3 mL Intravenous ONCE 09/14/19 1401 09/14/19 1415    09/14/19 0032  iopamidol (ISOVUE-370) solution 78 mL      78 mL Intravenous ONCE 09/14/19 0031 09/14/19 0032          Interpretation of levels and current regimen:  Trough level is  Therapeutic  Has serum creatinine changed > 50% in last 72 hours: Yes, increasing  Urine output:  diminished urine output  Renal Function: Worsening    Plan:  1.  Continue Current Dose  2.  Pharmacy will check trough levels as appropriate in 1-3 Days.    3. Serum creatinine levels will be ordered daily for the first week of therapy and at least twice weekly for subsequent weeks.      Faviola Monahan RPH        .

## 2019-09-15 NOTE — PLAN OF CARE
PT: per nursing, pt not medically appropriate to be seen by therapy w/ plan to complete orders and have staff re-order as pt able to participate

## 2019-09-15 NOTE — PROVIDER NOTIFICATION
Dr. Garcia notified of pt's lactic acid 4.3 after call from lab.  No new orders at this time, lactic decreased from previous.

## 2019-09-15 NOTE — PROVIDER NOTIFICATION
MD Notification    Notified Person: MD    Notified Person Name: Jose    Notification Date/Time:  09/15/19 at 1615    Notification Interaction:  bedside    Purpose of Notification: LA 5.2    Orders Received: 500ml plasmalyte bolus     Comments:

## 2019-09-15 NOTE — PROGRESS NOTES
[facility] ICU RESPIRATORY NOTE           Date of Admission: 9/13/2019     Date of Intubation (most recent): 9/14/19     Reason for Mechanical Ventilation: Resp Failure/Asthma Exacerbation     Number of Days on Mechanical Ventilation: 2     Met Criteria for Pressure Support Trial: No     Length of Pressure Support Trial: none     Reason for Stopping Pressure Support Trial: none     Reason for No Pressure Support Trial: PerMD     Significant Events Today: Continued High Ppeak and Pplat overnight. MD aware.     ABG Results:   Recent Labs   Lab 09/14/19  2344 09/14/19  1750 09/14/19  1632 09/14/19  1629  09/14/19  0020   PH 7.41 7.34* 7.12* 6.97*   < > 7.49*   PCO2 50* 51* 71* 120*   < > 49*   PO2 152* 99 97 40*   < > 108*   HCO3 31* 27 23 28   < > 37*   O2PER  --   --   --   --   --  40    < > = values in this interval not displayed.     Ventilation Mode: CMV/AC  (Continuous Mandatory Ventilation/ Assist Control)  FiO2 (%): 70 %  Rate Set (breaths/minute): 26 breaths/min  Tidal Volume Set (mL): 400 mL  PEEP (cm H2O): 5 cmH2O  Oxygen Concentration (%): 80 %  Resp: 26    Will continue to follow.     Jose Enrique, RT

## 2019-09-15 NOTE — PLAN OF CARE
Neuro: sedated, with on and off paralytic. Pupils sluggish. Fent drip for pain control  CV: ST with occ to frequent PAC, BP soft, on vasopressors  Pulm: CMV 60% Vt360 RR26 PEEP10, peak pressures remained mid 50's all day. LS remained exp insp wheeze on the right side, left side coarse and occ exp wheezes. Small secretions.  GI/: trickle feed tolerating. No BM faint bowel tones, stokes with adequate UOP  Skin: intact but cold even though hyperthermic- core temp max 39.9, antipyretic scheduled given, cooling blanket used.  Lines: JASMYNE TLCPICC, PIV rt arm, Rt rad art line. Rt chestwall port  Restraints: na  Plan: Rest overnight on vent. Continue use of paralytic for goal TOF 2-3twitches . Family- son carly updated about pt status and POC, pt is DNR now.  Pt's [employer]  and friend were here with son at the bedside.     @ 1535 pt started hypotensive BP in low 60's/40's, 's-130's ST with frequent non perfusing PAC. Pressors MD destinee called to the bedside, son at the bedside as well, MD administered 1 amp of Sodium bicarb, 1 amp of calcium chloride, 3cc of epinephrine, 50cc of 255% albumin and epi drip started. MD ordered to increase fent drip to 50mcg/hr, decrease ketamine to 35mg/hr and stop propofol, MD will order bumps of versed as well. Also to continue using paralytic for goal TOF of 2/4 twitches to use ULNAR nerve for TOF.

## 2019-09-15 NOTE — PLAN OF CARE
Death certificate filed, patient orders discontinued/cancelled.    Constance Boland XRO/  [facility]   Neuros unchanged-sedated/paralyzed to optimize pulmonary status. Remains on full vent support settings unchanged except fio2 down to 70%- minimal secretions. Pt remains tachycardic but regular rhythm- MD notified of slight trend upward this am. Remains on levophed gtt for bp support. TF seem to be tolerated well via enteric tube. UO is trending downward will report of to monitor. Skin unchanged. Pt's son at bedside updated throughout the night and pt explained care as well. All questions answered as able and explanations given in thorough by night intesivist and myself.

## 2019-09-16 PROBLEM — N17.9 ACUTE KIDNEY FAILURE, UNSPECIFIED (H): Status: ACTIVE | Noted: 2019-01-01

## 2019-09-16 NOTE — PROVIDER NOTIFICATION
Discussed Cdiff testing with MD. Unable to assess Abdominal pain, but patient has continuous watery stools via containment device, elevated wbc and fever (on cooling blanket and scheduled tylenol/ibuprofen), no recent laxatives, with recent hospitalizations.    MD to place Cdiff test order and will place patient in Enteric Isolation.

## 2019-09-16 NOTE — PROGRESS NOTES
[facility] ICU RESPIRATORY NOTE           Date of Admission: 9/13/2019     Date of Intubation (most recent): 9/14/19     Reason for Mechanical Ventilation: Resp Failure/Asthma Exacerbation     Number of Days on Mechanical Ventilation: 3     Met Criteria for Pressure Support Trial: NO     Reason for No Pressure Support Trial: PerMD     Significant Events Today: Continued Ppeak 50's, Pplat 45. MD aware.      ABG Results:   Recent Labs   Lab 09/16/19  0237 09/15/19  1606 09/15/19  1530 09/15/19  1020 09/15/19  0928   PH 7.40 7.41 7.37 7.41 7.39   PCO2 38 40 41 43 43   PO2 69* 101 69* 67* 65*   HCO3 24 25 24 27 26   O2PER  --  6L 70% 70% 70     Ventilation Mode: CMV/AC  (Continuous Mandatory Ventilation/ Assist Control)  FiO2 (%): 50 %  Rate Set (breaths/minute): 26 breaths/min  Tidal Volume Set (mL): 360 mL  PEEP (cm H2O): 10 cmH2O  Oxygen Concentration (%): 50 %  Resp: 26    Will continue to follow.     Jose Enrique, RT

## 2019-09-16 NOTE — PROGRESS NOTES
[facility] Progress Note     Primary Oncologist/Hematologist:            Assessment and Plan:   Ms. Megha Campbell is a 54 year old woman with a history of metastatic breast cancer who was admitted on 9/13 with respirtaory failure     1. Respiratory failure with CT findings showing extensive pulmonary infiltrates  - her condition has declined since admission and she is currently intubated and requiring increasing support  - prognosis poor and her family is currently weighing different options for limiting care  - No family present at this time.  RN reports that family may be coming into town      2. Stage III breast cancer with treatment as summarized in her records.  - she has previously been offered chemotherapy but declined  - she is not strong enough to tolerate or benefit from chemotherapy    3. DVT on heparin    CODE STATUS: DNR          Interval History:   Sedated, intubated.               Review of Systems:   The 5 point Review of Systems is negative other than noted in the HPI             Medications:   Scheduled Medications    acetaminophen  650 mg Oral Q6H     arformoterol  15 mcg Nebulization Q12H     artificial tears   Both Eyes BID     calcium chloride  1 g Intravenous Once     chlorhexidine  15 mL Mouth/Throat Q12H     doxycycline (VIBRAMYCIN) IV  100 mg Intravenous Q12H     ipratropium - albuterol 0.5 mg/2.5 mg/3 mL  3 mL Nebulization Q6H     LORazepam  1 mg Intravenous Q6H     methylPREDNISolone  62.5 mg Intravenous Q8H     piperacillin-tazobactam  4.5 g Intravenous Q6H     rantidine  150 mg Per Feeding Tube BID     sodium bicarbonate  50 mEq Intravenous Once     sodium chloride (PF)  3 mL Intracatheter Q8H     vancomycin (VANCOCIN) IV  2,000 mg Intravenous Q12H     PRN Medications  acetaminophen, albuterol, bisacodyl, IV fluid REPLACEMENT ONLY, glucose **OR** dextrose **OR** glucagon, fentaNYL, levalbuterol, lidocaine 4%, lidocaine (buffered or not buffered), magnesium sulfate,  magnesium sulfate, melatonin, metoclopramide **OR** metoclopramide, naloxone, nitroGLYcerin, ondansetron **OR** ondansetron, polyethylene glycol, potassium chloride, potassium chloride with lidocaine, potassium chloride, potassium chloride, potassium chloride, prochlorperazine **OR** prochlorperazine **OR** prochlorperazine, propofol (DIPRIVAN) infusion **AND** propofol **AND** CK total **AND** Triglycerides, senna-docusate **OR** senna-docusate, sodium chloride (PF), sodium phosphate, sodium phosphate, sodium phosphate, sodium phosphate               Physical Exam:   Vitals were reviewed  Blood pressure 108/81, pulse 133, temperature 100.2  F (37.9  C), resp. rate 26, weight 113.6 kg (250 lb 7.1 oz), last menstrual period 12/07/2017, SpO2 100 %, not currently breastfeeding.  Wt Readings from Last 4 Encounters:   09/16/19 113.6 kg (250 lb 7.1 oz)   09/12/19 108.9 kg (240 lb)   09/11/19 111.2 kg (245 lb 1.6 oz)   09/04/19 104.3 kg (230 lb)       I/O last 3 completed shifts:  In: 5064.13 [I.V.:3824.13; NG/GT:630]  Out: 880 [Urine:880]                    Data:   All laboratory data and imaging studies reviewed.    CMP  Recent Labs   Lab 09/16/19  0440 09/15/19  1512 09/15/19  0609 09/14/19  1635  09/14/19  0955    142 137 140   < > 137   POTASSIUM 3.2* 3.5 3.7 4.8   < > 4.0   CHLORIDE 108 106 103 101  --  100   CO2 28 26 28 28  --  33*   ANIONGAP 7 10 6 11  --  4   * 138* 205* 293*  --  194*   BUN 28 23 17 15  --  13   CR 0.57 0.80 0.60 0.78  --  0.50*   GFRESTIMATED >90 84 >90 87  --  >90   GFRESTBLACK >90 >90 >90 >90  --  >90   PABLO 8.5 8.0* 8.1* 8.7  --  8.2*   MAG 2.7* 2.4* 2.3  --   --  2.1   PHOS 2.6 2.9 2.0*  --   --  3.0   PROTTOTAL  --  6.2* 6.2* 6.3*  --  6.5*   ALBUMIN  --  2.7* 2.0* 2.0*  --  2.1*   BILITOTAL  --  0.7 0.4 0.4  --  0.4   ALKPHOS  --  114 91 94  --  90   AST  --  305* 87* 46*  --  38   ALT  --  132* 53* 32  --  28    < > = values in this interval not displayed.     CBC  Recent  Labs   Lab 09/15/19  0609 09/14/19  1635 09/14/19  1629 09/14/19  0955 09/14/19  0610   WBC 15.4* 11.9*  --  9.0 10.3   RBC 4.15 4.40  --  4.31 4.43   HGB 10.4* 10.8* 11.2* 10.5* 10.9*   HCT 33.5* 37.5  --  34.6* 35.8   MCV 81 85  --  80 81   MCH 25.1* 24.5*  --  24.4* 24.6*   MCHC 31.0* 28.8*  --  30.3* 30.4*   RDW 16.0* 15.9*  --  15.6* 15.5*    331  --  268 269     INR  Recent Labs   Lab 09/15/19  0609 09/14/19  1635 09/14/19  0013   INR 1.22* 1.37* 1.27*           Ramos Narayan CNP  Nurse Practitioner  [facility]  [phone]

## 2019-09-16 NOTE — PROVIDER NOTIFICATION
Critical Lactic at 4.8 trending down. Orders for Lactic acids to be checked less frequently obtained- every 6 hours. Peak/plateu psi's reported off to provider f/u since turning off nimbex gtt (d/t TOF goals via ulnar site). Orders to restart nimbex gtt obtained. Will continue to monitor and report back as needed.

## 2019-09-16 NOTE — PROVIDER NOTIFICATION
Pt's UO is starting to taper down since earlier. <60 ml q.2 hrs the last 6 hrs. Dr. Ireland notified. See new orders

## 2019-09-16 NOTE — CONSULTS
[facility]    Infectious Disease Consultation     Date of Admission:  9/13/2019  Date of Consult (When I saw the patient): 09/16/19    Assessment & Plan   Megha Campbell is a 54 year old female who was admitted on 9/13/2019.     Impression:  1. 54 y.o female with history of metastatic breast cancer. S/p resection and radiation but declined chemotherapy.   2. Multiple hospitalizations since then.   3. Recent hospitalization with recurrent pleural effusion, s/p VATS with pleurodesis.   4. Admitted this occasion with progressive respiratory failure.  5. Had cardiac arrest afternoon of 9/14 but had ROSC.   6. On pressors.   7. Fever, bilateral pulmonary infiltrates.   8. Procal essentially negative. GPC in the sputum cultures.   9. Currently on Vanco, zosyn and doxycycline.   10. Fungal antibodies pending.     Recommendations:   Continue on broad spectrum antibiotics.   Follow up on various pending cultures, serology.         Misha Lawson MD    Reason for Consult   Reason for consult: I was asked by Dr. Garcia  to evaluate this patient for ARDS concern for HCAP.    Primary Care Physician   [facility]    Chief Complaint   Hypoxic resp failure     History is obtained from the patient and medical records    History of Present Illness   Megha Campbell is a 54 year old female with stage III (T3 N2a M0) triple-negative left-sided breast cancer diagnosed in 11/2018.  She had initial surgery which showed an 8 cm tumor with 5 positive lymph nodes and also vascular invasion along with extranodal extension.  She had lumpectomy and reexcision in 12/2018 and also adjuvant radiation, which was completed in 04/2019.  She was offered chemotherapy, both neoadjuvant basis and also after surgery but declined treatment.    08/2018, she developed a large left pleural effusion and thoracentesis showed metastatic adenocarcinoma which was ER and VT negative.  There was no evidence of other sites of metastatic  disease at that time. Chemotherapy was again discussed with her and she declined treatment.    She has since been hospitalized on several occasions, most recently in early September.  In early August, she had a recurrence of the effusion and had a VATS procedure with pleurodesis.      She was admitted again on 09/13/2019 with progressive respiratory failure.  Since admission to the hospital, she has had further decline in her status and was intubated and sedated with propofol.       Past Medical History   I have reviewed this patient's medical history and updated it with pertinent information if needed.   Past Medical History:   Diagnosis Date     Acute respiratory failure with hypoxia (H)      Anxiety      Breast cancer in female (H) 11/21/2018    breast cancer, left, s/p mastectomy and radiation tx      Depressive disorder      Malignant pleural effusion 08/2019    Related to breast cancer, S/P Pleurodesis 8/23/19 left side     Metastatic cancer (H)      Obese      Pleural effusion      Pneumonia      Uncomplicated asthma        Past Surgical History   I have reviewed this patient's surgical history and updated it with pertinent information if needed.  Past Surgical History:   Procedure Laterality Date     BIOPSY BREAST Left 12/7/2018    Procedure: RE-EXCISION LEFT BREAST INFERIOR MARGIN, ASPIRATION OF AXILLARY SEROMA;  Surgeon: Lesly Aponte MD;  Location:  OR     GASTRIC BYPASS  2004    abdominal plasty, and scar tissue removal     IR CHEST PORT PLACEMENT > 5 YRS OF AGE  8/23/2019     LUMPECTOMY BREAST WITH SEED LOCALIZATION Left 11/21/2018    Procedure: SEED LOCALIZED LEFT BREAST LUMPECTOMY WITH LEFT AXILLARY LYMPH NODE DISSECTION;  Surgeon: Lesly Aponte MD;  Location:  OR     THORACOSCOPIC PLEURODESIS Left 8/23/2019    Procedure: LEFT VIDEO-ASSISTED THORACIC SURGERY  TALC PLEURODESIS, PARITAL PLEURAL BIOPSY.;  Surgeon: Zackary Rosado MD;  Location:  OR       Prior to Admission  Medications   Prior to Admission Medications   Prescriptions Last Dose Informant Patient Reported? Taking?   DULoxetine (CYMBALTA) 30 MG capsule   No No   Sig: Take 1 capsule (30 mg) by mouth daily   HYDROmorphone (DILAUDID) 2 MG tablet   No No   Sig: Take 1 tablet (2 mg) by mouth every 6 hours as needed for pain   Lidocaine (LIDOCARE) 4 % Patch   No No   Sig: Place 1 patch onto the skin every 24 hours To prevent lidocaine toxicity, patient should be patch free for 12 hrs daily.  Apply to site of pain left chest wall   QUEtiapine (SEROQUEL) 25 MG tablet   No No   Sig: Take 1 tablet (25 mg) by mouth 3 times daily   acetaminophen (TYLENOL) 325 MG tablet  Self No No   Sig: Take 2 tablets (650 mg) by mouth every 4 hours as needed for other (multimodal surgical pain management along with NSAIDS and opioid medication as indicated based on pain control and physical function.)   albuterol (PROAIR HFA/PROVENTIL HFA/VENTOLIN HFA) 108 (90 Base) MCG/ACT inhaler  Self No No   Sig: Inhale 1-2 puffs into the lungs every 6 hours as needed for shortness of breath / dyspnea or wheezing   amoxicillin-clavulanate (AUGMENTIN) 875-125 MG tablet   No No   Sig: Take 1 tablet by mouth 2 times daily for 3 days   bisacodyl (DULCOLAX) 10 MG suppository   No No   Sig: Place 1 suppository (10 mg) rectally daily as needed for constipation   clonazePAM (KLONOPIN) 1 MG tablet  Self No No   Si.5 mg (1/2 pill) twice a day for 3 days, then 0.5 mg (1/2 pill) at bedtime for 3 days -- then 0.5 mg at bedtime as needed for sleep   enoxaparin (LOVENOX) 100 MG/ML syringe   No No   Sig: Inject 1 mL (100 mg) Subcutaneous 2 times daily   furosemide (LASIX) 20 MG tablet   No No   Sig: Take 1 tablet (20 mg) by mouth 2 times daily   ibuprofen (ADVIL/MOTRIN) 800 MG tablet  Self No No   Sig: Take 1 tablet (800 mg) by mouth every 8 hours as needed for moderate pain   ipratropium (ATROVENT) 0.02 % neb solution   No No   Sig: Take 2.5 mLs (0.5 mg) by nebulization 4  times daily as needed for wheezing   levalbuterol (XOPENEX CONC) 1.25 MG/0.5ML neb solution   No No   Sig: Take 0.25 mLs (0.63 mg) by nebulization every 6 hours as needed for wheezing   metoprolol succinate ER (TOPROL-XL) 50 MG 24 hr tablet   No No   Sig: Take 1 tablet (50 mg) by mouth daily   ondansetron (ZOFRAN-ODT) 4 MG ODT tab  Self No No   Sig: Take 1 tablet (4 mg) by mouth every 6 hours as needed for nausea or vomiting   order for DME   No No   Sig: Equipment being ordered: Nebulizer   order for DME   No No   Sig: Equipment being ordered: bedside commode   order for DME   No No   Sig: Equipment being ordered: Walker Wheels () and Walker ()  Treatment Diagnosis: Difficulty ambulating   pantoprazole (PROTONIX) 40 MG EC tablet   No No   Sig: Take 1 tablet (40 mg) by mouth every morning (before breakfast)   polyethylene glycol (MIRALAX/GLYCOLAX) packet   No No   Sig: Take 17 g by mouth daily as needed for constipation   potassium chloride ER (K-DUR/KLOR-CON M) 20 MEQ CR tablet   No No   Sig: Take 1 tablet (20 mEq) by mouth 2 times daily   predniSONE (DELTASONE) 10 MG tablet   No No   Sig: Take 4 tablets (40 mg) by mouth daily for 3 days   senna-docusate (SENOKOT-S/PERICOLACE) 8.6-50 MG tablet   No No   Sig: Take 1 tablet by mouth 2 times daily as needed for constipation   sertraline (ZOLOFT) 100 MG tablet  Self Yes No   Sig: Take 150 mg by mouth daily      Facility-Administered Medications: None     Allergies   Allergies   Allergen Reactions     Hydrocodone-Acetaminophen Itching     Patient states she is able to take it with Benadryl.       Oxycodone Itching and Rash     Albuterol      SVT following DuoNeb/albuterol treatments in the past       Immunization History   Immunization History   Administered Date(s) Administered     Influenza (IIV3) PF 11/30/2011, 01/14/2013, 09/10/2013, 09/14/2014, 09/26/2017     Influenza Vaccine IM > 6 months Valent IIV4 09/03/2015, 09/14/2016     TDAP Vaccine (Boostrix)  10/01/2013     Td (Adult), Adsorbed 12/31/2003       Social History   I have reviewed this patient's social history and updated it with pertinent information if needed. Megha Campbell  reports that she has never smoked. She has never used smokeless tobacco. She reports that she drinks alcohol. She reports that she does not use drugs.    Family History   I have reviewed this patient's family history and updated it with pertinent information if needed.   Family History   Problem Relation Age of Onset     Breast Cancer Mother        Review of Systems   The 10 point Review of Systems is negative other than noted in the HPI or here.     Physical Exam   Temp: 100.2  F (37.9  C) Temp src: Esophageal BP: 108/81 Pulse: 133 Heart Rate: 134 Resp: 26 SpO2: 100 % O2 Device: Mechanical Ventilator    Vital Signs with Ranges  Temp:  [96.8  F (36  C)-103.8  F (39.9  C)] 100.2  F (37.9  C)  Pulse:  [133-137] 133  Heart Rate:  [103-146] 134  Resp:  [16-30] 26  BP: ()/(65-92) 108/81  MAP:  [52 mmHg-104 mmHg] 78 mmHg  Arterial Line BP: ()/(47-91) 93/66  FiO2 (%):  [50 %-70 %] 65 %  SpO2:  [90 %-100 %] 100 %  250 lbs 7.08 oz  Body mass index is 38.08 kg/m .    GENERAL APPEARANCE:  Intubated   EYES: Eyes grossly normal to inspection, PERRL and conjunctivae and sclerae normal  HENT: ear canals and TM's normal and nose and mouth without ulcers or lesions  NECK: no adenopathy, no asymmetry, masses, or scars and thyroid normal to palpation  RESP: lungs clear to auscultation - no rales, rhonchi or wheezes  CV: regular rates and rhythm, normal S1 S2, no S3 or S4 and no murmur, click or rub  LYMPHATICS: normal ant/post cervical and supraclavicular nodes  ABDOMEN: soft, nontender, without hepatosplenomegaly or masses and bowel sounds normal  MS: extremities normal- no gross deformities noted  SKIN: no suspicious lesions or rashes      Data   Lab Results   Component Value Date    WBC 15.4 (H) 09/15/2019    HGB 10.4 (L) 09/15/2019    HCT  33.5 (L) 09/15/2019     09/15/2019     09/16/2019    POTASSIUM 3.2 (L) 09/16/2019    CHLORIDE 108 09/16/2019    CO2 28 09/16/2019    BUN 28 09/16/2019    CR 0.57 09/16/2019     (H) 09/16/2019    DD 3.2 (H) 08/16/2019    NTBNPI 1,061 (H) 09/14/2019    TROPI <0.015 09/14/2019     (H) 09/15/2019     (H) 09/15/2019    ALKPHOS 114 09/15/2019    BILITOTAL 0.7 09/15/2019    INR 1.22 (H) 09/15/2019     Recent Labs   Lab 09/14/19  0755 09/14/19  0037 09/14/19  0014   CULT Light growth  Normal kristine to date    Canceled, Test credited  Incorrectly ordered by PCU/Clinic   No growth after 2 days No growth after 2 days     Recent Labs   Lab Test 09/14/19  0755 09/14/19  0037 09/14/19  0014 09/04/19  0515 08/21/19  1345 08/20/19  0833 08/20/19  0656 08/16/19  1455 08/16/19  0832   CULT Light growth  Normal kristine to date    Canceled, Test credited  Incorrectly ordered by PCU/Clinic   No growth after 2 days No growth after 2 days No growth No growth No growth No growth No growth No growth       Amount of time performed on this consult: 45 minutes. This includes face to face assessment and care coordination with the primary team.

## 2019-09-16 NOTE — PROVIDER NOTIFICATION
MD notified of multiple SVT runs following start of Neb treatment. Dr Holliday to discontinue nebs for time being. Will continue to monitor HR. BP tolerating SVT at this time.

## 2019-09-16 NOTE — PROGRESS NOTES
Critical Care Progress Note      09/16/2019    Name: Megha Campbell MRN#: [id number]   Age: 54 year old YOB: 1965     Hsptl Day# 2  ICU DAY #2    MV DAY #2             Problem List:   Active Problems:    Acute and chronic respiratory failure with hypoxia (H)  Shock, etiology unknown but most likely septic  S/p cardiac arrest  joshua  ARDS         Summary/Hospital Course:   54 year old  female presented to the ER with shortness of breath, hypoxia to 70% Sats on room air (apparently she is on 4L nasal cannula oxygen at home).  There have been recent hospitalizations. Recently she had been on BIPAP on the floor but she progressively got worse. She was transferred to the ICU and got immediately intubated.     Patient had a cardiac arrest afternoon of 9/14 but had ROSC and currently on pressors. It is not clear as to the etiology of the cardiac arrest but it is likely an arrythmia in the presence of severe septic shock.   The son was notified of the situation     Since her cardiac arrest, her condition has remained very unstable.  She continues to have high pressor needs and she continues to require high levels of ventilatory support.  Despite low tidal volume ventilation she has plateau pressures in the 40s.  Worsening UOP.    Code status was changed to DNR after long discussion with the son.        Assessment and plan :      Megha Campbell IS a 54 year old female admitted on 9/13/2019 for acute on chronic respiratory failure in the presence of septic shock  I have personally reviewed the daily labs, imaging studies, cultures and discussed the case with referring physician and consulting physicians.      My assessment and plan by system for this patient is as follows:     Neurology/Psychiatry:   1. Intubated and sedated  2. Pain/analgesia: fentanyl/ketamine/propofol  3. Anxiety: on zoloft and seroquel at home (holding here)  4. S/p cardiac arrest:  Neuro status remains  unknown.     Cardiovascular:   1.  Shock:  Etiology unclear.  Likely some component of sepsis, but may be an element of cardiogenic shock post cardiac arrest.  Repeat echo today.  Also may be an element of obstructive shock given high ventilating pressures that are required.  Continue levo/vaso.  Weaning epi as able.  Started inhaled flolan as note of high rv pressure on echo.  This may help hemodynamics.  On methylprednisolone.  2.  Lactic acidosis:  In setting of shock noted above.  Non-clearing.  Continue to trend.  Would CT abd to look for dead bowel but too unstable to travel.  Abd exam benign.      Pulmonary/Ventilator Management:   1. Acute hypoxemic respiratory failure, vent dependent:  Strong suspicion for ARDS though inciting insult unclear.  Continue vanco/zosyn.  Added doxy today.  Continue low tidal volume venitlation.  Added inhaled velitri.  Continue paralysis.  Not an ecmo candidate (see Dr. Garcia' notes).  Will prone if oxygenation worsens.    GI and Nutrition :   1. Enteral feedings started  2. Ranitidine for PUD prophy  Plan  - May need to reduce feeds in view of higher pressor requirements     Renal/Fluids/Electrolytes:   1.  Oliguria:  Following cardiac arrest.  Creat has been stable.  2.  HypoK:  Replete       Infectious Disease:   1. ?pneumonia?  Cultures pending.  Empiric zosyn, vanc, doxy  2. procalcitonin 0.10   Plan  - Continue antibiotics     Endocrine:   1. Stress induced hyperglycemia  Plan  - ICU insulin protocol, goal sugar <180     Hematology/Oncology:   1.  Anemia, stable, no signs, symptoms of active blood loss  2.  Leukocytosis:  Treating for infection, also on steroids.  3.  RLE DVT:  Heparin drip  Plan  - continue to monitor       IV/Access:   1. Venous access - PICC line placed today, Has a PORT as well  2. Arterial access - right radial placed  3.  Plan  - central access required and necessary        ICU Prophylaxis:   1. DVT:heparin infusion for RLE DVT/ mechanical  2. VAP:  HOB 30 degrees, chlorhexidine rinse  3. Stress Ulcer: PPI  4. Restraints: Nonviolent soft two point restraints required and necessary for patient safety and continued cares and good effect as patient continues to pull at necessary lines, tubes despite education and distraction. Will readdress daily.   6. Feeding - on feeds  7. Family Update: see below  8. Disposition - ICU      Family update:  Discussed with son by phone.  He understands the overall very grim prognosis.  His father and brother will be coming in to see Ms. Campbell today.  We will continue with full measures short of CPR, but he understands her overall deteriorating course and likely grim outcome.              Key Medications:       acetaminophen  650 mg Oral Q6H     arformoterol  15 mcg Nebulization Q12H     artificial tears   Both Eyes BID     calcium chloride  1 g Intravenous Once     chlorhexidine  15 mL Mouth/Throat Q12H     ibuprofen  400 mg Oral Q6H     ipratropium - albuterol 0.5 mg/2.5 mg/3 mL  3 mL Nebulization Q6H     LORazepam  1 mg Intravenous Q6H     methylPREDNISolone  62.5 mg Intravenous Q8H     piperacillin-tazobactam  4.5 g Intravenous Q6H     rantidine  150 mg Per Feeding Tube BID     sodium bicarbonate  50 mEq Intravenous Once     sodium chloride (PF)  3 mL Intracatheter Q8H     vancomycin (VANCOCIN) IV  2,000 mg Intravenous Q12H       cisatracurium (NIMBEX) infusion ADULT 1 mcg/kg/min (09/16/19 0801)     IV fluid REPLACEMENT ONLY       EPINEPHrine IV infusion ADULT 0.03 mcg/kg/min (09/16/19 0756)     epoprostenol (VELETRI) 20 mcg/mL in sterile water inhalation solution       fentaNYL 50 mcg/hr (09/16/19 0800)     HEParin 1,000 Units/hr (09/16/19 0833)     insulin (regular) 3 Units/hr (09/16/19 0800)     ketamine 50 mg/mL ADULT 35 mg/hr (09/16/19 0844)     norepinephrine 0.12 mcg/kg/min (09/16/19 0912)     propofol (DIPRIVAN) infusion Stopped (09/15/19 1545)     sodium chloride 30 mL/hr at 09/16/19 0507     vasopressin (PITRESSIN)  infusion ADULT (40 mL) 2.4 Units/hr (09/16/19 0800)               Physical Examination:   Temp:  [96.8  F (36  C)-103.8  F (39.9  C)] 100  F (37.8  C)  Pulse:  [133-137] 137  Heart Rate:  [103-146] 138  Resp:  [16-30] 26  BP: ()/(65-92) 131/77  MAP:  [52 mmHg-104 mmHg] 96 mmHg  Arterial Line BP: ()/(47-91) 128/76  FiO2 (%):  [50 %-70 %] 65 %  SpO2:  [90 %-100 %] 100 %      Intake/Output Summary (Last 24 hours) at 9/16/2019 1024  Last data filed at 9/16/2019 0800  Gross per 24 hour   Intake 4472.76 ml   Output 765 ml   Net 3707.76 ml         Wt Readings from Last 4 Encounters:   09/16/19 113.6 kg (250 lb 7.1 oz)   09/12/19 108.9 kg (240 lb)   09/11/19 111.2 kg (245 lb 1.6 oz)   09/04/19 104.3 kg (230 lb)     Arterial Line BP: ()/(47-91) 128/76  MAP:  [52 mmHg-104 mmHg] 96 mmHg  BP - Mean:  [] 107  Ventilation Mode: CMV/AC  (Continuous Mandatory Ventilation/ Assist Control)  FiO2 (%): 65 %  Rate Set (breaths/minute): 26 breaths/min  Tidal Volume Set (mL): 360 mL  PEEP (cm H2O): 10 cmH2O  Oxygen Concentration (%): 65 %  Resp: 26    Recent Labs   Lab 09/16/19  0237 09/15/19  1606 09/15/19  1530 09/15/19  1020 09/15/19  0928   PH 7.40 7.41 7.37 7.41 7.39   PCO2 38 40 41 43 43   PO2 69* 101 69* 67* 65*   HCO3 24 25 24 27 26   O2PER  --  6L 70% 70% 70       GEN: no acute distress, anasarca, sedated/paralyzed  HEENT: head ncat, sclera anicteric, OP patent, trachea midline, PERRL   PULM: unlabored synchronous with vent, clear anteriorly    CV/COR: RRR S1S2 no gallop,  No rub, no murmur  ABD: soft nontender, hypoactive bowel sounds, no mass  EXT:  Edema   warm  NEURO: sedated, perrl, paralyzed chemically  SKIN: no obvious rash  LINES: clean, dry intact         Data:   All data and imaging reviewed     ROUTINE ICU LABS (Last four results)  CMP  Recent Labs   Lab 09/16/19  0440 09/15/19  1512 09/15/19  0609 09/14/19  1635  09/14/19  0955    142 137 140   < > 137   POTASSIUM 3.2* 3.5 3.7 4.8   < >  4.0   CHLORIDE 108 106 103 101  --  100   CO2 28 26 28 28  --  33*   ANIONGAP 7 10 6 11  --  4   * 138* 205* 293*  --  194*   BUN 28 23 17 15  --  13   CR 0.57 0.80 0.60 0.78  --  0.50*   GFRESTIMATED >90 84 >90 87  --  >90   GFRESTBLACK >90 >90 >90 >90  --  >90   PABLO 8.5 8.0* 8.1* 8.7  --  8.2*   MAG 2.7* 2.4* 2.3  --   --  2.1   PHOS 2.6 2.9 2.0*  --   --  3.0   PROTTOTAL  --  6.2* 6.2* 6.3*  --  6.5*   ALBUMIN  --  2.7* 2.0* 2.0*  --  2.1*   BILITOTAL  --  0.7 0.4 0.4  --  0.4   ALKPHOS  --  114 91 94  --  90   AST  --  305* 87* 46*  --  38   ALT  --  132* 53* 32  --  28    < > = values in this interval not displayed.     CBC  Recent Labs   Lab 09/15/19  0609 09/14/19  1635 09/14/19  1629 09/14/19  0955 09/14/19  0610   WBC 15.4* 11.9*  --  9.0 10.3   RBC 4.15 4.40  --  4.31 4.43   HGB 10.4* 10.8* 11.2* 10.5* 10.9*   HCT 33.5* 37.5  --  34.6* 35.8   MCV 81 85  --  80 81   MCH 25.1* 24.5*  --  24.4* 24.6*   MCHC 31.0* 28.8*  --  30.3* 30.4*   RDW 16.0* 15.9*  --  15.6* 15.5*    331  --  268 269     INR  Recent Labs   Lab 09/15/19  0609 09/14/19  1635 09/14/19  0013   INR 1.22* 1.37* 1.27*     Arterial Blood Gas  Recent Labs   Lab 09/16/19  0237 09/15/19  1606 09/15/19  1530 09/15/19  1020 09/15/19  0928   PH 7.40 7.41 7.37 7.41 7.39   PCO2 38 40 41 43 43   PO2 69* 101 69* 67* 65*   HCO3 24 25 24 27 26   O2PER  --  6L 70% 70% 70       All cultures:  Recent Labs   Lab 09/14/19  0755 09/14/19  0037 09/14/19  0014   CULT Light growth  Normal kristine to date    Canceled, Test credited  Incorrectly ordered by PCU/Clinic   No growth after 2 days No growth after 2 days     No results found for this or any previous visit (from the past 24 hour(s)).    Labs (personally reviewed/interpreted): see a/p  CT chest (personally reviewed/interpreted):  B/l infiltrates c/w ards picture.  No PE.    Billing: This patient is critically ill: Yes. Total critical care time today 45 min.

## 2019-09-16 NOTE — PLAN OF CARE
Neuros unchanged- paralyzed/sedated sluggish pupils. Full vent support up from 60% fio2 to 65% through the night. Minimal secretions. Coarse vs diminshed to left consistently LS and insp/exp wheezes fairly consistently to right. PP weak, cool skin. Temp blanket cooling d/t fever. ST most the night 110-130's on epi gtt and levo gtt- have had to slowly titrate those up this AM. GI- larger liquid stool rectal tube placed bs faint and active. UO slowly diminished off between 45-60 q.2 hrs the last 6hrs (albumin given per MD order) Pt explained care throughout shift period family not present during this shift period.

## 2019-09-16 NOTE — PHARMACY-CONSULT NOTE
Pharmacy Vancomycin Note  Date of Service 2019  Patient's  1965   54 year old, female    Indication: [facility]ated Pneumonia  Goal Trough Level: 15-20 mg/L  Day of Therapy: 3  Current Vancomycin regimen:  2000 mg IV q12h    Current estimated CrCl = Estimated Creatinine Clearance: 151 mL/min (based on SCr of 0.57 mg/dL).    Creatinine for last 3 days  2019: 12:13 AM Creatinine 0.43 mg/dL;  6:10 AM Creatinine 0.37 mg/dL;  9:55 AM Creatinine 0.50 mg/dL;  4:35 PM Creatinine 0.78 mg/dL  9/15/2019:  6:09 AM Creatinine 0.60 mg/dL;  3:12 PM Creatinine 0.80 mg/dL  2019:  4:40 AM Creatinine 0.57 mg/dL    Recent Vancomycin Levels (past 3 days)  9/15/2019:  3:12 PM Vancomycin Level 18.0 mg/L  2019:  1:03 PM Vancomycin Level 24.2 mg/L    Vancomycin IV Administrations (past 72 hours)                   vancomycin (VANCOCIN) 2,000 mg in sodium chloride 0.9 % 500 mL intermittent infusion (mg) 2,000 mg New Bag 19 0352     2,000 mg New Bag 09/15/19 1705     2,000 mg New Bag  0417     2,000 mg New Bag 19 1551     2,000 mg New Bag  0447                Nephrotoxins and other renal medications (From now, onward)    Start     Dose/Rate Route Frequency Ordered Stop    19 2200  vancomycin (VANCOCIN) 2,000 mg in sodium chloride 0.9 % 500 mL intermittent infusion      2,000 mg  over 2 Hours Intravenous EVERY 18 HOURS 19 1641      09/15/19 0930  vasopressin (VASOSTRICT) 40 Units in D5W 40 mL infusion      2.4 Units/hr  2.4 mL/hr  Intravenous CONTINUOUS 09/15/19 0921      19 0730  norepinephrine (LEVOPHED) 16 mg in  mL infusion      0.03-0.2 mcg/kg/min × 100.8 kg  2.8-18.9 mL/hr  Intravenous CONTINUOUS 19 0723 19 0729    19 0600  piperacillin-tazobactam (ZOSYN) 4.5 g vial to attach to  mL bag      4.5 g  over 30 Minutes Intravenous EVERY 6 HOURS 19 0328               Contrast Orders - past 72 hours (72h ago, onward)    Start      Dose/Rate Route Frequency Ordered Stop    09/16/19 1315  perflutren diluted 1mL to 2mL with saline (OPTISON) diluted injection 9 mL     Note to Pharmacy:  NDC# 5346-6971-82    9 mL Intravenous ONCE 09/16/19 1311 09/16/19 1312    09/14/19 1815  iopamidol (ISOVUE-370) solution 78 mL      78 mL Intravenous ONCE 09/14/19 1804 09/14/19 1845    09/14/19 1415  perflutren diluted 1mL to 2mL with saline (OPTISON) diluted injection 3 mL      3 mL Intravenous ONCE 09/14/19 1401 09/14/19 1415    09/14/19 0032  iopamidol (ISOVUE-370) solution 78 mL      78 mL Intravenous ONCE 09/14/19 0031 09/14/19 0032          Interpretation of levels and current regimen:  Trough level is  Supratherapeutic    Has serum creatinine changed > 50% in last 72 hours: No    Urine output:  diminished urine output    Renal Function: Worsening    Plan:  1.  Decrease Dose to 2000 mg IV q18 hours  2.  Pharmacy will check trough levels as appropriate in 1-3 Days.    3. Serum creatinine levels will be ordered daily for the first week of therapy and at least twice weekly for subsequent weeks.      Lubna Nayak RPH        .

## 2019-09-16 NOTE — PROVIDER NOTIFICATION
Veletri started at 1012 with RT present for verification of rate/dose.     09/16/19 1015   Vitals   Temp 100.2  F (37.9  C)   Heart Rate 138   Resp 25   Art Line   Arterial Line /66   Arterial Line MAP (mmHg) 81 mmHg   Oxygen Therapy   SpO2 100 %   O2 Device Mechanical Ventilator   FiO2 (%) 65 %

## 2019-09-16 NOTE — PROVIDER NOTIFICATION
Critical Lactic acid of 5.4. Dr. Ireland notified. No new orders at this time. Will continue to monitor and report back.

## 2019-09-17 NOTE — PROGRESS NOTES
[facility]    Infectious Disease Progress Note    Date of Service (when I saw the patient): 09/17/2019     Assessment & Plan   Megha Campbell is a 54 year old female who was admitted on 9/13/2019.     Impression:  1. 54 y.o female with history of metastatic breast cancer. S/p resection and radiation but declined chemotherapy.   2. Multiple hospitalizations since then.   3. Recent hospitalization with recurrent pleural effusion, s/p VATS with pleurodesis.   4. Admitted this occasion with progressive respiratory failure.  5. Had cardiac arrest afternoon of 9/14 but had ROSC.   6. On pressors.   7. Fever, bilateral pulmonary infiltrates.   8. Procal essentially negative. GPC in the sputum cultures.   9. Currently on Vanco, zosyn and doxycycline.   10. Fungal antibodies pending.      Recommendations:   Continue on broad spectrum antibiotics.   Follow up on various pending cultures, serology.             Misha Lawson MD    Interval History   t max of 100     Physical Exam   Temp: 99.3  F (37.4  C) Temp src: Bladder BP: (!) 163/97 Pulse: 109 Heart Rate: 114 Resp: 26 SpO2: 94 % O2 Device: Mechanical Ventilator    Vitals:    09/15/19 0300 09/16/19 0600 09/17/19 0400   Weight: 110.9 kg (244 lb 7.8 oz) 113.6 kg (250 lb 7.1 oz) 120.1 kg (264 lb 12.4 oz)     Vital Signs with Ranges  Temp:  [93.7  F (34.3  C)-100  F (37.8  C)] 99.3  F (37.4  C)  Pulse:  [] 109  Heart Rate:  [] 114  Resp:  [11-26] 26  BP: ()/() 163/97  MAP:  [71 mmHg-118 mmHg] 109 mmHg  Arterial Line BP: ()/() 147/90  FiO2 (%):  [50 %-60 %] 60 %  SpO2:  [90 %-100 %] 94 %    Constitutional: Intubated and sedated   Lungs: Clear to auscultation bilaterally, no crackles or wheezing  Cardiovascular: Regular rate and rhythm, normal S1 and S2, and no murmur noted  Abdomen: Normal bowel sounds, soft, non-distended, non-tender  Skin: No rashes, no cyanosis, no edema  Other:    Medications     amiodarone 0.5 mg/min  (09/17/19 1032)     cisatracurium (NIMBEX) infusion ADULT 4 mcg/kg/min (09/17/19 1031)     IV fluid REPLACEMENT ONLY       EPINEPHrine IV infusion ADULT Stopped (09/16/19 1515)     epoprostenol (VELETRI) 20 mcg/mL in sterile water inhalation solution 20 ng/kg/min (09/17/19 0933)     fentaNYL 50 mcg/hr (09/17/19 0700)     HEParin 1,000 Units/hr (09/17/19 0835)     insulin (regular) 3 Units/hr (09/17/19 1000)     ketamine 50 mg/mL ADULT 35 mg/hr (09/17/19 0700)     norepinephrine Stopped (09/16/19 1348)     propofol (DIPRIVAN) infusion Stopped (09/15/19 1545)     sodium chloride 40 mL/hr at 09/16/19 2311     vasopressin (PITRESSIN) infusion ADULT (40 mL) Stopped (09/16/19 2357)       acetaminophen  650 mg Oral Q6H     arformoterol  15 mcg Nebulization Q12H     artificial tears   Both Eyes BID     calcium chloride  1 g Intravenous Once     chlorhexidine  15 mL Mouth/Throat Q12H     doxycycline (VIBRAMYCIN) IV  100 mg Intravenous Q12H     LORazepam  1 mg Intravenous Q6H     methylPREDNISolone  62.5 mg Intravenous Q8H     piperacillin-tazobactam  4.5 g Intravenous Q6H     rantidine  150 mg Per Feeding Tube BID     sodium bicarbonate  50 mEq Intravenous Once     sodium chloride (PF)  3 mL Intracatheter Q8H     vancomycin (VANCOCIN) IV  2,000 mg Intravenous Q18H       Data   All microbiology laboratory data reviewed.  Recent Labs   Lab Test 09/17/19  0415 09/16/19  1210 09/15/19  0609   WBC 17.5* 15.0* 15.4*   HGB 10.4* 9.7* 10.4*   HCT 33.9* 31.9* 33.5*   MCV 80 80 81    238 365     Recent Labs   Lab Test 09/17/19  0415 09/16/19  0440 09/15/19  1512   CR 0.56 0.57 0.80     No lab results found.  Recent Labs   Lab Test 09/14/19  0755 09/14/19  0037 09/14/19  0014 09/04/19  0515 08/21/19  1345 08/20/19  0833 08/20/19  0656 08/16/19  1455 08/16/19  0832   CULT Light growth  Normal kristine    Canceled, Test credited  Incorrectly ordered by PCU/Clinic   No growth after 3 days No growth after 3 days No growth No growth No  growth No growth No growth No growth       Attestation:  Total time on the floor involved in the patient's care: 35 minutes. Total time spent in counseling/care coordination: >50%

## 2019-09-17 NOTE — PROGRESS NOTES
Called to bedside to see patient for desaturations, bradycardia and hypotension.  Sats had fallen to 70s recalcitrant to 100% fio2, velitri and peep.  I attempted to bag the patient up but sats did not improve.  She became increasingly bradycardic. Family including her son Delta was at bedside--I explained that them that sats weren't improving and there was very little else I could do.  Within moments she became pulseless; her and her family's wishes for DNR were respected.   service offered.  Autopsy declined.  Time of death 15:44.

## 2019-09-17 NOTE — PROGRESS NOTES
CLINICAL NUTRITION SERVICES - REASSESSMENT NOTE      Recommendations Ordered by Registered Dietitian (RD):   Increase TF Peptamen Intense VHP now to 30 mL/hr;  Increase by 15 mL every 12 hrs to goal 60 mL/hr = 1440 kcals (14 kcal/kg), 132 gm pro (2.1 gm/kg and 104% pro needs), 1210 mL H20, 6 gm fiber, 109 gm CHO  Total (TF + D5 in med);  1562 kcals (16 kcal/kg)   Malnutrition:   % Weight Loss:  Weight loss does not meet criteria for malnutrition   % Intake:  </= 50% for >/= 5 days (severe malnutrition)  Subcutaneous Fat Loss:  None observed  Muscle Loss:  None observed  Fluid Retention:  Mild - Could be partly nutrition due to lack of protein    Malnutrition Diagnosis: Non-Severe malnutrition  In Context of:  Acute illness or injury       EVALUATION OF PROGRESS TOWARD GOALS   Diet:  NPO on vent    Nutrition Support:  Trickle TF was started on  as below:    Nutrition Support Enteral:  Type of Feeding Tube:  NG  Enteral Frequency:  Continuous  Enteral Regimen:  Peptamen Intense VHP at 15 mL/hr  Total Enteral Provisions:  360 kcals, 33 gm pro (0.5 gm/kg and 35% protein needs), 302 mL H20, 1 gm fiber, 27 gm CHO   Free Water Flush:  60 mL every 4 hrs    Propofol off on 9/15.    Amiodorone in D5 IVF running at 30 mL/hr = 36 gm CHO, 122 kcals  Total (TF + D5 in med):  482 kcals (5 kcal/kg and 34% energy needs    Intake/Tolerance:    Stool:  x6 and 150 mL yesterday (rectal tube placed).  This is slowing down today per RN.  Refeeding labs have been as follows (9/15-):  K:  3.7 --> 3.2 (L) --> 3.9 - acceptable  M.3 --> 2.7 (H) --> 3.6 (H)  Phos:  2.0 (L) --> 2.6 --> 2.0 (L) --> got IV bump  Wt:  120.1 kg (up 19.3 kg since admission).  Pt with chronic lymphedema.  Has 2+ dependent edema and 3+ edema in feet.      ASSESSED NUTRITION NEEDS PER APPROVED PRACTICE GUIDELINES:     Dosing Weight 100 kg (energy); 63.6 kg (protein)  Estimated Energy Needs: 8609-2432 kcals (14-17 Kcal/Kg)  Justification: obese and  vented  Estimated Protein Needs:  grams protein (1.5-2 g pro/Kg)  Justification: obesity guidelines and preservation of lean body mass      NEW FINDINGS:   Nutrition History obtained from son Delta:  Patient does her own shopping and cooking at home.  Before she got sick, she was eating a Regular diet with 3-4 meals per day.  Over the past 2.5 weeks she hasn't been able to eat solids foods due to a very poor appetite.  She was taking liquids only such as juice, ice, and water.  She became very weak and deconditioned.    3 pressors off yesterday  Nimbex - for paralytic  Insulin drip - for glycemic control  Lasix - for diuresis    Physical exam revealed no muscle or fat loss in upper part of body.    Per MD note, suspect ARDS.  Overall grim prognosis noted in MD notes.    Previous Goals (9/14):   EN to begin within 24 hrs   Evaluation: Met    Previous Nutrition Diagnosis (9/14):   Inadequate protein-energy intake related to NPO, vented as evidenced by receiving 0% estimated protein and 40% estimated energy from propofol   Evaluation: Improving      MALNUTRITION  % Weight Loss:  Weight loss does not meet criteria for malnutrition   % Intake:  </= 50% for >/= 5 days (severe malnutrition)  Subcutaneous Fat Loss:  None observed  Muscle Loss:  None observed  Fluid Retention:  Mild - Could be partly nutrition due to lack of protein    Malnutrition Diagnosis: Non-Severe malnutrition  In Context of:  Acute illness or injury    CURRENT NUTRITION DIAGNOSIS  Inadequate enteral nutrition infusion related to low TF rate as evidenced by TF + D5 in med meeting only 1/3 energy and protein needs    INTERVENTIONS  Recommendations / Nutrition Prescription  Increase TF Peptamen Intense VHP now to 30 mL/hr;  Increase by 15 mL every 12 hrs to goal 60 mL/hr = 1440 kcals (14 kcal/kg), 132 gm pro (2.1 gm/kg and 104% pro needs), 1210 mL H20, 6 gm fiber, 109 gm CHO  Total (TF + D5 in med):  1562 kcals (16  kcal/kg)      Implementation  Collaboration and Referral of Nutrition care - Discussed pt during ICU interdisciplinary rounds this morning and later with Dr. Bundy who approved TF rate increase today towards goal  EN Schedule - TF orders entered as above  Nutrition Education - Provided education to luisa Sorenson on TF process and components.  Answered multiple questions that he had regarding TF tolerance, protein and sugar content of TF.    Goals  TF goal Peptamen Intense VHP at 60 mL/hr (with or without D5 IVF in med) will meet % estimated needs.      MONITORING AND EVALUATION:  Progress towards goals will be monitored and evaluated per protocol and Practice Guidelines    Katy Petit, RD, LD, CNSC

## 2019-09-17 NOTE — PROGRESS NOTES
[facility]ology Progress Note     Primary Oncologist/Hematologist:            Assessment and Plan:     Ms. Megha Campbell is a 54 year old woman with a history of metastatic breast cancer who was admitted on 9/13 with respirtaory failure     1. Respiratory failure with CT findings showing extensive pulmonary infiltrates  - her condition has declined since admission and she is currently intubated and requiring increasing support  - prognosis poor and her family is currently weighing different options for limiting care  - No family present at this time.      2. Stage III breast cancer, now metastatic with malignant pleural effusion.  Treatment as summarized in her records.  - she has previously been offered chemotherapy but declined  - she is not strong enough to tolerate or benefit from chemotherapy     3. DVT on heparin     CODE STATUS: DNR            Interval History:   Sedated, intubated. No family present              Review of Systems:   The 5 point Review of Systems is negative other than noted in the HPI             Medications:   Scheduled Medications    acetaminophen  650 mg Oral Q6H     arformoterol  15 mcg Nebulization Q12H     artificial tears   Both Eyes BID     calcium chloride  1 g Intravenous Once     chlorhexidine  15 mL Mouth/Throat Q12H     doxycycline (VIBRAMYCIN) IV  100 mg Intravenous Q12H     LORazepam  1 mg Intravenous Q6H     methylPREDNISolone  62.5 mg Intravenous Q8H     piperacillin-tazobactam  4.5 g Intravenous Q6H     rantidine  150 mg Per Feeding Tube BID     sodium bicarbonate  50 mEq Intravenous Once     sodium chloride (PF)  3 mL Intracatheter Q8H     vancomycin (VANCOCIN) IV  2,000 mg Intravenous Q18H     PRN Medications  acetaminophen, bisacodyl, IV fluid REPLACEMENT ONLY, glucose **OR** dextrose **OR** glucagon, fentaNYL, levalbuterol, lidocaine 4%, lidocaine (buffered or not buffered), magnesium sulfate, magnesium sulfate, melatonin, metoclopramide **OR**  metoclopramide, naloxone, nitroGLYcerin, ondansetron **OR** ondansetron, polyethylene glycol, potassium chloride, potassium chloride with lidocaine, potassium chloride, potassium chloride, potassium chloride, prochlorperazine **OR** prochlorperazine **OR** prochlorperazine, propofol (DIPRIVAN) infusion **AND** propofol **AND** CK total **AND** Triglycerides, senna-docusate **OR** senna-docusate, sodium chloride (PF), sodium phosphate, sodium phosphate, sodium phosphate, sodium phosphate               Physical Exam:   Vitals were reviewed  Blood pressure (!) 163/97, pulse 109, temperature 99.7  F (37.6  C), resp. rate 26, weight 120.1 kg (264 lb 12.4 oz), last menstrual period 12/07/2017, SpO2 92 %, not currently breastfeeding.  Wt Readings from Last 4 Encounters:   09/17/19 120.1 kg (264 lb 12.4 oz)   09/12/19 108.9 kg (240 lb)   09/11/19 111.2 kg (245 lb 1.6 oz)   09/04/19 104.3 kg (230 lb)       I/O last 3 completed shifts:  In: 3669.58 [I.V.:2484.58; NG/GT:855]  Out: 850 [Urine:700; Stool:150]                    Data:   All laboratory data and imaging studies reviewed.    CMP  Recent Labs   Lab 09/17/19  0415 09/17/19  0000 09/16/19  1210 09/16/19  0440 09/15/19  1512 09/15/19  0609 09/14/19  1635     --   --  143 142 137 140   POTASSIUM 3.9 3.4 3.5 3.2* 3.5 3.7 4.8   CHLORIDE 105  --   --  108 106 103 101   CO2 25  --   --  28 26 28 28   ANIONGAP 8  --   --  7 10 6 11   *  --   --  149* 138* 205* 293*   BUN 29  --   --  28 23 17 15   CR 0.56  --   --  0.57 0.80 0.60 0.78   GFRESTIMATED >90  --   --  >90 84 >90 87   GFRESTBLACK >90  --   --  >90 >90 >90 >90   PABLO 8.1*  --   --  8.5 8.0* 8.1* 8.7   MAG 3.6* 2.4*  --  2.7* 2.4* 2.3  --    PHOS 2.0*  --   --  2.6 2.9 2.0*  --    PROTTOTAL 6.2*  --   --   --  6.2* 6.2* 6.3*   ALBUMIN 3.0*  --   --   --  2.7* 2.0* 2.0*   BILITOTAL 0.6  --   --   --  0.7 0.4 0.4   ALKPHOS 171*  --   --   --  114 91 94   *  --   --   --  305* 87* 46*   *  --    --   --  132* 53* 32     CBC  Recent Labs   Lab 09/17/19  0415 09/16/19  1210 09/15/19  0609 09/14/19  1635   WBC 17.5* 15.0* 15.4* 11.9*   RBC 4.26 4.00 4.15 4.40   HGB 10.4* 9.7* 10.4* 10.8*   HCT 33.9* 31.9* 33.5* 37.5   MCV 80 80 81 85   MCH 24.4* 24.3* 25.1* 24.5*   MCHC 30.7* 30.4* 31.0* 28.8*   RDW 16.8* 16.5* 16.0* 15.9*    238 365 331     INR  Recent Labs   Lab 09/15/19  0609 09/14/19  1635 09/14/19  0013   INR 1.22* 1.37* 1.27*           Ramos Narayan CNP  Nurse Practitioner  [facility]  [phone]

## 2019-09-17 NOTE — PLAN OF CARE
N: PERRL; sluggish. Remains paralyzed on nimbex. Fentanyl and ketamine for analgesia and sedation. Afebrile.  CV: Still having intermittent SVT but much improved since starting amio gtt. Off all pressors.  Pulm: Lung sounds wheezy on the right, clear/diminished on the left. Tolerating full vent support.  GI/: Trickle feed running. Hypoactive bowel sounds. High patent with borderline low UO: MD aware.  Endocrine: Remains on insulin gtt to keep BG < 150.  Skin: Healing CT sites present. Intact.    Pt's sons present last evening, updated on plan of care. Will continue to monitor closely.

## 2019-09-17 NOTE — PROGRESS NOTES
Pt was exctubated at 15:45 due to asystole and MD order.   Pt had passed at 1544 per Dr Bundy.     9/17/2019  Adrianne Ingram, RT

## 2019-09-17 NOTE — PROVIDER NOTIFICATION
DATE:  9/17/2019   TIME OF RECEIPT FROM LAB:  0445  LAB TEST:  AST  LAB VALUE: 633  RESULTS GIVEN WITH READ-BACK TO (PROVIDER):  Dr. Rawls  TIME LAB VALUE REPORTED TO PROVIDER:  1522

## 2019-09-17 NOTE — PROVIDER NOTIFICATION
MD Notification    Notified Person: Intensivist    Notified Person Name: Dr. Rawls    Notification Date/Time: 9/17 0230    Notification Interaction: Talked with MD.    Purpose of Notification: Pt remains in SVT following administration of digoxin.    Orders Received: Amiodarone infusion at 0.5 mcg/min; no bolus.

## 2019-09-17 NOTE — PROVIDER NOTIFICATION
MD Notification    Notified Person: Intensivist    Notified Person Name: Dr. Rawls    Notification Date/Time: 9/17 0150    Notification Interaction: Talked with MD.     Purpose of Notification: Pt remains in SVT.    Orders Received: 125 mcg digoxin x 1.

## 2019-09-17 NOTE — PROGRESS NOTES
[facility] ICU RESPIRATORY NOTE        Date of Admission: 9/13/2019    Date of Intubation (most recent): 9/14/19    Reason for Mechanical Ventilation: Respiratory failure    Number of Days on Mechanical Ventilation: 4    Met Criteria for Pressure Support Trial: No    Significant Events Today:   Remains on full strength Veletri.  PEEP at 34xwR1Q.  Has had episodes of desaturation with turns today, slow to recover SpO2 wise.    Plateau pressures 49-83neM5G today.  MD aware.  Vt decreased to 280mL by Dr. Bundy.      ABG Results:   Recent Labs   Lab 09/17/19  0415 09/17/19  0000 09/16/19  1825 09/16/19  1300 09/16/19  1257  09/15/19  1606   PH 7.35 7.37 7.40  --  7.39   < > 7.41   PCO2 44 43 40  --  41   < > 40   PO2 94 64* 66*  --  87   < > 101   HCO3 24 25 25  --  25   < > 25   O2PER  --   --  Ventilator 60% 60%  --  6L    < > = values in this interval not displayed.       Current Vent Settings: Ventilation Mode: CMV/AC  (Continuous Mandatory Ventilation/ Assist Control)  FiO2 (%): 60 %  Rate Set (breaths/minute): 26 breaths/min  Tidal Volume Set (mL): 280 mL (Vt adjusted by Dr. Bundy)  PEEP (cm H2O): 10 cmH2O  Oxygen Concentration (%): 60 %  Resp: 26      Plan: Continue ventilatory support.    Lesly Esteves, RT

## 2019-09-17 NOTE — PLAN OF CARE
Pt remains on full vent support, veletri started today with improvement in vital signs. Nimbex adjusted to meet goal of 2-3/4 twitches. Weaned off Norepi and Epi, vaso still at 2.4 units. Runs of SVT this evening following Neb administration. LS coarse with insp/exp wheezes. Watery stools, sample sent for Cdiff. Flexiseal in place but some leakage around.    Son at bedside throughout day, questions answered and support provided. Denied need for  services but support from Patient's [employer] will come visit.

## 2019-09-17 NOTE — PROGRESS NOTES
Medical ICU Cross Cover Note:    Called to bedside for SVT with rate alternating between 120 and 200.  Her blood pressure remained stable throughout the runs with MAP ~100.  Previously coded with 5 mg of metoprolol.  Vagal maneuvers attempted with no change.  Gave 4 mg of Mg over 15 min.      Was also notified of decreased UOP to 10-15 per hour over shift.  Has tenuous fluid volume status so avoiding diuresis at this time.  Will continue to reassess.  Sandip K, Mg, ionized Ca, lactate, and ABG.    If continuing SVT, then will consider amiodarone or digoxin.    K of 3.4, Mg of 2.4, ionized Ca of 4.7, and lactic acid of 2.8.  ABG showed pH of 7.37, Co2 43, O2 64, and HCO3 25.    Nurys Grande, MS4  Patient was staffed with Dr. Rawls.    Agree with above  Jesus Rawls MD        151 am - hr still 191, will  1x dose digoxin      647  Still elevated HR , thus amio gtt (no bolus) started at 0.5    Jesus Rawls MD

## 2019-09-17 NOTE — PROGRESS NOTES
Critical Care Progress Note      09/17/2019    Name: Megha Campbell MRN#: [id number]   Age: 54 year old YOB: 1965     Hsptl Day# 3  ICU DAY #3    MV DAY #3             Problem List:   Active Problems:    Acute and chronic respiratory failure with hypoxia (H)    Acute kidney failure, unspecified (H)  Shock, etiology unknown but most likely septic--now resolved  S/p cardiac arrest  joshua  ARDS         Summary/Hospital Course:   54 year old  female presented to the ER with shortness of breath, hypoxia to 70% Sats on room air (apparently she is on 4L nasal cannula oxygen at home).  There have been recent hospitalizations. Recently she had been on BIPAP on the floor but she progressively got worse. She was transferred to the ICU and got immediately intubated.     Patient had a cardiac arrest afternoon of 9/14 but had ROSC and currently on pressors. It is not clear as to the etiology of the cardiac arrest but it is likely an arrythmia in the presence of severe septic shock.   The son was notified of the situation     Since her cardiac arrest, her condition has remained very unstable.  She continues to have high pressor needs and she continues to require high levels of ventilatory support.  Despite low tidal volume ventilation she has plateau pressures in the 40s.  Worsening UOP.    Code status was changed to DNR after long discussion with the son.        Assessment and plan :      Megha Campbell IS a 54 year old female admitted on 9/13/2019 for acute on chronic respiratory failure in the presence of septic shock  I have personally reviewed the daily labs, imaging studies, cultures and discussed the case with referring physician and consulting physicians.      My assessment and plan by system for this patient is as follows:     Neurology/Psychiatry:   1. Intubated and sedated  2. Pain/analgesia: fentanyl/ketamine/propofol  3. Anxiety: on zoloft and seroquel at home (holding here)  4. S/p cardiac  arrest:  Neuro status remains unknown.     Cardiovascular:   1.  Shock:  Etiology unclear, but suspect R heart failure may have been playing a role.  Now off pressors with iflolan.  Resolved today.  2.  Lactic acidosis:  Slowly clearing today.  Abd exam benign.      Pulmonary/Ventilator Management:   1. Acute hypoxemic respiratory failure, vent dependent:  Strong suspicion for ARDS though inciting insult unclear.  Continue vanco/zosyn/doxy.  Continue low tidal volume ventilation, inhaled velitri and paralysis.  Not an ecmo candidate (see Dr. Garcia' notes).  Will prone if oxygenation worsens, but has been stable for now.    GI and Nutrition :   1. Enteral feedings started  2. Ranitidine for PUD prophy  3.  Elevated LFTs:  339/633--suspect shock liver vs congestive hepatopathy.  Trend.     Renal/Fluids/Electrolytes:   1.  Oliguria:  Following cardiac arrest.  Creat has been stable 0.56.  2.  Initiate aggressive diuresis today.       Infectious Disease:   1. ?pneumonia?  Cultures pending.  Empiric zosyn, vanc, doxy  2. procalcitonin 0.10   Plan  - Continue antibiotics     Endocrine:   1. Stress induced hyperglycemia  Plan  - ICU insulin protocol, goal sugar <180     Hematology/Oncology:   1.  Anemia, stable, no signs, symptoms of active blood loss-- hgb 10.4 ok.  2.  Leukocytosis:  Treating for infection, also on steroids. -- wbc 17.5 ok  3.  RLE DVT:  Heparin drip  4.  pltls ok 249  Plan  - continue to monitor       IV/Access:   1. Venous access - PICC line, Has a PORT as well  2. Arterial access - right radial placed  3.  Plan  - central access required and necessary        ICU Prophylaxis:   1. DVT:heparin infusion for RLE DVT/ mechanical  2. VAP: HOB 30 degrees, chlorhexidine rinse  3. Stress Ulcer: PPI  4. Restraints: Nonviolent soft two point restraints required and necessary for patient safety and continued cares and good effect as patient continues to pull at necessary lines, tubes despite education and  distraction. Will readdress daily.   6. Feeding - on feeds  7. Family Update: see below  8. Disposition - ICU      Family update:  Met with ex- and 3 sons (including prime decision maker Delta) in family meeting.  The patient is unable to make her own decisions due to intubation, and further input is required to guide medical care.  We again discussed her tenuous condition due to respiratory failure, and her overall poor prognosis.  She has achieved hemodynamic stability overnight so I am initiating diuresis today.  I think if this is effective it is her best course for recovery, but I am unfortunately not optimistic.  We discussed this, as well as the possibility of eventually needing a trach if she were to have some degree of recovery and continued aggressive cares were c/w goals of care.  We will continue to take things day by day; she remains DNR in the meantime.         Key Medications:       acetaminophen  650 mg Oral Q6H     arformoterol  15 mcg Nebulization Q12H     artificial tears   Both Eyes BID     calcium chloride  1 g Intravenous Once     chlorhexidine  15 mL Mouth/Throat Q12H     doxycycline (VIBRAMYCIN) IV  100 mg Intravenous Q12H     furosemide  40 mg Intravenous Q8H     methylPREDNISolone  62.5 mg Intravenous Q8H     piperacillin-tazobactam  4.5 g Intravenous Q6H     rantidine  150 mg Per Feeding Tube BID     sodium bicarbonate  50 mEq Intravenous Once     sodium chloride (PF)  3 mL Intracatheter Q8H     vancomycin (VANCOCIN) IV  2,000 mg Intravenous Q18H       amiodarone 0.5 mg/min (09/17/19 1032)     cisatracurium (NIMBEX) infusion ADULT 4 mcg/kg/min (09/17/19 1031)     IV fluid REPLACEMENT ONLY       EPINEPHrine IV infusion ADULT Stopped (09/16/19 1515)     epoprostenol (VELETRI) 20 mcg/mL in sterile water inhalation solution 20 ng/kg/min (09/17/19 1207)     fentaNYL 50 mcg/hr (09/17/19 0700)     HEParin 1,000 Units/hr (09/17/19 0835)     insulin (regular) 4 Units/hr (09/17/19 1300)      ketamine 50 mg/mL ADULT 35 mg/hr (09/17/19 0700)     norepinephrine Stopped (09/16/19 1348)     propofol (DIPRIVAN) infusion Stopped (09/15/19 1545)     sodium chloride 30 mL/hr at 09/17/19 1109     vasopressin (PITRESSIN) infusion ADULT (40 mL) Stopped (09/16/19 2357)               Physical Examination:   Temp:  [93.7  F (34.3  C)-100.9  F (38.3  C)] 100.9  F (38.3  C)  Pulse:  [] 128  Heart Rate:  [] 128  Resp:  [11-26] 26  BP: ()/() 165/79  MAP:  [71 mmHg-118 mmHg] 94 mmHg  Arterial Line BP: ()/() 107/83  FiO2 (%):  [50 %-60 %] 60 %  SpO2:  [90 %-99 %] 92 %    Intake/Output Summary (Last 24 hours) at 9/17/2019 1400  Last data filed at 9/17/2019 1300  Gross per 24 hour   Intake 4100.15 ml   Output 820 ml   Net 3280.15 ml           Wt Readings from Last 4 Encounters:   09/17/19 120.1 kg (264 lb 12.4 oz)   09/12/19 108.9 kg (240 lb)   09/11/19 111.2 kg (245 lb 1.6 oz)   09/04/19 104.3 kg (230 lb)     Arterial Line BP: ()/() 107/83  MAP:  [71 mmHg-118 mmHg] 94 mmHg  BP - Mean:  [] 121  Ventilation Mode: CMV/AC  (Continuous Mandatory Ventilation/ Assist Control)  FiO2 (%): 60 %  Rate Set (breaths/minute): 26 breaths/min  Tidal Volume Set (mL): 280 mL (Vt adjusted by Dr. Bundy)  PEEP (cm H2O): 10 cmH2O  Oxygen Concentration (%): 60 %  Resp: 26    Recent Labs   Lab 09/17/19  0415 09/17/19  0000 09/16/19  1825 09/16/19  1300 09/16/19  1257  09/15/19  1606   PH 7.35 7.37 7.40  --  7.39   < > 7.41   PCO2 44 43 40  --  41   < > 40   PO2 94 64* 66*  --  87   < > 101   HCO3 24 25 25  --  25   < > 25   O2PER  --   --  Ventilator 60% 60%  --  6L    < > = values in this interval not displayed.       GEN: no acute distress, anasarca, sedated/paralyzed  HEENT: head ncat, sclera anicteric, OP patent, trachea midline, PERRL   PULM: unlabored synchronous with vent, clear anteriorly    CV/COR: RRR S1S2 no gallop,  No rub, no murmur  ABD: soft nontender, hypoactive bowel sounds, no  mass  EXT:  Edema   warm  NEURO: sedated, perrl, paralyzed chemically  SKIN: no obvious rash  LINES: clean, dry intact         Data:   All data and imaging reviewed     ROUTINE ICU LABS (Last four results)  CMP  Recent Labs   Lab 09/17/19  0415 09/17/19  0000 09/16/19  1210 09/16/19  0440 09/15/19  1512 09/15/19  0609 09/14/19  1635     --   --  143 142 137 140   POTASSIUM 3.9 3.4 3.5 3.2* 3.5 3.7 4.8   CHLORIDE 105  --   --  108 106 103 101   CO2 25  --   --  28 26 28 28   ANIONGAP 8  --   --  7 10 6 11   *  --   --  149* 138* 205* 293*   BUN 29  --   --  28 23 17 15   CR 0.56  --   --  0.57 0.80 0.60 0.78   GFRESTIMATED >90  --   --  >90 84 >90 87   GFRESTBLACK >90  --   --  >90 >90 >90 >90   PABLO 8.1*  --   --  8.5 8.0* 8.1* 8.7   MAG 3.6* 2.4*  --  2.7* 2.4* 2.3  --    PHOS 2.0*  --   --  2.6 2.9 2.0*  --    PROTTOTAL 6.2*  --   --   --  6.2* 6.2* 6.3*   ALBUMIN 3.0*  --   --   --  2.7* 2.0* 2.0*   BILITOTAL 0.6  --   --   --  0.7 0.4 0.4   ALKPHOS 171*  --   --   --  114 91 94   *  --   --   --  305* 87* 46*   *  --   --   --  132* 53* 32     CBC  Recent Labs   Lab 09/17/19  0415 09/16/19  1210 09/15/19  0609 09/14/19  1635   WBC 17.5* 15.0* 15.4* 11.9*   RBC 4.26 4.00 4.15 4.40   HGB 10.4* 9.7* 10.4* 10.8*   HCT 33.9* 31.9* 33.5* 37.5   MCV 80 80 81 85   MCH 24.4* 24.3* 25.1* 24.5*   MCHC 30.7* 30.4* 31.0* 28.8*   RDW 16.8* 16.5* 16.0* 15.9*    238 365 331     INR  Recent Labs   Lab 09/15/19  0609 09/14/19  1635 09/14/19  0013   INR 1.22* 1.37* 1.27*     Arterial Blood Gas  Recent Labs   Lab 09/17/19  0415 09/17/19  0000 09/16/19  1825 09/16/19  1300 09/16/19  1257  09/15/19  1606   PH 7.35 7.37 7.40  --  7.39   < > 7.41   PCO2 44 43 40  --  41   < > 40   PO2 94 64* 66*  --  87   < > 101   HCO3 24 25 25  --  25   < > 25   O2PER  --   --  Ventilator 60% 60%  --  6L    < > = values in this interval not displayed.       All cultures:  Recent Labs   Lab 09/14/19  0755  09/14/19  0037 09/14/19  0014   CULT Light growth  Normal kristine    Canceled, Test credited  Incorrectly ordered by PCU/Clinic   No growth after 3 days No growth after 3 days     Recent Results (from the past 24 hour(s))   XR Chest Port 1 View    Narrative    CHEST PORTABLE ONE VIEW September 17, 2019 10:22 AM     HISTORY: Hypoxia.     COMPARISON: CT angiogram of the chest 9/14/2019, chest radiograph  9/14/2019.      Impression    IMPRESSION: Endotracheal tube is positioned with tip in the mid  thoracic trachea. A feeding tube extends below the diaphragm. There is  a linear radiopaque device projecting immediately to the right lateral  [________] to the feeding tube, which may indicate a temperature probe  in the cervical esophagus. A right chest port is stable. There is a  new right PICC, with tip terminating in the low SVC/SVC-right atrial  junction.    Compared to most recent portable chest radiograph, extensive  consolidation in both lungs has worsened. However, given differences  in technique, the appearance is overall similar to most recent chest  CT angiogram. Bilateral pleural effusions are again present. Heart and  mediastinal contours are partially obscured, but grossly similar. No  other gross change.       Labs (personally reviewed/interpreted): see a/p      Billing: This patient is critically ill: Yes. Total critical care time today 60 min.

## 2019-09-17 NOTE — PROVIDER NOTIFICATION
MD Notification    Notified Person: Intensivist    Notified Person Name: Dr. Rawls    Notification Date/Time: 9/17 0000    Notification Interaction: Talked with MD.    Purpose of Notification: Pt having longer and more frequent runs of SVT.    Orders Received: 4mg Magnesium given over 10 minutes.

## 2019-09-17 NOTE — PROVIDER NOTIFICATION
MD Notification    Notified Person: Intensivist    Notified Person Name: Dr. Rawls    Notification Date/Time: 9/16 2200    Notification Interaction: Talked with MD.    Purpose of Notification: Pt continues to have self-limiting runs of SVT. Also has low UO approx 10-15 mL/hr.    Orders Received: None.    Comments: Will continue to monitor.

## 2019-09-18 LAB
ASPERGILLUS AB TITR SER CF: NORMAL {TITER}
B DERMAT AB SER-ACNC: 0.3 IV
COCCIDIOIDES AB TITR SER CF: NORMAL {TITER}
H CAPSUL MYC AB TITR SER CF: NORMAL {TITER}
H CAPSUL YST AB TITR SER CF: NORMAL {TITER}

## 2019-09-19 LAB
INTERPRETATION ECG - MUSE: NORMAL
INTERPRETATION ECG - MUSE: NORMAL

## 2019-09-19 NOTE — PROGRESS NOTES
CM (Post discharge entry)    I: TAMELA received request to meet with patient's son today regarding the burial assistance program thru [facility]. Son, Delta, presented to [facility] ICU, confirming to SW he wishes to proceed with cremation thru [place] [employer]. TAMELA spoke with rep at the [place] location () who stated a person must first select the  home they wish to use and then set up an appointment with one of the directors who then assists the family with accessing the funds thru [facility].  TAMELA provided this information and contact number to son. Patient's body reportedly remains in the [facility] morgue.     P: No further TAMELA interventions anticipated at this time.    JACKLYN Bueno

## 2019-09-20 LAB
BACTERIA SPEC CULT: NO GROWTH
BACTERIA SPEC CULT: NO GROWTH
Lab: NORMAL
Lab: NORMAL
SPECIMEN SOURCE: NORMAL
SPECIMEN SOURCE: NORMAL

## 2024-06-10 NOTE — ED AVS SNAPSHOT
Emergency Department  [address] [place] [address]  Phone:  [phone]  Fax:  [phone]                                    Megha Campbell   MRN: [id number]    Department:   Emergency Department   Date of Visit:  8/29/2019           After Visit Summary Signature Page    I have received my discharge instructions, and my questions have been answered. I have discussed any challenges I see with this plan with the nurse or doctor.    ..........................................................................................................................................  Patient/Patient Representative Signature      ..........................................................................................................................................  Patient Representative Print Name and Relationship to Patient    ..................................................               ................................................  Date                                   Time    ..........................................................................................................................................  Reviewed by Signature/Title    ...................................................              ..............................................  Date                                               Time          22EPIC Rev 08/18        never

## 2025-03-04 NOTE — PLAN OF CARE
OT: orders received and chart reviewed. Pt just admitted this afternoon. Will hold OT eval to allow for medical mgmt for optimal discharge planning. Will schedule for tomorrow.   patient denies

## (undated) DEVICE — PREP CHLORAPREP 26ML TINTED ORANGE  260815

## (undated) DEVICE — GLOVE PROTEXIS MICRO 6.0  2D73PM60

## (undated) DEVICE — SU SILK 2-0 FSL 18" 677G

## (undated) DEVICE — DRAIN CHEST TUBE 28FR STR 8028

## (undated) DEVICE — DRAPE POUCH INSTRUMENT 1018

## (undated) DEVICE — SU ETHILON 3-0 FS-1 18" 669H

## (undated) DEVICE — SUCTION CANISTER MEDIVAC LINER 3000ML W/LID 65651-530

## (undated) DEVICE — SU NDL CUT REV MED 3/8 209014

## (undated) DEVICE — COVER ULTRASOUND PROBE 6X48" PC1290

## (undated) DEVICE — ESU PENCIL W/HOLSTER E2350H

## (undated) DEVICE — SU MONOCRYL 4-0 PS-2 18" UND Y496G

## (undated) DEVICE — DRAPE IOBAN INCISE 23X17" 6650EZ

## (undated) DEVICE — SOL NACL 0.9% INJ 250ML BAG 2B1322Q

## (undated) DEVICE — DRAPE BREAST/CHEST 29420

## (undated) DEVICE — TUBING SUCTION MEDI-VAC SOFT 3/16"X20' N520A

## (undated) DEVICE — DRAIN CHEST TUBE RIGHT ANGLED 28FR 8128

## (undated) DEVICE — PACK MINOR SBA15MIFSE

## (undated) DEVICE — SUCTION TIP YANKAUER W/O VENT K86

## (undated) DEVICE — SU PROLENE 2-0 CT-2 30" 8411H

## (undated) DEVICE — BNDG ELASTIC 6" DBL LENGTH UNSTERILE 6611-16

## (undated) DEVICE — ENDO TROCAR THORACIC 10/12MM TT012

## (undated) DEVICE — SU VICRYL 3-0 SH 27" J316H

## (undated) DEVICE — LINEN TOWEL PACK X5 5464

## (undated) DEVICE — SPONGE LAP 18X18" X8435

## (undated) DEVICE — SLEEVE PROTECTIVE BREAST BIOPSY  GMSLV001-10

## (undated) DEVICE — SYR BULB IRRIG 50ML LATEX FREE 0035280

## (undated) DEVICE — ESU PENCIL W/SMOKE EVAC NEPTUNE STRYKER 0703-046-000

## (undated) DEVICE — DRSG STERI STRIP 1/4X3" R1541

## (undated) DEVICE — SU VICRYL 4-0 PS-2 18" UND J496H

## (undated) DEVICE — PAD CHUX UNDERPAD 23X24" 7136

## (undated) DEVICE — DRSG KERLIX 4 1/2"X4YDS ROLL 6715

## (undated) DEVICE — BLADE KNIFE SURG 11 371111

## (undated) DEVICE — ESU GROUND PAD UNIVERSAL W/O CORD

## (undated) DEVICE — DECANTER BAG 2002S

## (undated) DEVICE — GLOVE PROTEXIS BLUE W/NEU-THERA 6.5  2D73EB65

## (undated) DEVICE — ANTIFOG SOLUTION W/FOAM PAD 31142527

## (undated) DEVICE — GLOVE PROTEXIS W/NEU-THERA 7.5  2D73TE75

## (undated) DEVICE — DRSG STERI STRIP 1/2X4" R1547

## (undated) DEVICE — GOWN IMPERVIOUS ZONED LG

## (undated) DEVICE — SU VICRYL 2-0 CT-2 27" J333H

## (undated) DEVICE — GLOVE PROTEXIS W/NEU-THERA 6.5  2D73TE65

## (undated) DEVICE — SOL WATER IRRIG 1000ML BOTTLE 2F7114

## (undated) DEVICE — DECANTER VIAL 2006S

## (undated) DEVICE — SUCTION DRY CHEST DRAIN OASIS 3600-100

## (undated) DEVICE — DRSG KERLIX FLUFFS X5

## (undated) DEVICE — SURGICEL POWDER ABSORBABLE HEMOSTAT 3GM 3013SP

## (undated) DEVICE — DRSG GAUZE 4X4" 3033

## (undated) DEVICE — CLIP ETHICON LIGACLIP SM BLUE LT100

## (undated) DEVICE — SUTURE BOOTS 051003PBX

## (undated) DEVICE — SYR 30ML LL W/O NDL 302832

## (undated) DEVICE — SU VICRYL 1 CT-2 27" J335H

## (undated) DEVICE — NDL 22GA 1.5"

## (undated) DEVICE — KIT SURGICAL TURNOVER FVSD-01D

## (undated) DEVICE — SOL NACL 0.9% IRRIG 1000ML BOTTLE 07138-09

## (undated) DEVICE — ESU ELEC BLADE 6" COATED/INSULATED E1455-6

## (undated) DEVICE — DRAPE LAP TRANSVERSE 29421

## (undated) DEVICE — SOL NACL 0.9% IRRIG 1000ML BOTTLE 2F7124

## (undated) DEVICE — SYR 10ML SLIP TIP W/O NDL

## (undated) DEVICE — NDL 18GA 1.5" 305196

## (undated) DEVICE — DRAPE COVER C-ARM SEAMLESS SNAP-KAP 03-KP26 LATEX FREE

## (undated) DEVICE — SU SILK 2 REEL 60" SA8H

## (undated) DEVICE — ESU ELEC BLADE 2.75" COATED/INSULATED E1455

## (undated) DEVICE — SU VICRYL 3-0 TIE 12X18" J904T

## (undated) RX ORDER — PROPOFOL 10 MG/ML
INJECTION, EMULSION INTRAVENOUS
Status: DISPENSED
Start: 2018-01-01

## (undated) RX ORDER — ONDANSETRON 2 MG/ML
INJECTION INTRAMUSCULAR; INTRAVENOUS
Status: DISPENSED
Start: 2018-01-01

## (undated) RX ORDER — FENTANYL CITRATE 50 UG/ML
INJECTION, SOLUTION INTRAMUSCULAR; INTRAVENOUS
Status: DISPENSED
Start: 2019-01-01

## (undated) RX ORDER — LIDOCAINE HYDROCHLORIDE 20 MG/ML
INJECTION, SOLUTION EPIDURAL; INFILTRATION; INTRACAUDAL; PERINEURAL
Status: DISPENSED
Start: 2019-01-01

## (undated) RX ORDER — HYDROMORPHONE HYDROCHLORIDE 1 MG/ML
INJECTION, SOLUTION INTRAMUSCULAR; INTRAVENOUS; SUBCUTANEOUS
Status: DISPENSED
Start: 2018-01-01

## (undated) RX ORDER — BUPIVACAINE HYDROCHLORIDE 2.5 MG/ML
INJECTION, SOLUTION EPIDURAL; INFILTRATION; INTRACAUDAL
Status: DISPENSED
Start: 2018-01-01

## (undated) RX ORDER — FENTANYL CITRATE 50 UG/ML
INJECTION, SOLUTION INTRAMUSCULAR; INTRAVENOUS
Status: DISPENSED
Start: 2018-01-01

## (undated) RX ORDER — OXYCODONE HYDROCHLORIDE 5 MG/1
TABLET ORAL
Status: DISPENSED
Start: 2018-01-01

## (undated) RX ORDER — LIDOCAINE HYDROCHLORIDE 10 MG/ML
INJECTION, SOLUTION EPIDURAL; INFILTRATION; INTRACAUDAL; PERINEURAL
Status: DISPENSED
Start: 2018-01-01

## (undated) RX ORDER — CEFAZOLIN SODIUM 2 G/100ML
INJECTION, SOLUTION INTRAVENOUS
Status: DISPENSED
Start: 2018-01-01

## (undated) RX ORDER — LIDOCAINE HYDROCHLORIDE 20 MG/ML
INJECTION, SOLUTION EPIDURAL; INFILTRATION; INTRACAUDAL; PERINEURAL
Status: DISPENSED
Start: 2018-01-01

## (undated) RX ORDER — HYDROMORPHONE HYDROCHLORIDE 1 MG/ML
INJECTION, SOLUTION INTRAMUSCULAR; INTRAVENOUS; SUBCUTANEOUS
Status: DISPENSED
Start: 2019-01-01

## (undated) RX ORDER — PROPOFOL 10 MG/ML
INJECTION, EMULSION INTRAVENOUS
Status: DISPENSED
Start: 2019-01-01

## (undated) RX ORDER — CEFAZOLIN SODIUM 1 G/50ML
SOLUTION INTRAVENOUS
Status: DISPENSED
Start: 2019-01-01

## (undated) RX ORDER — IPRATROPIUM BROMIDE AND ALBUTEROL SULFATE 2.5; .5 MG/3ML; MG/3ML
SOLUTION RESPIRATORY (INHALATION)
Status: DISPENSED
Start: 2019-01-01

## (undated) RX ORDER — HYDROCODONE BITARTRATE AND ACETAMINOPHEN 5; 325 MG/1; MG/1
TABLET ORAL
Status: DISPENSED
Start: 2018-01-01

## (undated) RX ORDER — ACETAMINOPHEN 325 MG/1
TABLET ORAL
Status: DISPENSED
Start: 2018-01-01

## (undated) RX ORDER — DIPHENHYDRAMINE HCL 25 MG
CAPSULE ORAL
Status: DISPENSED
Start: 2018-01-01

## (undated) RX ORDER — BUPIVACAINE HYDROCHLORIDE 5 MG/ML
INJECTION, SOLUTION EPIDURAL; INTRACAUDAL
Status: DISPENSED
Start: 2019-01-01

## (undated) RX ORDER — DEXAMETHASONE SODIUM PHOSPHATE 4 MG/ML
INJECTION, SOLUTION INTRA-ARTICULAR; INTRALESIONAL; INTRAMUSCULAR; INTRAVENOUS; SOFT TISSUE
Status: DISPENSED
Start: 2018-01-01